# Patient Record
Sex: FEMALE | Race: WHITE | NOT HISPANIC OR LATINO | Employment: UNEMPLOYED | URBAN - METROPOLITAN AREA
[De-identification: names, ages, dates, MRNs, and addresses within clinical notes are randomized per-mention and may not be internally consistent; named-entity substitution may affect disease eponyms.]

---

## 2017-03-28 ENCOUNTER — ALLSCRIPTS OFFICE VISIT (OUTPATIENT)
Dept: OTHER | Facility: OTHER | Age: 42
End: 2017-03-28

## 2017-04-17 ENCOUNTER — HOSPITAL ENCOUNTER (INPATIENT)
Facility: HOSPITAL | Age: 42
LOS: 1 days | Discharge: HOME | DRG: 073 | End: 2017-04-19
Attending: EMERGENCY MEDICINE | Admitting: FAMILY MEDICINE
Payer: COMMERCIAL

## 2017-04-17 DIAGNOSIS — E11.69 DIABETES MELLITUS TYPE 2 IN OBESE (HCC): ICD-10-CM

## 2017-04-17 DIAGNOSIS — B00.7: ICD-10-CM

## 2017-04-17 DIAGNOSIS — B00.52 HERPES SIMPLEX KERATITIS OF LEFT EYE: ICD-10-CM

## 2017-04-17 DIAGNOSIS — F41.9 ANXIETY: ICD-10-CM

## 2017-04-17 DIAGNOSIS — I10 HYPERTENSION: ICD-10-CM

## 2017-04-17 DIAGNOSIS — IMO0001 IDDM (INSULIN DEPENDENT DIABETES MELLITUS): Primary | ICD-10-CM

## 2017-04-17 DIAGNOSIS — F32.A DEPRESSION: ICD-10-CM

## 2017-04-17 DIAGNOSIS — E66.9 DIABETES MELLITUS TYPE 2 IN OBESE (HCC): ICD-10-CM

## 2017-04-17 DIAGNOSIS — K13.79 MOUTH SORES: ICD-10-CM

## 2017-04-17 RX ORDER — LISINOPRIL AND HYDROCHLOROTHIAZIDE 20; 12.5 MG/1; MG/1
1 TABLET ORAL 2 TIMES DAILY
COMMUNITY
End: 2018-06-06 | Stop reason: SDUPTHER

## 2017-04-17 RX ORDER — PAROXETINE HYDROCHLORIDE 40 MG/1
40 TABLET, FILM COATED ORAL EVERY MORNING
Status: ON HOLD | COMMUNITY
End: 2018-01-15 | Stop reason: ALTCHOICE

## 2017-04-17 RX ORDER — DIVALPROEX SODIUM 500 MG/1
500 TABLET, DELAYED RELEASE ORAL 2 TIMES DAILY
Status: ON HOLD | COMMUNITY
End: 2017-04-18 | Stop reason: ALTCHOICE

## 2017-04-17 RX ORDER — INSULIN GLARGINE 100 [IU]/ML
30 INJECTION, SOLUTION SUBCUTANEOUS
COMMUNITY
End: 2018-06-06 | Stop reason: CLARIF

## 2017-04-17 RX ORDER — GABAPENTIN 800 MG/1
800 TABLET ORAL 3 TIMES DAILY
COMMUNITY
End: 2018-06-06 | Stop reason: SDUPTHER

## 2017-04-18 PROBLEM — K13.79 MOUTH SORES: Status: ACTIVE | Noted: 2017-04-18

## 2017-04-18 PROBLEM — F32.A DEPRESSION: Status: ACTIVE | Noted: 2017-04-18

## 2017-04-18 PROBLEM — F41.9 ANXIETY: Status: ACTIVE | Noted: 2017-04-18

## 2017-04-18 PROBLEM — IMO0001 IDDM (INSULIN DEPENDENT DIABETES MELLITUS): Status: ACTIVE | Noted: 2017-04-18

## 2017-04-18 PROBLEM — I10 HYPERTENSION: Status: ACTIVE | Noted: 2017-04-18

## 2017-04-18 PROBLEM — B00.7: Status: ACTIVE | Noted: 2017-04-18

## 2017-04-18 LAB
AMPHETAMINES SERPL QL SCN: NEGATIVE
ANION GAP SERPL CALCULATED.3IONS-SCNC: 8 MMOL/L (ref 4–13)
ANION GAP SERPL CALCULATED.3IONS-SCNC: 9 MMOL/L (ref 4–13)
BARBITURATES UR QL: NEGATIVE
BASOPHILS # BLD AUTO: 0 THOUSANDS/ΜL (ref 0–0.1)
BASOPHILS # BLD AUTO: 0 THOUSANDS/ΜL (ref 0–0.1)
BASOPHILS NFR BLD AUTO: 0 % (ref 0–1)
BASOPHILS NFR BLD AUTO: 1 % (ref 0–1)
BENZODIAZ UR QL: NEGATIVE
BUN SERPL-MCNC: 14 MG/DL (ref 5–25)
BUN SERPL-MCNC: 14 MG/DL (ref 5–25)
CALCIUM SERPL-MCNC: 8.6 MG/DL (ref 8.3–10.1)
CALCIUM SERPL-MCNC: 9.7 MG/DL (ref 8.3–10.1)
CHLORIDE SERPL-SCNC: 102 MMOL/L (ref 100–108)
CHLORIDE SERPL-SCNC: 97 MMOL/L (ref 100–108)
CHOLEST SERPL-MCNC: 172 MG/DL (ref 50–200)
CO2 SERPL-SCNC: 29 MMOL/L (ref 21–32)
CO2 SERPL-SCNC: 30 MMOL/L (ref 21–32)
COCAINE UR QL: NEGATIVE
CREAT SERPL-MCNC: 0.7 MG/DL (ref 0.6–1.3)
CREAT SERPL-MCNC: 0.83 MG/DL (ref 0.6–1.3)
EOSINOPHIL # BLD AUTO: 0 THOUSAND/ΜL (ref 0–0.61)
EOSINOPHIL # BLD AUTO: 0.1 THOUSAND/ΜL (ref 0–0.61)
EOSINOPHIL NFR BLD AUTO: 1 % (ref 0–6)
EOSINOPHIL NFR BLD AUTO: 1 % (ref 0–6)
ERYTHROCYTE [DISTWIDTH] IN BLOOD BY AUTOMATED COUNT: 13.4 % (ref 11.6–15.1)
ERYTHROCYTE [DISTWIDTH] IN BLOOD BY AUTOMATED COUNT: 13.6 % (ref 11.6–15.1)
EST. AVERAGE GLUCOSE BLD GHB EST-MCNC: 166 MG/DL
GFR SERPL CREATININE-BSD FRML MDRD: >60 ML/MIN/1.73SQ M
GFR SERPL CREATININE-BSD FRML MDRD: >60 ML/MIN/1.73SQ M
GLUCOSE SERPL-MCNC: 179 MG/DL (ref 65–140)
GLUCOSE SERPL-MCNC: 196 MG/DL (ref 65–140)
GLUCOSE SERPL-MCNC: 230 MG/DL (ref 65–140)
GLUCOSE SERPL-MCNC: 297 MG/DL (ref 65–140)
GLUCOSE SERPL-MCNC: 302 MG/DL (ref 65–140)
GLUCOSE SERPL-MCNC: 357 MG/DL (ref 65–140)
HBA1C MFR BLD: 7.4 % (ref 4.2–6.3)
HCT VFR BLD AUTO: 37 % (ref 37–47)
HCT VFR BLD AUTO: 42 % (ref 37–47)
HDLC SERPL-MCNC: 33 MG/DL (ref 40–60)
HGB BLD-MCNC: 12.5 G/DL (ref 12–16)
HGB BLD-MCNC: 14.1 G/DL (ref 12–16)
LDLC SERPL CALC-MCNC: 106 MG/DL (ref 0–100)
LYMPHOCYTES # BLD AUTO: 1.2 THOUSANDS/ΜL (ref 0.6–4.47)
LYMPHOCYTES # BLD AUTO: 2.7 THOUSANDS/ΜL (ref 0.6–4.47)
LYMPHOCYTES NFR BLD AUTO: 15 % (ref 14–44)
LYMPHOCYTES NFR BLD AUTO: 34 % (ref 14–44)
MAGNESIUM SERPL-MCNC: 1.7 MG/DL (ref 1.6–2.6)
MCH RBC QN AUTO: 29.3 PG (ref 27–31)
MCH RBC QN AUTO: 29.4 PG (ref 27–31)
MCHC RBC AUTO-ENTMCNC: 33.5 G/DL (ref 31.4–37.4)
MCHC RBC AUTO-ENTMCNC: 33.7 G/DL (ref 31.4–37.4)
MCV RBC AUTO: 87 FL (ref 82–98)
MCV RBC AUTO: 87 FL (ref 82–98)
METHADONE UR QL: NEGATIVE
MONOCYTES # BLD AUTO: 0.2 THOUSAND/ΜL (ref 0.17–1.22)
MONOCYTES # BLD AUTO: 0.5 THOUSAND/ΜL (ref 0.17–1.22)
MONOCYTES NFR BLD AUTO: 3 % (ref 4–12)
MONOCYTES NFR BLD AUTO: 7 % (ref 4–12)
NEUTROPHILS # BLD AUTO: 4.6 THOUSANDS/ΜL (ref 1.85–7.62)
NEUTROPHILS # BLD AUTO: 6.3 THOUSANDS/ΜL (ref 1.85–7.62)
NEUTS SEG NFR BLD AUTO: 58 % (ref 43–75)
NEUTS SEG NFR BLD AUTO: 82 % (ref 43–75)
NRBC BLD AUTO-RTO: 0 /100 WBCS
NRBC BLD AUTO-RTO: 0 /100 WBCS
OPIATES UR QL SCN: POSITIVE
PCP UR QL: NEGATIVE
PHOSPHATE SERPL-MCNC: 1.8 MG/DL (ref 2.7–4.5)
PLATELET # BLD AUTO: 308 THOUSANDS/UL (ref 130–400)
PLATELET # BLD AUTO: 358 THOUSANDS/UL (ref 130–400)
PMV BLD AUTO: 6.8 FL (ref 8.9–12.7)
PMV BLD AUTO: 7 FL (ref 8.9–12.7)
POTASSIUM SERPL-SCNC: 3.6 MMOL/L (ref 3.5–5.3)
POTASSIUM SERPL-SCNC: 3.8 MMOL/L (ref 3.5–5.3)
RBC # BLD AUTO: 4.25 MILLION/UL (ref 4.2–5.4)
RBC # BLD AUTO: 4.81 MILLION/UL (ref 4.2–5.4)
SODIUM SERPL-SCNC: 136 MMOL/L (ref 136–145)
SODIUM SERPL-SCNC: 139 MMOL/L (ref 136–145)
THC UR QL: NEGATIVE
TRIGL SERPL-MCNC: 164 MG/DL
TSH SERPL DL<=0.05 MIU/L-ACNC: 2.16 UIU/ML (ref 0.36–3.74)
WBC # BLD AUTO: 7.8 THOUSAND/UL (ref 4.8–10.8)
WBC # BLD AUTO: 7.9 THOUSAND/UL (ref 4.8–10.8)

## 2017-04-18 PROCEDURE — 80048 BASIC METABOLIC PNL TOTAL CA: CPT | Performed by: FAMILY MEDICINE

## 2017-04-18 PROCEDURE — 87389 HIV-1 AG W/HIV-1&-2 AB AG IA: CPT | Performed by: SPECIALIST

## 2017-04-18 PROCEDURE — 80048 BASIC METABOLIC PNL TOTAL CA: CPT | Performed by: EMERGENCY MEDICINE

## 2017-04-18 PROCEDURE — 86695 HERPES SIMPLEX TYPE 1 TEST: CPT | Performed by: FAMILY MEDICINE

## 2017-04-18 PROCEDURE — 87081 CULTURE SCREEN ONLY: CPT | Performed by: FAMILY MEDICINE

## 2017-04-18 PROCEDURE — 83036 HEMOGLOBIN GLYCOSYLATED A1C: CPT | Performed by: FAMILY MEDICINE

## 2017-04-18 PROCEDURE — 85025 COMPLETE CBC W/AUTO DIFF WBC: CPT | Performed by: FAMILY MEDICINE

## 2017-04-18 PROCEDURE — 86694 HERPES SIMPLEX NES ANTBDY: CPT | Performed by: FAMILY MEDICINE

## 2017-04-18 PROCEDURE — 80061 LIPID PANEL: CPT | Performed by: FAMILY MEDICINE

## 2017-04-18 PROCEDURE — 84100 ASSAY OF PHOSPHORUS: CPT | Performed by: FAMILY MEDICINE

## 2017-04-18 PROCEDURE — 86696 HERPES SIMPLEX TYPE 2 TEST: CPT | Performed by: FAMILY MEDICINE

## 2017-04-18 PROCEDURE — 87070 CULTURE OTHR SPECIMN AEROBIC: CPT | Performed by: EMERGENCY MEDICINE

## 2017-04-18 PROCEDURE — 87252 VIRUS INOCULATION TISSUE: CPT | Performed by: EMERGENCY MEDICINE

## 2017-04-18 PROCEDURE — 99284 EMERGENCY DEPT VISIT MOD MDM: CPT

## 2017-04-18 PROCEDURE — 80307 DRUG TEST PRSMV CHEM ANLYZR: CPT | Performed by: FAMILY MEDICINE

## 2017-04-18 PROCEDURE — 83735 ASSAY OF MAGNESIUM: CPT | Performed by: FAMILY MEDICINE

## 2017-04-18 PROCEDURE — 82948 REAGENT STRIP/BLOOD GLUCOSE: CPT

## 2017-04-18 PROCEDURE — 84443 ASSAY THYROID STIM HORMONE: CPT | Performed by: FAMILY MEDICINE

## 2017-04-18 PROCEDURE — 87205 SMEAR GRAM STAIN: CPT | Performed by: EMERGENCY MEDICINE

## 2017-04-18 PROCEDURE — 85025 COMPLETE CBC W/AUTO DIFF WBC: CPT | Performed by: EMERGENCY MEDICINE

## 2017-04-18 PROCEDURE — 36415 COLL VENOUS BLD VENIPUNCTURE: CPT | Performed by: EMERGENCY MEDICINE

## 2017-04-18 RX ORDER — CLONAZEPAM 0.5 MG/1
0.5 TABLET ORAL DAILY PRN
Status: DISCONTINUED | OUTPATIENT
Start: 2017-04-18 | End: 2017-04-19 | Stop reason: HOSPADM

## 2017-04-18 RX ORDER — CLONAZEPAM 0.5 MG/1
0.5 TABLET ORAL 2 TIMES DAILY
Status: DISCONTINUED | OUTPATIENT
Start: 2017-04-18 | End: 2017-04-19 | Stop reason: HOSPADM

## 2017-04-18 RX ORDER — TETRACAINE HYDROCHLORIDE 5 MG/ML
2 SOLUTION OPHTHALMIC ONCE
Status: COMPLETED | OUTPATIENT
Start: 2017-04-18 | End: 2017-04-18

## 2017-04-18 RX ORDER — TRAMADOL HYDROCHLORIDE 50 MG/1
50 TABLET ORAL EVERY 6 HOURS PRN
Status: DISCONTINUED | OUTPATIENT
Start: 2017-04-18 | End: 2017-04-19 | Stop reason: HOSPADM

## 2017-04-18 RX ORDER — INSULIN GLARGINE 100 [IU]/ML
30 INJECTION, SOLUTION SUBCUTANEOUS
Status: DISCONTINUED | OUTPATIENT
Start: 2017-04-18 | End: 2017-04-19 | Stop reason: HOSPADM

## 2017-04-18 RX ORDER — CLONAZEPAM 0.5 MG/1
0.5 TABLET ORAL 2 TIMES DAILY
COMMUNITY
End: 2018-06-06 | Stop reason: ALTCHOICE

## 2017-04-18 RX ORDER — PREDNISONE 20 MG/1
60 TABLET ORAL DAILY
Status: DISCONTINUED | OUTPATIENT
Start: 2017-04-18 | End: 2017-04-18

## 2017-04-18 RX ORDER — GABAPENTIN 400 MG/1
800 CAPSULE ORAL 3 TIMES DAILY
Status: DISCONTINUED | OUTPATIENT
Start: 2017-04-18 | End: 2017-04-18

## 2017-04-18 RX ORDER — PAROXETINE HYDROCHLORIDE 20 MG/1
40 TABLET, FILM COATED ORAL EVERY MORNING
Status: DISCONTINUED | OUTPATIENT
Start: 2017-04-18 | End: 2017-04-19 | Stop reason: HOSPADM

## 2017-04-18 RX ORDER — GABAPENTIN 300 MG/1
900 CAPSULE ORAL 3 TIMES DAILY
Status: DISCONTINUED | OUTPATIENT
Start: 2017-04-18 | End: 2017-04-19 | Stop reason: HOSPADM

## 2017-04-18 RX ORDER — ACETAMINOPHEN 325 MG/1
650 TABLET ORAL EVERY 6 HOURS PRN
Status: DISCONTINUED | OUTPATIENT
Start: 2017-04-18 | End: 2017-04-19 | Stop reason: HOSPADM

## 2017-04-18 RX ADMIN — TETRACAINE HYDROCHLORIDE 2 DROP: 5 SOLUTION OPHTHALMIC at 00:32

## 2017-04-18 RX ADMIN — TRAMADOL HYDROCHLORIDE 50 MG: 50 TABLET, FILM COATED ORAL at 02:48

## 2017-04-18 RX ADMIN — TETRACAINE HYDROCHLORIDE 2 DROP: 5 SOLUTION OPHTHALMIC at 00:31

## 2017-04-18 RX ADMIN — INSULIN LISPRO 6 UNITS: 100 INJECTION, SOLUTION INTRAVENOUS; SUBCUTANEOUS at 13:40

## 2017-04-18 RX ADMIN — LISINOPRIL: 20 TABLET ORAL at 09:11

## 2017-04-18 RX ADMIN — CLONAZEPAM 0.5 MG: 0.5 TABLET ORAL at 13:39

## 2017-04-18 RX ADMIN — SODIUM CHLORIDE 1000 ML: 0.9 INJECTION, SOLUTION INTRAVENOUS at 00:36

## 2017-04-18 RX ADMIN — TRAMADOL HYDROCHLORIDE 50 MG: 50 TABLET, FILM COATED ORAL at 20:24

## 2017-04-18 RX ADMIN — GABAPENTIN 900 MG: 300 CAPSULE ORAL at 20:24

## 2017-04-18 RX ADMIN — PAROXETINE HYDROCHLORIDE 40 MG: 20 TABLET, FILM COATED ORAL at 09:11

## 2017-04-18 RX ADMIN — SODIUM PHOSPHATE, MONOBASIC, MONOHYDRATE AND SODIUM PHOSPHATE, DIBASIC, ANHYDROUS 30 MMOL: 276; 142 INJECTION, SOLUTION INTRAVENOUS at 09:01

## 2017-04-18 RX ADMIN — ACYCLOVIR SODIUM 1000 MG: 50 INJECTION, SOLUTION INTRAVENOUS at 18:44

## 2017-04-18 RX ADMIN — INSULIN LISPRO 4 UNITS: 100 INJECTION, SOLUTION INTRAVENOUS; SUBCUTANEOUS at 09:12

## 2017-04-18 RX ADMIN — GABAPENTIN 900 MG: 300 CAPSULE ORAL at 16:43

## 2017-04-18 RX ADMIN — GABAPENTIN 900 MG: 300 CAPSULE ORAL at 09:11

## 2017-04-18 RX ADMIN — ACYCLOVIR SODIUM 1000 MG: 50 INJECTION, SOLUTION INTRAVENOUS at 11:09

## 2017-04-18 RX ADMIN — FLUORESCEIN SODIUM 1 STRIP: 1 STRIP OPHTHALMIC at 00:32

## 2017-04-18 RX ADMIN — INSULIN LISPRO 2 UNITS: 100 INJECTION, SOLUTION INTRAVENOUS; SUBCUTANEOUS at 16:43

## 2017-04-18 RX ADMIN — TRAMADOL HYDROCHLORIDE 50 MG: 50 TABLET, FILM COATED ORAL at 09:11

## 2017-04-18 RX ADMIN — INSULIN LISPRO 1 UNITS: 100 INJECTION, SOLUTION INTRAVENOUS; SUBCUTANEOUS at 23:23

## 2017-04-18 RX ADMIN — CLONAZEPAM 0.5 MG: 0.5 TABLET ORAL at 20:24

## 2017-04-18 RX ADMIN — PREDNISONE 60 MG: 20 TABLET ORAL at 02:48

## 2017-04-18 RX ADMIN — ENOXAPARIN SODIUM 40 MG: 40 INJECTION SUBCUTANEOUS at 09:11

## 2017-04-18 RX ADMIN — INSULIN GLARGINE 30 UNITS: 100 INJECTION, SOLUTION SUBCUTANEOUS at 23:23

## 2017-04-18 RX ADMIN — ACYCLOVIR SODIUM 1000 MG: 50 INJECTION, SOLUTION INTRAVENOUS at 02:00

## 2017-04-19 VITALS
HEIGHT: 63 IN | BODY MASS INDEX: 39.87 KG/M2 | SYSTOLIC BLOOD PRESSURE: 133 MMHG | HEART RATE: 60 BPM | TEMPERATURE: 96.4 F | DIASTOLIC BLOOD PRESSURE: 74 MMHG | WEIGHT: 225 LBS | OXYGEN SATURATION: 100 % | RESPIRATION RATE: 18 BRPM

## 2017-04-19 LAB
ANION GAP SERPL CALCULATED.3IONS-SCNC: 4 MMOL/L (ref 4–13)
BASOPHILS # BLD AUTO: 0 THOUSANDS/ΜL (ref 0–0.1)
BASOPHILS NFR BLD AUTO: 0 % (ref 0–1)
BUN SERPL-MCNC: 16 MG/DL (ref 5–25)
CALCIUM SERPL-MCNC: 8.7 MG/DL (ref 8.3–10.1)
CHLORIDE SERPL-SCNC: 104 MMOL/L (ref 100–108)
CO2 SERPL-SCNC: 34 MMOL/L (ref 21–32)
CREAT SERPL-MCNC: 0.71 MG/DL (ref 0.6–1.3)
EOSINOPHIL # BLD AUTO: 0.1 THOUSAND/ΜL (ref 0–0.61)
EOSINOPHIL NFR BLD AUTO: 1 % (ref 0–6)
ERYTHROCYTE [DISTWIDTH] IN BLOOD BY AUTOMATED COUNT: 13.8 % (ref 11.6–15.1)
GFR SERPL CREATININE-BSD FRML MDRD: >60 ML/MIN/1.73SQ M
GLUCOSE SERPL-MCNC: 131 MG/DL (ref 65–140)
GLUCOSE SERPL-MCNC: 139 MG/DL (ref 65–140)
GLUCOSE SERPL-MCNC: 245 MG/DL (ref 65–140)
HCT VFR BLD AUTO: 35.3 % (ref 37–47)
HGB BLD-MCNC: 12 G/DL (ref 12–16)
HIV 1+2 AB+HIV1 P24 AG SERPL QL IA: NORMAL
HSV1 IGG SER IA-ACNC: 44.9 INDEX (ref 0–0.9)
HSV2 IGG SER IA-ACNC: <0.91 INDEX (ref 0–0.9)
LYMPHOCYTES # BLD AUTO: 3.8 THOUSANDS/ΜL (ref 0.6–4.47)
LYMPHOCYTES NFR BLD AUTO: 45 % (ref 14–44)
MAGNESIUM SERPL-MCNC: 1.7 MG/DL (ref 1.6–2.6)
MCH RBC QN AUTO: 29.5 PG (ref 27–31)
MCHC RBC AUTO-ENTMCNC: 34 G/DL (ref 31.4–37.4)
MCV RBC AUTO: 87 FL (ref 82–98)
MONOCYTES # BLD AUTO: 0.5 THOUSAND/ΜL (ref 0.17–1.22)
MONOCYTES NFR BLD AUTO: 6 % (ref 4–12)
MRSA NOSE QL CULT: NORMAL
NEUTROPHILS # BLD AUTO: 4.1 THOUSANDS/ΜL (ref 1.85–7.62)
NEUTS SEG NFR BLD AUTO: 48 % (ref 43–75)
NRBC BLD AUTO-RTO: 0 /100 WBCS
PLATELET # BLD AUTO: 301 THOUSANDS/UL (ref 130–400)
PMV BLD AUTO: 6.8 FL (ref 8.9–12.7)
POTASSIUM SERPL-SCNC: 3.3 MMOL/L (ref 3.5–5.3)
RBC # BLD AUTO: 4.07 MILLION/UL (ref 4.2–5.4)
SODIUM SERPL-SCNC: 142 MMOL/L (ref 136–145)
WBC # BLD AUTO: 8.5 THOUSAND/UL (ref 4.8–10.8)

## 2017-04-19 PROCEDURE — 83735 ASSAY OF MAGNESIUM: CPT | Performed by: FAMILY MEDICINE

## 2017-04-19 PROCEDURE — 80048 BASIC METABOLIC PNL TOTAL CA: CPT | Performed by: FAMILY MEDICINE

## 2017-04-19 PROCEDURE — 85025 COMPLETE CBC W/AUTO DIFF WBC: CPT | Performed by: FAMILY MEDICINE

## 2017-04-19 PROCEDURE — 82948 REAGENT STRIP/BLOOD GLUCOSE: CPT

## 2017-04-19 RX ORDER — DIVALPROEX SODIUM 500 MG/1
500 TABLET, EXTENDED RELEASE ORAL
Status: DISCONTINUED | OUTPATIENT
Start: 2017-04-19 | End: 2017-04-19 | Stop reason: HOSPADM

## 2017-04-19 RX ORDER — CLONAZEPAM 0.5 MG/1
0.5 TABLET ORAL ONCE
Status: COMPLETED | OUTPATIENT
Start: 2017-04-19 | End: 2017-04-19

## 2017-04-19 RX ORDER — LIDOCAINE 40 MG/G
CREAM TOPICAL ONCE
Status: COMPLETED | OUTPATIENT
Start: 2017-04-19 | End: 2017-04-19

## 2017-04-19 RX ORDER — CLONAZEPAM 0.5 MG/1
0.5 TABLET ORAL 2 TIMES DAILY PRN
Qty: 20 TABLET | Refills: 0 | Status: SHIPPED | OUTPATIENT
Start: 2017-04-19 | End: 2017-07-03 | Stop reason: HOSPADM

## 2017-04-19 RX ORDER — ACYCLOVIR 200 MG/1
200 CAPSULE ORAL
Qty: 25 CAPSULE | Refills: 0 | Status: SHIPPED | OUTPATIENT
Start: 2017-04-19 | End: 2017-06-07

## 2017-04-19 RX ORDER — DIVALPROEX SODIUM 500 MG/1
500 TABLET, EXTENDED RELEASE ORAL
Qty: 14 TABLET | Refills: 0 | Status: SHIPPED | OUTPATIENT
Start: 2017-04-19 | End: 2018-10-04

## 2017-04-19 RX ORDER — LIDOCAINE 40 MG/G
1 CREAM TOPICAL DAILY
Qty: 30 G | Refills: 0 | Status: SHIPPED | OUTPATIENT
Start: 2017-04-19 | End: 2017-06-29 | Stop reason: ALTCHOICE

## 2017-04-19 RX ORDER — POTASSIUM CHLORIDE 20 MEQ/1
40 TABLET, EXTENDED RELEASE ORAL ONCE
Status: COMPLETED | OUTPATIENT
Start: 2017-04-19 | End: 2017-04-19

## 2017-04-19 RX ADMIN — LISINOPRIL: 20 TABLET ORAL at 09:10

## 2017-04-19 RX ADMIN — ACYCLOVIR SODIUM 1000 MG: 50 INJECTION, SOLUTION INTRAVENOUS at 11:12

## 2017-04-19 RX ADMIN — ENOXAPARIN SODIUM 40 MG: 40 INJECTION SUBCUTANEOUS at 09:12

## 2017-04-19 RX ADMIN — CLONAZEPAM 0.5 MG: 0.5 TABLET ORAL at 03:50

## 2017-04-19 RX ADMIN — TRAMADOL HYDROCHLORIDE 50 MG: 50 TABLET, FILM COATED ORAL at 09:26

## 2017-04-19 RX ADMIN — PAROXETINE HYDROCHLORIDE 40 MG: 20 TABLET, FILM COATED ORAL at 09:11

## 2017-04-19 RX ADMIN — POTASSIUM CHLORIDE 40 MEQ: 1500 TABLET, EXTENDED RELEASE ORAL at 11:10

## 2017-04-19 RX ADMIN — GABAPENTIN 900 MG: 300 CAPSULE ORAL at 09:11

## 2017-04-19 RX ADMIN — INSULIN LISPRO 3 UNITS: 100 INJECTION, SOLUTION INTRAVENOUS; SUBCUTANEOUS at 12:57

## 2017-04-19 RX ADMIN — CLONAZEPAM 0.5 MG: 0.5 TABLET ORAL at 09:12

## 2017-04-19 RX ADMIN — LIDOCAINE 4% 1 APPLICATION: 4 CREAM TOPICAL at 13:11

## 2017-04-19 RX ADMIN — ACYCLOVIR SODIUM 1000 MG: 50 INJECTION, SOLUTION INTRAVENOUS at 03:50

## 2017-04-20 ENCOUNTER — GENERIC CONVERSION - ENCOUNTER (OUTPATIENT)
Dept: OTHER | Facility: OTHER | Age: 42
End: 2017-04-20

## 2017-04-20 LAB
BACTERIA WND AEROBE CULT: NORMAL
GRAM STN SPEC: NORMAL
HSV1+2 IGM SER IA-ACNC: <0.91 RATIO (ref 0–0.9)

## 2017-04-21 ENCOUNTER — GENERIC CONVERSION - ENCOUNTER (OUTPATIENT)
Dept: OTHER | Facility: OTHER | Age: 42
End: 2017-04-21

## 2017-04-26 LAB — VIRUS SPEC CULT: ABNORMAL

## 2017-05-01 ENCOUNTER — GENERIC CONVERSION - ENCOUNTER (OUTPATIENT)
Dept: OTHER | Facility: OTHER | Age: 42
End: 2017-05-01

## 2017-06-07 ENCOUNTER — APPOINTMENT (EMERGENCY)
Dept: RADIOLOGY | Facility: HOSPITAL | Age: 42
End: 2017-06-07
Payer: COMMERCIAL

## 2017-06-07 ENCOUNTER — HOSPITAL ENCOUNTER (EMERGENCY)
Facility: HOSPITAL | Age: 42
Discharge: HOME | End: 2017-06-07
Attending: EMERGENCY MEDICINE | Admitting: EMERGENCY MEDICINE
Payer: COMMERCIAL

## 2017-06-07 VITALS
HEART RATE: 85 BPM | RESPIRATION RATE: 20 BRPM | TEMPERATURE: 97.1 F | HEIGHT: 63 IN | OXYGEN SATURATION: 94 % | SYSTOLIC BLOOD PRESSURE: 121 MMHG | DIASTOLIC BLOOD PRESSURE: 68 MMHG | WEIGHT: 247 LBS | BODY MASS INDEX: 43.77 KG/M2

## 2017-06-07 DIAGNOSIS — M54.9 MUSCULOSKELETAL BACK PAIN: Primary | ICD-10-CM

## 2017-06-07 LAB
BILIRUB UR QL STRIP: NEGATIVE
CLARITY UR: CLEAR
COLOR UR: YELLOW
GLUCOSE UR STRIP-MCNC: NEGATIVE MG/DL
HGB UR QL STRIP.AUTO: NEGATIVE
KETONES UR STRIP-MCNC: NEGATIVE MG/DL
LEUKOCYTE ESTERASE UR QL STRIP: NEGATIVE
NITRITE UR QL STRIP: NEGATIVE
PH UR STRIP.AUTO: 7 [PH] (ref 5–9)
PROT UR STRIP-MCNC: NEGATIVE MG/DL
SP GR UR STRIP.AUTO: 1.01 (ref 1–1.03)
UROBILINOGEN UR QL STRIP.AUTO: 2 E.U./DL

## 2017-06-07 PROCEDURE — 72100 X-RAY EXAM L-S SPINE 2/3 VWS: CPT

## 2017-06-07 PROCEDURE — 71020 HB CHEST X-RAY 2VW FRONTAL&LATL: CPT

## 2017-06-07 PROCEDURE — 81003 URINALYSIS AUTO W/O SCOPE: CPT | Performed by: EMERGENCY MEDICINE

## 2017-06-07 PROCEDURE — 99283 EMERGENCY DEPT VISIT LOW MDM: CPT

## 2017-06-07 RX ORDER — METHYLPREDNISOLONE 4 MG/1
TABLET ORAL
Qty: 24 TABLET | Refills: 0 | Status: SHIPPED | OUTPATIENT
Start: 2017-06-07 | End: 2017-06-29 | Stop reason: ALTCHOICE

## 2017-06-07 RX ORDER — PREDNISONE 20 MG/1
60 TABLET ORAL ONCE
Status: COMPLETED | OUTPATIENT
Start: 2017-06-07 | End: 2017-06-07

## 2017-06-07 RX ORDER — OXYCODONE HYDROCHLORIDE AND ACETAMINOPHEN 5; 325 MG/1; MG/1
1 TABLET ORAL EVERY 4 HOURS PRN
Qty: 15 TABLET | Refills: 0 | Status: SHIPPED | OUTPATIENT
Start: 2017-06-07 | End: 2017-06-29 | Stop reason: ALTCHOICE

## 2017-06-07 RX ORDER — CYCLOBENZAPRINE HCL 10 MG
10 TABLET ORAL 3 TIMES DAILY PRN
Qty: 15 TABLET | Refills: 0 | Status: SHIPPED | OUTPATIENT
Start: 2017-06-07 | End: 2018-06-06 | Stop reason: SDUPTHER

## 2017-06-07 RX ORDER — OXYCODONE HYDROCHLORIDE AND ACETAMINOPHEN 5; 325 MG/1; MG/1
1 TABLET ORAL ONCE
Status: COMPLETED | OUTPATIENT
Start: 2017-06-07 | End: 2017-06-07

## 2017-06-07 RX ORDER — CYCLOBENZAPRINE HCL 10 MG
10 TABLET ORAL ONCE
Status: COMPLETED | OUTPATIENT
Start: 2017-06-07 | End: 2017-06-07

## 2017-06-07 RX ADMIN — OXYCODONE HYDROCHLORIDE AND ACETAMINOPHEN 1 TABLET: 5; 325 TABLET ORAL at 21:24

## 2017-06-07 RX ADMIN — CYCLOBENZAPRINE HYDROCHLORIDE 10 MG: 10 TABLET, FILM COATED ORAL at 21:25

## 2017-06-07 RX ADMIN — PREDNISONE 60 MG: 20 TABLET ORAL at 22:41

## 2017-06-08 ENCOUNTER — GENERIC CONVERSION - ENCOUNTER (OUTPATIENT)
Dept: OTHER | Facility: OTHER | Age: 42
End: 2017-06-08

## 2017-06-23 ENCOUNTER — ALLSCRIPTS OFFICE VISIT (OUTPATIENT)
Dept: OTHER | Facility: OTHER | Age: 42
End: 2017-06-23

## 2017-06-23 ENCOUNTER — GENERIC CONVERSION - ENCOUNTER (OUTPATIENT)
Dept: OTHER | Facility: OTHER | Age: 42
End: 2017-06-23

## 2017-06-23 DIAGNOSIS — T14.8XXA OTHER INJURY OF UNSPECIFIED BODY REGION: ICD-10-CM

## 2017-06-26 ENCOUNTER — ALLSCRIPTS OFFICE VISIT (OUTPATIENT)
Dept: OTHER | Facility: OTHER | Age: 42
End: 2017-06-26

## 2017-06-29 ENCOUNTER — HOSPITAL ENCOUNTER (INPATIENT)
Facility: HOSPITAL | Age: 42
LOS: 3 days | Discharge: HOME | DRG: 277 | End: 2017-07-03
Attending: EMERGENCY MEDICINE | Admitting: FAMILY MEDICINE
Payer: COMMERCIAL

## 2017-06-29 DIAGNOSIS — N28.9 RENAL INSUFFICIENCY: ICD-10-CM

## 2017-06-29 DIAGNOSIS — R69 DIAGNOSIS UNKNOWN: ICD-10-CM

## 2017-06-29 DIAGNOSIS — E66.01 MORBIDLY OBESE (HCC): ICD-10-CM

## 2017-06-29 DIAGNOSIS — L03.115 CELLULITIS OF RIGHT LOWER EXTREMITY: Primary | ICD-10-CM

## 2017-06-29 DIAGNOSIS — L02.93 CARBUNCLE: ICD-10-CM

## 2017-06-29 LAB
ALBUMIN SERPL BCP-MCNC: 3 G/DL (ref 3.5–5)
ALP SERPL-CCNC: 58 U/L (ref 46–116)
ALT SERPL W P-5'-P-CCNC: 23 U/L (ref 12–78)
ANION GAP SERPL CALCULATED.3IONS-SCNC: 10 MMOL/L (ref 4–13)
AST SERPL W P-5'-P-CCNC: 15 U/L (ref 5–45)
BASOPHILS # BLD AUTO: 0 THOUSANDS/ΜL (ref 0–0.1)
BASOPHILS NFR BLD AUTO: 0 % (ref 0–1)
BILIRUB SERPL-MCNC: 0.3 MG/DL (ref 0.2–1)
BUN SERPL-MCNC: 34 MG/DL (ref 5–25)
CALCIUM SERPL-MCNC: 9.1 MG/DL (ref 8.3–10.1)
CHLORIDE SERPL-SCNC: 95 MMOL/L (ref 100–108)
CO2 SERPL-SCNC: 29 MMOL/L (ref 21–32)
CREAT SERPL-MCNC: 1.65 MG/DL (ref 0.6–1.3)
EOSINOPHIL # BLD AUTO: 0.2 THOUSAND/ΜL (ref 0–0.61)
EOSINOPHIL NFR BLD AUTO: 2 % (ref 0–6)
ERYTHROCYTE [DISTWIDTH] IN BLOOD BY AUTOMATED COUNT: 13.8 % (ref 11.6–15.1)
GFR SERPL CREATININE-BSD FRML MDRD: 34.5 ML/MIN/1.73SQ M
GLUCOSE SERPL-MCNC: 219 MG/DL (ref 65–140)
HCT VFR BLD AUTO: 35.3 % (ref 37–47)
HGB BLD-MCNC: 11.9 G/DL (ref 12–16)
LYMPHOCYTES # BLD AUTO: 2 THOUSANDS/ΜL (ref 0.6–4.47)
LYMPHOCYTES NFR BLD AUTO: 21 % (ref 14–44)
MCH RBC QN AUTO: 28.8 PG (ref 27–31)
MCHC RBC AUTO-ENTMCNC: 33.7 G/DL (ref 31.4–37.4)
MCV RBC AUTO: 85 FL (ref 82–98)
MONOCYTES # BLD AUTO: 0.7 THOUSAND/ΜL (ref 0.17–1.22)
MONOCYTES NFR BLD AUTO: 7 % (ref 4–12)
NEUTROPHILS # BLD AUTO: 6.7 THOUSANDS/ΜL (ref 1.85–7.62)
NEUTS SEG NFR BLD AUTO: 70 % (ref 43–75)
NRBC BLD AUTO-RTO: 0 /100 WBCS
PLATELET # BLD AUTO: 263 THOUSANDS/UL (ref 130–400)
PMV BLD AUTO: 6.3 FL (ref 8.9–12.7)
POTASSIUM SERPL-SCNC: 4.3 MMOL/L (ref 3.5–5.3)
PROT SERPL-MCNC: 8.3 G/DL (ref 6.4–8.2)
RBC # BLD AUTO: 4.14 MILLION/UL (ref 4.2–5.4)
SODIUM SERPL-SCNC: 134 MMOL/L (ref 136–145)
WBC # BLD AUTO: 9.6 THOUSAND/UL (ref 4.8–10.8)

## 2017-06-29 PROCEDURE — 87040 BLOOD CULTURE FOR BACTERIA: CPT | Performed by: EMERGENCY MEDICINE

## 2017-06-29 PROCEDURE — 96361 HYDRATE IV INFUSION ADD-ON: CPT

## 2017-06-29 PROCEDURE — 80053 COMPREHEN METABOLIC PANEL: CPT | Performed by: EMERGENCY MEDICINE

## 2017-06-29 PROCEDURE — 99284 EMERGENCY DEPT VISIT MOD MDM: CPT

## 2017-06-29 PROCEDURE — 85025 COMPLETE CBC W/AUTO DIFF WBC: CPT | Performed by: EMERGENCY MEDICINE

## 2017-06-29 PROCEDURE — 96375 TX/PRO/DX INJ NEW DRUG ADDON: CPT

## 2017-06-29 PROCEDURE — 96365 THER/PROPH/DIAG IV INF INIT: CPT

## 2017-06-29 PROCEDURE — 36415 COLL VENOUS BLD VENIPUNCTURE: CPT | Performed by: EMERGENCY MEDICINE

## 2017-06-29 RX ORDER — DOXYCYCLINE HYCLATE 100 MG/1
100 TABLET, DELAYED RELEASE ORAL EVERY 8 HOURS
COMMUNITY
End: 2017-07-03 | Stop reason: HOSPADM

## 2017-06-29 RX ORDER — KETOROLAC TROMETHAMINE 30 MG/ML
30 INJECTION, SOLUTION INTRAMUSCULAR; INTRAVENOUS ONCE
Status: COMPLETED | OUTPATIENT
Start: 2017-06-29 | End: 2017-06-29

## 2017-06-29 RX ADMIN — KETOROLAC TROMETHAMINE 30 MG: 30 INJECTION, SOLUTION INTRAMUSCULAR at 22:17

## 2017-06-29 RX ADMIN — CEFAZOLIN SODIUM 2000 MG: 2 SOLUTION INTRAVENOUS at 21:47

## 2017-06-29 RX ADMIN — SODIUM CHLORIDE 1000 ML: 0.9 INJECTION, SOLUTION INTRAVENOUS at 21:47

## 2017-06-29 RX ADMIN — VANCOMYCIN HYDROCHLORIDE 1500 MG: 1 INJECTION, POWDER, LYOPHILIZED, FOR SOLUTION INTRAVENOUS at 23:06

## 2017-06-30 PROBLEM — L02.93 CARBUNCLE: Status: ACTIVE | Noted: 2017-06-30

## 2017-06-30 PROBLEM — N28.9 RENAL INSUFFICIENCY: Status: ACTIVE | Noted: 2017-06-30

## 2017-06-30 PROBLEM — E87.1 HYPONATREMIA: Status: ACTIVE | Noted: 2017-06-30

## 2017-06-30 PROBLEM — E66.01 MORBIDLY OBESE (HCC): Status: ACTIVE | Noted: 2017-06-30

## 2017-06-30 PROBLEM — F19.11 HISTORY OF DRUG ABUSE (HCC): Status: ACTIVE | Noted: 2017-06-30

## 2017-06-30 PROBLEM — L03.115 CELLULITIS OF RIGHT LOWER EXTREMITY: Status: ACTIVE | Noted: 2017-06-30

## 2017-06-30 PROBLEM — Z86.39 HISTORY OF HYPOTHYROIDISM: Status: ACTIVE | Noted: 2017-06-30

## 2017-06-30 PROBLEM — D64.9 ANEMIA: Status: ACTIVE | Noted: 2017-06-30

## 2017-06-30 PROBLEM — L02.91 ABSCESS: Status: ACTIVE | Noted: 2017-06-30

## 2017-06-30 LAB
AMPHETAMINES SERPL QL SCN: NEGATIVE
ANION GAP SERPL CALCULATED.3IONS-SCNC: 8 MMOL/L (ref 4–13)
BARBITURATES UR QL: NEGATIVE
BENZODIAZ UR QL: NEGATIVE
BUN SERPL-MCNC: 31 MG/DL (ref 5–25)
CALCIUM SERPL-MCNC: 8.4 MG/DL (ref 8.3–10.1)
CHLORIDE SERPL-SCNC: 100 MMOL/L (ref 100–108)
CO2 SERPL-SCNC: 26 MMOL/L (ref 21–32)
COCAINE UR QL: POSITIVE
CREAT SERPL-MCNC: 1.38 MG/DL (ref 0.6–1.3)
ERYTHROCYTE [DISTWIDTH] IN BLOOD BY AUTOMATED COUNT: 14.2 % (ref 11.6–15.1)
GFR SERPL CREATININE-BSD FRML MDRD: 42.3 ML/MIN/1.73SQ M
GLUCOSE SERPL-MCNC: 127 MG/DL (ref 65–140)
GLUCOSE SERPL-MCNC: 133 MG/DL (ref 65–140)
GLUCOSE SERPL-MCNC: 147 MG/DL (ref 65–140)
GLUCOSE SERPL-MCNC: 153 MG/DL (ref 65–140)
GLUCOSE SERPL-MCNC: 207 MG/DL (ref 65–140)
GLUCOSE SERPL-MCNC: 226 MG/DL (ref 65–140)
HCT VFR BLD AUTO: 29.8 % (ref 37–47)
HGB BLD-MCNC: 10 G/DL (ref 12–16)
MAGNESIUM SERPL-MCNC: 1.8 MG/DL (ref 1.6–2.6)
MCH RBC QN AUTO: 28.6 PG (ref 27–31)
MCHC RBC AUTO-ENTMCNC: 33.5 G/DL (ref 31.4–37.4)
MCV RBC AUTO: 86 FL (ref 82–98)
METHADONE UR QL: NEGATIVE
OPIATES UR QL SCN: POSITIVE
PCP UR QL: NEGATIVE
PHOSPHATE SERPL-MCNC: 3.7 MG/DL (ref 2.7–4.5)
PLATELET # BLD AUTO: 158 THOUSANDS/UL (ref 130–400)
PMV BLD AUTO: 6.2 FL (ref 8.9–12.7)
POTASSIUM SERPL-SCNC: 4.2 MMOL/L (ref 3.5–5.3)
RBC # BLD AUTO: 3.48 MILLION/UL (ref 4.2–5.4)
SODIUM SERPL-SCNC: 134 MMOL/L (ref 136–145)
THC UR QL: NEGATIVE
WBC # BLD AUTO: 7.1 THOUSAND/UL (ref 4.8–10.8)

## 2017-06-30 PROCEDURE — 82948 REAGENT STRIP/BLOOD GLUCOSE: CPT

## 2017-06-30 PROCEDURE — 80307 DRUG TEST PRSMV CHEM ANLYZR: CPT | Performed by: EMERGENCY MEDICINE

## 2017-06-30 PROCEDURE — 85027 COMPLETE CBC AUTOMATED: CPT | Performed by: FAMILY MEDICINE

## 2017-06-30 PROCEDURE — 87186 SC STD MICRODIL/AGAR DIL: CPT | Performed by: FAMILY MEDICINE

## 2017-06-30 PROCEDURE — 87147 CULTURE TYPE IMMUNOLOGIC: CPT | Performed by: FAMILY MEDICINE

## 2017-06-30 PROCEDURE — 80048 BASIC METABOLIC PNL TOTAL CA: CPT | Performed by: FAMILY MEDICINE

## 2017-06-30 PROCEDURE — 83735 ASSAY OF MAGNESIUM: CPT | Performed by: FAMILY MEDICINE

## 2017-06-30 PROCEDURE — 87081 CULTURE SCREEN ONLY: CPT | Performed by: FAMILY MEDICINE

## 2017-06-30 PROCEDURE — 84100 ASSAY OF PHOSPHORUS: CPT | Performed by: FAMILY MEDICINE

## 2017-06-30 PROCEDURE — 87070 CULTURE OTHR SPECIMN AEROBIC: CPT | Performed by: FAMILY MEDICINE

## 2017-06-30 PROCEDURE — 87205 SMEAR GRAM STAIN: CPT | Performed by: FAMILY MEDICINE

## 2017-06-30 PROCEDURE — 87077 CULTURE AEROBIC IDENTIFY: CPT | Performed by: FAMILY MEDICINE

## 2017-06-30 RX ORDER — GABAPENTIN 400 MG/1
800 CAPSULE ORAL 4 TIMES DAILY
Status: DISCONTINUED | OUTPATIENT
Start: 2017-06-30 | End: 2017-07-04 | Stop reason: HOSPADM

## 2017-06-30 RX ORDER — INSULIN GLARGINE 100 [IU]/ML
20 INJECTION, SOLUTION SUBCUTANEOUS
Status: DISCONTINUED | OUTPATIENT
Start: 2017-06-30 | End: 2017-07-02

## 2017-06-30 RX ORDER — PAROXETINE HYDROCHLORIDE 20 MG/1
40 TABLET, FILM COATED ORAL EVERY MORNING
Status: DISCONTINUED | OUTPATIENT
Start: 2017-06-30 | End: 2017-07-04 | Stop reason: HOSPADM

## 2017-06-30 RX ORDER — BUPRENORPHINE AND NALOXONE 8; 2 MG/1; MG/1
1 FILM, SOLUBLE BUCCAL; SUBLINGUAL DAILY
Status: DISCONTINUED | OUTPATIENT
Start: 2017-06-30 | End: 2017-07-04 | Stop reason: HOSPADM

## 2017-06-30 RX ORDER — DIVALPROEX SODIUM 500 MG/1
500 TABLET, EXTENDED RELEASE ORAL
Status: DISCONTINUED | OUTPATIENT
Start: 2017-06-30 | End: 2017-07-04 | Stop reason: HOSPADM

## 2017-06-30 RX ORDER — SODIUM CHLORIDE 9 MG/ML
75 INJECTION, SOLUTION INTRAVENOUS CONTINUOUS
Status: DISCONTINUED | OUTPATIENT
Start: 2017-06-30 | End: 2017-06-30

## 2017-06-30 RX ORDER — INSULIN GLARGINE 100 [IU]/ML
30 INJECTION, SOLUTION SUBCUTANEOUS
Status: DISCONTINUED | OUTPATIENT
Start: 2017-06-30 | End: 2017-06-30

## 2017-06-30 RX ORDER — HEPARIN SODIUM 5000 [USP'U]/ML
5000 INJECTION, SOLUTION INTRAVENOUS; SUBCUTANEOUS EVERY 8 HOURS SCHEDULED
Status: DISCONTINUED | OUTPATIENT
Start: 2017-06-30 | End: 2017-07-04 | Stop reason: HOSPADM

## 2017-06-30 RX ORDER — ACETAMINOPHEN 325 MG/1
650 TABLET ORAL EVERY 6 HOURS PRN
Status: DISCONTINUED | OUTPATIENT
Start: 2017-06-30 | End: 2017-06-30

## 2017-06-30 RX ORDER — ACETAMINOPHEN 325 MG/1
650 TABLET ORAL EVERY 6 HOURS
Status: DISCONTINUED | OUTPATIENT
Start: 2017-06-30 | End: 2017-07-01

## 2017-06-30 RX ORDER — ONDANSETRON 2 MG/ML
4 INJECTION INTRAMUSCULAR; INTRAVENOUS EVERY 6 HOURS PRN
Status: DISCONTINUED | OUTPATIENT
Start: 2017-06-30 | End: 2017-07-04 | Stop reason: HOSPADM

## 2017-06-30 RX ADMIN — GABAPENTIN 800 MG: 400 CAPSULE ORAL at 17:40

## 2017-06-30 RX ADMIN — DIVALPROEX SODIUM 500 MG: 500 TABLET, EXTENDED RELEASE ORAL at 22:43

## 2017-06-30 RX ADMIN — GABAPENTIN 800 MG: 400 CAPSULE ORAL at 11:05

## 2017-06-30 RX ADMIN — INSULIN LISPRO 2 UNITS: 100 INJECTION, SOLUTION INTRAVENOUS; SUBCUTANEOUS at 22:44

## 2017-06-30 RX ADMIN — GABAPENTIN 800 MG: 400 CAPSULE ORAL at 01:35

## 2017-06-30 RX ADMIN — HEPARIN SODIUM 5000 UNITS: 5000 INJECTION, SOLUTION INTRAVENOUS; SUBCUTANEOUS at 22:43

## 2017-06-30 RX ADMIN — SODIUM CHLORIDE 75 ML/HR: 0.9 INJECTION, SOLUTION INTRAVENOUS at 01:36

## 2017-06-30 RX ADMIN — GABAPENTIN 800 MG: 400 CAPSULE ORAL at 22:44

## 2017-06-30 RX ADMIN — ACETAMINOPHEN 650 MG: 325 TABLET, FILM COATED ORAL at 22:45

## 2017-06-30 RX ADMIN — ACETAMINOPHEN 650 MG: 325 TABLET, FILM COATED ORAL at 17:40

## 2017-06-30 RX ADMIN — HEPARIN SODIUM 5000 UNITS: 5000 INJECTION, SOLUTION INTRAVENOUS; SUBCUTANEOUS at 05:42

## 2017-06-30 RX ADMIN — DIVALPROEX SODIUM 500 MG: 500 TABLET, EXTENDED RELEASE ORAL at 01:35

## 2017-06-30 RX ADMIN — PAROXETINE HYDROCHLORIDE 40 MG: 20 TABLET, FILM COATED ORAL at 11:06

## 2017-06-30 RX ADMIN — INSULIN GLARGINE 20 UNITS: 100 INJECTION, SOLUTION SUBCUTANEOUS at 22:44

## 2017-06-30 RX ADMIN — INSULIN GLARGINE 20 UNITS: 100 INJECTION, SOLUTION SUBCUTANEOUS at 01:36

## 2017-06-30 RX ADMIN — LISINOPRIL: 20 TABLET ORAL at 11:05

## 2017-06-30 RX ADMIN — VANCOMYCIN HYDROCHLORIDE 1250 MG: 5 INJECTION, POWDER, LYOPHILIZED, FOR SOLUTION INTRAVENOUS at 11:11

## 2017-06-30 RX ADMIN — BUPRENORPHINE HYDROCHLORIDE, NALOXONE HYDROCHLORIDE 1 FILM: 8; 2 FILM, SOLUBLE BUCCAL; SUBLINGUAL at 11:04

## 2017-06-30 RX ADMIN — HEPARIN SODIUM 5000 UNITS: 5000 INJECTION, SOLUTION INTRAVENOUS; SUBCUTANEOUS at 15:38

## 2017-06-30 RX ADMIN — SODIUM CHLORIDE 1000 ML: 0.9 INJECTION, SOLUTION INTRAVENOUS at 11:11

## 2017-06-30 RX ADMIN — ACETAMINOPHEN 650 MG: 325 TABLET, FILM COATED ORAL at 11:06

## 2017-06-30 RX ADMIN — VANCOMYCIN HYDROCHLORIDE 1250 MG: 5 INJECTION, POWDER, LYOPHILIZED, FOR SOLUTION INTRAVENOUS at 22:47

## 2017-06-30 RX ADMIN — INSULIN LISPRO 2 UNITS: 100 INJECTION, SOLUTION INTRAVENOUS; SUBCUTANEOUS at 13:20

## 2017-07-01 ENCOUNTER — ANESTHESIA EVENT (INPATIENT)
Dept: PERIOP | Facility: HOSPITAL | Age: 42
DRG: 277 | End: 2017-07-01
Payer: COMMERCIAL

## 2017-07-01 ENCOUNTER — ANESTHESIA (INPATIENT)
Dept: PERIOP | Facility: HOSPITAL | Age: 42
DRG: 277 | End: 2017-07-01
Payer: COMMERCIAL

## 2017-07-01 PROBLEM — L02.429: Status: ACTIVE | Noted: 2017-07-01

## 2017-07-01 PROBLEM — L02.439: Status: ACTIVE | Noted: 2017-07-01

## 2017-07-01 PROBLEM — L03.115 CELLULITIS OF RIGHT LOWER EXTREMITY: Status: RESOLVED | Noted: 2017-06-30 | Resolved: 2017-07-01

## 2017-07-01 PROBLEM — L02.91 ABSCESS: Status: RESOLVED | Noted: 2017-06-30 | Resolved: 2017-07-01

## 2017-07-01 LAB
ANION GAP SERPL CALCULATED.3IONS-SCNC: 8 MMOL/L (ref 4–13)
B-HCG SERPL-ACNC: <2 MIU/ML
BASOPHILS # BLD AUTO: 0 THOUSANDS/ΜL (ref 0–0.1)
BASOPHILS NFR BLD AUTO: 1 % (ref 0–1)
BUN SERPL-MCNC: 20 MG/DL (ref 5–25)
CALCIUM SERPL-MCNC: 8.5 MG/DL (ref 8.3–10.1)
CHLORIDE SERPL-SCNC: 103 MMOL/L (ref 100–108)
CO2 SERPL-SCNC: 29 MMOL/L (ref 21–32)
CREAT SERPL-MCNC: 0.91 MG/DL (ref 0.6–1.3)
EOSINOPHIL # BLD AUTO: 0.1 THOUSAND/ΜL (ref 0–0.61)
EOSINOPHIL NFR BLD AUTO: 2 % (ref 0–6)
ERYTHROCYTE [DISTWIDTH] IN BLOOD BY AUTOMATED COUNT: 13.9 % (ref 11.6–15.1)
GFR SERPL CREATININE-BSD FRML MDRD: >60 ML/MIN/1.73SQ M
GLUCOSE SERPL-MCNC: 105 MG/DL (ref 65–140)
GLUCOSE SERPL-MCNC: 118 MG/DL (ref 65–140)
GLUCOSE SERPL-MCNC: 222 MG/DL (ref 65–140)
GLUCOSE SERPL-MCNC: 223 MG/DL (ref 65–140)
HCT VFR BLD AUTO: 29.8 % (ref 37–47)
HGB BLD-MCNC: 10 G/DL (ref 12–16)
LYMPHOCYTES # BLD AUTO: 1.8 THOUSANDS/ΜL (ref 0.6–4.47)
LYMPHOCYTES NFR BLD AUTO: 32 % (ref 14–44)
MAGNESIUM SERPL-MCNC: 1.6 MG/DL (ref 1.6–2.6)
MCH RBC QN AUTO: 28.5 PG (ref 27–31)
MCHC RBC AUTO-ENTMCNC: 33.7 G/DL (ref 31.4–37.4)
MCV RBC AUTO: 85 FL (ref 82–98)
MONOCYTES # BLD AUTO: 0.5 THOUSAND/ΜL (ref 0.17–1.22)
MONOCYTES NFR BLD AUTO: 10 % (ref 4–12)
MRSA NOSE QL CULT: NORMAL
NEUTROPHILS # BLD AUTO: 3.2 THOUSANDS/ΜL (ref 1.85–7.62)
NEUTS SEG NFR BLD AUTO: 56 % (ref 43–75)
NRBC BLD AUTO-RTO: 0 /100 WBCS
PHOSPHATE SERPL-MCNC: 3.4 MG/DL (ref 2.7–4.5)
PLATELET # BLD AUTO: 245 THOUSANDS/UL (ref 130–400)
PMV BLD AUTO: 6.3 FL (ref 8.9–12.7)
POTASSIUM SERPL-SCNC: 4.1 MMOL/L (ref 3.5–5.3)
RBC # BLD AUTO: 3.52 MILLION/UL (ref 4.2–5.4)
SODIUM SERPL-SCNC: 140 MMOL/L (ref 136–145)
VANCOMYCIN TROUGH SERPL-MCNC: 13.7 UG/ML (ref 10–20)
WBC # BLD AUTO: 5.7 THOUSAND/UL (ref 4.8–10.8)

## 2017-07-01 PROCEDURE — 87181 SC STD AGAR DILUTION PER AGT: CPT | Performed by: SURGERY

## 2017-07-01 PROCEDURE — 87075 CULTR BACTERIA EXCEPT BLOOD: CPT | Performed by: SURGERY

## 2017-07-01 PROCEDURE — 80048 BASIC METABOLIC PNL TOTAL CA: CPT | Performed by: FAMILY MEDICINE

## 2017-07-01 PROCEDURE — 87147 CULTURE TYPE IMMUNOLOGIC: CPT | Performed by: SURGERY

## 2017-07-01 PROCEDURE — 84100 ASSAY OF PHOSPHORUS: CPT | Performed by: FAMILY MEDICINE

## 2017-07-01 PROCEDURE — 87205 SMEAR GRAM STAIN: CPT | Performed by: SURGERY

## 2017-07-01 PROCEDURE — 0Y9C0ZZ DRAINAGE OF RIGHT UPPER LEG, OPEN APPROACH: ICD-10-PCS | Performed by: SURGERY

## 2017-07-01 PROCEDURE — 82948 REAGENT STRIP/BLOOD GLUCOSE: CPT

## 2017-07-01 PROCEDURE — 83735 ASSAY OF MAGNESIUM: CPT | Performed by: FAMILY MEDICINE

## 2017-07-01 PROCEDURE — 87070 CULTURE OTHR SPECIMN AEROBIC: CPT | Performed by: SURGERY

## 2017-07-01 PROCEDURE — 85025 COMPLETE CBC W/AUTO DIFF WBC: CPT | Performed by: FAMILY MEDICINE

## 2017-07-01 PROCEDURE — 87186 SC STD MICRODIL/AGAR DIL: CPT | Performed by: SURGERY

## 2017-07-01 PROCEDURE — 84702 CHORIONIC GONADOTROPIN TEST: CPT | Performed by: FAMILY MEDICINE

## 2017-07-01 PROCEDURE — 87077 CULTURE AEROBIC IDENTIFY: CPT | Performed by: SURGERY

## 2017-07-01 PROCEDURE — 87185 SC STD ENZYME DETCJ PER NZM: CPT | Performed by: SURGERY

## 2017-07-01 PROCEDURE — 80202 ASSAY OF VANCOMYCIN: CPT | Performed by: FAMILY MEDICINE

## 2017-07-01 RX ORDER — BUPIVACAINE HYDROCHLORIDE 5 MG/ML
INJECTION, SOLUTION PERINEURAL AS NEEDED
Status: DISCONTINUED | OUTPATIENT
Start: 2017-07-01 | End: 2017-07-01 | Stop reason: HOSPADM

## 2017-07-01 RX ORDER — PROPOFOL 10 MG/ML
INJECTION, EMULSION INTRAVENOUS AS NEEDED
Status: DISCONTINUED | OUTPATIENT
Start: 2017-07-01 | End: 2017-07-01 | Stop reason: SURG

## 2017-07-01 RX ORDER — ACETAMINOPHEN 325 MG/1
650 TABLET ORAL EVERY 6 HOURS PRN
Status: DISCONTINUED | OUTPATIENT
Start: 2017-07-01 | End: 2017-07-04 | Stop reason: HOSPADM

## 2017-07-01 RX ORDER — HYDROMORPHONE HCL 110MG/55ML
0.5 PATIENT CONTROLLED ANALGESIA SYRINGE INTRAVENOUS
Status: DISCONTINUED | OUTPATIENT
Start: 2017-07-01 | End: 2017-07-01

## 2017-07-01 RX ORDER — MORPHINE SULFATE 2 MG/ML
2 INJECTION, SOLUTION INTRAMUSCULAR; INTRAVENOUS EVERY 2 HOUR PRN
Status: DISCONTINUED | OUTPATIENT
Start: 2017-07-01 | End: 2017-07-03

## 2017-07-01 RX ORDER — SODIUM CHLORIDE, SODIUM LACTATE, POTASSIUM CHLORIDE, CALCIUM CHLORIDE 600; 310; 30; 20 MG/100ML; MG/100ML; MG/100ML; MG/100ML
INJECTION, SOLUTION INTRAVENOUS CONTINUOUS PRN
Status: DISCONTINUED | OUTPATIENT
Start: 2017-07-01 | End: 2017-07-01 | Stop reason: SURG

## 2017-07-01 RX ORDER — LIDOCAINE HYDROCHLORIDE AND EPINEPHRINE 10; 10 MG/ML; UG/ML
INJECTION, SOLUTION INFILTRATION; PERINEURAL AS NEEDED
Status: DISCONTINUED | OUTPATIENT
Start: 2017-07-01 | End: 2017-07-01 | Stop reason: HOSPADM

## 2017-07-01 RX ORDER — FENTANYL CITRATE 50 UG/ML
INJECTION, SOLUTION INTRAMUSCULAR; INTRAVENOUS AS NEEDED
Status: DISCONTINUED | OUTPATIENT
Start: 2017-07-01 | End: 2017-07-01 | Stop reason: SURG

## 2017-07-01 RX ORDER — MAGNESIUM HYDROXIDE 1200 MG/15ML
LIQUID ORAL AS NEEDED
Status: DISCONTINUED | OUTPATIENT
Start: 2017-07-01 | End: 2017-07-01 | Stop reason: HOSPADM

## 2017-07-01 RX ORDER — MIDAZOLAM HYDROCHLORIDE 1 MG/ML
INJECTION INTRAMUSCULAR; INTRAVENOUS AS NEEDED
Status: DISCONTINUED | OUTPATIENT
Start: 2017-07-01 | End: 2017-07-01 | Stop reason: SURG

## 2017-07-01 RX ADMIN — GABAPENTIN 800 MG: 400 CAPSULE ORAL at 11:43

## 2017-07-01 RX ADMIN — MORPHINE SULFATE 2 MG: 2 INJECTION, SOLUTION INTRAMUSCULAR; INTRAVENOUS at 14:40

## 2017-07-01 RX ADMIN — INSULIN GLARGINE 20 UNITS: 100 INJECTION, SOLUTION SUBCUTANEOUS at 21:40

## 2017-07-01 RX ADMIN — DIVALPROEX SODIUM 500 MG: 500 TABLET, EXTENDED RELEASE ORAL at 21:40

## 2017-07-01 RX ADMIN — PAROXETINE HYDROCHLORIDE 40 MG: 20 TABLET, FILM COATED ORAL at 11:42

## 2017-07-01 RX ADMIN — PROPOFOL 100 MG: 10 INJECTION, EMULSION INTRAVENOUS at 09:29

## 2017-07-01 RX ADMIN — MORPHINE SULFATE 2 MG: 2 INJECTION, SOLUTION INTRAMUSCULAR; INTRAVENOUS at 12:00

## 2017-07-01 RX ADMIN — MORPHINE SULFATE 2 MG: 2 INJECTION, SOLUTION INTRAMUSCULAR; INTRAVENOUS at 20:42

## 2017-07-01 RX ADMIN — SODIUM CHLORIDE, SODIUM LACTATE, POTASSIUM CHLORIDE, AND CALCIUM CHLORIDE: .6; .31; .03; .02 INJECTION, SOLUTION INTRAVENOUS at 09:15

## 2017-07-01 RX ADMIN — HEPARIN SODIUM 5000 UNITS: 5000 INJECTION, SOLUTION INTRAVENOUS; SUBCUTANEOUS at 21:39

## 2017-07-01 RX ADMIN — LISINOPRIL: 20 TABLET ORAL at 11:45

## 2017-07-01 RX ADMIN — INSULIN LISPRO 2 UNITS: 100 INJECTION, SOLUTION INTRAVENOUS; SUBCUTANEOUS at 21:41

## 2017-07-01 RX ADMIN — INSULIN LISPRO 2 UNITS: 100 INJECTION, SOLUTION INTRAVENOUS; SUBCUTANEOUS at 17:04

## 2017-07-01 RX ADMIN — INSULIN LISPRO 2 UNITS: 100 INJECTION, SOLUTION INTRAVENOUS; SUBCUTANEOUS at 11:49

## 2017-07-01 RX ADMIN — MORPHINE SULFATE 2 MG: 2 INJECTION, SOLUTION INTRAMUSCULAR; INTRAVENOUS at 23:36

## 2017-07-01 RX ADMIN — VANCOMYCIN HYDROCHLORIDE 1250 MG: 5 INJECTION, POWDER, LYOPHILIZED, FOR SOLUTION INTRAVENOUS at 14:06

## 2017-07-01 RX ADMIN — GABAPENTIN 800 MG: 400 CAPSULE ORAL at 17:03

## 2017-07-01 RX ADMIN — GABAPENTIN 800 MG: 400 CAPSULE ORAL at 21:39

## 2017-07-01 RX ADMIN — MIDAZOLAM HYDROCHLORIDE 2 MG: 1 INJECTION, SOLUTION INTRAMUSCULAR; INTRAVENOUS at 09:20

## 2017-07-01 RX ADMIN — PROPOFOL 100 MG: 10 INJECTION, EMULSION INTRAVENOUS at 09:43

## 2017-07-01 RX ADMIN — HYDROMORPHONE HYDROCHLORIDE 0.5 MG: 2 INJECTION, SOLUTION INTRAMUSCULAR; INTRAVENOUS; SUBCUTANEOUS at 10:26

## 2017-07-01 RX ADMIN — PROPOFOL 100 MG: 10 INJECTION, EMULSION INTRAVENOUS at 09:35

## 2017-07-01 RX ADMIN — HEPARIN SODIUM 5000 UNITS: 5000 INJECTION, SOLUTION INTRAVENOUS; SUBCUTANEOUS at 14:07

## 2017-07-01 RX ADMIN — FENTANYL CITRATE 50 MCG: 50 INJECTION, SOLUTION INTRAMUSCULAR; INTRAVENOUS at 09:29

## 2017-07-01 RX ADMIN — PROPOFOL 100 MG: 10 INJECTION, EMULSION INTRAVENOUS at 09:39

## 2017-07-01 RX ADMIN — FENTANYL CITRATE 50 MCG: 50 INJECTION, SOLUTION INTRAMUSCULAR; INTRAVENOUS at 09:42

## 2017-07-01 RX ADMIN — VANCOMYCIN HYDROCHLORIDE 1250 MG: 5 INJECTION, POWDER, LYOPHILIZED, FOR SOLUTION INTRAVENOUS at 23:39

## 2017-07-02 LAB
ANION GAP SERPL CALCULATED.3IONS-SCNC: 5 MMOL/L (ref 4–13)
BACTERIA WND AEROBE CULT: NORMAL
BASOPHILS # BLD AUTO: 0 THOUSANDS/ΜL (ref 0–0.1)
BASOPHILS NFR BLD AUTO: 1 % (ref 0–1)
BUN SERPL-MCNC: 13 MG/DL (ref 5–25)
CALCIUM SERPL-MCNC: 8.5 MG/DL (ref 8.3–10.1)
CHLORIDE SERPL-SCNC: 102 MMOL/L (ref 100–108)
CO2 SERPL-SCNC: 32 MMOL/L (ref 21–32)
CREAT SERPL-MCNC: 0.9 MG/DL (ref 0.6–1.3)
EOSINOPHIL # BLD AUTO: 0.1 THOUSAND/ΜL (ref 0–0.61)
EOSINOPHIL NFR BLD AUTO: 2 % (ref 0–6)
ERYTHROCYTE [DISTWIDTH] IN BLOOD BY AUTOMATED COUNT: 13.4 % (ref 11.6–15.1)
GFR SERPL CREATININE-BSD FRML MDRD: >60 ML/MIN/1.73SQ M
GLUCOSE SERPL-MCNC: 156 MG/DL (ref 65–140)
GLUCOSE SERPL-MCNC: 195 MG/DL (ref 65–140)
GLUCOSE SERPL-MCNC: 201 MG/DL (ref 65–140)
GLUCOSE SERPL-MCNC: 234 MG/DL (ref 65–140)
GLUCOSE SERPL-MCNC: 265 MG/DL (ref 65–140)
GRAM STN SPEC: NORMAL
HCT VFR BLD AUTO: 30.4 % (ref 37–47)
HGB BLD-MCNC: 10.2 G/DL (ref 12–16)
LYMPHOCYTES # BLD AUTO: 2.2 THOUSANDS/ΜL (ref 0.6–4.47)
LYMPHOCYTES NFR BLD AUTO: 46 % (ref 14–44)
MAGNESIUM SERPL-MCNC: 1.6 MG/DL (ref 1.6–2.6)
MCH RBC QN AUTO: 28.6 PG (ref 27–31)
MCHC RBC AUTO-ENTMCNC: 33.6 G/DL (ref 31.4–37.4)
MCV RBC AUTO: 85 FL (ref 82–98)
MONOCYTES # BLD AUTO: 0.4 THOUSAND/ΜL (ref 0.17–1.22)
MONOCYTES NFR BLD AUTO: 8 % (ref 4–12)
NEUTROPHILS # BLD AUTO: 2 THOUSANDS/ΜL (ref 1.85–7.62)
NEUTS SEG NFR BLD AUTO: 43 % (ref 43–75)
NRBC BLD AUTO-RTO: 0 /100 WBCS
PHOSPHATE SERPL-MCNC: 3.5 MG/DL (ref 2.7–4.5)
PLATELET # BLD AUTO: 253 THOUSANDS/UL (ref 130–400)
PMV BLD AUTO: 6.1 FL (ref 8.9–12.7)
POTASSIUM SERPL-SCNC: 3.9 MMOL/L (ref 3.5–5.3)
RBC # BLD AUTO: 3.57 MILLION/UL (ref 4.2–5.4)
SODIUM SERPL-SCNC: 139 MMOL/L (ref 136–145)
WBC # BLD AUTO: 4.7 THOUSAND/UL (ref 4.8–10.8)

## 2017-07-02 PROCEDURE — 80048 BASIC METABOLIC PNL TOTAL CA: CPT | Performed by: FAMILY MEDICINE

## 2017-07-02 PROCEDURE — 84100 ASSAY OF PHOSPHORUS: CPT | Performed by: FAMILY MEDICINE

## 2017-07-02 PROCEDURE — 83735 ASSAY OF MAGNESIUM: CPT | Performed by: FAMILY MEDICINE

## 2017-07-02 PROCEDURE — 82948 REAGENT STRIP/BLOOD GLUCOSE: CPT

## 2017-07-02 PROCEDURE — 85025 COMPLETE CBC W/AUTO DIFF WBC: CPT | Performed by: FAMILY MEDICINE

## 2017-07-02 RX ORDER — AMOXICILLIN AND CLAVULANATE POTASSIUM 875; 125 MG/1; MG/1
1 TABLET, FILM COATED ORAL EVERY 12 HOURS SCHEDULED
Status: DISCONTINUED | OUTPATIENT
Start: 2017-07-02 | End: 2017-07-04 | Stop reason: HOSPADM

## 2017-07-02 RX ORDER — OXYCODONE HYDROCHLORIDE AND ACETAMINOPHEN 5; 325 MG/1; MG/1
1 TABLET ORAL EVERY 6 HOURS PRN
Status: DISCONTINUED | OUTPATIENT
Start: 2017-07-02 | End: 2017-07-04 | Stop reason: HOSPADM

## 2017-07-02 RX ORDER — INSULIN GLARGINE 100 [IU]/ML
30 INJECTION, SOLUTION SUBCUTANEOUS
Status: DISCONTINUED | OUTPATIENT
Start: 2017-07-02 | End: 2017-07-04 | Stop reason: HOSPADM

## 2017-07-02 RX ADMIN — INSULIN LISPRO 2 UNITS: 100 INJECTION, SOLUTION INTRAVENOUS; SUBCUTANEOUS at 12:13

## 2017-07-02 RX ADMIN — MORPHINE SULFATE 2 MG: 2 INJECTION, SOLUTION INTRAMUSCULAR; INTRAVENOUS at 16:30

## 2017-07-02 RX ADMIN — MORPHINE SULFATE 2 MG: 2 INJECTION, SOLUTION INTRAMUSCULAR; INTRAVENOUS at 23:31

## 2017-07-02 RX ADMIN — OXYCODONE HYDROCHLORIDE AND ACETAMINOPHEN 1 TABLET: 5; 325 TABLET ORAL at 12:23

## 2017-07-02 RX ADMIN — AMOXICILLIN AND CLAVULANATE POTASSIUM 1 TABLET: 875; 125 TABLET, FILM COATED ORAL at 21:08

## 2017-07-02 RX ADMIN — GABAPENTIN 800 MG: 400 CAPSULE ORAL at 21:09

## 2017-07-02 RX ADMIN — GABAPENTIN 800 MG: 400 CAPSULE ORAL at 12:23

## 2017-07-02 RX ADMIN — DIVALPROEX SODIUM 500 MG: 500 TABLET, EXTENDED RELEASE ORAL at 21:09

## 2017-07-02 RX ADMIN — MORPHINE SULFATE 2 MG: 2 INJECTION, SOLUTION INTRAMUSCULAR; INTRAVENOUS at 08:51

## 2017-07-02 RX ADMIN — INSULIN LISPRO 3 UNITS: 100 INJECTION, SOLUTION INTRAVENOUS; SUBCUTANEOUS at 17:04

## 2017-07-02 RX ADMIN — HEPARIN SODIUM 5000 UNITS: 5000 INJECTION, SOLUTION INTRAVENOUS; SUBCUTANEOUS at 05:59

## 2017-07-02 RX ADMIN — INSULIN LISPRO 1 UNITS: 100 INJECTION, SOLUTION INTRAVENOUS; SUBCUTANEOUS at 08:44

## 2017-07-02 RX ADMIN — MORPHINE SULFATE 2 MG: 2 INJECTION, SOLUTION INTRAMUSCULAR; INTRAVENOUS at 04:29

## 2017-07-02 RX ADMIN — INSULIN LISPRO 3 UNITS: 100 INJECTION, SOLUTION INTRAVENOUS; SUBCUTANEOUS at 21:21

## 2017-07-02 RX ADMIN — MORPHINE SULFATE 2 MG: 2 INJECTION, SOLUTION INTRAMUSCULAR; INTRAVENOUS at 14:25

## 2017-07-02 RX ADMIN — VANCOMYCIN HYDROCHLORIDE 1250 MG: 5 INJECTION, POWDER, LYOPHILIZED, FOR SOLUTION INTRAVENOUS at 12:12

## 2017-07-02 RX ADMIN — PAROXETINE HYDROCHLORIDE 40 MG: 20 TABLET, FILM COATED ORAL at 08:45

## 2017-07-02 RX ADMIN — GABAPENTIN 800 MG: 400 CAPSULE ORAL at 08:43

## 2017-07-02 RX ADMIN — HEPARIN SODIUM 5000 UNITS: 5000 INJECTION, SOLUTION INTRAVENOUS; SUBCUTANEOUS at 16:12

## 2017-07-02 RX ADMIN — MORPHINE SULFATE 2 MG: 2 INJECTION, SOLUTION INTRAMUSCULAR; INTRAVENOUS at 21:08

## 2017-07-02 RX ADMIN — LISINOPRIL: 20 TABLET ORAL at 08:43

## 2017-07-02 RX ADMIN — MORPHINE SULFATE 2 MG: 2 INJECTION, SOLUTION INTRAMUSCULAR; INTRAVENOUS at 10:40

## 2017-07-02 RX ADMIN — MORPHINE SULFATE 2 MG: 2 INJECTION, SOLUTION INTRAMUSCULAR; INTRAVENOUS at 18:50

## 2017-07-02 RX ADMIN — INSULIN GLARGINE 30 UNITS: 100 INJECTION, SOLUTION SUBCUTANEOUS at 21:20

## 2017-07-02 RX ADMIN — GABAPENTIN 800 MG: 400 CAPSULE ORAL at 17:05

## 2017-07-02 RX ADMIN — HEPARIN SODIUM 5000 UNITS: 5000 INJECTION, SOLUTION INTRAVENOUS; SUBCUTANEOUS at 21:09

## 2017-07-03 VITALS
HEIGHT: 63 IN | HEART RATE: 78 BPM | DIASTOLIC BLOOD PRESSURE: 80 MMHG | BODY MASS INDEX: 41.64 KG/M2 | WEIGHT: 235.01 LBS | OXYGEN SATURATION: 94 % | TEMPERATURE: 97.8 F | SYSTOLIC BLOOD PRESSURE: 130 MMHG | RESPIRATION RATE: 18 BRPM

## 2017-07-03 PROBLEM — L02.93 CARBUNCLE: Status: RESOLVED | Noted: 2017-06-30 | Resolved: 2017-07-03

## 2017-07-03 PROBLEM — L02.439: Status: RESOLVED | Noted: 2017-07-01 | Resolved: 2017-07-03

## 2017-07-03 PROBLEM — E87.1 HYPONATREMIA: Status: RESOLVED | Noted: 2017-06-30 | Resolved: 2017-07-03

## 2017-07-03 PROBLEM — N28.9 RENAL INSUFFICIENCY: Status: RESOLVED | Noted: 2017-06-30 | Resolved: 2017-07-03

## 2017-07-03 PROBLEM — L02.429: Status: RESOLVED | Noted: 2017-07-01 | Resolved: 2017-07-03

## 2017-07-03 LAB
ANION GAP SERPL CALCULATED.3IONS-SCNC: 7 MMOL/L (ref 4–13)
BUN SERPL-MCNC: 12 MG/DL (ref 5–25)
CALCIUM SERPL-MCNC: 9 MG/DL (ref 8.3–10.1)
CHLORIDE SERPL-SCNC: 101 MMOL/L (ref 100–108)
CO2 SERPL-SCNC: 31 MMOL/L (ref 21–32)
CREAT SERPL-MCNC: 0.81 MG/DL (ref 0.6–1.3)
GFR SERPL CREATININE-BSD FRML MDRD: >60 ML/MIN/1.73SQ M
GLUCOSE SERPL-MCNC: 141 MG/DL (ref 65–140)
GLUCOSE SERPL-MCNC: 167 MG/DL (ref 65–140)
GLUCOSE SERPL-MCNC: 197 MG/DL (ref 65–140)
GLUCOSE SERPL-MCNC: 226 MG/DL (ref 65–140)
GLUCOSE SERPL-MCNC: 287 MG/DL (ref 65–140)
MAGNESIUM SERPL-MCNC: 1.7 MG/DL (ref 1.6–2.6)
PHOSPHATE SERPL-MCNC: 4.4 MG/DL (ref 2.7–4.5)
POTASSIUM SERPL-SCNC: 4.3 MMOL/L (ref 3.5–5.3)
SODIUM SERPL-SCNC: 139 MMOL/L (ref 136–145)

## 2017-07-03 PROCEDURE — 84100 ASSAY OF PHOSPHORUS: CPT | Performed by: FAMILY MEDICINE

## 2017-07-03 PROCEDURE — 83735 ASSAY OF MAGNESIUM: CPT | Performed by: FAMILY MEDICINE

## 2017-07-03 PROCEDURE — 80048 BASIC METABOLIC PNL TOTAL CA: CPT | Performed by: FAMILY MEDICINE

## 2017-07-03 PROCEDURE — 82948 REAGENT STRIP/BLOOD GLUCOSE: CPT

## 2017-07-03 RX ORDER — AMOXICILLIN AND CLAVULANATE POTASSIUM 875; 125 MG/1; MG/1
1 TABLET, FILM COATED ORAL EVERY 12 HOURS SCHEDULED
Qty: 18 TABLET | Refills: 0
Start: 2017-07-03 | End: 2017-07-12

## 2017-07-03 RX ADMIN — MORPHINE SULFATE 2 MG: 2 INJECTION, SOLUTION INTRAMUSCULAR; INTRAVENOUS at 04:38

## 2017-07-03 RX ADMIN — INSULIN LISPRO 4 UNITS: 100 INJECTION, SOLUTION INTRAVENOUS; SUBCUTANEOUS at 11:35

## 2017-07-03 RX ADMIN — PAROXETINE HYDROCHLORIDE 40 MG: 20 TABLET, FILM COATED ORAL at 09:39

## 2017-07-03 RX ADMIN — AMOXICILLIN AND CLAVULANATE POTASSIUM 1 TABLET: 875; 125 TABLET, FILM COATED ORAL at 09:39

## 2017-07-03 RX ADMIN — OXYCODONE HYDROCHLORIDE AND ACETAMINOPHEN 1 TABLET: 5; 325 TABLET ORAL at 15:51

## 2017-07-03 RX ADMIN — GABAPENTIN 800 MG: 400 CAPSULE ORAL at 11:35

## 2017-07-03 RX ADMIN — INSULIN LISPRO 2 UNITS: 100 INJECTION, SOLUTION INTRAVENOUS; SUBCUTANEOUS at 17:16

## 2017-07-03 RX ADMIN — GABAPENTIN 800 MG: 400 CAPSULE ORAL at 17:16

## 2017-07-03 RX ADMIN — HEPARIN SODIUM 5000 UNITS: 5000 INJECTION, SOLUTION INTRAVENOUS; SUBCUTANEOUS at 15:06

## 2017-07-03 RX ADMIN — HEPARIN SODIUM 5000 UNITS: 5000 INJECTION, SOLUTION INTRAVENOUS; SUBCUTANEOUS at 06:33

## 2017-07-03 RX ADMIN — GABAPENTIN 800 MG: 400 CAPSULE ORAL at 09:39

## 2017-07-03 RX ADMIN — OXYCODONE HYDROCHLORIDE AND ACETAMINOPHEN 1 TABLET: 5; 325 TABLET ORAL at 09:39

## 2017-07-03 RX ADMIN — LISINOPRIL: 20 TABLET ORAL at 09:39

## 2017-07-04 LAB
BACTERIA SPEC ANAEROBE CULT: NORMAL
BACTERIA WND AEROBE CULT: NORMAL
GRAM STN SPEC: NORMAL
GRAM STN SPEC: NORMAL

## 2017-07-05 LAB
BACTERIA BLD CULT: NORMAL
BACTERIA BLD CULT: NORMAL

## 2017-07-12 ENCOUNTER — GENERIC CONVERSION - ENCOUNTER (OUTPATIENT)
Dept: OTHER | Facility: OTHER | Age: 42
End: 2017-07-12

## 2018-01-09 NOTE — MISCELLANEOUS
Message   Recorded as Task   Date: 12/02/2016 02:43 PM, Created By: Rahel Farmer   Task Name: Med Renewal Request   Assigned To: Kylah Aguilar   Regarding Patient: Reena Weaver, Status: Active   CommentMao Allan - 02 Dec 2016 2:43 PM     TASK CREATED  Caller: Self; Renew Medication; [phone] (Home)  DR DHALIWAL PT,PT [facility]ED [facility] FOR [facility]PT,THEY CANNOT SEE HER FOR 2 MONTHS,ASKS IF YOU WILL REFILL PAXIL 60 MG AND DEPAKOTE 500 MG  [facility] 06 Dec 2016 9:46 AM     TASK EDITED  pt called re med refills ,needs these today,if you have any questions please contact pt per pt she needs these today  her pcp is michelle dhaliwal told me to send to someone on the team who is here  Rahel Farmer - 06 Dec 2016 9:47 AM     TASK EDITED  please call [phone] first then can try [phone]  Kylah Aguilar - 06 Dec 2016 10:06 AM     TASK EDITED  spoke with patient, who said she has an apt with [facility] at the end of January  states she wants to be called back at [phone]  Called patient back regarding her refill request  Patient stated she has an apt with [facility] at the end of January but unsure exactly what day because she doesn't have the info with her  I called [facility] to confirm and they stated they do not have any upcoming apts for the patient  I called patient back and let her know that she needs to actually make an apt, give us the date, time and only then can we refill medications to last her until her apt  Patient became very angry and said that she HAD an apt but that after I called her, she called [facility] to let them to know I was going to be calling them, and they confirmed her home address with her and noted that she had moved to [place] so they told her she needs to go to the [place] office now and cancelled her apt  I I told the patient again what she needs to do   Patient screaming on the other line about insulin medication not being called in (part of which i column't understand), said she would make the apt and hung up        Signatures   Electronically signed by : Yina Engel MD; Dec  6 2016 10:46AM EST                       (Author)

## 2018-01-10 NOTE — MISCELLANEOUS
Provider Comments  Provider Comments:   L/M for pt to call to R/S missed apt   CE      Signatures   Electronically signed by : VANESSA Rainey ; May  1 2017  7:09PM EST                       (Author)

## 2018-01-12 VITALS
SYSTOLIC BLOOD PRESSURE: 130 MMHG | DIASTOLIC BLOOD PRESSURE: 100 MMHG | WEIGHT: 240 LBS | HEIGHT: 63 IN | BODY MASS INDEX: 42.52 KG/M2 | HEART RATE: 61 BPM | RESPIRATION RATE: 18 BRPM | OXYGEN SATURATION: 98 %

## 2018-01-12 VITALS
RESPIRATION RATE: 16 BRPM | DIASTOLIC BLOOD PRESSURE: 58 MMHG | OXYGEN SATURATION: 97 % | BODY MASS INDEX: 42.17 KG/M2 | TEMPERATURE: 97.4 F | HEIGHT: 63 IN | HEART RATE: 96 BPM | WEIGHT: 238 LBS | SYSTOLIC BLOOD PRESSURE: 108 MMHG

## 2018-01-12 NOTE — MISCELLANEOUS
Message  Message Free Text Note Form: Pt called on call phone to ask for lantus and test strips and Paxil  told her last refill sent in nov 1 with note to [facility] in 30 days to keep receiving scripts  Pt says [facility] never received script  i called [facility] and they said they never received a script in nov, so i called in a 30 day supply of lantus and test strips and told pt these are her last refills until she follows up  did not call in paxil as pt off that for months  Plan    1   OneTouch Ultra Blue In Vitro Strip; USE ONE STRIP TO CHECK GLUCOSE   THREE TIMES DAILY    Signatures   Electronically signed by : Whitney Chin DO; Nov 25 2016  8:05PM EST                       (Author)

## 2018-01-12 NOTE — MISCELLANEOUS
Message  RE: TASK about recent visit in ER for back pain  I personally have not seen this patient for 2 years  I have called her a number of times about non-compliance  She came for a visit 3 months ago with Dr Chilo Cary because we stopped refillling her medications  She also has a history of obtaining multiple prescriptions for controlled substances from multiple providers  Please call the patient and notify her that there is no additional treatment or management until she is evaluated by our physicians for interval change as this issue has not been addressed at our practice        Signatures   Electronically signed by : VANESSA Nolasco ; Jun 23 2017  3:06PM EST                       (Author)

## 2018-01-13 NOTE — MISCELLANEOUS
Message  patient called on call phone  call returned immediately  She states she was recently discharged from the hospital with sores on her mouth with a topical cream for pain, and the pharmacy cant fill it today because they have to order it  She says she is in pain and wants something else for it  I spoke to the Inpatient team who let me know she was admitted with oral herpes and there was some concern for drug seeking behavior  I let the patient know that until her topical Rx is filled she can get an OTC abreva for her lesions which can be used  Patient hung up on me at that time        Signatures   Electronically signed by : Suzanna Harrington MD; Apr 21 2017  7:26PM EST                       (Author)

## 2018-01-14 NOTE — MISCELLANEOUS
Message  Message Free Text Note Form: pt called phone because wanted refills  informed her last refills given by dr Mariah Burrell on 11/3, and would receive no more until seen at [facility]        Signatures   Electronically signed by : Mignon Cowden, DO; Nov 25 2016  7:03PM EST                       (Author)

## 2018-01-15 ENCOUNTER — HOSPITAL ENCOUNTER (OUTPATIENT)
Facility: HOSPITAL | Age: 43
Setting detail: OBSERVATION
Discharge: HOME | End: 2018-01-20
Attending: EMERGENCY MEDICINE | Admitting: FAMILY MEDICINE
Payer: COMMERCIAL

## 2018-01-15 DIAGNOSIS — L03.113 CELLULITIS OF RIGHT HAND: Primary | ICD-10-CM

## 2018-01-15 PROBLEM — F31.9 BIPOLAR 1 DISORDER (HCC): Status: ACTIVE | Noted: 2018-01-15

## 2018-01-15 PROBLEM — E11.40 DIABETES MELLITUS WITH NEUROPATHY (HCC): Status: ACTIVE | Noted: 2017-04-18

## 2018-01-15 LAB
ALBUMIN SERPL BCP-MCNC: 3.1 G/DL (ref 3.5–5)
ALP SERPL-CCNC: 63 U/L (ref 46–116)
ALT SERPL W P-5'-P-CCNC: 84 U/L (ref 12–78)
ANION GAP SERPL CALCULATED.3IONS-SCNC: 5 MMOL/L (ref 4–13)
APTT PPP: 29 SECONDS (ref 24–33)
AST SERPL W P-5'-P-CCNC: 50 U/L (ref 5–45)
BASOPHILS # BLD AUTO: 0 THOUSANDS/ΜL (ref 0–0.1)
BASOPHILS NFR BLD AUTO: 0 % (ref 0–1)
BILIRUB SERPL-MCNC: 0.7 MG/DL (ref 0.2–1)
BUN SERPL-MCNC: 9 MG/DL (ref 5–25)
CALCIUM SERPL-MCNC: 8.9 MG/DL (ref 8.3–10.1)
CHLORIDE SERPL-SCNC: 100 MMOL/L (ref 100–108)
CO2 SERPL-SCNC: 31 MMOL/L (ref 21–32)
CREAT SERPL-MCNC: 0.69 MG/DL (ref 0.6–1.3)
EOSINOPHIL # BLD AUTO: 0.1 THOUSAND/ΜL (ref 0–0.61)
EOSINOPHIL NFR BLD AUTO: 1 % (ref 0–6)
ERYTHROCYTE [DISTWIDTH] IN BLOOD BY AUTOMATED COUNT: 14.2 % (ref 11.6–15.1)
GFR SERPL CREATININE-BSD FRML MDRD: 108 ML/MIN/1.73SQ M
GLUCOSE SERPL-MCNC: 236 MG/DL (ref 65–140)
HCT VFR BLD AUTO: 39.2 % (ref 37–47)
HGB BLD-MCNC: 13.6 G/DL (ref 12–16)
INR PPP: 1.08 (ref 0.86–1.16)
LACTATE SERPL-SCNC: 1.3 MMOL/L (ref 0.5–2)
LYMPHOCYTES # BLD AUTO: 2.1 THOUSANDS/ΜL (ref 0.6–4.47)
LYMPHOCYTES NFR BLD AUTO: 22 % (ref 14–44)
MCH RBC QN AUTO: 30.3 PG (ref 27–31)
MCHC RBC AUTO-ENTMCNC: 34.7 G/DL (ref 31.4–37.4)
MCV RBC AUTO: 87 FL (ref 82–98)
MONOCYTES # BLD AUTO: 0.7 THOUSAND/ΜL (ref 0.17–1.22)
MONOCYTES NFR BLD AUTO: 7 % (ref 4–12)
NEUTROPHILS # BLD AUTO: 6.8 THOUSANDS/ΜL (ref 1.85–7.62)
NEUTS SEG NFR BLD AUTO: 70 % (ref 43–75)
NRBC BLD AUTO-RTO: 0 /100 WBCS
PLATELET # BLD AUTO: 323 THOUSANDS/UL (ref 130–400)
PMV BLD AUTO: 6.2 FL (ref 8.9–12.7)
POTASSIUM SERPL-SCNC: 4.7 MMOL/L (ref 3.5–5.3)
PROT SERPL-MCNC: 7.5 G/DL (ref 6.4–8.2)
PROTHROMBIN TIME: 11.3 SECONDS (ref 9.4–11.7)
RBC # BLD AUTO: 4.49 MILLION/UL (ref 4.2–5.4)
SODIUM SERPL-SCNC: 136 MMOL/L (ref 136–145)
WBC # BLD AUTO: 9.7 THOUSAND/UL (ref 4.8–10.8)

## 2018-01-15 PROCEDURE — 83605 ASSAY OF LACTIC ACID: CPT | Performed by: EMERGENCY MEDICINE

## 2018-01-15 PROCEDURE — 80053 COMPREHEN METABOLIC PANEL: CPT | Performed by: EMERGENCY MEDICINE

## 2018-01-15 PROCEDURE — 36415 COLL VENOUS BLD VENIPUNCTURE: CPT | Performed by: EMERGENCY MEDICINE

## 2018-01-15 PROCEDURE — 82948 REAGENT STRIP/BLOOD GLUCOSE: CPT

## 2018-01-15 PROCEDURE — 85025 COMPLETE CBC W/AUTO DIFF WBC: CPT | Performed by: EMERGENCY MEDICINE

## 2018-01-15 PROCEDURE — 87040 BLOOD CULTURE FOR BACTERIA: CPT | Performed by: EMERGENCY MEDICINE

## 2018-01-15 PROCEDURE — 85730 THROMBOPLASTIN TIME PARTIAL: CPT | Performed by: EMERGENCY MEDICINE

## 2018-01-15 PROCEDURE — 99284 EMERGENCY DEPT VISIT MOD MDM: CPT

## 2018-01-15 PROCEDURE — 87081 CULTURE SCREEN ONLY: CPT | Performed by: FAMILY MEDICINE

## 2018-01-15 PROCEDURE — 93005 ELECTROCARDIOGRAM TRACING: CPT

## 2018-01-15 PROCEDURE — 85610 PROTHROMBIN TIME: CPT | Performed by: EMERGENCY MEDICINE

## 2018-01-15 RX ORDER — VANCOMYCIN HYDROCHLORIDE 1 G/200ML
10 INJECTION, SOLUTION INTRAVENOUS EVERY 12 HOURS
Status: DISCONTINUED | OUTPATIENT
Start: 2018-01-16 | End: 2018-01-20 | Stop reason: HOSPADM

## 2018-01-15 RX ORDER — ACETAMINOPHEN 325 MG/1
650 TABLET ORAL EVERY 6 HOURS PRN
Status: DISCONTINUED | OUTPATIENT
Start: 2018-01-15 | End: 2018-01-16

## 2018-01-15 RX ORDER — ESCITALOPRAM OXALATE 10 MG/1
20 TABLET ORAL DAILY
Status: DISCONTINUED | OUTPATIENT
Start: 2018-01-16 | End: 2018-01-20 | Stop reason: HOSPADM

## 2018-01-15 RX ORDER — INSULIN GLARGINE 100 [IU]/ML
30 INJECTION, SOLUTION SUBCUTANEOUS
Status: DISCONTINUED | OUTPATIENT
Start: 2018-01-15 | End: 2018-01-20 | Stop reason: HOSPADM

## 2018-01-15 RX ORDER — GABAPENTIN 400 MG/1
800 CAPSULE ORAL 3 TIMES DAILY
Status: DISCONTINUED | OUTPATIENT
Start: 2018-01-15 | End: 2018-01-20 | Stop reason: HOSPADM

## 2018-01-15 RX ORDER — CLONAZEPAM 0.5 MG/1
0.5 TABLET ORAL 2 TIMES DAILY
Status: DISCONTINUED | OUTPATIENT
Start: 2018-01-15 | End: 2018-01-20 | Stop reason: HOSPADM

## 2018-01-15 RX ORDER — KETOROLAC TROMETHAMINE 30 MG/ML
30 INJECTION, SOLUTION INTRAMUSCULAR; INTRAVENOUS ONCE
Status: COMPLETED | OUTPATIENT
Start: 2018-01-15 | End: 2018-01-15

## 2018-01-15 RX ORDER — DIVALPROEX SODIUM 500 MG/1
500 TABLET, EXTENDED RELEASE ORAL
Status: DISCONTINUED | OUTPATIENT
Start: 2018-01-16 | End: 2018-01-20 | Stop reason: HOSPADM

## 2018-01-15 RX ORDER — ESCITALOPRAM OXALATE 20 MG/1
20 TABLET ORAL DAILY
COMMUNITY
End: 2018-06-06 | Stop reason: SDUPTHER

## 2018-01-15 RX ORDER — KETOROLAC TROMETHAMINE 30 MG/ML
15 INJECTION, SOLUTION INTRAMUSCULAR; INTRAVENOUS EVERY 6 HOURS PRN
Status: DISCONTINUED | OUTPATIENT
Start: 2018-01-15 | End: 2018-01-16

## 2018-01-15 RX ADMIN — SODIUM CHLORIDE 1000 ML: 0.9 INJECTION, SOLUTION INTRAVENOUS at 18:53

## 2018-01-15 RX ADMIN — HYDROMORPHONE HYDROCHLORIDE 0.5 MG: 1 INJECTION, SOLUTION INTRAMUSCULAR; INTRAVENOUS; SUBCUTANEOUS at 23:03

## 2018-01-15 RX ADMIN — KETOROLAC TROMETHAMINE 15 MG: 30 INJECTION, SOLUTION INTRAMUSCULAR at 21:46

## 2018-01-15 RX ADMIN — HYDROMORPHONE HYDROCHLORIDE 1 MG: 1 INJECTION, SOLUTION INTRAMUSCULAR; INTRAVENOUS; SUBCUTANEOUS at 18:54

## 2018-01-15 RX ADMIN — VANCOMYCIN HYDROCHLORIDE 1250 MG: 5 INJECTION, POWDER, LYOPHILIZED, FOR SOLUTION INTRAVENOUS at 19:00

## 2018-01-15 RX ADMIN — KETOROLAC TROMETHAMINE 30 MG: 30 INJECTION, SOLUTION INTRAMUSCULAR at 18:55

## 2018-01-15 RX ADMIN — GABAPENTIN 800 MG: 400 CAPSULE ORAL at 21:46

## 2018-01-15 RX ADMIN — INSULIN GLARGINE 30 UNITS: 100 INJECTION, SOLUTION SUBCUTANEOUS at 21:48

## 2018-01-15 RX ADMIN — CLONAZEPAM 0.5 MG: 0.5 TABLET ORAL at 21:46

## 2018-01-15 NOTE — MISCELLANEOUS
Message   Recorded as Task   Date: 04/19/2017 02:32 PM, Created By: System   Task Name: Vishnu Bryan   Assigned To: Umberto Tate   Regarding Patient: Castillo Sidhu, Status: In Progress   Comment:    System - 19 Apr 2017 2:32 PM     Patient discharged from hospital   Patient Name: Mi Brunson  Patient YOB: 1975  Discharge Date: 4/19/2017  Facility: [facility] - 19 Apr 2017 2:36 PM     TASK EDITED   Daniel Cadena - 20 Apr 2017 10:40 AM     TASK IN PROGRESS   Talita Larsen - 20 Apr 2017 11:52 AM     TASK EDITED    JHONATAN completed  Patient reported that her Clonazepam and Depakote were not called into the pharmacy  She also reported that the acyclovir capsules and the lidocaine ointment were not available at the pharmacy and would not be available until tomorrow  Patient reported that she is having a lot of pain and is requesting Tramadol  Advised the patient that I would need to speak to the doctor that discharged her in regards to these issues  Phone call to Dr Irma Montes De Oca  Per Dr Irma Montes De Oca, the patient was told prior to leaving the hospital that they will not be refilling the Clonazepam  As far as the Depakote, the patient reported that she had enough of the Depakote to get her through to her appointment with [facility]  Per Dr Irma Montes De Oca, patient has drug seeking behavior which is why they will not order the Clonazepam and Tramadol  Phone call to [facility] pharmacy to check and see when thepatient's medication will be in  Per Trisha Enriquez, the acyclovir capsules are ready for the patient to   The Lidocaine cream needs a prior auth as well as the acyclovir ointment for the patient's eyes will need a prior auth  Submitted prior auths online for the Lidocaine cream as well as the Acyclovir ointment  Phone call to patient and made her aware of the above   The patient was not happy that Dr Irma Montes De Oca will not order the CLonazepam or Tramadol and again reported that she is in a lot of pain  Advised patient that I would try to see if the prior auth could be expidited  Juan Fernandez RN        Active Problems    1  Adult ADHD (314 01) (F90 9)   2  Adult BMI 40 0-44 9 kg/sq m (V85 41) (Z68 41)   3  Anxiety (300 00) (F41 9)   4  Asthma (493 90) (J45 909)   5  Baker's cyst, ruptured (727 51) (M66 0)   6  Benign essential hypertension (401 1) (I10)   7  Bipolar disorder (296 80) (F31 9)   8  BMI 38 0-38 9,adult (V85 38) (Z68 38)   9  Cellulitis of thigh (682 6) (L03 119)   10  Cocaine abuse (305 60) (F14 10)   11  Depression (311) (F32 9)   12  Diabetes mellitus type 2, uncontrolled (250 02) (E11 65)   13  Diabetes mellitus with neuropathy (250 60,357 2) (E11 40)   14  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)   15  Fibromyalgia (729 1) (M79 7)   16  Hypothyroidism (244 9) (E03 9)   17  Long-term insulin use (V58 67) (Z79 4)   18  Morbid obesity (278 01) (E66 01)   19  Mouth sores (528 9) (K13 79)   20  Muscle strain (848 9) (T14 8)   21  On home oxygen therapy (V46 2) (Z99 81)   22  Pain, dental (525 9) (K08 89)   23  Peripheral neuropathy (356 9) (G62 9)   24  Right knee pain (719 46) (M25 561)   25  Well adult on routine health check (V70 0) (Z00 00)    Current Meds   1  BD Insulin Syr Ultrafine II 31G X 5/16" 0 5 ML Miscellaneous; use as directed; Therapy: 15Apr2015 to (Last Rx:28Mar2017)  Requested for: 28Mar2017 Ordered   2  Diclofenac Sodium 75 MG Oral Tablet Delayed Release; TAKE 1 TABLET Twice daily   PRN Dental pain; Therapy: 18RAJ5664 to (Evaluate:02Apr2017)  Requested for: 28Mar2017; Last   Rx:28Mar2017 Ordered   3  Divalproex Sodium  MG Oral Tablet Extended Release 24 Hour; TAKE 1 TABLET   AT BEDTIME; Therapy: 75NIJ6023 to (Evaluate:61Phu6172)  Requested for: 24MFD6895; Last   Rx:22Jan2016 Ordered   4  Gabapentin 800 MG Oral Tablet; TAKE 1 TABLET 3 TIMES DAILY;    Therapy: 93HME2987 to (Evaluate:27Apr2017)  Requested for: 51DXG8044; Last   Rx:28Mar2017 Ordered 5  Lantus 100 UNIT/ML Subcutaneous Solution; Inject 20 units at bedtime; Therapy: 12YSY5280 to (Last Rx:28Mar2017)  Requested for: 28Mar2017 Ordered   6  Levothyroxine Sodium 25 MCG Oral Tablet; TAKE 1 TABLET DAILY; Therapy: 65TLG8958 to (Evaluate:02Mar2017)  Requested for: 33Ylz1021; Last   Rx:23Ojc8809 Ordered   7  Lisinopril-Hydrochlorothiazide 20-25 MG Oral Tablet; TAKE 1 TABLET BY MOUTH ONCE   A DAY; Therapy: 12CHF4803 to (Last Rx:48Ixx6586)  Requested for: 26Tlh5155 Ordered   8  OneTouch Ultra Blue In Vitro Strip; TEST 3 TIMES DAILY; Therapy: 54JRE0069 to (Evaluate:77Aij6939) Recorded   9  OneTouch Ultra Blue In Vitro Strip; USE ONE STRIP TO CHECK GLUCOSE THREE   TIMES DAILY; Therapy: 30DRR9520 to (Anitha Ferguson)  Requested for: 28Mar2017; Last   Rx:28Mar2017 Ordered   10  PARoxetine HCl - 30 MG Oral Tablet; TAKE 2 TABLETS DAILY; Last Rx:89Ziy3976    Ordered    Allergies    1   Septra TABS    Signatures   Electronically signed by : VANESSA Taylor ; Apr 24 2017 10:20AM EST                       (Author)

## 2018-01-15 NOTE — MISCELLANEOUS
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)   2  Mouth sores (528 9) (K13 79)    Discussion/Summary  Discussion Summary:   PATIENT DID NOT SHOW FOR HER APPOINTMENT  NURSING SUPERVISOR WAS MADE AWARE  Chief Complaint  Chief Complaint Free Text Note Form: Discharged from [facility] 4/19/17 for Disseminated Herpes Viral disease  Follow up appointment scheduled for 5/1/17 at 9:30 am with Dr Jana Green  JHONATAN completed  Mckenna Goins RN      History of Present Illness  TCM Communication [facility]: The patient is being contacted for follow-up after hospitalization  Hospital records were reviewed  She was hospitalized at [facility]  Diagnosis: disseminated herpesviral disease  She was discharged to home  Medications reviewed and updated today  She scheduled a follow up appointment  Follow-up appointments with other specialists: none  The patient is currently experiencing symptoms  pain in/around mouth from lesions Referrals Needed:  none  Communication performed and completed by Elisa Manzo RN   HPI: PATIENT DID NOT SHOW FOR HER APPOINTMENT  Active Problems    1  Adult ADHD (314 01) (F90 9)   2  Adult BMI 40 0-44 9 kg/sq m (V85 41) (Z68 41)   3  Anxiety (300 00) (F41 9)   4  Asthma (493 90) (J45 909)   5  Baker's cyst, ruptured (727 51) (M66 0)   6  Benign essential hypertension (401 1) (I10)   7  Bipolar disorder (296 80) (F31 9)   8  BMI 38 0-38 9,adult (V85 38) (Z68 38)   9  Cellulitis of thigh (682 6) (L03 119)   10  Cocaine abuse (305 60) (F14 10)   11  Depression (311) (F32 9)   12  Diabetes mellitus type 2, uncontrolled (250 02) (E11 65)   13  Diabetes mellitus with neuropathy (250 60,357 2) (E11 40)   14  Fibromyalgia (729 1) (M79 7)   15  Hypothyroidism (244 9) (E03 9)   16  Long-term insulin use (V58 67) (Z79 4)   17  Morbid obesity (278 01) (E66 01)   18  Muscle strain (848 9) (T14 8)   19  On home oxygen therapy (V46 2) (Z99 81)   20  Pain, dental (525 9) (K08 89)   21   Peripheral neuropathy (356 9) (G62 9)   22  Right knee pain (719 46) (M25 561)   23  Well adult on routine health check (V70 0) (Z00 00)    Family History  Family History    1  Family history of diabetes mellitus (V18 0) (Z83 3)   2  Family history of hypertension (V17 49) (Z82 49)    Social History    · Never a smoker    Current Meds   1  BD Insulin Syr Ultrafine II 31G X 5/16" 0 5 ML Miscellaneous; use as directed; Therapy: 76Dco4248 to (Last Rx:28Mar2017)  Requested for: 28Mar2017 Ordered   2  Diclofenac Sodium 75 MG Oral Tablet Delayed Release; TAKE 1 TABLET Twice daily   PRN Dental pain; Therapy: 73CLD7801 to (Evaluate:02Apr2017)  Requested for: 28Mar2017; Last   Rx:28Mar2017 Ordered   3  Divalproex Sodium  MG Oral Tablet Extended Release 24 Hour; TAKE 1 TABLET   AT BEDTIME; Therapy: 31MED8716 to (Evaluate:71Msx2768)  Requested for: 36VAR8773; Last   Rx:22Jan2016 Ordered   4  Gabapentin 800 MG Oral Tablet; TAKE 1 TABLET 3 TIMES DAILY; Therapy: 25BZL0711 to (Evaluate:27Apr2017)  Requested for: 38QHH6396; Last   Rx:28Mar2017 Ordered   5  Lantus 100 UNIT/ML Subcutaneous Solution; Inject 20 units at bedtime; Therapy: 41IXN9862 to (Last Rx:28Mar2017)  Requested for: 28Mar2017 Ordered   6  Levothyroxine Sodium 25 MCG Oral Tablet; TAKE 1 TABLET DAILY; Therapy: 15GYT8166 to (Evaluate:02Mar2017)  Requested for: 66Gfh2059; Last   Rx:02Dec2016 Ordered   7  Lisinopril-Hydrochlorothiazide 20-25 MG Oral Tablet; TAKE 1 TABLET BY MOUTH ONCE A   DAY; Therapy: 65RXV4482 to (Last Rx:46Xfm1471)  Requested for: 02Dec2016 Ordered   8  OneTouch Ultra Blue In Vitro Strip; TEST 3 TIMES DAILY; Therapy: 05NLU2253 to (Evaluate:80Pgh1513) Recorded   9  OneTouch Ultra Blue In Vitro Strip; USE ONE STRIP TO CHECK GLUCOSE THREE   TIMES DAILY; Therapy: 01GRA1196 to (Diony Soliman)  Requested for: 28Mar2017; Last   Rx:28Mar2017 Ordered   10  PARoxetine HCl - 30 MG Oral Tablet; TAKE 2 TABLETS DAILY;  Last Rx:11Jul2016 Ordered    Allergies    1  Septra TABS    Message   Recorded as Task   Date: 04/19/2017 02:32 PM, Created By: System   Task Name: Hospital JHONATAN   Assigned To: Tyree Tony   Regarding Patient: Iris Elizondo, Status:  In Progress   Comment:    System - 19 Apr 2017 2:32 PM     Patient discharged from hospital   Patient Name: Mony Matthews  Patient YOB: 1975  Discharge Date: 4/19/2017  [facility] - 19 Apr 2017 2:36 PM     TASK EDITED   Huong Cash - 20 Apr 2017 10:40 AM     TASK IN PROGRESS     Signatures   Electronically signed by : Lorra Alpers, M D ; May  1 2017  7:09PM EST                       (Author)    Electronically signed by : DENISE Kumar ; May  3 2017  8:36PM EST                       (Author)

## 2018-01-15 NOTE — ED PROVIDER NOTES
History  Chief Complaint   Patient presents with    Hand Swelling     rt hand swelling,redness x 2 days,had a scratch on her hand she thinks     PT WAS SCRATCHED BY HER CAT 5 DAYS AGO, RT HAND  C/O PAIN, SWELLING  EXAM: RT HAND DIFFUSE SWELLING, ERYHTEMA, TENDERNESS, CELLULITIS  PT UTD FOR TETANUS  NO FEVER, NO LN ENLARGED,         Hand Pain   Location:  RT HAND  Severity:  Moderate  Onset quality:  Gradual  Duration:  4 days  Timing:  Constant  Progression:  Worsening  Chronicity:  New      Prior to Admission Medications   Prescriptions Last Dose Informant Patient Reported? Taking?    PARoxetine (PAXIL) 40 MG tablet   Yes No   Sig: Take 40 mg by mouth every morning   clonazePAM (KlonoPIN) 0 5 mg tablet   Yes No   Sig: Take 0 5 mg by mouth 2 (two) times a day   cyclobenzaprine (FLEXERIL) 10 mg tablet   No No   Sig: Take 1 tablet by mouth 3 (three) times a day as needed for muscle spasms for up to 15 doses   divalproex sodium (DEPAKOTE ER) 500 mg 24 hr tablet   No No   Sig: Take 1 tablet by mouth daily at bedtime for 14 days   gabapentin (NEURONTIN) 800 mg tablet   Yes No   Sig: Take 800 mg by mouth 4 (four) times a day   insulin glargine (LANTUS) 100 units/mL subcutaneous injection   Yes No   Sig: Inject 30 Units under the skin daily at bedtime   lisinopril-hydrochlorothiazide (PRINZIDE,ZESTORETIC) 20-12 5 MG per tablet   Yes No   Sig: Take 1 tablet by mouth daily      Facility-Administered Medications: None       Past Medical History:   Diagnosis Date    Depression     Diabetes mellitus ([facility] [address] )     Hypertension     Neuropathy        Past Surgical History:   Procedure Laterality Date    INCISION AND DRAINAGE OF WOUND Right 7/1/2017    Procedure: INCISION AND DRAINAGE (I&D) EXTREMITY THIGH;  Surgeon: Ross Flores MD;  Location: [facility];  Service: General       Family History   Problem Relation Age of Onset    Hypertension Mother     Diabetes Father     Hypertension Father      I have reviewed and agree with the history as documented  Social History   Substance Use Topics    Smoking status: Never Smoker    Smokeless tobacco: Never Used    Alcohol use No        Review of Systems   Musculoskeletal: Positive for arthralgias  Skin:        CELLULITIS   All other systems reviewed and are negative  Physical Exam  ED Triage Vitals [01/15/18 1759]   Temperature Pulse Respirations Blood Pressure SpO2   97 5 °F (36 4 °C) 80 16 168/94 97 %      Temp Source Heart Rate Source Patient Position - Orthostatic VS BP Location FiO2 (%)   Tympanic Monitor Sitting Left arm --      Pain Score       9           Orthostatic Vital Signs  Vitals:    01/15/18 1759   BP: 168/94   Pulse: 80   Patient Position - Orthostatic VS: Sitting       Physical Exam   Constitutional: She appears well-developed and well-nourished  No distress  HENT:   Head: Normocephalic and atraumatic  Eyes: Conjunctivae are normal  Pupils are equal, round, and reactive to light  Neck: Normal range of motion  Cardiovascular: Normal rate and regular rhythm  Pulmonary/Chest: Effort normal and breath sounds normal    Musculoskeletal: She exhibits edema and tenderness  Right hand: She exhibits tenderness and swelling  Lymphadenopathy:     She has no cervical adenopathy  Skin: She is not diaphoretic  There is erythema  Nursing note and vitals reviewed        ED Medications  Medications   sodium chloride 0 9 % bolus 1,000 mL (not administered)   HYDROmorphone (DILAUDID) injection 1 mg (not administered)   ketorolac (TORADOL) injection 30 mg (not administered)   piperacillin-tazobactam (ZOSYN) IVPB 3 375 g (not administered)   vancomycin (VANCOCIN) 1,250 mg in sodium chloride 0 9 % 250 mL IVPB (not administered)       Diagnostic Studies  Results Reviewed     Procedure Component Value Units Date/Time    Blood culture [[id number]]     Lab Status:  No result Specimen:  Blood from Line, Venous     Blood culture [[id number]]     Lab Status:  No result Specimen:  Blood from Line, Venous     CBC and differential [[id number]]     Lab Status:  No result Specimen:  Blood     Comprehensive metabolic panel [[id number]]     Lab Status:  No result Specimen:  Blood     Protime-INR [[id number]]     Lab Status:  No result Specimen:  Blood     APTT [[id number]]     Lab Status:  No result Specimen:  Blood     Lactic acid, plasma [[id number]]     Lab Status:  No result Specimen:  Blood                  No orders to display              Procedures  Procedures       Phone Contacts  ED Phone Contact    ED Course  ED Course                                MDM  Number of Diagnoses or Management Options  Cellulitis of right hand:   Diagnosis management comments: D/W DR Jose Montalvo, CELLULITIS, NOT CAT SCRATCH    CritCare Time    Disposition  Final diagnoses:   Cellulitis of right hand     Time reflects when diagnosis was documented in both MDM as applicable and the Disposition within this note     Time User Action Codes Description Comment    1/15/2018  6:44 PM Milad Hawkins Add [L03 113] Cellulitis of right hand       ED Disposition     ED Disposition Condition Comment    Admit  Case was discussed with FP and the patient's admission status was agreed to be Admission Status: inpatient status to the service of Dr Jose Montalvo   Follow-up Information    None       Patient's Medications   Discharge Prescriptions    No medications on file     No discharge procedures on file      ED Provider  Electronically Signed by           Willard Cook MD  01/15/18 4424       Willard Cook MD  01/15/18 9019       Willard Cook MD  01/15/18 7358

## 2018-01-16 LAB
ANION GAP SERPL CALCULATED.3IONS-SCNC: 4 MMOL/L (ref 4–13)
BUN SERPL-MCNC: 15 MG/DL (ref 5–25)
CALCIUM SERPL-MCNC: 8.7 MG/DL (ref 8.3–10.1)
CHLORIDE SERPL-SCNC: 104 MMOL/L (ref 100–108)
CHOLEST SERPL-MCNC: 138 MG/DL (ref 50–200)
CO2 SERPL-SCNC: 29 MMOL/L (ref 21–32)
CREAT SERPL-MCNC: 0.66 MG/DL (ref 0.6–1.3)
ERYTHROCYTE [DISTWIDTH] IN BLOOD BY AUTOMATED COUNT: 14.6 % (ref 11.6–15.1)
EST. AVERAGE GLUCOSE BLD GHB EST-MCNC: 169 MG/DL
GFR SERPL CREATININE-BSD FRML MDRD: 110 ML/MIN/1.73SQ M
GLUCOSE SERPL-MCNC: 183 MG/DL (ref 65–140)
GLUCOSE SERPL-MCNC: 187 MG/DL (ref 65–140)
GLUCOSE SERPL-MCNC: 205 MG/DL (ref 65–140)
GLUCOSE SERPL-MCNC: 215 MG/DL (ref 65–140)
GLUCOSE SERPL-MCNC: 274 MG/DL (ref 65–140)
GLUCOSE SERPL-MCNC: 297 MG/DL (ref 65–140)
HBA1C MFR BLD: 7.5 % (ref 4.2–6.3)
HCT VFR BLD AUTO: 36.8 % (ref 37–47)
HDLC SERPL-MCNC: 34 MG/DL (ref 40–60)
HGB BLD-MCNC: 12.7 G/DL (ref 12–16)
LDLC SERPL CALC-MCNC: 84 MG/DL (ref 0–100)
MAGNESIUM SERPL-MCNC: 1.9 MG/DL (ref 1.6–2.6)
MCH RBC QN AUTO: 30.3 PG (ref 27–31)
MCHC RBC AUTO-ENTMCNC: 34.5 G/DL (ref 31.4–37.4)
MCV RBC AUTO: 88 FL (ref 82–98)
PHOSPHATE SERPL-MCNC: 3.8 MG/DL (ref 2.7–4.5)
PLATELET # BLD AUTO: 248 THOUSANDS/UL (ref 130–400)
PMV BLD AUTO: 6.5 FL (ref 8.9–12.7)
POTASSIUM SERPL-SCNC: 4.3 MMOL/L (ref 3.5–5.3)
RBC # BLD AUTO: 4.19 MILLION/UL (ref 4.2–5.4)
SODIUM SERPL-SCNC: 137 MMOL/L (ref 136–145)
TRIGL SERPL-MCNC: 102 MG/DL
WBC # BLD AUTO: 8.4 THOUSAND/UL (ref 4.8–10.8)

## 2018-01-16 PROCEDURE — 80061 LIPID PANEL: CPT | Performed by: STUDENT IN AN ORGANIZED HEALTH CARE EDUCATION/TRAINING PROGRAM

## 2018-01-16 PROCEDURE — 83735 ASSAY OF MAGNESIUM: CPT | Performed by: STUDENT IN AN ORGANIZED HEALTH CARE EDUCATION/TRAINING PROGRAM

## 2018-01-16 PROCEDURE — 85027 COMPLETE CBC AUTOMATED: CPT | Performed by: STUDENT IN AN ORGANIZED HEALTH CARE EDUCATION/TRAINING PROGRAM

## 2018-01-16 PROCEDURE — 80048 BASIC METABOLIC PNL TOTAL CA: CPT | Performed by: STUDENT IN AN ORGANIZED HEALTH CARE EDUCATION/TRAINING PROGRAM

## 2018-01-16 PROCEDURE — 84100 ASSAY OF PHOSPHORUS: CPT | Performed by: STUDENT IN AN ORGANIZED HEALTH CARE EDUCATION/TRAINING PROGRAM

## 2018-01-16 PROCEDURE — 82948 REAGENT STRIP/BLOOD GLUCOSE: CPT

## 2018-01-16 PROCEDURE — 83036 HEMOGLOBIN GLYCOSYLATED A1C: CPT | Performed by: STUDENT IN AN ORGANIZED HEALTH CARE EDUCATION/TRAINING PROGRAM

## 2018-01-16 RX ORDER — LANOLIN ALCOHOL/MO/W.PET/CERES
6 CREAM (GRAM) TOPICAL
Status: DISCONTINUED | OUTPATIENT
Start: 2018-01-16 | End: 2018-01-20 | Stop reason: HOSPADM

## 2018-01-16 RX ORDER — ONDANSETRON 2 MG/ML
4 INJECTION INTRAMUSCULAR; INTRAVENOUS EVERY 6 HOURS PRN
Status: DISCONTINUED | OUTPATIENT
Start: 2018-01-16 | End: 2018-01-20 | Stop reason: HOSPADM

## 2018-01-16 RX ORDER — ACETAMINOPHEN 325 MG/1
650 TABLET ORAL EVERY 6 HOURS PRN
Status: DISCONTINUED | OUTPATIENT
Start: 2018-01-16 | End: 2018-01-20 | Stop reason: HOSPADM

## 2018-01-16 RX ORDER — KETOROLAC TROMETHAMINE 30 MG/ML
30 INJECTION, SOLUTION INTRAMUSCULAR; INTRAVENOUS EVERY 6 HOURS PRN
Status: DISCONTINUED | OUTPATIENT
Start: 2018-01-16 | End: 2018-01-17

## 2018-01-16 RX ADMIN — MELATONIN TAB 3 MG 6 MG: 3 TAB at 02:47

## 2018-01-16 RX ADMIN — KETOROLAC TROMETHAMINE 30 MG: 30 INJECTION, SOLUTION INTRAMUSCULAR at 16:16

## 2018-01-16 RX ADMIN — CLONAZEPAM 0.5 MG: 0.5 TABLET ORAL at 10:38

## 2018-01-16 RX ADMIN — INSULIN LISPRO 2 UNITS: 100 INJECTION, SOLUTION INTRAVENOUS; SUBCUTANEOUS at 17:21

## 2018-01-16 RX ADMIN — ONDANSETRON 4 MG: 2 INJECTION INTRAMUSCULAR; INTRAVENOUS at 10:33

## 2018-01-16 RX ADMIN — VANCOMYCIN HYDROCHLORIDE 1000 MG: 1 INJECTION, SOLUTION INTRAVENOUS at 18:14

## 2018-01-16 RX ADMIN — MELATONIN TAB 3 MG 6 MG: 3 TAB at 21:21

## 2018-01-16 RX ADMIN — KETOROLAC TROMETHAMINE 30 MG: 30 INJECTION, SOLUTION INTRAMUSCULAR at 22:16

## 2018-01-16 RX ADMIN — CLONAZEPAM 0.5 MG: 0.5 TABLET ORAL at 17:20

## 2018-01-16 RX ADMIN — VANCOMYCIN HYDROCHLORIDE 1000 MG: 1 INJECTION, SOLUTION INTRAVENOUS at 06:16

## 2018-01-16 RX ADMIN — KETOROLAC TROMETHAMINE 30 MG: 30 INJECTION, SOLUTION INTRAMUSCULAR at 02:47

## 2018-01-16 RX ADMIN — INSULIN GLARGINE 30 UNITS: 100 INJECTION, SOLUTION SUBCUTANEOUS at 22:04

## 2018-01-16 RX ADMIN — ACETAMINOPHEN 650 MG: 325 TABLET, FILM COATED ORAL at 12:41

## 2018-01-16 RX ADMIN — KETOROLAC TROMETHAMINE 30 MG: 30 INJECTION, SOLUTION INTRAMUSCULAR at 09:05

## 2018-01-16 RX ADMIN — GABAPENTIN 800 MG: 400 CAPSULE ORAL at 10:36

## 2018-01-16 RX ADMIN — LISINOPRIL: 20 TABLET ORAL at 10:37

## 2018-01-16 RX ADMIN — ENOXAPARIN SODIUM 40 MG: 40 INJECTION SUBCUTANEOUS at 10:38

## 2018-01-16 RX ADMIN — DIVALPROEX SODIUM 500 MG: 500 TABLET, EXTENDED RELEASE ORAL at 21:21

## 2018-01-16 RX ADMIN — GABAPENTIN 800 MG: 400 CAPSULE ORAL at 21:21

## 2018-01-16 RX ADMIN — INSULIN LISPRO 2 UNITS: 100 INJECTION, SOLUTION INTRAVENOUS; SUBCUTANEOUS at 07:57

## 2018-01-16 RX ADMIN — INSULIN LISPRO 4 UNITS: 100 INJECTION, SOLUTION INTRAVENOUS; SUBCUTANEOUS at 12:05

## 2018-01-16 RX ADMIN — ESCITALOPRAM OXALATE 20 MG: 10 TABLET ORAL at 10:35

## 2018-01-16 RX ADMIN — GABAPENTIN 800 MG: 400 CAPSULE ORAL at 17:20

## 2018-01-16 RX ADMIN — ACETAMINOPHEN 650 MG: 325 TABLET, FILM COATED ORAL at 19:43

## 2018-01-16 NOTE — PROGRESS NOTES
[address] [facility] [address] [facility] Progress Note - Nicholas Barron 41 y o  female MRN: [id number]    Unit/Bed#: 3 [place] [address] Encounter: [id number]      Assessment/Plan:  Right hand cellulitis  -afebrile, no leukocytosis on admission  -Blood cultures x 2 pending  -pain control: toradol 15 mg q6h prn  -Day #2 vancomycin 1g q12h  -vanco trough pending  DM type 2 with neuropathy  -hgb A1c pending  -ISS with accuchecls  -Lantus 30 units qhs and gabapentin 800 mg TID  Obesity  -BMI 43 4  -lipid panel pending  Hypertension: stable continue with Prinizide 20-12-5 mg qd  Depression: stable, continue with lexapro 20mg qd  Bipolar Disorder: stable, continue with Depakote 500 mg qd  Global: diabetic diet, SCDs and lovenox    Subjective:   Patient seen and examined at bedside  Reports improvement in hand  Objective:     Vitals: Blood pressure 129/66, pulse 61, temperature 97 6 °F (36 4 °C), temperature source Tympanic, resp  rate 20, height 5' 3" (1 6 m), weight 111 kg (245 lb 9 5 oz), last menstrual period 01/03/2018, SpO2 97 %, not currently breastfeeding  ,Body mass index is 43 5 kg/m²    Wt Readings from Last 3 Encounters:   01/15/18 111 kg (245 lb 9 5 oz)   06/30/17 107 kg (235 lb 0 2 oz)   06/26/17 108 kg (238 lb)       Intake/Output Summary (Last 24 hours) at 01/16/18 0711  Last data filed at 01/16/18 0616   Gross per 24 hour   Intake              500 ml   Output                0 ml   Net              500 ml       Physical Exam:   General: Pt is AAO x 3, not in any acute distress  Cardio: RRR, S1 and S2 +, no murmurs/rubs/gallops/clicks  Resp: CTA b/l, no wheezes/rales/rhonchi  Abdomen: soft, NT/ND, BS+  Extremities: right dorsal surface of hand moderate erythema, warmth and pain, no wound      Recent Results (from the past 24 hour(s))   Protime-INR    Collection Time: 01/15/18  6:49 PM   Result Value Ref Range    Protime 11 3 9 4 - 11 7 seconds    INR 1 08 0 86 - 1 16   APTT    Collection Time: 01/15/18  6:49 PM   Result Value Ref Range    PTT 29 24 - 33 seconds   CBC and differential    Collection Time: 01/15/18  6:50 PM   Result Value Ref Range    WBC 9 70 4 80 - 10 80 Thousand/uL    RBC 4 49 4 20 - 5 40 Million/uL    Hemoglobin 13 6 12 0 - 16 0 g/dL    Hematocrit 39 2 37 0 - 47 0 %    MCV 87 82 - 98 fL    MCH 30 3 27 0 - 31 0 pg    MCHC 34 7 31 4 - 37 4 g/dL    RDW 14 2 11 6 - 15 1 %    MPV 6 2 (L) 8 9 - 12 7 fL    Platelets 459 191 - 729 Thousands/uL    nRBC 0 /100 WBCs    Neutrophils Relative 70 43 - 75 %    Lymphocytes Relative 22 14 - 44 %    Monocytes Relative 7 4 - 12 %    Eosinophils Relative 1 0 - 6 %    Basophils Relative 0 0 - 1 %    Neutrophils Absolute 6 80 1 85 - 7 62 Thousands/µL    Lymphocytes Absolute 2 10 0 60 - 4 47 Thousands/µL    Monocytes Absolute 0 70 0 17 - 1 22 Thousand/µL    Eosinophils Absolute 0 10 0 00 - 0 61 Thousand/µL    Basophils Absolute 0 00 0 00 - 0 10 Thousands/µL   Comprehensive metabolic panel    Collection Time: 01/15/18  6:50 PM   Result Value Ref Range    Sodium 136 136 - 145 mmol/L    Potassium 4 7 3 5 - 5 3 mmol/L    Chloride 100 100 - 108 mmol/L    CO2 31 21 - 32 mmol/L    Anion Gap 5 4 - 13 mmol/L    BUN 9 5 - 25 mg/dL    Creatinine 0 69 0 60 - 1 30 mg/dL    Glucose 236 (H) 65 - 140 mg/dL    Calcium 8 9 8 3 - 10 1 mg/dL    AST 50 (H) 5 - 45 U/L    ALT 84 (H) 12 - 78 U/L    Alkaline Phosphatase 63 46 - 116 U/L    Total Protein 7 5 6 4 - 8 2 g/dL    Albumin 3 1 (L) 3 5 - 5 0 g/dL    Total Bilirubin 0 70 0 20 - 1 00 mg/dL    eGFR 108 ml/min/1 73sq m   Lactic acid, plasma    Collection Time: 01/15/18  6:50 PM   Result Value Ref Range    LACTIC ACID 1 3 0 5 - 2 0 mmol/L   CBC (With Platelets)    Collection Time: 01/16/18  6:24 AM   Result Value Ref Range    WBC 8 40 4 80 - 10 80 Thousand/uL    RBC 4 19 (L) 4 20 - 5 40 Million/uL    Hemoglobin 12 7 12 0 - 16 0 g/dL    Hematocrit 36 8 (L) 37 0 - 47 0 %    MCV 88 82 - 98 fL    MCH 30 3 27 0 - 31 0 pg    MCHC 34 5 31 4 - 37 4 g/dL    RDW 14 6 11 6 - 15 1 %    Platelets 030 476 - 165 Thousands/uL    MPV 6 5 (L) 8 9 - 12 7 fL       Current Facility-Administered Medications   Medication Dose Route Frequency Provider Last Rate Last Dose    acetaminophen (TYLENOL) tablet 650 mg  650 mg Oral Q6H PRN Sowmya Smith MD        clonazePAM (KlonoPIN) tablet 0 5 mg  0 5 mg Oral BID Sowmya Smith MD   0 5 mg at 01/15/18 2146    divalproex sodium (DEPAKOTE ER) 24 hr tablet 500 mg  500 mg Oral HS Sowmya Smith MD        enoxaparin (LOVENOX) subcutaneous injection 40 mg  40 mg Subcutaneous Daily Sowmya Smith MD        escitalopram (LEXAPRO) tablet 20 mg  20 mg Oral Daily Sowmya Smith MD        gabapentin (NEURONTIN) capsule 800 mg  800 mg Oral TID Sowmya Smith MD   800 mg at 01/15/18 2146    insulin glargine (LANTUS) subcutaneous injection 30 Units  30 Units Subcutaneous HS Sowmya Smith MD   30 Units at 01/15/18 2148    insulin lispro (HumaLOG) 100 units/mL subcutaneous injection 2-12 Units  2-12 Units Subcutaneous TID AC Sowmya Smith MD        ketorolac (TORADOL) injection 30 mg  30 mg Intravenous Q6H PRN Sowmya Smith MD   30 mg at 01/16/18 0247    lisinopril-hydrochlorothiazide (PRINZIDE 20/12  5) combo dose   Oral Daily Sowmya Smith MD        melatonin tablet 6 mg  6 mg Oral HS Sowmya Smith MD   6 mg at 01/16/18 0247    vancomycin (VANCOCIN) IVPB (premix) 1,000 mg  10 mg/kg Intravenous Q12H Sowmya Smith  mL/hr at 01/16/18 0616 1,000 mg at 01/16/18 0616       Invasive Devices     Peripheral Intravenous Line            Peripheral IV 07/01/17 Right Arm 198 days    Peripheral IV 01/15/18 Left Forearm less than 1 day                Barbara Boyd MD

## 2018-01-16 NOTE — H&P
H&P Exam - Devi Barron 41 y o  female MRN: [id number]  Unit/Bed#: ED 15 Encounter: [id number]      41year old female with a PMH of Depression, Hypertension, and Diabetes Mellitus w  Neuropathy presents with right hand swelling  Patient will be admitted to the medical/surgical floor and will be placed on inpatient status under the service of Dr Amrit Melgar  Patient is expected to stay more than 2 midnights  Assessment/Plan:    Right Hand Swelling:  · Likely 2/2 Cellulitis vs  Abscess   · Afebrile on admission / WBC on admission: 9 7 / LA on admission: 1 3  · f/u Blood Cx x2  · Pain Control: Toradol 15mg IV q6h prn  · Start Vancomycin 1g IV q12h    Diabetes Mellitus w  Neuropathy:  · Blood Glucose on admission: 236  · Obtain HbA1c  · Start ISS Algorithm 4 and monitor with Accucheck  · Continue home medication: Lantus 30U at bedtime and Gabapentin 800mg PO tid    Obesity:  · BMI: 43 4  · Obtain Lipid Panel    Hypertension:  · BP on admission: 168/94  · Stable; continue with home medication: Prinizide 20-12 5mg PO qd    Depression:  · Stable; continue with home medication: Lexapro 20mg PO qd    Bipolar Disorder:  · Stable; continue with home medication: Depakote 500mg PO q24h    Global:  · Diabetic Diet / Replete Electrolytes as Needed / Lovenox + SCD        Plan Discussed and Agreed upon with Senior Resident and Attending Physician on-call    History of Present Illness     Carlito Sidhu is a 41year old female with a PMH of Depression, Hypertension, and Diabetes Mellitus w  Neuropathy who presents to the ED with a chief complaint of right hand swelling  As per patient, she noticed her right hand to start swelling sometime last week either on 12/10/18 or 12/11/18  Patient states that she was volunteering her time at a cat shelter  While doing so, it appears that she received a cat scratch from her own cat on the top surface of her right hand  She states that her cat's vaccinations are up-to-date   Patient states that she cleaned the wound with soap and water  Further notes using over-the-counter bacitracin ointment for the last 3-4 days  Patient states that the affected area has progressively worsened  Her right hand has become more tender, red, warm to touch, and painful  Associated symptoms include periods of sweating, chills, and fever blister on her nose  Pain was initially noted to be 6/10 in severity, but now is rated 9/10 in severity  She denies any fever, headache, visual changes, shortness of breath, chest pain, abdominal pain, nausea, vomiting, diarrhea, weakness, tingling, and swelling of her other extremities  ER Course: Patient arrived to the ED  Given 1L NaCl 0 9% Bolus, Dilaudid 1mg 1x dose, Toradol 30mg IV 1x dose, and Vancomycin 1250mg  Patient stabilized and moved to the medical floor  Review of Systems   Constitutional: Positive for diaphoresis  Negative for chills and fever  HENT: Negative for congestion, ear discharge, ear pain, facial swelling, mouth sores, rhinorrhea, sinus pain, sinus pressure, sore throat and trouble swallowing  Eyes: Negative for photophobia and visual disturbance  Respiratory: Negative for cough, chest tightness and shortness of breath  Cardiovascular: Negative for chest pain, palpitations and leg swelling  Gastrointestinal: Negative for abdominal distention, abdominal pain, constipation, diarrhea, nausea and vomiting  Endocrine: Negative for polydipsia, polyphagia and polyuria  Genitourinary: Negative for decreased urine volume, difficulty urinating, dyspareunia, dysuria, enuresis, flank pain, frequency, hematuria and urgency  Musculoskeletal: Positive for joint swelling  Negative for arthralgias, back pain, gait problem and myalgias  Skin: Positive for wound  Neurological: Negative for dizziness, tremors, syncope, facial asymmetry, weakness, numbness and headaches  Psychiatric/Behavioral: Negative for behavioral problems         Historical Information Past Medical History:   Diagnosis Date    Depression     Diabetes mellitus ([address] )     Hypertension     Neuropathy      Past Surgical History:   Procedure Laterality Date    INCISION AND DRAINAGE OF WOUND Right 7/1/2017    Procedure: INCISION AND DRAINAGE (I&D) EXTREMITY THIGH;  Surgeon: Mark Navarrete MD;  Location: [facility];  Service: General     Social History   History   Alcohol Use No     History   Drug Use No     Comment: lasr use per patient April 2017     History   Smoking Status    Never Smoker   Smokeless Tobacco    Never Used     Family History:   Family History   Problem Relation Age of Onset    Hypertension Mother     Diabetes Father     Hypertension Father        Meds/Allergies   PTA meds:   Prior to Admission Medications   Prescriptions Last Dose Informant Patient Reported? Taking?    PARoxetine (PAXIL) 40 MG tablet   Yes No   Sig: Take 40 mg by mouth every morning   clonazePAM (KlonoPIN) 0 5 mg tablet   Yes No   Sig: Take 0 5 mg by mouth 2 (two) times a day   cyclobenzaprine (FLEXERIL) 10 mg tablet   No No   Sig: Take 1 tablet by mouth 3 (three) times a day as needed for muscle spasms for up to 15 doses   divalproex sodium (DEPAKOTE ER) 500 mg 24 hr tablet   No No   Sig: Take 1 tablet by mouth daily at bedtime for 14 days   gabapentin (NEURONTIN) 800 mg tablet   Yes No   Sig: Take 800 mg by mouth 4 (four) times a day   insulin glargine (LANTUS) 100 units/mL subcutaneous injection   Yes No   Sig: Inject 30 Units under the skin daily at bedtime   lisinopril-hydrochlorothiazide (PRINZIDE,ZESTORETIC) 20-12 5 MG per tablet   Yes No   Sig: Take 1 tablet by mouth daily      Facility-Administered Medications: None     Allergies   Allergen Reactions    Septra [Sulfamethoxazole-Trimethoprim] Hives    Tramadol Rash       Objective   First Vitals:   Blood Pressure: 168/94 (01/15/18 1759)  Pulse: 80 (01/15/18 1759)  Temperature: 97 5 °F (36 4 °C) (01/15/18 1759)  Temp Source: Tympanic (01/15/18 1759)  Respirations: 16 (01/15/18 1759)  Height: 5' 3" (160 cm) (01/15/18 1759)  Weight - Scale: 113 kg (250 lb) (01/15/18 1759)  SpO2: 97 % (01/15/18 1759)    Current Vitals:   Blood Pressure: 168/94 (01/15/18 1759)  Pulse: 80 (01/15/18 1759)  Temperature: 97 5 °F (36 4 °C) (01/15/18 1759)  Temp Source: Tympanic (01/15/18 1759)  Respirations: 16 (01/15/18 1759)  Height: 5' 3" (160 cm) (01/15/18 1759)  Weight - Scale: 113 kg (250 lb) (01/15/18 1759)  SpO2: 97 % (01/15/18 1759)    No intake or output data in the 24 hours ending 01/15/18 1915    Invasive Devices     Peripheral Intravenous Line            Peripheral IV 07/01/17 Right Arm 198 days    Peripheral IV 01/15/18 Left Forearm less than 1 day                Physical Exam   Constitutional: She is oriented to person, place, and time  She appears well-developed and well-nourished  HENT:   Head: Normocephalic and atraumatic  Eyes: Conjunctivae and EOM are normal  Pupils are equal, round, and reactive to light  Neck: Normal range of motion  Neck supple  No JVD present  No tracheal deviation present  No thyromegaly present  Cardiovascular: Normal rate, regular rhythm, normal heart sounds and intact distal pulses  Exam reveals no gallop and no friction rub  No murmur heard  Pulmonary/Chest: Effort normal and breath sounds normal  No respiratory distress  She has no wheezes  She has no rales  She exhibits no tenderness  Abdominal: Soft  Bowel sounds are normal  She exhibits no distension and no mass  There is no tenderness  There is no rebound and no guarding  Musculoskeletal: Normal range of motion  She exhibits no edema, tenderness or deformity  Right Hand ([place] Surface): Erythematous, Tender to Palpation, Increased Warmth, Painful to Touch, No Streaks, No Lymphadenopathy, No Puncture Wound    Lymphadenopathy:     She has no cervical adenopathy  Neurological: She is alert and oriented to person, place, and time  Skin: Skin is warm   There is erythema  Psychiatric: She has a normal mood and affect  Lab Results:  Results for orders placed or performed during the hospital encounter of 01/15/18   CBC and differential   Result Value Ref Range    WBC 9 70 4 80 - 10 80 Thousand/uL    RBC 4 49 4 20 - 5 40 Million/uL    Hemoglobin 13 6 12 0 - 16 0 g/dL    Hematocrit 39 2 37 0 - 47 0 %    MCV 87 82 - 98 fL    MCH 30 3 27 0 - 31 0 pg    MCHC 34 7 31 4 - 37 4 g/dL    RDW 14 2 11 6 - 15 1 %    MPV 6 2 (L) 8 9 - 12 7 fL    Platelets 501 260 - 489 Thousands/uL    nRBC 0 /100 WBCs    Neutrophils Relative 70 43 - 75 %    Lymphocytes Relative 22 14 - 44 %    Monocytes Relative 7 4 - 12 %    Eosinophils Relative 1 0 - 6 %    Basophils Relative 0 0 - 1 %    Neutrophils Absolute 6 80 1 85 - 7 62 Thousands/µL    Lymphocytes Absolute 2 10 0 60 - 4 47 Thousands/µL    Monocytes Absolute 0 70 0 17 - 1 22 Thousand/µL    Eosinophils Absolute 0 10 0 00 - 0 61 Thousand/µL    Basophils Absolute 0 00 0 00 - 0 10 Thousands/µL   Comprehensive metabolic panel   Result Value Ref Range    Sodium 136 136 - 145 mmol/L    Potassium 4 7 3 5 - 5 3 mmol/L    Chloride 100 100 - 108 mmol/L    CO2 31 21 - 32 mmol/L    Anion Gap 5 4 - 13 mmol/L    BUN 9 5 - 25 mg/dL    Creatinine 0 69 0 60 - 1 30 mg/dL    Glucose 236 (H) 65 - 140 mg/dL    Calcium 8 9 8 3 - 10 1 mg/dL    AST 50 (H) 5 - 45 U/L    ALT 84 (H) 12 - 78 U/L    Alkaline Phosphatase 63 46 - 116 U/L    Total Protein 7 5 6 4 - 8 2 g/dL    Albumin 3 1 (L) 3 5 - 5 0 g/dL    Total Bilirubin 0 70 0 20 - 1 00 mg/dL    eGFR 108 ml/min/1 73sq m   Protime-INR   Result Value Ref Range    Protime 11 3 9 4 - 11 7 seconds    INR 1 08 0 86 - 1 16   APTT   Result Value Ref Range    PTT 29 24 - 33 seconds   Lactic acid, plasma   Result Value Ref Range    LACTIC ACID 1 3 0 5 - 2 0 mmol/L       Imaging: None  EKG, Pathology, and Other Studies: None    Code Status: Full      Counseling / Coordination of Care:    Total floor / unit time spent today 30 keegan Khan MD

## 2018-01-16 NOTE — PLAN OF CARE
INFECTION - ADULT     Absence or prevention of progression during hospitalization Progressing        Nutrition/Hydration-ADULT     Nutrient/Hydration intake appropriate for improving, restoring or maintaining nutritional needs Progressing        PAIN - ADULT     Verbalizes/displays adequate comfort level or baseline comfort level Progressing

## 2018-01-16 NOTE — MISCELLANEOUS
Message  Message Free Text Note Form: pt called  states her medications have ran out  states she called [facility] 4-5 days ago and had her pharmacy also call us but we did not refill her meds  i called pharmacy and was told that they faxed for refill 2 days ago  as per pharmacy pt has not refill her meds since march 2016  i looked at her all scripts chart and as per chart she has not been in the office since oct 2015  her last A1c was in 4/2015  i called back patient and advised her that she hasn't been in the office for over 8 months and her last check was over a year ago  pt denies  states she was in [facility] very recently and has been seen by us multiple times this year  she states she needs her meds now and has no refills left  I told patient that I will refill her meds ONLY for 1 month and no more  She needs to make appt with [facility] within 1 month for further medication refill  I refilled medications as it was told to me by the patient  Lantus 20u at bedtime  gabapentin 600mg tid  Lisinopril - HCTZ 20-25mg daily  0 5ml syringes for her diabetes  paxil 30mg 2 tabs daily    i called and spoke with pharmacy and refilled as above for 1 month with no refill    i called dr Willi Ward and discused the case and he agreed  Signatures   Electronically signed by :  VANESSA Cabrera ; Jul 9 2016  3:58PM EST                       (Author)

## 2018-01-16 NOTE — CASE MANAGEMENT
Continued Stay Review    Date: 1/16/18    Vital Signs: /66   Pulse 61   Temp 97 6 °F (36 4 °C) (Tympanic)   Resp 20   Ht 5' 3" (1 6 m)   Wt 111 kg (245 lb 9 5 oz)   LMP 01/03/2018   SpO2 97%   BMI 43 50 kg/m²       IV ZOFRAN X1  VANCO TROUGH BEFORE 4TH DOSE  IV TORADOL X2   IV DILAUDID   Medications:   Scheduled Meds:   clonazePAM 0 5 mg Oral BID   divalproex sodium 500 mg Oral HS   enoxaparin 40 mg Subcutaneous Daily   escitalopram 20 mg Oral Daily   gabapentin 800 mg Oral TID   insulin glargine 30 Units Subcutaneous HS   insulin lispro 2-12 Units Subcutaneous TID AC   lisinopril-hydrochlorothiazide (PRINZIDE 20/12  5) combo dose  Oral Daily   melatonin 6 mg Oral HS   vancomycin 10 mg/kg Intravenous Q12H     Continuous Infusions:    PRN Meds:   acetaminophen    ketorolac    ondansetron    Abnormal Labs/Diagnostic Results: GLUCOSE 215 HDL 34 HGBA1C 7 5    Age/Sex: 41 y o  female     ATTENDING  Extremities: right dorsal surface of hand moderate erythema, warmth and pain       Assessment/Plan:  Right hand cellulitis  -afebrile, no leukocytosis on admission  -Blood cultures x 2 pending  -pain control: toradol 15 mg q6h prn  -Day #2 vancomycin 1g q12h  -vanco trough pending  DM type 2 with neuropathy  -hgb A1c pending  -ISS with accuchecls  -Lantus 30 units qhs and gabapentin 800 mg TID  Obesity  -BMI 43 4  -lipid panel pending  Hypertension: stable continue with Prinizide 20-12-5 mg qd  Depression: stable, continue with lexapro 20mg qd  Bipolar Disorder: stable, continue with Depakote 500 mg qd  Global: diabetic diet, SCDs and lovenox

## 2018-01-16 NOTE — CASE MANAGEMENT
Initial Clinical Review    Admission: Date/Time/Statement: 1/15/18 @ 1845     Orders Placed This Encounter   Procedures    Inpatient Admission (expected length of stay for this patient is greater than two midnights)     Standing Status:   Standing     Number of Occurrences:   1     Order Specific Question:   Admitting Physician     Answer:   Lev Clayton     Order Specific Question:   Level of Care     Answer:   Med Surg [16]     Order Specific Question:   Estimated length of stay     Answer:   Not Applicable         ED: Date/Time/Mode of Arrival:   ED Arrival Information     Expected Arrival Acuity Means of Arrival Escorted By Service Admission Type    - 1/15/2018 17:56 Urgent Walk-In Self General Medicine Urgent    Arrival Complaint    swollen hand          Chief Complaint:   Chief Complaint   Patient presents with    Hand Swelling     rt hand swelling,redness x 2 days,had a scratch on her hand she thinks       History of Illness:  41year old female with a PMH of Depression, Hypertension, and Diabetes Mellitus w  Neuropathy who presents to the ED with a chief complaint of right hand swelling  As per patient, she noticed her right hand to start swelling sometime last week   Patient states that she was volunteering her time at a cat shelter  While doing so, it appears that she received a cat scratch from her own cat on the top surface of her right hand  She states that her cat's vaccinations are up-to-date  Patient states that she cleaned the wound with soap and water  Further notes using over-the-counter bacitracin ointment for the last 3-4 days  Patient states that the affected area has progressively worsened  Her right hand has become more tender, red, warm to touch, and painful  Associated symptoms include periods of sweating, chills, and fever blister on her nose  Pain was initially noted to be 6/10 in severity, but now is rated 9/10 in severity    She denies any fever, headache, visual changes, shortness of breath, chest pain, abdominal pain, nausea, vomiting, diarrhea, weakness, tingling, and swelling of her other extremities  Right Hand ([place]): Erythematous, Tender to Palpation, Increased Warmth, Painful to Touch  ED Vital Signs:   ED Triage Vitals [01/15/18 1759]   Temperature Pulse Respirations Blood Pressure SpO2   97 5 °F (36 4 °C) 80 16 168/94 97 %      Temp Source Heart Rate Source Patient Position - Orthostatic VS BP Location FiO2 (%)   Tympanic Monitor Sitting Left arm --      Pain Score       9        Wt Readings from Last 1 Encounters:   01/15/18 111 kg (245 lb 9 5 oz)       Vital Signs (abnormal): Abnormal Labs/Diagnostic Test Results:   Glucose 236 (H) 65 - 140      AST 50 (H) 5 - 45 U/L      ALT 84 (H) 12 - 78 U/L       ED Treatment:   Medication Administration from 01/15/2018 1756 to 01/15/2018 1949       Date/Time Order Dose Route Action Action by Comments     01/15/2018 1853 sodium chloride 0 9 % bolus 1,000 mL 1,000 mL Intravenous New Bag Ivan Sanchez RN      01/15/2018 1854 HYDROmorphone (DILAUDID) injection 1 mg 1 mg Intravenous Given Ivan Sanchez RN      01/15/2018 1855 ketorolac (TORADOL) injection 30 mg 30 mg Intravenous Given Ivan Sanchez RN      01/15/2018 1900 vancomycin (VANCOCIN) 1,250 mg in sodium chloride 0 9 % 250 mL IVPB 1,250 mg Intravenous New Bag Ivan Sanchez RN           Past Medical/Surgical History:    Active Ambulatory Problems     Diagnosis Date Noted    Disseminated herpesviral disease ([facility] ) 04/18/2017    Mouth sores 04/18/2017    Hypertension 04/18/2017    Diabetes mellitus with neuropathy ([facility] ) 04/18/2017    Anxiety 04/18/2017    Depression 04/18/2017    History of drug abuse 06/30/2017    Morbidly obese ([facility] ) 06/30/2017    History of hypothyroidism 06/30/2017    Anemia 06/30/2017     Resolved Ambulatory Problems     Diagnosis Date Noted    Abscess 06/30/2017    Carbuncle 06/30/2017    Cellulitis of right lower extremity 06/30/2017    Renal insufficiency 06/30/2017    Hyponatremia 06/30/2017    Carbuncle and furuncle of lower extremity 07/01/2017     Past Medical History:   Diagnosis Date    Depression     [facility] mellitus ([facility] )     Hypertension     Neuropathy        Admitting Diagnosis: Hand swelling [M79 89]  Cellulitis of right hand [L03 113]    Age/Sex: 41 y o  female    Assessment/Plan:   41year old female with a PMH of Depression, Hypertension, and Diabetes Mellitus w  Neuropathy presents with right hand swelling  Patient will be admitted to the medical/surgical floor and will be placed on inpatient status under the service of Dr Ktae Esposito  Patient is expected to stay more than 2 midnights      Assessment/Plan:  Right Hand Swelling:  · Likely 2/2 Cellulitis vs  Abscess   · Afebrile on admission / WBC on admission: 9 7 / LA on admission: 1 3  · f/u Blood Cx x2  · Pain Control: Toradol 15mg IV q6h prn  · Start Vancomycin 1g IV q12h  Diabetes Mellitus w  Neuropathy:  · Blood Glucose on admission: 236  · Obtain HbA1c  · Start ISS Algorithm 4 and monitor with Accucheck  · Continue home medication: Lantus 30U at bedtime and Gabapentin 800mg PO tid  Obesity:  · BMI: 43 4  · Obtain Lipid Panel  Hypertension:  · BP on admission: 168/94  · Stable; continue with home medication: Prinizide 20-12 5mg PO qd  Depression:  · Stable; continue with home medication: Lexapro 20mg PO qd     Bipolar Disorder:  · Stable; continue with home medication: Depakote 500mg PO q24h     Global:  · Diabetic Diet / Replete Electrolytes as Needed / Lovenox + SCD        Admission Orders:  GMF    Scheduled Meds:   clonazePAM 0 5 mg Oral BID   divalproex sodium 500 mg Oral HS   enoxaparin 40 mg Subcutaneous Daily   escitalopram 20 mg Oral Daily   gabapentin 800 mg Oral TID   insulin glargine 30 Units Subcutaneous HS   insulin lispro 2-12 Units Subcutaneous TID AC   lisinopril-hydrochlorothiazide (PRINZIDE 20/12  5) combo dose  Oral Daily melatonin 6 mg Oral HS   vancomycin 10 mg/kg Intravenous Q12H     Continuous Infusions:    PRN Meds:   acetaminophen    ketorolac

## 2018-01-16 NOTE — MALNUTRITION/BMI
This medical record reflects one or more clinical indicators suggestive of malnutrition and/or morbid obesity  Please indicate the one diagnosis below which you feel best reflects the clinical picture  BMI Findings: Body mass index is 43 5 kg/m²  See Nutrition note dated 01/16/2018 for additional details  Completed nutrition assessment is viewable in the nutrition documentation

## 2018-01-16 NOTE — NUTRITION
01/16/18 1019   Assessment   Timepoint Initial  (BMI>40)   Labs   List Completed Labs (, , 215; meds: gabapentin, dilaudid, lantus, humalog, lisinopril-HCTZ)   Feeding Route   PO Independent   Adequacy of Intake   Nutrition Modality PO  (CCD1)   Estimated Calorie Intake 0-25%   Estimated Protein Intake  0-25%   Estimated Fluid Intake 0-25%   Estimated calorie intake compared to estimated need Pt reported she had breakfast this morning, then threw up d/t severe hand pain r/t cellulitis  Assessed as not meeting needs at this time, will monitor  Nutrition Prognosis   Nutrition Concerns (vomiting)   Comorbid Concerns (morbid obesity, R hand cellulitis)   Nutrition Considerations (Education inappropriate at present, will provide prn)   PES Statement   Problem Clinical   Weight (3) Overweight/obesity NC-3 3   Overweight/ obesity specific to Obese, Class III (5)   Related to Energy intake > Energy output over time   As evidenced by: BMI   Additional PES needed? Yes   PES Statement 2   Problem Intake   Related to Inadequate Intake;Vomiting  (pain)   As evidenced by Intake < estimated needs;Per patient interview   Oral or Nutritional Support Intake (2) Inadequate oral intake NI-2 1   Patient Nutrition Goals   Goal adequate hydration;tolerate PO diet;meet PO needs   Goal Status initiated   Timeframe to complete goal by next f/u   Recommendations/Interventions   Summary BMI form filed  Pt admitted for R hand cellulitis which pt reports is causing severe pain and vomiting  Unable to tolerate breakfast this morning as a result  Will assess diet ed needs as appropriate      Interventions Diet: continued as ordered   Nutrition Recommendations Continue diet as ordered   Nutrition Complexity Risk   Nutrition complexity level Moderate risk   Nutrition review: 01/19/18  (po intakes, diet ed needs?)   Follow up date 01/23/18

## 2018-01-16 NOTE — MISCELLANEOUS
Provider Comments  Provider Comments:   CALLED PT REGARDING MISSED APPT, LM TO R/S /AT        Signatures   Electronically signed by : Ilsa Espinoza MD; Dec 16 2016  2:50PM EST                       (Acknowledgement)

## 2018-01-16 NOTE — CASE MANAGEMENT
Continued Stay Review    Date: 1/16/18    Vital Signs: /73   Pulse 62   Temp (!) 96 4 °F (35 8 °C) (Tympanic)   Resp 20   Ht 5' 3" (1 6 m)   Wt 111 kg (245 lb 9 5 oz)   LMP 01/03/2018   SpO2 92%   BMI 43 50 kg/m²     Medications:   Scheduled Meds:   clonazePAM 0 5 mg Oral BID   divalproex sodium 500 mg Oral HS   enoxaparin 40 mg Subcutaneous Daily   escitalopram 20 mg Oral Daily   gabapentin 800 mg Oral TID   insulin glargine 30 Units Subcutaneous HS   insulin lispro 2-12 Units Subcutaneous TID AC   lisinopril-hydrochlorothiazide (PRINZIDE 20/12  5) combo dose  Oral Daily   melatonin 6 mg Oral HS   vancomycin 10 mg/kg Intravenous Q12H     Continuous Infusions:    PRN Meds:   acetaminophen    ketorolac    ondansetron    Abnormal Labs/Diagnostic Results: GLUCOSE 205     Age/Sex: 41 y o  female     Assessment/Plan:   Extremities: right dorsal surface of hand moderate erythema, warmth and pain   Pt will continue with IV antibiotics for the hand cellulitis  Pt is morbidly obese  Lillie written for pain control    Cellulitis marked with pen to check on resolution juan  Assessment/Plan:  Right hand cellulitis  -afebrile, no leukocytosis on admission  -Blood cultures x 2 pending  -pain control: toradol 15 mg q6h prn  -Day #2 vancomycin 1g q12h  -vanco trough pending  DM type 2 with neuropathy  -hgb A1c pending  -ISS with accuchecls  -Lantus 30 units qhs and gabapentin 800 mg TID  Obesity  -BMI 43 4  -lipid panel pending  Hypertension: stable continue with Prinizide 20-12-5 mg qd  Depression: stable, continue with lexapro 20mg qd  Bipolar Disorder: stable, continue with Depakote 500 mg qd  Global: diabetic diet, SCDs and lovenox     Discharge Plan:

## 2018-01-17 LAB
ANION GAP SERPL CALCULATED.3IONS-SCNC: 6 MMOL/L (ref 4–13)
BUN SERPL-MCNC: 12 MG/DL (ref 5–25)
CALCIUM SERPL-MCNC: 7.3 MG/DL (ref 8.3–10.1)
CHLORIDE SERPL-SCNC: 107 MMOL/L (ref 100–108)
CO2 SERPL-SCNC: 24 MMOL/L (ref 21–32)
CREAT SERPL-MCNC: 0.48 MG/DL (ref 0.6–1.3)
ERYTHROCYTE [DISTWIDTH] IN BLOOD BY AUTOMATED COUNT: 14.2 % (ref 11.6–15.1)
GFR SERPL CREATININE-BSD FRML MDRD: 122 ML/MIN/1.73SQ M
GLUCOSE SERPL-MCNC: 126 MG/DL (ref 65–140)
GLUCOSE SERPL-MCNC: 148 MG/DL (ref 65–140)
GLUCOSE SERPL-MCNC: 181 MG/DL (ref 65–140)
GLUCOSE SERPL-MCNC: 236 MG/DL (ref 65–140)
GLUCOSE SERPL-MCNC: 246 MG/DL (ref 65–140)
HCT VFR BLD AUTO: 32 % (ref 37–47)
HGB BLD-MCNC: 10.8 G/DL (ref 12–16)
MAGNESIUM SERPL-MCNC: 1.6 MG/DL (ref 1.6–2.6)
MCH RBC QN AUTO: 29.5 PG (ref 27–31)
MCHC RBC AUTO-ENTMCNC: 33.7 G/DL (ref 31.4–37.4)
MCV RBC AUTO: 88 FL (ref 82–98)
MRSA NOSE QL CULT: NORMAL
PHOSPHATE SERPL-MCNC: 3.2 MG/DL (ref 2.7–4.5)
PLATELET # BLD AUTO: 244 THOUSANDS/UL (ref 130–400)
PMV BLD AUTO: 6.4 FL (ref 8.9–12.7)
POTASSIUM SERPL-SCNC: 3.6 MMOL/L (ref 3.5–5.3)
RBC # BLD AUTO: 3.65 MILLION/UL (ref 4.2–5.4)
SODIUM SERPL-SCNC: 137 MMOL/L (ref 136–145)
VANCOMYCIN TROUGH SERPL-MCNC: 14.4 UG/ML (ref 10–20)
WBC # BLD AUTO: 9.5 THOUSAND/UL (ref 4.8–10.8)

## 2018-01-17 PROCEDURE — 80202 ASSAY OF VANCOMYCIN: CPT | Performed by: STUDENT IN AN ORGANIZED HEALTH CARE EDUCATION/TRAINING PROGRAM

## 2018-01-17 PROCEDURE — 90732 PPSV23 VACC 2 YRS+ SUBQ/IM: CPT | Performed by: FAMILY MEDICINE

## 2018-01-17 PROCEDURE — 80048 BASIC METABOLIC PNL TOTAL CA: CPT | Performed by: STUDENT IN AN ORGANIZED HEALTH CARE EDUCATION/TRAINING PROGRAM

## 2018-01-17 PROCEDURE — 83735 ASSAY OF MAGNESIUM: CPT | Performed by: STUDENT IN AN ORGANIZED HEALTH CARE EDUCATION/TRAINING PROGRAM

## 2018-01-17 PROCEDURE — 84100 ASSAY OF PHOSPHORUS: CPT | Performed by: STUDENT IN AN ORGANIZED HEALTH CARE EDUCATION/TRAINING PROGRAM

## 2018-01-17 PROCEDURE — 85027 COMPLETE CBC AUTOMATED: CPT | Performed by: STUDENT IN AN ORGANIZED HEALTH CARE EDUCATION/TRAINING PROGRAM

## 2018-01-17 PROCEDURE — 82948 REAGENT STRIP/BLOOD GLUCOSE: CPT

## 2018-01-17 RX ORDER — IBUPROFEN 400 MG/1
800 TABLET ORAL EVERY 6 HOURS PRN
Status: DISCONTINUED | OUTPATIENT
Start: 2018-01-17 | End: 2018-01-17

## 2018-01-17 RX ORDER — KETOROLAC TROMETHAMINE 30 MG/ML
30 INJECTION, SOLUTION INTRAMUSCULAR; INTRAVENOUS EVERY 6 HOURS PRN
Status: DISPENSED | OUTPATIENT
Start: 2018-01-17 | End: 2018-01-19

## 2018-01-17 RX ORDER — CYCLOBENZAPRINE HCL 10 MG
10 TABLET ORAL 3 TIMES DAILY PRN
Status: DISCONTINUED | OUTPATIENT
Start: 2018-01-17 | End: 2018-01-20 | Stop reason: HOSPADM

## 2018-01-17 RX ORDER — PANTOPRAZOLE SODIUM 20 MG/1
20 TABLET, DELAYED RELEASE ORAL
Status: DISCONTINUED | OUTPATIENT
Start: 2018-01-18 | End: 2018-01-20 | Stop reason: HOSPADM

## 2018-01-17 RX ADMIN — KETOROLAC TROMETHAMINE 30 MG: 30 INJECTION, SOLUTION INTRAMUSCULAR at 21:54

## 2018-01-17 RX ADMIN — CYCLOBENZAPRINE HYDROCHLORIDE 10 MG: 10 TABLET, FILM COATED ORAL at 01:06

## 2018-01-17 RX ADMIN — GABAPENTIN 800 MG: 400 CAPSULE ORAL at 08:45

## 2018-01-17 RX ADMIN — ENOXAPARIN SODIUM 40 MG: 40 INJECTION SUBCUTANEOUS at 08:45

## 2018-01-17 RX ADMIN — GABAPENTIN 800 MG: 400 CAPSULE ORAL at 21:54

## 2018-01-17 RX ADMIN — CLONAZEPAM 0.5 MG: 0.5 TABLET ORAL at 08:46

## 2018-01-17 RX ADMIN — CLONAZEPAM 0.5 MG: 0.5 TABLET ORAL at 18:30

## 2018-01-17 RX ADMIN — VANCOMYCIN HYDROCHLORIDE 1000 MG: 1 INJECTION, SOLUTION INTRAVENOUS at 08:48

## 2018-01-17 RX ADMIN — INSULIN GLARGINE 30 UNITS: 100 INJECTION, SOLUTION SUBCUTANEOUS at 21:55

## 2018-01-17 RX ADMIN — CYCLOBENZAPRINE HYDROCHLORIDE 10 MG: 10 TABLET, FILM COATED ORAL at 08:55

## 2018-01-17 RX ADMIN — INSULIN LISPRO 4 UNITS: 100 INJECTION, SOLUTION INTRAVENOUS; SUBCUTANEOUS at 11:32

## 2018-01-17 RX ADMIN — CYCLOBENZAPRINE HYDROCHLORIDE 10 MG: 10 TABLET, FILM COATED ORAL at 18:30

## 2018-01-17 RX ADMIN — LISINOPRIL: 20 TABLET ORAL at 08:46

## 2018-01-17 RX ADMIN — VANCOMYCIN HYDROCHLORIDE 1000 MG: 1 INJECTION, SOLUTION INTRAVENOUS at 19:29

## 2018-01-17 RX ADMIN — GABAPENTIN 800 MG: 400 CAPSULE ORAL at 15:48

## 2018-01-17 RX ADMIN — DIVALPROEX SODIUM 500 MG: 500 TABLET, EXTENDED RELEASE ORAL at 21:54

## 2018-01-17 RX ADMIN — MELATONIN TAB 3 MG 6 MG: 3 TAB at 21:54

## 2018-01-17 RX ADMIN — IBUPROFEN 800 MG: 400 TABLET, FILM COATED ORAL at 10:01

## 2018-01-17 RX ADMIN — KETOROLAC TROMETHAMINE 30 MG: 30 INJECTION, SOLUTION INTRAMUSCULAR at 15:48

## 2018-01-17 RX ADMIN — ESCITALOPRAM OXALATE 20 MG: 10 TABLET ORAL at 08:47

## 2018-01-17 RX ADMIN — KETOROLAC TROMETHAMINE 30 MG: 30 INJECTION, SOLUTION INTRAMUSCULAR at 04:17

## 2018-01-17 RX ADMIN — ACETAMINOPHEN 650 MG: 325 TABLET, FILM COATED ORAL at 03:22

## 2018-01-17 RX ADMIN — PNEUMOCOCCAL VACCINE POLYVALENT 0.5 ML
25; 25; 25; 25; 25; 25; 25; 25; 25; 25; 25; 25; 25; 25; 25; 25; 25; 25; 25; 25; 25; 25; 25 INJECTION, SOLUTION INTRAMUSCULAR; SUBCUTANEOUS at 08:55

## 2018-01-17 RX ADMIN — INSULIN LISPRO 2 UNITS: 100 INJECTION, SOLUTION INTRAVENOUS; SUBCUTANEOUS at 16:57

## 2018-01-17 RX ADMIN — LIDOCAINE HYDROCHLORIDE 1 APPLICATION: 20 JELLY TOPICAL at 11:30

## 2018-01-17 NOTE — PROGRESS NOTES
[address] [facility] [address] [facility] Progress Note - Devi Barron 41 y o  female MRN: [id number]    Unit/Bed#: 3 [place] [address]01 Encounter: [id number]      Assessment/Plan:  Right hand cellulitis  -afebrile, no leukocytosis on admission  -Blood cultures x 2 pending  -pain control: ibuprofen 800 mg q6h PRN, caution with narcotics, previous cocaine and narcotic abuse  -Day #3 vancomycin 1g q12h  -vanco trough - 14 4 wnl  Uncontrolled DM type 2 with neuropathy  -hgb A1c 7 5  -ISS with accuchecls  -Lantus 30 units qhs and gabapentin 800 mg TID  Obesity  -BMI 43 4  -lipid panel cholesterol 138, triglycerides 102, HDL 34, LDL 84  Hypertension: stable continue with Prinizide 20-12-5 mg qd  Depression: stable, continue with lexapro 20mg qd  Bipolar Disorder: stable, continue with Depakote 500 mg qd  Global: diabetic diet, SCDs and lovenox    Subjective:   Patient seen and examined at the bedside  Patient very emotional, and demanding Dilaudid overnight  Patient told she would not receive narcotic medications  Objective:     Vitals: Blood pressure 162/82, pulse 78, temperature 98 1 °F (36 7 °C), temperature source Oral, resp  rate 19, height 5' 3" (1 6 m), weight 111 kg (245 lb 9 5 oz), last menstrual period 01/03/2018, SpO2 99 %, not currently breastfeeding  ,Body mass index is 43 5 kg/m²    Wt Readings from Last 3 Encounters:   01/15/18 111 kg (245 lb 9 5 oz)   06/30/17 107 kg (235 lb 0 2 oz)   06/26/17 108 kg (238 lb)     No intake or output data in the 24 hours ending 01/17/18 0706    Physical Exam:   General: Pt is AAO x 3, not in any acute distress  Cardio: RRR, S1 and S2 +, no murmurs/rubs/gallops/clicks  Resp: CTA b/l, no wheezes/rales/rhonchi  Abdomen: soft, NT/ND, BS+  Extremities: right dorsal surface of hand minimal erythema, warmth and pain, improved from yesterday, no wound    Recent Results (from the past 24 hour(s))   Fingerstick Glucose (POCT)    Collection Time: 01/16/18  7:31 AM   Result Value Ref Range    POC Glucose 187 (H) 65 - 140 mg/dl   Fingerstick Glucose (POCT)    Collection Time: 01/16/18 11:22 AM   Result Value Ref Range    POC Glucose 205 (H) 65 - 140 mg/dl   Fingerstick Glucose (POCT)    Collection Time: 01/16/18  4:25 PM   Result Value Ref Range    POC Glucose 183 (H) 65 - 140 mg/dl   Fingerstick Glucose (POCT)    Collection Time: 01/16/18  9:54 PM   Result Value Ref Range    POC Glucose 297 (H) 65 - 140 mg/dl   Basic metabolic panel    Collection Time: 01/17/18  5:51 AM   Result Value Ref Range    Sodium 137 136 - 145 mmol/L    Potassium 3 6 3 5 - 5 3 mmol/L    Chloride 107 100 - 108 mmol/L    CO2 24 21 - 32 mmol/L    Anion Gap 6 4 - 13 mmol/L    BUN 12 5 - 25 mg/dL    Creatinine 0 48 (L) 0 60 - 1 30 mg/dL    Glucose 148 (H) 65 - 140 mg/dL    Calcium 7 3 (L) 8 3 - 10 1 mg/dL    eGFR 122 ml/min/1 73sq m   Magnesium    Collection Time: 01/17/18  5:51 AM   Result Value Ref Range    Magnesium 1 6 1 6 - 2 6 mg/dL   Phosphorus    Collection Time: 01/17/18  5:51 AM   Result Value Ref Range    Phosphorus 3 2 2 7 - 4 5 mg/dL   CBC and Platelet    Collection Time: 01/17/18  5:51 AM   Result Value Ref Range    WBC 9 50 4 80 - 10 80 Thousand/uL    RBC 3 65 (L) 4 20 - 5 40 Million/uL    Hemoglobin 10 8 (L) 12 0 - 16 0 g/dL    Hematocrit 32 0 (L) 37 0 - 47 0 %    MCV 88 82 - 98 fL    MCH 29 5 27 0 - 31 0 pg    MCHC 33 7 31 4 - 37 4 g/dL    RDW 14 2 11 6 - 15 1 %    Platelets 622 801 - 780 Thousands/uL    MPV 6 4 (L) 8 9 - 12 7 fL       Current Facility-Administered Medications   Medication Dose Route Frequency Provider Last Rate Last Dose    acetaminophen (TYLENOL) tablet 650 mg  650 mg Oral Q6H PRN Nellei Torres MD   650 mg at 01/17/18 0322    clonazePAM (KlonoPIN) tablet 0 5 mg  0 5 mg Oral BID Aden Zepeda MD   0 5 mg at 01/16/18 1720    cyclobenzaprine (FLEXERIL) tablet 10 mg  10 mg Oral TID PRN Aden Zepeda MD   10 mg at 01/17/18 0106    divalproex sodium (DEPAKOTE ER) 24 hr tablet 500 mg  500 mg Oral HS Darrell Marrian Severe, MD   500 mg at 01/16/18 2121    enoxaparin (LOVENOX) subcutaneous injection 40 mg  40 mg Subcutaneous Daily Teresa Ellis MD   40 mg at 01/16/18 1038    escitalopram (LEXAPRO) tablet 20 mg  20 mg Oral Daily Teresa Ellis MD   20 mg at 01/16/18 1035    gabapentin (NEURONTIN) capsule 800 mg  800 mg Oral TID Teresa Ellis MD   800 mg at 01/16/18 2121    insulin glargine (LANTUS) subcutaneous injection 30 Units  30 Units Subcutaneous HS Teresa Ellis MD   30 Units at 01/16/18 2204    insulin lispro (HumaLOG) 100 units/mL subcutaneous injection 2-12 Units  2-12 Units Subcutaneous TID [facility] Teresa Ellis MD   2 Units at 01/16/18 1721    ketorolac (TORADOL) injection 30 mg  30 mg Intravenous Q6H PRN Teresa Ellis MD   30 mg at 01/17/18 0417    lisinopril-hydrochlorothiazide (PRINZIDE 20/12  5) combo dose   Oral Daily Teresa Ellis MD        melatonin tablet 6 mg  6 mg Oral HS Teresa Ellis MD   6 mg at 01/16/18 2121    ondansetron (ZOFRAN) injection 4 mg  4 mg Intravenous Q6H PRN Kathia Garcia DO   4 mg at 01/16/18 1033    pneumococcal 23-valent polysaccharide vaccine (PNEUMOVAX-23) injection 0 5 mL  0 5 mL Subcutaneous Prior to discharge Peter Treadwell DO        vancomycin (VANCOCIN) IVPB (premix) 1,000 mg  10 mg/kg Intravenous Q12H Teresa Ellis  mL/hr at 01/16/18 1814 1,000 mg at 01/16/18 1814       Invasive Devices     Peripheral Intravenous Line            Peripheral IV 01/15/18 Left Forearm 1 day                Todd Chauhan MD

## 2018-01-17 NOTE — MISCELLANEOUS
Provider Comments  Provider Comments:   CALLED PT FOR NOT SHOW, L/M TO RESCHEDULED          Signatures   Electronically signed by : VANESSA Daniel ; Jul 27 2017 10:29AM EST                       (Author)

## 2018-01-17 NOTE — MISCELLANEOUS
Message  Message Free Text Note Form: Seen in ED on 6/8/17 for back pain  Imaging negative  Back pain is staying the same with a little improvement  Not tried anything  Discussion/Summary  Discussion Summary:   Counseled patient to start scheduled tylenol and motrin alternating  Make visit with [facility] on monday        Signatures   Electronically signed by : VANESSA Hoang ; Jun 17 2017  4:12PM EST                       (Author)

## 2018-01-17 NOTE — PROGRESS NOTES
Assessment    1  Back muscle spasm (724 8) (M62 830)   2  Adult BMI 40 0-44 9 kg/sq m (V85 41) (Z68 41)   3  Acute right-sided thoracic back pain (724 1) (M54 6)    Plan  Acute right-sided thoracic back pain, Muscle strain    · *1 - [facility]  *  Status: Active  Requested for: 23Jun2017  Care Summary provided  : Yes  Back muscle spasm, Muscle strain    · Cyclobenzaprine HCl - 10 MG Oral Tablet; TAKE 1 TABLET 3 TIMES DAILY AS  NEEDED    Discussion/Summary    # Right-sided thoracic back pain/Muscle Spasm/malingering: Patient has normal ROM and back pain is parasternal without inciting trauma, no numbness or tingling, radiculopathy, likely 2/2 to musculoskeletal  Patient has history of narcotic seeking behavior  - ordered requisition for Physical therapy  - Rx given for flexeril    RTO as needed    Discussed with Dr Janine Dixon  Chief Complaint    1  Back Pain  back pain feels like when she had pneumonia      History of Present Illness  HPI: 38year old female complains of worsening back pain since her visit to ER on June 7th  She reports the back pain as right parasternal thoracic throbbing 9/10 back pain  It worsens with walking and getting out of bed  No pain when laying down  She as been taking Motrin and Tylenol without relief  At ER she had right sided thoracic pain during that morning  At the ED did CXR which was negative and patient was discharged on Percocet and Flexeril  Reported some pain control with the Percocet  Denies inciting injury  Denies fevers chills  Denies SOB  Review of Systems    Constitutional: as noted in HPI  Cardiovascular: no complaints of slow or fast heart rate, no chest pain, no palpitations, no leg claudication or lower extremity edema and as noted in HPI  Respiratory: no complaints of shortness of breath, no wheezing, no dyspnea on exertion, no orthopnea or PND  Musculoskeletal: as noted in HPI     Neurological: no complaints of headache, no confusion, no numbness or tingling, no dizziness or fainting  ROS reviewed  Active Problems    1  Adult ADHD (314 01) (F90 9)   2  Adult BMI 40 0-44 9 kg/sq m (V85 41) (Z68 41)   3  Anxiety (300 00) (F41 9)   4  Asthma (493 90) (J45 909)   5  Baker's cyst, ruptured (727 51) (M66 0)   6  Benign essential hypertension (401 1) (I10)   7  Bipolar disorder (296 80) (F31 9)   8  BMI 38 0-38 9,adult (V85 38) (Z68 38)   9  Cellulitis of thigh (682 6) (L03 119)   10  Cocaine abuse (305 60) (F14 10)   11  Depression (311) (F32 9)   12  Diabetes mellitus type 2, uncontrolled (250 02) (E11 65)   13  Diabetes mellitus with neuropathy (250 60,357 2) (E11 40)   14  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)   15  Fibromyalgia (729 1) (M79 7)   16  Hypothyroidism (244 9) (E03 9)   17  Long-term insulin use (V58 67) (Z79 4)   18  Morbid obesity (278 01) (E66 01)   19  Mouth sores (528 9) (K13 79)   20  Muscle strain (848 9) (T14 8)   21  On home oxygen therapy (V46 2) (Z99 81)   22  Pain, dental (525 9) (K08 89)   23  Peripheral neuropathy (356 9) (G62 9)   24  Right knee pain (719 46) (M25 561)   25  Well adult on routine health check (V70 0) (Z00 00)    Past Medical History  Active Problems And Past Medical History Reviewed: The active problems and past medical history were reviewed and updated today  Family History  Family History    1  Family history of diabetes mellitus (V18 0) (Z83 3)   2  Family history of hypertension (V17 49) (Z82 49)  Family History Reviewed: The family history was reviewed and updated today  Social History    · Never a smoker  The social history was reviewed and updated today  The social history was reviewed and is unchanged  Surgical History  Surgical History Reviewed: The surgical history was reviewed and updated today  Current Meds   1  Acyclovir 200 MG Oral Capsule; TAKE CAPSULE  Take 1 capsule by mouth 5 times a   day for 5 days;    Therapy: (Recorded:20Apr2017) to Recorded   2  Acyclovir 5 % External Ointment; APPLY INCH  Apply to the eyes 6 times daily for 7 days; Therapy: (Recorded:20Apr2017) to Recorded   3  BD Insulin Syr Ultrafine II 31G X 5/16" 0 5 ML Miscellaneous; use as directed; Therapy: 15Apr2015 to (Last Rx:28Mar2017)  Requested for: 28Mar2017 Ordered   4  BD Insulin Syringe MicroFine 28G X 1/2" 1 ML Miscellaneous; USE ONE SYRINGE   THREE TIMES DAILY; Therapy: 20Apr2017 to (Evaluate:06Sep2017)  Requested for: [id number]; Last   Rx:30May2017 Ordered   5  Divalproex Sodium  MG Oral Tablet Extended Release 24 Hour; TAKE 1 TABLET   AT BEDTIME; Therapy: [id number] to (Evaluate:07Jun2017)  Requested for: 09OKU3531; Last   Rx:23May2017 Ordered   6  Gabapentin 800 MG Oral Tablet; TAKE 1 TABLET 4 TIMES DAILY; Therapy: (Recorded:20Apr2017) to Recorded   7  Gabapentin 800 MG Oral Tablet; TAKE ONE TABLET BY MOUTH THREE TIMES DAILY; Therapy: [id number] to (Evaluate:07Jun2017)  Requested for: [id number]; Last   Rx:23May2017 Ordered   8  Lantus 100 UNIT/ML Subcutaneous Solution; INJECT 30 UNIT Bedtime; Therapy: (Recorded:20Apr2017) to Recorded   9  Lidocaine 4 % External Cream; USE TOPICALLY AS DIRECTED; Therapy: (Recorded:20Apr2017) to Recorded   10  Lisinopril-Hydrochlorothiazide 20-12 5 MG Oral Tablet; TAKE 1 TABLET DAILY     Requested for: [id number]; Last Rx:23May2017 Ordered   11  OneTouch Ultra Blue In Vitro Strip; TEST 3 TIMES DAILY; Therapy: [id number] to (Evaluate:16May2017) Recorded   12  OneTouch Ultra Blue In Vitro Strip; USE ONE STRIP TO CHECK GLUCOSE THREE    TIMES DAILY; Therapy: [id number] to ([facility])  Requested for: 24Apr2017; Last    Rx:24Apr2017 Ordered   13  PARoxetine HCl - 40 MG Oral Tablet; TAKE 1 TABLET DAILY; Therapy: (Recorded:20Apr2017) to Recorded    The medication list was reviewed and updated today  Allergies    1   Septra TABS    Vitals   Recorded: [id number] 04:29PM   Temperature 97 3 F   Heart Rate 97   Respiration 18   Systolic 159   Diastolic 88   Height 5 ft 3 in   Weight 235 lb    BMI Calculated 41 63   BSA Calculated 2 07   O2 Saturation 98     Physical Exam    Constitutional   General appearance: No acute distress, well appearing and well nourished  Pulmonary   Respiratory effort: No increased work of breathing or signs of respiratory distress  Auscultation of lungs: Clear to auscultation  Cardiovascular   Auscultation of heart: Normal rate and rhythm, normal S1 and S2, without murmurs  Musculoskeletal   Gait and station: Normal     Digits and nails: Normal without clubbing or cyanosis  Inspection/palpation of joints, bones, and muscles: Abnormal   pain on straight leg raise bilaterally, point tenderness on right paraspinal thoracic area; some  Attending Note  Attending Note [facility]: Attending Note: I did not interview and examine the patient, I supervised the Resident and I agree with the Resident management plan as it was presented to me  Level of Participation: I was present in clinic, but did not examine the patient  I agree with the Resident's note  Signatures   Electronically signed by : Brett Jack MD; Jun 25 2017  2:13AM EST                       (Author)    Electronically signed by :  VANESSA Jasmine ; Jun 25 2017  5:29PM EST                       (Co-author)

## 2018-01-17 NOTE — SOCIAL WORK
DASH discussion completed  Discussed goals of making sure pt's needs are met upon discharge, pt's preferences are taken into account, pt understands her health condition, medications and symptoms to watch for after returning home and pt is aware of any follow up appointments recommended by hospital physician  HILDA WENT INTO PT ROOM TO D/W HER DCP NEEDS  PT FOUND TO BE CRYING  PER PT, SHE IS HAVING SEVERE PAIN IN HER HAND AND THERE IS NOTHING THEY CAN DO ABOUT IT   PT HAS RECENTLY HAD IBUPROFEN BUT SEEMS LIKE IT IS NOT EFFECTIVE  PCP AWARE AND WILL ADD TOPICAL PAIN SUPPORT  HILDA UPDATED PT WITH MED ORDER  DCP: PT STATED THAT SHE LIVES WITH HER  AND IS GENERALLY INDEPENDENT WITH HER NEEDS  PT HAS NO HHC AND DME IN THE HOME, SHE DOES NOT DRIVE AT THIS TIME    PT USES [facility] IN [place] [id number]

## 2018-01-18 LAB
ANION GAP SERPL CALCULATED.3IONS-SCNC: 5 MMOL/L (ref 4–13)
BUN SERPL-MCNC: 15 MG/DL (ref 5–25)
CALCIUM SERPL-MCNC: 8.6 MG/DL (ref 8.3–10.1)
CHLORIDE SERPL-SCNC: 100 MMOL/L (ref 100–108)
CO2 SERPL-SCNC: 31 MMOL/L (ref 21–32)
CREAT SERPL-MCNC: 0.77 MG/DL (ref 0.6–1.3)
ERYTHROCYTE [DISTWIDTH] IN BLOOD BY AUTOMATED COUNT: 13.9 % (ref 11.6–15.1)
GFR SERPL CREATININE-BSD FRML MDRD: 96 ML/MIN/1.73SQ M
GLUCOSE SERPL-MCNC: 107 MG/DL (ref 65–140)
GLUCOSE SERPL-MCNC: 203 MG/DL (ref 65–140)
GLUCOSE SERPL-MCNC: 227 MG/DL (ref 65–140)
GLUCOSE SERPL-MCNC: 244 MG/DL (ref 65–140)
GLUCOSE SERPL-MCNC: 258 MG/DL (ref 65–140)
HCT VFR BLD AUTO: 33.9 % (ref 37–47)
HGB BLD-MCNC: 11.6 G/DL (ref 12–16)
MAGNESIUM SERPL-MCNC: 1.5 MG/DL (ref 1.6–2.6)
MCH RBC QN AUTO: 29.6 PG (ref 27–31)
MCHC RBC AUTO-ENTMCNC: 34.2 G/DL (ref 31.4–37.4)
MCV RBC AUTO: 87 FL (ref 82–98)
PHOSPHATE SERPL-MCNC: 3.9 MG/DL (ref 2.7–4.5)
PLATELET # BLD AUTO: 343 THOUSANDS/UL (ref 130–400)
PMV BLD AUTO: 6.3 FL (ref 8.9–12.7)
POTASSIUM SERPL-SCNC: 3.8 MMOL/L (ref 3.5–5.3)
RBC # BLD AUTO: 3.91 MILLION/UL (ref 4.2–5.4)
SODIUM SERPL-SCNC: 136 MMOL/L (ref 136–145)
WBC # BLD AUTO: 11.2 THOUSAND/UL (ref 4.8–10.8)

## 2018-01-18 PROCEDURE — 80048 BASIC METABOLIC PNL TOTAL CA: CPT | Performed by: STUDENT IN AN ORGANIZED HEALTH CARE EDUCATION/TRAINING PROGRAM

## 2018-01-18 PROCEDURE — 82948 REAGENT STRIP/BLOOD GLUCOSE: CPT

## 2018-01-18 PROCEDURE — 85027 COMPLETE CBC AUTOMATED: CPT | Performed by: STUDENT IN AN ORGANIZED HEALTH CARE EDUCATION/TRAINING PROGRAM

## 2018-01-18 PROCEDURE — 84100 ASSAY OF PHOSPHORUS: CPT | Performed by: STUDENT IN AN ORGANIZED HEALTH CARE EDUCATION/TRAINING PROGRAM

## 2018-01-18 PROCEDURE — 83735 ASSAY OF MAGNESIUM: CPT | Performed by: STUDENT IN AN ORGANIZED HEALTH CARE EDUCATION/TRAINING PROGRAM

## 2018-01-18 RX ADMIN — INSULIN LISPRO 4 UNITS: 100 INJECTION, SOLUTION INTRAVENOUS; SUBCUTANEOUS at 13:55

## 2018-01-18 RX ADMIN — CLONAZEPAM 0.5 MG: 0.5 TABLET ORAL at 10:04

## 2018-01-18 RX ADMIN — Medication 400 MG: at 10:55

## 2018-01-18 RX ADMIN — DIVALPROEX SODIUM 500 MG: 500 TABLET, EXTENDED RELEASE ORAL at 21:16

## 2018-01-18 RX ADMIN — GABAPENTIN 800 MG: 400 CAPSULE ORAL at 21:17

## 2018-01-18 RX ADMIN — ACETAMINOPHEN 650 MG: 325 TABLET, FILM COATED ORAL at 03:24

## 2018-01-18 RX ADMIN — KETOROLAC TROMETHAMINE 30 MG: 30 INJECTION, SOLUTION INTRAMUSCULAR at 16:25

## 2018-01-18 RX ADMIN — ENOXAPARIN SODIUM 40 MG: 40 INJECTION SUBCUTANEOUS at 10:05

## 2018-01-18 RX ADMIN — CYCLOBENZAPRINE HYDROCHLORIDE 10 MG: 10 TABLET, FILM COATED ORAL at 15:10

## 2018-01-18 RX ADMIN — GABAPENTIN 800 MG: 400 CAPSULE ORAL at 15:10

## 2018-01-18 RX ADMIN — INSULIN LISPRO 4 UNITS: 100 INJECTION, SOLUTION INTRAVENOUS; SUBCUTANEOUS at 18:24

## 2018-01-18 RX ADMIN — VANCOMYCIN HYDROCHLORIDE 1000 MG: 1 INJECTION, SOLUTION INTRAVENOUS at 10:14

## 2018-01-18 RX ADMIN — KETOROLAC TROMETHAMINE 30 MG: 30 INJECTION, SOLUTION INTRAMUSCULAR at 21:14

## 2018-01-18 RX ADMIN — LISINOPRIL: 20 TABLET ORAL at 10:04

## 2018-01-18 RX ADMIN — PANTOPRAZOLE SODIUM 20 MG: 20 TABLET, DELAYED RELEASE ORAL at 05:22

## 2018-01-18 RX ADMIN — KETOROLAC TROMETHAMINE 30 MG: 30 INJECTION, SOLUTION INTRAMUSCULAR at 04:09

## 2018-01-18 RX ADMIN — MELATONIN TAB 3 MG 6 MG: 3 TAB at 21:16

## 2018-01-18 RX ADMIN — KETOROLAC TROMETHAMINE 30 MG: 30 INJECTION, SOLUTION INTRAMUSCULAR at 10:10

## 2018-01-18 RX ADMIN — GABAPENTIN 800 MG: 400 CAPSULE ORAL at 10:03

## 2018-01-18 RX ADMIN — ACETAMINOPHEN 650 MG: 325 TABLET, FILM COATED ORAL at 15:10

## 2018-01-18 RX ADMIN — INSULIN GLARGINE 30 UNITS: 100 INJECTION, SOLUTION SUBCUTANEOUS at 21:17

## 2018-01-18 RX ADMIN — VANCOMYCIN HYDROCHLORIDE 1000 MG: 1 INJECTION, SOLUTION INTRAVENOUS at 18:27

## 2018-01-18 RX ADMIN — ESCITALOPRAM OXALATE 20 MG: 10 TABLET ORAL at 10:04

## 2018-01-18 RX ADMIN — CLONAZEPAM 0.5 MG: 0.5 TABLET ORAL at 18:24

## 2018-01-18 RX ADMIN — LIDOCAINE HYDROCHLORIDE 1 APPLICATION: 20 JELLY TOPICAL at 10:51

## 2018-01-18 RX ADMIN — CYCLOBENZAPRINE HYDROCHLORIDE 10 MG: 10 TABLET, FILM COATED ORAL at 03:24

## 2018-01-18 NOTE — PLAN OF CARE
DISCHARGE PLANNING - CARE MANAGEMENT     Discharge to post-acute care or home with appropriate resources Progressing        INFECTION - ADULT     Absence or prevention of progression during hospitalization Progressing        Nutrition/Hydration-ADULT     Nutrient/Hydration intake appropriate for improving, restoring or maintaining nutritional needs Progressing        PAIN - ADULT     Verbalizes/displays adequate comfort level or baseline comfort level Progressing

## 2018-01-18 NOTE — PROGRESS NOTES
[address] [facility] [address] [facility] Progress Note - Dot Tevin Barron 41 y o  female MRN: [id number]    Unit/Bed#: 3 [place] [address]- Encounter: [id number]      Assessment/Plan:  Right hand cellulitis  -afebrile, no leukocytosis on admission  -Blood cultures x 2 pending  -pain control:  Toradol  30mg q6h PRN, caution with narcotics, previous cocaine and narcotic abuse  -Day #4 vancomycin 1g q12h  -vanco trough - 14 4 wnl  Uncontrolled DM type 2 with neuropathy  -hgb A1c 7 5  -ISS with accuchecls  -Lantus 30 units qhs and gabapentin 800 mg TID  Obesity  -BMI 43 4  -lipid panel cholesterol 138, triglycerides 102, HDL 34, LDL 84  Hypertension: stable continue with Prinizide 20-12-5 mg qd  Depression: stable, continue with lexapro 20mg qd  Bipolar Disorder: stable, continue with Depakote 500 mg qd  Global: diabetic diet, SCDs and lovenox    Subjective:   Patient seen and examined at the bedside  Reports pain improved  Patient still requesting narcotics  It was explained to the patient her dose of Toradol was equivalent to a percocet  Objective:     Vitals: Blood pressure 158/86, pulse 83, temperature 99 °F (37 2 °C), temperature source Oral, resp  rate 20, height 5' 3" (1 6 m), weight 111 kg (245 lb 9 5 oz), last menstrual period 01/03/2018, SpO2 100 %, not currently breastfeeding  ,Body mass index is 43 5 kg/m²    Wt Readings from Last 3 Encounters:   01/15/18 111 kg (245 lb 9 5 oz)   06/30/17 107 kg (235 lb 0 2 oz)   06/26/17 108 kg (238 lb)       Intake/Output Summary (Last 24 hours) at 01/18/18 0646  Last data filed at 01/17/18 1929   Gross per 24 hour   Intake              440 ml   Output                0 ml   Net              440 ml       Physical Exam:   General: Pt is AAO x 3, not in any acute distress  Cardio: RRR, S1 and S2 +, no murmurs/rubs/gallops/clicks  Resp: CTA b/l, no wheezes/rales/rhonchi  Abdomen: soft, NT/ND, BS+  Extremities: right dorsal surface of hand erythema, warmth, and pain, significant improvement from yesterday, no wound or drainage         Recent Results (from the past 24 hour(s))   Fingerstick Glucose (POCT)    Collection Time: 01/17/18  7:18 AM   Result Value Ref Range    POC Glucose 126 65 - 140 mg/dl   Fingerstick Glucose (POCT)    Collection Time: 01/17/18 11:13 AM   Result Value Ref Range    POC Glucose 236 (H) 65 - 140 mg/dl   Fingerstick Glucose (POCT)    Collection Time: 01/17/18  4:35 PM   Result Value Ref Range    POC Glucose 181 (H) 65 - 140 mg/dl   Fingerstick Glucose (POCT)    Collection Time: 01/17/18  9:18 PM   Result Value Ref Range    POC Glucose 246 (H) 65 - 140 mg/dl       Current Facility-Administered Medications   Medication Dose Route Frequency Provider Last Rate Last Dose    acetaminophen (TYLENOL) tablet 650 mg  650 mg Oral Q6H PRN Naz Kauffman MD   650 mg at 01/18/18 0324    clonazePAM (KlonoPIN) tablet 0 5 mg  0 5 mg Oral BID Jovanna Contreras MD   0 5 mg at 01/17/18 1830    cyclobenzaprine (FLEXERIL) tablet 10 mg  10 mg Oral TID PRN Jovanna Contreras MD   10 mg at 01/18/18 0324    divalproex sodium (DEPAKOTE ER) 24 hr tablet 500 mg  500 mg Oral HS Jovanna Contreras MD   500 mg at 01/17/18 2154    enoxaparin (LOVENOX) subcutaneous injection 40 mg  40 mg Subcutaneous Daily Jovanna Contreras MD   40 mg at 01/17/18 0845    escitalopram (LEXAPRO) tablet 20 mg  20 mg Oral Daily Jovanna Contreras MD   20 mg at 01/17/18 0847    gabapentin (NEURONTIN) capsule 800 mg  800 mg Oral TID Jovanna Contreras MD   800 mg at 01/17/18 2154    insulin glargine (LANTUS) subcutaneous injection 30 Units  30 Units Subcutaneous HS Jovanna Contreras MD   30 Units at 01/17/18 2155    insulin lispro (HumaLOG) 100 units/mL subcutaneous injection 2-12 Units  2-12 Units Subcutaneous TID [facility] Jovanna Contreras MD   2 Units at 01/17/18 1657    ketorolac (TORADOL) injection 30 mg  30 mg Intravenous Q6H PRN Kathia Garcia DO   30 mg at 01/18/18 0409    lidocaine (XYLOCAINE) 2 % topical gel 1 application  1 application Topical Daily PRN Jesus Allen MD   1 application at 02/80/66 1130    lisinopril-hydrochlorothiazide (PRINZIDE 20/12  5) combo dose   Oral Daily Sylvia Dumont MD        melatonin tablet 6 mg  6 mg Oral HS Sylvia Dumont MD   6 mg at 01/17/18 2154    ondansetron (ZOFRAN) injection 4 mg  4 mg Intravenous Q6H PRN Kathia Radha, DO   4 mg at 01/16/18 1033    pantoprazole (PROTONIX) EC tablet 20 mg  20 mg Oral Early Morning Kathia Radha, DO   20 mg at 01/18/18 0522    vancomycin (VANCOCIN) IVPB (premix) 1,000 mg  10 mg/kg Intravenous Q12H Sylvia Dumont MD   Stopped at 01/17/18 2029       Invasive Devices     Peripheral Intravenous Line            Peripheral IV 01/17/18 Right Forearm less than 1 day                Jesus Allen MD

## 2018-01-19 ENCOUNTER — GENERIC CONVERSION - ENCOUNTER (OUTPATIENT)
Dept: OTHER | Facility: OTHER | Age: 43
End: 2018-01-19

## 2018-01-19 LAB
ANION GAP SERPL CALCULATED.3IONS-SCNC: 5 MMOL/L (ref 4–13)
BUN SERPL-MCNC: 18 MG/DL (ref 5–25)
CALCIUM SERPL-MCNC: 8.5 MG/DL (ref 8.3–10.1)
CHLORIDE SERPL-SCNC: 101 MMOL/L (ref 100–108)
CO2 SERPL-SCNC: 32 MMOL/L (ref 21–32)
CREAT SERPL-MCNC: 0.66 MG/DL (ref 0.6–1.3)
ERYTHROCYTE [DISTWIDTH] IN BLOOD BY AUTOMATED COUNT: 14 % (ref 11.6–15.1)
GFR SERPL CREATININE-BSD FRML MDRD: 110 ML/MIN/1.73SQ M
GLUCOSE P FAST SERPL-MCNC: 110 MG/DL (ref 65–99)
GLUCOSE SERPL-MCNC: 110 MG/DL (ref 65–140)
GLUCOSE SERPL-MCNC: 111 MG/DL (ref 65–140)
GLUCOSE SERPL-MCNC: 210 MG/DL (ref 65–140)
GLUCOSE SERPL-MCNC: 245 MG/DL (ref 65–140)
GLUCOSE SERPL-MCNC: 319 MG/DL (ref 65–140)
HCT VFR BLD AUTO: 33.3 % (ref 37–47)
HGB BLD-MCNC: 11.2 G/DL (ref 12–16)
MAGNESIUM SERPL-MCNC: 1.8 MG/DL (ref 1.6–2.6)
MCH RBC QN AUTO: 29.7 PG (ref 27–31)
MCHC RBC AUTO-ENTMCNC: 33.8 G/DL (ref 31.4–37.4)
MCV RBC AUTO: 88 FL (ref 82–98)
PHOSPHATE SERPL-MCNC: 3.9 MG/DL (ref 2.7–4.5)
PLATELET # BLD AUTO: 308 THOUSANDS/UL (ref 130–400)
PMV BLD AUTO: 6.3 FL (ref 8.9–12.7)
POTASSIUM SERPL-SCNC: 3.6 MMOL/L (ref 3.5–5.3)
RBC # BLD AUTO: 3.78 MILLION/UL (ref 4.2–5.4)
SODIUM SERPL-SCNC: 138 MMOL/L (ref 136–145)
WBC # BLD AUTO: 10.9 THOUSAND/UL (ref 4.8–10.8)

## 2018-01-19 PROCEDURE — 83735 ASSAY OF MAGNESIUM: CPT | Performed by: STUDENT IN AN ORGANIZED HEALTH CARE EDUCATION/TRAINING PROGRAM

## 2018-01-19 PROCEDURE — 80048 BASIC METABOLIC PNL TOTAL CA: CPT | Performed by: STUDENT IN AN ORGANIZED HEALTH CARE EDUCATION/TRAINING PROGRAM

## 2018-01-19 PROCEDURE — 82948 REAGENT STRIP/BLOOD GLUCOSE: CPT

## 2018-01-19 PROCEDURE — 84100 ASSAY OF PHOSPHORUS: CPT | Performed by: STUDENT IN AN ORGANIZED HEALTH CARE EDUCATION/TRAINING PROGRAM

## 2018-01-19 PROCEDURE — 85027 COMPLETE CBC AUTOMATED: CPT | Performed by: STUDENT IN AN ORGANIZED HEALTH CARE EDUCATION/TRAINING PROGRAM

## 2018-01-19 RX ORDER — BUPIVACAINE HYDROCHLORIDE AND EPINEPHRINE 2.5; 5 MG/ML; UG/ML
25 INJECTION, SOLUTION EPIDURAL; INFILTRATION; INTRACAUDAL; PERINEURAL ONCE
Status: COMPLETED | OUTPATIENT
Start: 2018-01-19 | End: 2018-01-19

## 2018-01-19 RX ORDER — HYDROMORPHONE HCL 110MG/55ML
1 PATIENT CONTROLLED ANALGESIA SYRINGE INTRAVENOUS ONCE
Status: COMPLETED | OUTPATIENT
Start: 2018-01-19 | End: 2018-01-19

## 2018-01-19 RX ORDER — LORAZEPAM 2 MG/ML
1 INJECTION INTRAMUSCULAR ONCE
Status: COMPLETED | OUTPATIENT
Start: 2018-01-19 | End: 2018-01-19

## 2018-01-19 RX ORDER — KETOROLAC TROMETHAMINE 30 MG/ML
30 INJECTION, SOLUTION INTRAMUSCULAR; INTRAVENOUS EVERY 6 HOURS PRN
Status: DISCONTINUED | OUTPATIENT
Start: 2018-01-19 | End: 2018-01-20 | Stop reason: HOSPADM

## 2018-01-19 RX ADMIN — INSULIN LISPRO 4 UNITS: 100 INJECTION, SOLUTION INTRAVENOUS; SUBCUTANEOUS at 16:35

## 2018-01-19 RX ADMIN — MELATONIN TAB 3 MG 6 MG: 3 TAB at 21:43

## 2018-01-19 RX ADMIN — KETOROLAC TROMETHAMINE 30 MG: 30 INJECTION, SOLUTION INTRAMUSCULAR at 03:17

## 2018-01-19 RX ADMIN — GABAPENTIN 800 MG: 400 CAPSULE ORAL at 15:48

## 2018-01-19 RX ADMIN — INSULIN GLARGINE 30 UNITS: 100 INJECTION, SOLUTION SUBCUTANEOUS at 21:45

## 2018-01-19 RX ADMIN — PANTOPRAZOLE SODIUM 20 MG: 20 TABLET, DELAYED RELEASE ORAL at 06:18

## 2018-01-19 RX ADMIN — BUPIVACAINE HYDROCHLORIDE AND EPINEPHRINE 25 ML: 2.5; 5 INJECTION, SOLUTION EPIDURAL; INFILTRATION; INTRACAUDAL; PERINEURAL at 14:00

## 2018-01-19 RX ADMIN — GABAPENTIN 800 MG: 400 CAPSULE ORAL at 08:19

## 2018-01-19 RX ADMIN — CYCLOBENZAPRINE HYDROCHLORIDE 10 MG: 10 TABLET, FILM COATED ORAL at 00:47

## 2018-01-19 RX ADMIN — ESCITALOPRAM OXALATE 20 MG: 10 TABLET ORAL at 08:19

## 2018-01-19 RX ADMIN — DIVALPROEX SODIUM 500 MG: 500 TABLET, EXTENDED RELEASE ORAL at 21:43

## 2018-01-19 RX ADMIN — GABAPENTIN 800 MG: 400 CAPSULE ORAL at 21:42

## 2018-01-19 RX ADMIN — KETOROLAC TROMETHAMINE 30 MG: 30 INJECTION, SOLUTION INTRAMUSCULAR at 09:11

## 2018-01-19 RX ADMIN — LISINOPRIL: 20 TABLET ORAL at 08:19

## 2018-01-19 RX ADMIN — INSULIN LISPRO 4 UNITS: 100 INJECTION, SOLUTION INTRAVENOUS; SUBCUTANEOUS at 12:04

## 2018-01-19 RX ADMIN — ACETAMINOPHEN 650 MG: 325 TABLET, FILM COATED ORAL at 19:50

## 2018-01-19 RX ADMIN — VANCOMYCIN HYDROCHLORIDE 1000 MG: 1 INJECTION, SOLUTION INTRAVENOUS at 19:50

## 2018-01-19 RX ADMIN — ENOXAPARIN SODIUM 40 MG: 40 INJECTION SUBCUTANEOUS at 08:19

## 2018-01-19 RX ADMIN — HYDROMORPHONE HYDROCHLORIDE 1 MG: 2 INJECTION, SOLUTION INTRAMUSCULAR; INTRAVENOUS; SUBCUTANEOUS at 14:05

## 2018-01-19 RX ADMIN — ACETAMINOPHEN 650 MG: 325 TABLET, FILM COATED ORAL at 00:46

## 2018-01-19 RX ADMIN — CLONAZEPAM 0.5 MG: 0.5 TABLET ORAL at 18:18

## 2018-01-19 RX ADMIN — ACETAMINOPHEN 650 MG: 325 TABLET, FILM COATED ORAL at 08:19

## 2018-01-19 RX ADMIN — KETOROLAC TROMETHAMINE 30 MG: 30 INJECTION, SOLUTION INTRAMUSCULAR at 18:18

## 2018-01-19 RX ADMIN — LORAZEPAM 1 MG: 2 INJECTION INTRAMUSCULAR; INTRAVENOUS at 13:51

## 2018-01-19 RX ADMIN — VANCOMYCIN HYDROCHLORIDE 1000 MG: 1 INJECTION, SOLUTION INTRAVENOUS at 06:18

## 2018-01-19 RX ADMIN — CLONAZEPAM 0.5 MG: 0.5 TABLET ORAL at 08:20

## 2018-01-19 RX ADMIN — HYDROMORPHONE HYDROCHLORIDE 1 MG: 2 INJECTION, SOLUTION INTRAMUSCULAR; INTRAVENOUS; SUBCUTANEOUS at 15:48

## 2018-01-19 NOTE — CONSULTS
Consultation - General Surgery   Rocío Barron 41 y o  female MRN: [id number]  Unit/Bed#: [address] [place] [address] Encounter: [id number]    Assessment/Plan     Assessment:  1) Right Hand Cellulitis with abscess and superficial skin necrosis - erythema, significant swelling, significant palpable fluctuance on the dorsal surface of the right wrist, necrotic center, weeping purulent fluid    Plan:  1) Right Hand Cellulitis -   - plan for bedside I & D   - ordered one time dose of 1 mg dilaudid  - one time dose 1mg of lorazepam  - verbal consent obtained  - continued abx per FP   - continued PRN analgesia per FP  - tighter glycemic control     History of Present Illness   HPI:  Brayan Grandchild is a 41 y o  female past medical history of neuropathy, diabetes mellitus, cat bite 1 week ago presenting to the acute care surgery team with right hand pain  Patient reports that approximately 1 week ago she was bit on the back of her hand by her pet cat  Patient wants the [facility] team as an outpatient and was told that her lab work was normal hand exam was benign  Patient at that time denied any erythema, swelling, fluctuance, fever, chills  Patient now yesterday solid dressing change of her dorsal surface of right hand  Patient now reports increased erythema, swelling, necrotic center, weeping, fluctuance right where the CT head bit her last week  Patient reports that the pain has been worsening over the last 24 hours  Patient reports mild fever  Patient does have history of peripheral neuropathy with mild numbness in her distal fingertips; however, she denies any new changes to her neurovascular status  Patient denies any numbness, tingling, lack of motor movement, lack of sensation compared to her baseline  Patient reports that she does have limited active range of motion in her fingers secondary to the swelling and pain  Patient does not have any saw such like digits or any palpable tenderness over the tendons   Patient does have history of IV drug use but currently denies any IV drug use  Inpatient consult to Acute Care Surgery  Consult performed by: Gordy Riley ordered by: Adonis Blackmon          Review of Systems   Constitutional: Positive for fever  Negative for appetite change, chills and fatigue  Respiratory: Negative for cough, chest tightness, shortness of breath and wheezing  Cardiovascular: Negative for chest pain, palpitations and leg swelling  Gastrointestinal: Negative for abdominal distention, abdominal pain, anal bleeding, diarrhea, nausea and vomiting  Genitourinary: Negative for dysuria  Skin: Positive for color change and wound  Neurological: Negative for seizures, weakness and numbness         Historical Information   Past Medical History:   Diagnosis Date    Depression     Diabetes mellitus ([address] )     Hypertension     Neuropathy      Past Surgical History:   Procedure Laterality Date    INCISION AND DRAINAGE OF WOUND Right 7/1/2017    Procedure: INCISION AND DRAINAGE (I&D) EXTREMITY THIGH;  Surgeon: Megha Wiley MD;  Location: [facility];  Service: General     Social History   History   Alcohol Use No     History   Drug Use No     Comment: lasr use per patient April 2017     History   Smoking Status    Never Smoker   Smokeless Tobacco    Never Used     Family History: non-contributory    Meds/Allergies   all current active meds have been reviewed and current meds:   Current Facility-Administered Medications   Medication Dose Route Frequency    acetaminophen (TYLENOL) tablet 650 mg  650 mg Oral Q6H PRN    bupivacaine-epinephrine (PF) (MARCAINE/EPINEPHRINE PF) 0 25 %-1:[id number] injection 25 mL  25 mL Infiltration Once    clonazePAM (KlonoPIN) tablet 0 5 mg  0 5 mg Oral BID    cyclobenzaprine (FLEXERIL) tablet 10 mg  10 mg Oral TID PRN    divalproex sodium (DEPAKOTE ER) 24 hr tablet 500 mg  500 mg Oral HS    enoxaparin (LOVENOX) subcutaneous injection 40 mg  40 mg Subcutaneous Daily    escitalopram (LEXAPRO) tablet 20 mg  20 mg Oral Daily    gabapentin (NEURONTIN) capsule 800 mg  800 mg Oral TID    HYDROmorphone (DILAUDID) injection 1 mg  1 mg Intravenous Once    insulin glargine (LANTUS) subcutaneous injection 30 Units  30 Units Subcutaneous HS    insulin lispro (HumaLOG) 100 units/mL subcutaneous injection 2-12 Units  2-12 Units Subcutaneous TID AC    ketorolac (TORADOL) injection 30 mg  30 mg Intravenous Q6H PRN    lidocaine (XYLOCAINE) 2 % topical gel 1 application  1 application Topical Daily PRN    lisinopril-hydrochlorothiazide (PRINZIDE 20/12  5) combo dose   Oral Daily    LORazepam (ATIVAN) 2 mg/mL injection 1 mg  1 mg Intravenous Once    melatonin tablet 6 mg  6 mg Oral HS    ondansetron (ZOFRAN) injection 4 mg  4 mg Intravenous Q6H PRN    pantoprazole (PROTONIX) EC tablet 20 mg  20 mg Oral Early Morning    vancomycin (VANCOCIN) IVPB (premix) 1,000 mg  10 mg/kg Intravenous Q12H     Allergies   Allergen Reactions    Septra [Sulfamethoxazole-Trimethoprim] Hives    Tramadol Rash       Objective   First Vitals:   Blood Pressure: 168/94 (01/15/18 1759)  Pulse: 80 (01/15/18 1759)  Temperature: 97 5 °F (36 4 °C) (01/15/18 1759)  Temp Source: Tympanic (01/15/18 1759)  Respirations: 16 (01/15/18 1759)  Height: 5' 3" (160 cm) (01/15/18 1759)  Weight - Scale: 113 kg (250 lb) (01/15/18 1759)  SpO2: 97 % (01/15/18 1759)    Current Vitals:   Blood Pressure: 164/77 (01/19/18 0705)  Pulse: 70 (01/19/18 0705)  Temperature: 98 8 °F (37 1 °C) (01/19/18 0705)  Temp Source: Oral (01/19/18 0705)  Respirations: 18 (01/19/18 0705)  Height: 5' 3" (160 cm) (01/15/18 1959)  Weight - Scale: 111 kg (245 lb 9 5 oz) (01/15/18 1959)  SpO2: 99 % (01/19/18 0705)      Intake/Output Summary (Last 24 hours) at 01/19/18 1350  Last data filed at 01/19/18 0705   Gross per 24 hour   Intake             1120 ml   Output                0 ml   Net             1120 ml       Invasive Devices     Peripheral Intravenous Line            Peripheral IV 01/18/18 Right Forearm 1 day                Physical Exam   Constitutional: She is oriented to person, place, and time  Vital signs are normal  She appears well-developed and well-nourished  She is cooperative  She does not have a sickly appearance  She does not appear ill  No distress  She is not intubated  HENT:   Head: Normocephalic and atraumatic  Right Ear: Hearing and external ear normal    Left Ear: Hearing and external ear normal    Nose: Nose normal    Cardiovascular: Normal rate, regular rhythm, S1 normal, S2 normal and normal heart sounds  Exam reveals no gallop, no S3, no S4, no distant heart sounds and no friction rub  No murmur heard  Pulses:       Radial pulses are 2+ on the right side, and 2+ on the left side  Cap refill less than 2 seconds    Pulmonary/Chest: Effort normal  No accessory muscle usage  No apnea, no tachypnea and no bradypnea  She is not intubated  No respiratory distress  Neurological: She is alert and oriented to person, place, and time  No sensory deficit  GCS eye subscore is 4  GCS verbal subscore is 5  GCS motor subscore is 6  AROM limited   PROM full/complete in all planes of finger movement  - PROM and AROM of wrist limited to pain and swelling     Skin: There is erythema  Lab Results:   I have personally reviewed pertinent lab results    , CBC:   Lab Results   Component Value Date    WBC 10 90 (H) 01/19/2018    HGB 11 2 (L) 01/19/2018    HCT 33 3 (L) 01/19/2018    MCV 88 01/19/2018     01/19/2018    MCH 29 7 01/19/2018    MCHC 33 8 01/19/2018    RDW 14 0 01/19/2018    MPV 6 3 (L) 01/19/2018   , CMP:   Lab Results   Component Value Date     01/19/2018    K 3 6 01/19/2018     01/19/2018    CO2 32 01/19/2018    ANIONGAP 5 01/19/2018    BUN 18 01/19/2018    CREATININE 0 66 01/19/2018    GLUCOSE 110 01/19/2018    CALCIUM 8 5 01/19/2018    EGFR 110 01/19/2018     Imaging: I have personally reviewed pertinent reports  EKG, Pathology, and Other Studies: I have personally reviewed pertinent reports  Counseling / Coordination of Care  Total floor / unit time spent today 45 minutes  Greater than 50% of total time was spent with the patient and / or family counseling and / or coordination of care  A description of the counseling / coordination of care: evaluating patient, discussing with attending, developing plan, planning for bedside ID, coordinating with FP team, reviewing chart

## 2018-01-19 NOTE — PROCEDURES
Incision and drain  Date/Time: 1/19/2018 2:04 PM  Performed by: Stephanie Pastrana by: Crystal Walton     Patient location:  Bedside  Other Assisting Provider: Yes (comment) Rigoberto Hernandez RN)    Consent:     Consent obtained:  Verbal    Consent given by:  Patient    Risks discussed:  Bleeding, incomplete drainage, infection and pain    Alternatives discussed:  No treatment, delayed treatment and alternative treatment  Universal protocol:     Procedure explained and questions answered to patient or proxy's satisfaction: yes      Relevant documents present and verified: yes      Test results available and properly labeled: yes      Imaging studies available: yes      Required blood products, implants, devices, and special equipment available: yes      Site/side marked: yes      Immediately prior to procedure a time out was called: yes      Patient identity confirmed:  Verbally with patient and arm band  Location:     Type:  Abscess and fluid collection    Size:  6cm X 6cm    Location:  Upper extremity    Upper extremity location:  R hand  Pre-procedure details:     Skin preparation:  Antiseptic wash (alcohol swab)  Sedation:     Sedation type: Anxiolysis (PRN dilaudid)  Anesthesia (see MAR for exact dosages):      Anesthesia method:  Topical application and local infiltration    Topical anesthetic:  Lidocaine gel    Local anesthetic:  Bupivacaine 0 25% WITH epi  Procedure details:     Complexity:  Intermediate    Needle aspiration: no      Incision types:  Single straight    Scalpel blade:  11    Approach:  Open    Incision depth:  Skin and subcutaneous    Wound management:  Probed and deloculated, extensive cleaning, debrided and irrigated with saline    Irrigation with saline:  10cc    Drainage:  Purulent    Drainage amount:  Copious    Wound treatment:  Wound left open    Packing materials:  1/4 in iodoform gauze    Amount 1/4" iodoform:  Approximately 15cm  Post-procedure details:     Patient tolerance of procedure:   Tolerated with difficulty

## 2018-01-19 NOTE — PROGRESS NOTES
[facility] Progress Note - Jory Barron 41 y o  female MRN: [id number]    Unit/Bed#: [address] [place] [address] Encounter: [id number]      Assessment/Plan:  Right hand cellulitis  -afebrile, no leukocytosis on admission  -Blood cultures x 2 pending  -pain control:  Toradol  30mg q6h PRN, caution with narcotics, previous cocaine and narcotic abuse  -Day #5  vancomycin 1g q12h  -vanco trough - 14 4 wnl  Uncontrolled DM type 2 with neuropathy  -hgb A1c 7 5  -ISS with accuchecls  -Lantus 30 units qhs and gabapentin 800 mg TID  Obesity  -BMI 43 4  -lipid panel cholesterol 138, triglycerides 102, HDL 34, LDL 84  Hypertension: stable continue with Prinizide 20-12-5 mg qd  Depression: stable, continue with lexapro 20mg qd  Bipolar Disorder: stable, continue with Depakote 500 mg qd  Global: diabetic diet, SCDs and lovenox         Subjective:   Patient seen examined at bedside  Hand opened this morning and drained foul-smelling pus  No fevers or chills  Objective:     Vitals: Blood pressure 165/78, pulse 79, temperature 98 9 °F (37 2 °C), temperature source Tympanic, resp  rate 18, height 5' 3" (1 6 m), weight 111 kg (245 lb 9 5 oz), last menstrual period 01/03/2018, SpO2 100 %, not currently breastfeeding  ,Body mass index is 43 5 kg/m²    Wt Readings from Last 3 Encounters:   01/15/18 111 kg (245 lb 9 5 oz)   06/30/17 107 kg (235 lb 0 2 oz)   06/26/17 108 kg (238 lb)       Intake/Output Summary (Last 24 hours) at 01/19/18 9516  Last data filed at 01/18/18 2000   Gross per 24 hour   Intake              440 ml   Output                0 ml   Net              440 ml       Physical Exam:  General: Pt is AAO x 3, not in any acute distress  Cardio: RRR, S1 and S2 +, no murmurs/rubs/gallops/clicks  Resp: CTA b/l, no wheezes/rales/rhonchi  Abdomen: soft, NT/ND, BS+  Extremities: right dorsal surface of hand erythema, warmth, and pain, 5cm x 5m wound over dorsal surface of hand with serous drainage          Recent Results (from the past 24 hour(s))   Fingerstick Glucose (POCT)    Collection Time: 01/18/18  7:53 AM   Result Value Ref Range    POC Glucose 107 65 - 140 mg/dl   Basic metabolic panel    Collection Time: 01/18/18 10:53 AM   Result Value Ref Range    Sodium 136 136 - 145 mmol/L    Potassium 3 8 3 5 - 5 3 mmol/L    Chloride 100 100 - 108 mmol/L    CO2 31 21 - 32 mmol/L    Anion Gap 5 4 - 13 mmol/L    BUN 15 5 - 25 mg/dL    Creatinine 0 77 0 60 - 1 30 mg/dL    Glucose 244 (H) 65 - 140 mg/dL    Calcium 8 6 8 3 - 10 1 mg/dL    eGFR 96 ml/min/1 73sq m   Magnesium    Collection Time: 01/18/18 10:53 AM   Result Value Ref Range    Magnesium 1 5 (L) 1 6 - 2 6 mg/dL   Phosphorus    Collection Time: 01/18/18 10:53 AM   Result Value Ref Range    Phosphorus 3 9 2 7 - 4 5 mg/dL   CBC and Platelet    Collection Time: 01/18/18 10:53 AM   Result Value Ref Range    WBC 11 20 (H) 4 80 - 10 80 Thousand/uL    RBC 3 91 (L) 4 20 - 5 40 Million/uL    Hemoglobin 11 6 (L) 12 0 - 16 0 g/dL    Hematocrit 33 9 (L) 37 0 - 47 0 %    MCV 87 82 - 98 fL    MCH 29 6 27 0 - 31 0 pg    MCHC 34 2 31 4 - 37 4 g/dL    RDW 13 9 11 6 - 15 1 %    Platelets 901 175 - 160 Thousands/uL    MPV 6 3 (L) 8 9 - 12 7 fL   Fingerstick Glucose (POCT)    Collection Time: 01/18/18 11:18 AM   Result Value Ref Range    POC Glucose 227 (H) 65 - 140 mg/dl   Fingerstick Glucose (POCT)    Collection Time: 01/18/18  4:33 PM   Result Value Ref Range    POC Glucose 203 (H) 65 - 140 mg/dl   Fingerstick Glucose (POCT)    Collection Time: 01/18/18  8:53 PM   Result Value Ref Range    POC Glucose 258 (H) 65 - 140 mg/dl       Current Facility-Administered Medications   Medication Dose Route Frequency Provider Last Rate Last Dose    acetaminophen (TYLENOL) tablet 650 mg  650 mg Oral Q6H PRN Bill Baker MD   650 mg at 01/19/18 0046    clonazePAM (KlonoPIN) tablet 0 5 mg  0 5 mg Oral BID King Parish MD   0 5 mg at 01/18/18 1824    cyclobenzaprine (FLEXERIL) tablet 10 mg  10 mg Oral TID PRN Jaimee Hankins MD   10 mg at 01/19/18 0047    divalproex sodium (DEPAKOTE ER) 24 hr tablet 500 mg  500 mg Oral HS Jaimee Hankins MD   500 mg at 01/18/18 2116    enoxaparin (LOVENOX) subcutaneous injection 40 mg  40 mg Subcutaneous Daily Jaimee Hankins MD   40 mg at 01/18/18 1005    escitalopram (LEXAPRO) tablet 20 mg  20 mg Oral Daily Jaimee Hankins MD   20 mg at 01/18/18 1004    gabapentin (NEURONTIN) capsule 800 mg  800 mg Oral TID Jaimee Hankins MD   800 mg at 01/18/18 2117    insulin glargine (LANTUS) subcutaneous injection 30 Units  30 Units Subcutaneous HS Jaimee Hankins MD   30 Units at 01/18/18 2117    insulin lispro (HumaLOG) 100 units/mL subcutaneous injection 2-12 Units  2-12 Units Subcutaneous TID [facility] Jaimee Hankins MD   4 Units at 01/18/18 1824    ketorolac (TORADOL) injection 30 mg  30 mg Intravenous Q6H PRN Jaimee Hankins MD   30 mg at 01/19/18 0317    lidocaine (XYLOCAINE) 2 % topical gel 1 application  1 application Topical Daily PRN Angel Hayward MD   1 application at 80/50/82 1051    lisinopril-hydrochlorothiazide (PRINZIDE 20/12  5) combo dose   Oral Daily Jaimee Hankins MD        melatonin tablet 6 mg  6 mg Oral HS Jaimee Hankins MD   6 mg at 01/18/18 2116    ondansetron (ZOFRAN) injection 4 mg  4 mg Intravenous Q6H PRN Kathia Radha, DO   4 mg at 01/16/18 1033    pantoprazole (PROTONIX) EC tablet 20 mg  20 mg Oral Early Morning Kathia Radha, DO   20 mg at 01/19/18 0618    vancomycin (VANCOCIN) IVPB (premix) 1,000 mg  10 mg/kg Intravenous Q12H Jaimee Hankins  mL/hr at 01/19/18 0618 1,000 mg at 01/19/18 0618       Invasive Devices     Peripheral Intravenous Line            Peripheral IV 01/18/18 Right Forearm less than 1 day                  Angel Hayward MD

## 2018-01-19 NOTE — CASE MANAGEMENT
Continued Stay Review    Date: 1/18/18    Vital Signs: /70 (BP Location: Right arm)   Pulse 72   Temp 98 7 °F (37 1 °C) (Tympanic)   Resp 18   Ht 5' 3" (1 6 m)   Wt 111 kg (245 lb 9 5 oz)   LMP 01/03/2018   SpO2 97%   BMI 43 50 kg/m²     Medications:   Scheduled Meds:   clonazePAM 0 5 mg Oral BID   divalproex sodium 500 mg Oral HS   enoxaparin 40 mg Subcutaneous Daily   escitalopram 20 mg Oral Daily   gabapentin 800 mg Oral TID   insulin glargine 30 Units Subcutaneous HS   insulin lispro 2-12 Units Subcutaneous TID AC   lisinopril-hydrochlorothiazide (PRINZIDE 20/12  5) combo dose  Oral Daily   melatonin 6 mg Oral HS   pantoprazole 20 mg Oral Early Morning   vancomycin 10 mg/kg Intravenous Q12H     Continuous Infusions:    PRN Meds:   acetaminophen    cyclobenzaprine    Ketorolac; USED X6/24HRS    lidocaine    ondansetron    Abnormal Labs/Diagnostic Results:   GLUC 244 MG 1 5 WBC 11 20 HGB 11 6     Age/Sex: 41 y o  female     Assessment/Plan:   Extremities: right dorsal surface of hand erythema, warmth, and pain, no wound or drainage  Right hand cellulitis  -afebrile, no leukocytosis on admission  -Blood cultures x 2 pending  -pain control:  Toradol  30mg q6h PRN, caution with narcotics, previous cocaine and narcotic abuse  -Day #4 vancomycin 1g q12h  -vanco trough - 14 4 wnl  Uncontrolled DM type 2 with neuropathy  -hgb A1c 7 5  -ISS with accuchecls  -Lantus 30 units qhs and gabapentin 800 mg TID  Obesity  -BMI 43 4  -lipid panel cholesterol 138, triglycerides 102, HDL 34, LDL 84  Hypertension: stable continue with Prinizide 20-12-5 mg qd  Depression: stable, continue with lexapro 20mg qd  Bipolar Disorder: stable, continue with Depakote 500 mg qd  Global: diabetic diet, SCDs and lovenox    Discharge Plan: HOME  Thank you,  [address] [facility] in the [facility] by Senthil Franco for 2017  Network Utilization Review Department  Phone: [phone];  Fax [phone]  ATTENTION: The Network Utilization Review Department is now centralized for our 7 Facilities  Make a note that we have a new phone and fax numbers for our Department  Please call with any questions or concerns to [phone] and carefully follow the prompts so that you are directed to the right person  All voicemails are confidential  Fax any determinations, approvals, denials, and requests for initial or continue stay review clinical to [phone]  Due to HIGH CALL volume, it would be easier if you could please send faxed requests to expedite your requests and in part, help us provide discharge notifications faster

## 2018-01-20 VITALS
SYSTOLIC BLOOD PRESSURE: 144 MMHG | OXYGEN SATURATION: 96 % | DIASTOLIC BLOOD PRESSURE: 84 MMHG | TEMPERATURE: 98.7 F | RESPIRATION RATE: 18 BRPM | HEIGHT: 63 IN | BODY MASS INDEX: 43.52 KG/M2 | HEART RATE: 71 BPM | WEIGHT: 245.59 LBS

## 2018-01-20 LAB
BACTERIA BLD CULT: NORMAL
BACTERIA BLD CULT: NORMAL
GLUCOSE SERPL-MCNC: 194 MG/DL (ref 65–140)
GLUCOSE SERPL-MCNC: 275 MG/DL (ref 65–140)

## 2018-01-20 PROCEDURE — 82948 REAGENT STRIP/BLOOD GLUCOSE: CPT

## 2018-01-20 RX ORDER — OXYCODONE HYDROCHLORIDE AND ACETAMINOPHEN 5; 325 MG/1; MG/1
1 TABLET ORAL ONCE
Status: COMPLETED | OUTPATIENT
Start: 2018-01-20 | End: 2018-01-20

## 2018-01-20 RX ORDER — OXYCODONE HYDROCHLORIDE AND ACETAMINOPHEN 5; 325 MG/1; MG/1
1 TABLET ORAL EVERY 6 HOURS PRN
Qty: 20 TABLET | Refills: 0
Start: 2018-01-20 | End: 2018-06-06 | Stop reason: ALTCHOICE

## 2018-01-20 RX ORDER — CLINDAMYCIN HYDROCHLORIDE 300 MG/1
300 CAPSULE ORAL 4 TIMES DAILY
Qty: 20 CAPSULE | Refills: 0
Start: 2018-01-20 | End: 2018-01-25

## 2018-01-20 RX ADMIN — PANTOPRAZOLE SODIUM 20 MG: 20 TABLET, DELAYED RELEASE ORAL at 06:22

## 2018-01-20 RX ADMIN — KETOROLAC TROMETHAMINE 30 MG: 30 INJECTION, SOLUTION INTRAMUSCULAR at 01:10

## 2018-01-20 RX ADMIN — CLONAZEPAM 0.5 MG: 0.5 TABLET ORAL at 09:20

## 2018-01-20 RX ADMIN — GABAPENTIN 800 MG: 400 CAPSULE ORAL at 09:20

## 2018-01-20 RX ADMIN — CYCLOBENZAPRINE HYDROCHLORIDE 10 MG: 10 TABLET, FILM COATED ORAL at 09:30

## 2018-01-20 RX ADMIN — ENOXAPARIN SODIUM 40 MG: 40 INJECTION SUBCUTANEOUS at 09:21

## 2018-01-20 RX ADMIN — OXYCODONE HYDROCHLORIDE AND ACETAMINOPHEN 1 TABLET: 5; 325 TABLET ORAL at 09:56

## 2018-01-20 RX ADMIN — INSULIN LISPRO 2 UNITS: 100 INJECTION, SOLUTION INTRAVENOUS; SUBCUTANEOUS at 09:17

## 2018-01-20 RX ADMIN — VANCOMYCIN HYDROCHLORIDE 1000 MG: 1 INJECTION, SOLUTION INTRAVENOUS at 06:22

## 2018-01-20 RX ADMIN — KETOROLAC TROMETHAMINE 30 MG: 30 INJECTION, SOLUTION INTRAMUSCULAR at 07:38

## 2018-01-20 RX ADMIN — LISINOPRIL: 20 TABLET ORAL at 09:20

## 2018-01-20 RX ADMIN — ESCITALOPRAM OXALATE 20 MG: 10 TABLET ORAL at 09:20

## 2018-01-20 NOTE — PLAN OF CARE
DISCHARGE PLANNING - CARE MANAGEMENT     Discharge to post-acute care or home with appropriate resources Not Progressing        INFECTION - ADULT     Absence or prevention of progression during hospitalization Not Progressing        Nutrition/Hydration-ADULT     Nutrient/Hydration intake appropriate for improving, restoring or maintaining nutritional needs Not Progressing        PAIN - ADULT     Verbalizes/displays adequate comfort level or baseline comfort level Not Progressing

## 2018-01-20 NOTE — PROGRESS NOTES
[address] [facility] [address] [facility] Practice Progress Note - Jory Barron 41 y o  female MRN: [id number]    Unit/Bed#: [address] [place] [address]01 Encounter: [id number]      Assessment/Plan:    Right hand cellulitis  -Pt was afebrile overnight    - WBC 10 90 today, down trending from 11 20 since yesterday  -Blood cultures negative after 4 days  -pain control:  Toradol  30mg q6h PRN, provide caution with narcotics, previous cocaine and narcotic abuse  - Continue vancomycin 1g q12h, will switch to Clindamycin 300 QID PO when pt is ready for discharge    -vanco trough - 14 4 wnl  - Pt seen by Dr Jay Manzo from surgery  Wound was incised and drained  Wound was packed and redressed today  Pt was taught how to properly package and dress the wound  Uncontrolled DM type 2 with neuropathy  -hgb A1c 7 5  -ISS Alg 4 + accuchecls qachs   -Lantus 30 units qhs and gabapentin 800 mg TID    Obesity  -BMI 43 4  -lipid panel cholesterol 138, triglycerides 102, HDL 34, LDL 84  - Weight loss counseling provided     Hypertension   - stable   - Continue with Prinizide 20-12-5 mg qd    Depression  - stable   - Continue with lexapro 20mg qd    Bipolar Disorder  - stable  - Continue with Depakote 500 mg qd    Global   - Diabetic diet, replete electrolytes as necessary, SCDs and Lovenox for DVT prophylaxis       Subjective:   Pt seen and examined at bedside  Pt continues to experience pain in her right wrist  Pt denies chest pain, shortness of breath, abdominal pain, urinary changes or diarrhea  Objective:     Vitals: Blood pressure 144/84, pulse 71, temperature 98 7 °F (37 1 °C), temperature source Oral, resp  rate 18, height 5' 3" (1 6 m), weight 111 kg (245 lb 9 5 oz), last menstrual period 01/03/2018, SpO2 96 %, not currently breastfeeding  ,Body mass index is 43 5 kg/m²    Wt Readings from Last 3 Encounters:   01/15/18 111 kg (245 lb 9 5 oz)   06/30/17 107 kg (235 lb 0 2 oz)   06/26/17 108 kg (238 lb)     No intake or output data in the 24 hours ending 01/20/18 1201    Physical Exam: General appearance: alert and oriented, in no acute distress  Lungs: clear to auscultation bilaterally  Heart: regular rate and rhythm, S1, S2 normal, no murmur, click, rub or gallop  Abdomen: soft, non-tender; bowel sounds normal; no masses,  no organomegaly  Skin: 5 cm x 5cm wound over dorsal surface of hand s/p I&D, erythema and warmth present over whole dorsal wrist     Recent Results (from the past 24 hour(s))   Fingerstick Glucose (POCT)    Collection Time: 01/19/18  4:20 PM   Result Value Ref Range    POC Glucose 245 (H) 65 - 140 mg/dl   Fingerstick Glucose (POCT)    Collection Time: 01/19/18  9:18 PM   Result Value Ref Range    POC Glucose 319 (H) 65 - 140 mg/dl   Fingerstick Glucose (POCT)    Collection Time: 01/20/18  7:47 AM   Result Value Ref Range    POC Glucose 194 (H) 65 - 140 mg/dl       Current Facility-Administered Medications   Medication Dose Route Frequency Provider Last Rate Last Dose    acetaminophen (TYLENOL) tablet 650 mg  650 mg Oral Q6H PRN Marci Flores MD   650 mg at 01/19/18 1950    clonazePAM (KlonoPIN) tablet 0 5 mg  0 5 mg Oral BID Mathew Manning MD   0 5 mg at 01/20/18 0920    cyclobenzaprine (FLEXERIL) tablet 10 mg  10 mg Oral TID PRN Mathew Manning MD   10 mg at 01/20/18 0930    divalproex sodium (DEPAKOTE ER) 24 hr tablet 500 mg  500 mg Oral HS Mathew Manning MD   500 mg at 01/19/18 2143    enoxaparin (LOVENOX) subcutaneous injection 40 mg  40 mg Subcutaneous Daily Mathew Manning MD   40 mg at 01/20/18 0921    escitalopram (LEXAPRO) tablet 20 mg  20 mg Oral Daily Mathew Manning MD   20 mg at 01/20/18 0920    gabapentin (NEURONTIN) capsule 800 mg  800 mg Oral TID Mathew Manning MD   800 mg at 01/20/18 0920    insulin glargine (LANTUS) subcutaneous injection 30 Units  30 Units Subcutaneous HS Mathew Manning MD   30 Units at 01/19/18 2145    insulin lispro (HumaLOG) 100 units/mL subcutaneous injection 2-12 Units  2-12 Units Subcutaneous TID [facility] James Nascimento MD   2 Units at 01/20/18 2657    ketorolac (TORADOL) injection 30 mg  30 mg Intravenous Q6H PRN Alverta Heimlich, MD   30 mg at 01/20/18 0738    lidocaine (XYLOCAINE) 2 % topical gel 1 application  1 application Topical Daily PRN Alverta Heimlich, MD   1 application at 16/70/32 1051    lisinopril-hydrochlorothiazide (PRINZIDE 20/12  5) combo dose   Oral Daily James Nascimento MD        melatonin tablet 6 mg  6 mg Oral HS James Nascimento MD   6 mg at 01/19/18 2143    ondansetron (ZOFRAN) injection 4 mg  4 mg Intravenous Q6H PRN Kathia Radha, DO   4 mg at 01/16/18 1033    pantoprazole (PROTONIX) EC tablet 20 mg  20 mg Oral Early Morning Kathia Radha, DO   20 mg at 01/20/18 9832    vancomycin (VANCOCIN) IVPB (premix) 1,000 mg  10 mg/kg Intravenous Q12H James Nascimento  mL/hr at 01/20/18 0622 1,000 mg at 01/20/18 0622       Invasive Devices     Peripheral Intravenous Line            Peripheral IV 01/18/18 Right Forearm 2 days                Lab, Imaging and other studies: I have personally reviewed pertinent reports      VTE Pharmacologic Prophylaxis: Enoxaparin (Lovenox)  VTE Mechanical Prophylaxis: sequential compression device    Yue Arroyo DO

## 2018-01-20 NOTE — PROGRESS NOTES
Progress Note - General Surgery   Valentin Barron 41 y o  female MRN: [id number]  Unit/Bed#: [address] [place] [address] Encounter: [id number]    Assessment:  Postop day 1 status post incision and drainage of abscess right wrist   Afebrile, pain under control, 1st dressing change to be performed today    Plan:  After 1st dressing change by nurse today, patient may be discharged if she feels comfortable performing the changes  Subjective/Objective   Chief Complaint:  Do think I can go home?     Subjective:  Comfortable in bed    Objective:     Blood pressure 142/68, pulse 91, temperature (!) 97 1 °F (36 2 °C), temperature source Tympanic, resp  rate 18, height 5' 3" (1 6 m), weight 111 kg (245 lb 9 5 oz), last menstrual period 01/03/2018, SpO2 95 %, not currently breastfeeding  ,Body mass index is 43 5 kg/m²  No intake or output data in the 24 hours ending 01/20/18 0937    Invasive Devices     Peripheral Intravenous Line            Peripheral IV 01/18/18 Right Forearm 1 day                Physical Exam:  Dressing in place    To be changed today by the nursing staff and patient will observe

## 2018-01-20 NOTE — DISCHARGE INSTRUCTIONS
Please change dressing and packing of your wrist every day, as taught by nurse today  Please follow up with [facility] in 2 weeks  Please call [facility] Surgical Office to set up an appointment with Dr torres within 1 week  Start clindamycin 600 mg 4 times a day the next 5 days  Take Percocet 1 tablet every 6 hours for pain as needed

## 2018-01-20 NOTE — NURSING NOTE
Pt given discharge instructions and follow up appointment information  Pt verbalized understanding of discharge instructions and follow up appointments  Pt educated on dressing changes with packing and verbalized assistance available at home  Pt provided with supplies and opportunity for questions  IV access removed intact pt tolerated well  Dressing changed before d/c to home  Pt given x1 RX for percocet  Antibiotics called into pt pharmacy    Pt exited [address] [facility] with all belongings and family

## 2018-01-20 NOTE — DISCHARGE SUMMARY
[facility] 26 Discharge Summary - Medical Jory Barron 41 y o  female MRN: [id number]    [place] [facility] [address] / Bed: 2 [facility] 306/3 [place] Encounter: [id number]    BRIEF OVERVIEW  Admitting Provider: Kati Soliman DO  Discharge Provider: Ida Strickland MD    Discharge To: Home    Outpatient Follow-Up: Follow up in 1 week with Dr Jay Manzo at [facility] office  Follow up with steven in 2 weeks  Things to address at first follow up visit: cellulitis and care of wound on Right hand   Labs and results pending at discharge: none     Admission Date: 1/15/2018     Discharge Date: 1/20/2018    Primary Discharge Diagnosis  Principal Problem:    Cellulitis of right hand  Active Problems:    Hypertension    Diabetes mellitus with neuropathy ([facility] )    Depression    Morbidly obese ([facility] )    Bipolar 1 disorder ([facility] )  Resolved Problems:    * No resolved hospital problems  Car Subramanian Irvin STAY    Procedures Performed/Pertinent Test results    I & D of cellulitis or Right wrist by Dr Jay Manzo     Results for orders placed or performed during the hospital encounter of 01/15/18   Blood culture   Result Value Ref Range    Blood Culture No Growth After 4 Days  Blood culture   Result Value Ref Range    Blood Culture No Growth After 4 Days      MRSA culture   Result Value Ref Range    MRSA Culture Only       No Methicillin Resistant Staphlyococcus aureus (MRSA) isolated   CBC and differential   Result Value Ref Range    WBC 9 70 4 80 - 10 80 Thousand/uL    RBC 4 49 4 20 - 5 40 Million/uL    Hemoglobin 13 6 12 0 - 16 0 g/dL    Hematocrit 39 2 37 0 - 47 0 %    MCV 87 82 - 98 fL    MCH 30 3 27 0 - 31 0 pg    MCHC 34 7 31 4 - 37 4 g/dL    RDW 14 2 11 6 - 15 1 %    MPV 6 2 (L) 8 9 - 12 7 fL    Platelets 165 610 - 990 Thousands/uL    nRBC 0 /100 WBCs    Neutrophils Relative 70 43 - 75 %    Lymphocytes Relative 22 14 - 44 % Monocytes Relative 7 4 - 12 %    Eosinophils Relative 1 0 - 6 %    Basophils Relative 0 0 - 1 %    Neutrophils Absolute 6 80 1 85 - 7 62 Thousands/µL    Lymphocytes Absolute 2 10 0 60 - 4 47 Thousands/µL    Monocytes Absolute 0 70 0 17 - 1 22 Thousand/µL    Eosinophils Absolute 0 10 0 00 - 0 61 Thousand/µL    Basophils Absolute 0 00 0 00 - 0 10 Thousands/µL   Comprehensive metabolic panel   Result Value Ref Range    Sodium 136 136 - 145 mmol/L    Potassium 4 7 3 5 - 5 3 mmol/L    Chloride 100 100 - 108 mmol/L    CO2 31 21 - 32 mmol/L    Anion Gap 5 4 - 13 mmol/L    BUN 9 5 - 25 mg/dL    Creatinine 0 69 0 60 - 1 30 mg/dL    Glucose 236 (H) 65 - 140 mg/dL    Calcium 8 9 8 3 - 10 1 mg/dL    AST 50 (H) 5 - 45 U/L    ALT 84 (H) 12 - 78 U/L    Alkaline Phosphatase 63 46 - 116 U/L    Total Protein 7 5 6 4 - 8 2 g/dL    Albumin 3 1 (L) 3 5 - 5 0 g/dL    Total Bilirubin 0 70 0 20 - 1 00 mg/dL    eGFR 108 ml/min/1 73sq m   Protime-INR   Result Value Ref Range    Protime 11 3 9 4 - 11 7 seconds    INR 1 08 0 86 - 1 16   APTT   Result Value Ref Range    PTT 29 24 - 33 seconds   Lactic acid, plasma   Result Value Ref Range    LACTIC ACID 1 3 0 5 - 2 0 mmol/L   Basic metabolic panel   Result Value Ref Range    Sodium 137 136 - 145 mmol/L    Potassium 4 3 3 5 - 5 3 mmol/L    Chloride 104 100 - 108 mmol/L    CO2 29 21 - 32 mmol/L    Anion Gap 4 4 - 13 mmol/L    BUN 15 5 - 25 mg/dL    Creatinine 0 66 0 60 - 1 30 mg/dL    Glucose 215 (H) 65 - 140 mg/dL    Calcium 8 7 8 3 - 10 1 mg/dL    eGFR 110 ml/min/1 73sq m   Magnesium   Result Value Ref Range    Magnesium 1 9 1 6 - 2 6 mg/dL   Phosphorus   Result Value Ref Range    Phosphorus 3 8 2 7 - 4 5 mg/dL   CBC (With Platelets)   Result Value Ref Range    WBC 8 40 4 80 - 10 80 Thousand/uL    RBC 4 19 (L) 4 20 - 5 40 Million/uL    Hemoglobin 12 7 12 0 - 16 0 g/dL    Hematocrit 36 8 (L) 37 0 - 47 0 %    MCV 88 82 - 98 fL    MCH 30 3 27 0 - 31 0 pg    MCHC 34 5 31 4 - 37 4 g/dL    RDW 14 6 11 6 - 15 1 %    Platelets 232 035 - 280 Thousands/uL    MPV 6 5 (L) 8 9 - 12 7 fL   Hemoglobin A1c   Result Value Ref Range    Hemoglobin A1C 7 5 (H) 4 2 - 6 3 %     mg/dl   Lipid Panel with Direct LDL reflex   Result Value Ref Range    Cholesterol 138 50 - 200 mg/dL    Triglycerides 102 <=150 mg/dL    HDL, Direct 34 (L) 40 - 60 mg/dL    LDL Calculated 84 0 - 100 mg/dL   Vancomycin, trough Please draw before 4th dose   Result Value Ref Range    Vancomycin Tr 14 4 10 0 - 20 0 ug/mL   Basic metabolic panel   Result Value Ref Range    Sodium 137 136 - 145 mmol/L    Potassium 3 6 3 5 - 5 3 mmol/L    Chloride 107 100 - 108 mmol/L    CO2 24 21 - 32 mmol/L    Anion Gap 6 4 - 13 mmol/L    BUN 12 5 - 25 mg/dL    Creatinine 0 48 (L) 0 60 - 1 30 mg/dL    Glucose 148 (H) 65 - 140 mg/dL    Calcium 7 3 (L) 8 3 - 10 1 mg/dL    eGFR 122 ml/min/1 73sq m   Magnesium   Result Value Ref Range    Magnesium 1 6 1 6 - 2 6 mg/dL   Phosphorus   Result Value Ref Range    Phosphorus 3 2 2 7 - 4 5 mg/dL   CBC and Platelet   Result Value Ref Range    WBC 9 50 4 80 - 10 80 Thousand/uL    RBC 3 65 (L) 4 20 - 5 40 Million/uL    Hemoglobin 10 8 (L) 12 0 - 16 0 g/dL    Hematocrit 32 0 (L) 37 0 - 47 0 %    MCV 88 82 - 98 fL    MCH 29 5 27 0 - 31 0 pg    MCHC 33 7 31 4 - 37 4 g/dL    RDW 14 2 11 6 - 15 1 %    Platelets 177 619 - 383 Thousands/uL    MPV 6 4 (L) 8 9 - 12 7 fL   Basic metabolic panel   Result Value Ref Range    Sodium 136 136 - 145 mmol/L    Potassium 3 8 3 5 - 5 3 mmol/L    Chloride 100 100 - 108 mmol/L    CO2 31 21 - 32 mmol/L    Anion Gap 5 4 - 13 mmol/L    BUN 15 5 - 25 mg/dL    Creatinine 0 77 0 60 - 1 30 mg/dL    Glucose 244 (H) 65 - 140 mg/dL    Calcium 8 6 8 3 - 10 1 mg/dL    eGFR 96 ml/min/1 73sq m   Magnesium   Result Value Ref Range    Magnesium 1 5 (L) 1 6 - 2 6 mg/dL   Phosphorus   Result Value Ref Range    Phosphorus 3 9 2 7 - 4 5 mg/dL   CBC and Platelet   Result Value Ref Range    WBC 11 20 (H) 4 80 - 10 80 Thousand/uL    RBC 3 91 (L) 4 20 - 5 40 Million/uL    Hemoglobin 11 6 (L) 12 0 - 16 0 g/dL    Hematocrit 33 9 (L) 37 0 - 47 0 %    MCV 87 82 - 98 fL    MCH 29 6 27 0 - 31 0 pg    MCHC 34 2 31 4 - 37 4 g/dL    RDW 13 9 11 6 - 15 1 %    Platelets 919 371 - 666 Thousands/uL    MPV 6 3 (L) 8 9 - 12 7 fL   Basic metabolic panel   Result Value Ref Range    Sodium 138 136 - 145 mmol/L    Potassium 3 6 3 5 - 5 3 mmol/L    Chloride 101 100 - 108 mmol/L    CO2 32 21 - 32 mmol/L    Anion Gap 5 4 - 13 mmol/L    BUN 18 5 - 25 mg/dL    Creatinine 0 66 0 60 - 1 30 mg/dL    Glucose 110 65 - 140 mg/dL    Glucose, Fasting 110 (H) 65 - 99 mg/dL    Calcium 8 5 8 3 - 10 1 mg/dL    eGFR 110 ml/min/1 73sq m   Magnesium   Result Value Ref Range    Magnesium 1 8 1 6 - 2 6 mg/dL   Phosphorus   Result Value Ref Range    Phosphorus 3 9 2 7 - 4 5 mg/dL   CBC and Platelet   Result Value Ref Range    WBC 10 90 (H) 4 80 - 10 80 Thousand/uL    RBC 3 78 (L) 4 20 - 5 40 Million/uL    Hemoglobin 11 2 (L) 12 0 - 16 0 g/dL    Hematocrit 33 3 (L) 37 0 - 47 0 %    MCV 88 82 - 98 fL    MCH 29 7 27 0 - 31 0 pg    MCHC 33 8 31 4 - 37 4 g/dL    RDW 14 0 11 6 - 15 1 %    Platelets 092 978 - 257 Thousands/uL    MPV 6 3 (L) 8 9 - 12 7 fL   Fingerstick Glucose (POCT)   Result Value Ref Range    POC Glucose 187 (H) 65 - 140 mg/dl   Fingerstick Glucose (POCT)   Result Value Ref Range    POC Glucose 274 (H) 65 - 140 mg/dl   Fingerstick Glucose (POCT)   Result Value Ref Range    POC Glucose 205 (H) 65 - 140 mg/dl   Fingerstick Glucose (POCT)   Result Value Ref Range    POC Glucose 183 (H) 65 - 140 mg/dl   Fingerstick Glucose (POCT)   Result Value Ref Range    POC Glucose 297 (H) 65 - 140 mg/dl   Fingerstick Glucose (POCT)   Result Value Ref Range    POC Glucose 126 65 - 140 mg/dl   Fingerstick Glucose (POCT)   Result Value Ref Range    POC Glucose 236 (H) 65 - 140 mg/dl   Fingerstick Glucose (POCT)   Result Value Ref Range    POC Glucose 181 (H) 65 - 140 mg/dl Fingerstick Glucose (POCT)   Result Value Ref Range    POC Glucose 246 (H) 65 - 140 mg/dl   Fingerstick Glucose (POCT)   Result Value Ref Range    POC Glucose 107 65 - 140 mg/dl   Fingerstick Glucose (POCT)   Result Value Ref Range    POC Glucose 227 (H) 65 - 140 mg/dl   Fingerstick Glucose (POCT)   Result Value Ref Range    POC Glucose 203 (H) 65 - 140 mg/dl   Fingerstick Glucose (POCT)   Result Value Ref Range    POC Glucose 258 (H) 65 - 140 mg/dl   Fingerstick Glucose (POCT)   Result Value Ref Range    POC Glucose 111 65 - 140 mg/dl   Fingerstick Glucose (POCT)   Result Value Ref Range    POC Glucose 210 (H) 65 - 140 mg/dl   Fingerstick Glucose (POCT)   Result Value Ref Range    POC Glucose 245 (H) 65 - 140 mg/dl   Fingerstick Glucose (POCT)   Result Value Ref Range    POC Glucose 319 (H) 65 - 140 mg/dl   Fingerstick Glucose (POCT)   Result Value Ref Range    POC Glucose 194 (H) 65 - 140 mg/dl   Fingerstick Glucose (POCT)   Result Value Ref Range    POC Glucose 275 (H) 65 - 140 mg/dl       HPI  As per H&P, Denita Espinoza is a 41year old female with a PMH of Depression, Hypertension, and Diabetes Mellitus w  Neuropathy who presents to the ED with a chief complaint of right hand swelling  As per patient, she noticed her right hand to start swelling sometime last week either on 12/10/18 or 12/11/18  Patient states that she was volunteering her time at a cat shelter  While doing so, it appears that she received a cat scratch from her own cat on the top surface of her right hand  She states that her cat's vaccinations are up-to-date  Patient states that she cleaned the wound with soap and water  Further notes using over-the-counter bacitracin ointment for the last 3-4 days  Patient states that the affected area has progressively worsened  Her right hand has become more tender, red, warm to touch, and painful  Associated symptoms include periods of sweating, chills, and fever blister on her nose    Pain was initially noted to be 6/10 in severity, but now is rated 9/10 in severity  She denies any fever, headache, visual changes, shortness of breath, chest pain, abdominal pain, nausea, vomiting, diarrhea, weakness, tingling, and swelling of her other extremities  Hospital Course    In ED,  Given 1L NaCl 0 9% Bolus, Dilaudid 1mg 1x dose, Toradol 30mg IV 1x dose, and Vancomycin 1250mg  Patient stabilized and moved to the medical floor      Right hand cellulitis  - Pt had one episode of temp 100 9, but otherwise during hospitalization    - WBC 10 90 on day of discharge, down trending from 11 20 the day prior  - Blood cultures negative after 4 days  - Pain controlled with Toradol  30mg q6h PRN  - Proceeded with caution with narcotics, previous cocaine and narcotic abuse  - Pt given vancomycin 1g q12h for 5 days, switched to Clindamycin 600 QID PO upon discharge    - vanco [id number] 4 wnl  - Pt seen by Dr Nato Arias from surgery  Wound was incised and drained  Wound was packed and redressed today  Pt was taught how to properly package and dress the wound          Uncontrolled DM type 2 with neuropathy  -hgb A1c 7 5  -Placed on ISS Alg 4 + accuchecls qachs   -Given Lantus 30 units qhs and gabapentin 800 mg TID     Obesity  -BMI 43 4  - lipid panel:  cholesterol 138, triglycerides 102, HDL 34, LDL 84  - Weight loss counseling provided      Hypertension   -  Final /84 upon discharge   - Pt was given Prinizide 20-12-5 mg qd     Depression  - stable during hospitalization   - Continued on home medication Lexapro 20mg qd     Bipolar Disorder  - stable during hospitalization   - Continued on Depakote 500 mg qd      Global   - Pt was on diabetic diet while hospitalized, electrolytes repleted necessary, SCDs and Lovenox given for DVT prophylaxis     Pt will follow up in 2 weeks with [facility]  Pt will follow up in 1 week with Dr Nato Arias in Surgery clinic    Pt taught to do proper packing and dressing changes by nurse  Pt instructed to change her dressing and packing daily  Will continue Clindamycin 600mg QID for 5 days  Percocet 1 tab Q6H PRN for 5 days, a total of 20 tablets given upon discharge       Physical Exam at Discharge  General appearance: alert and oriented, in no acute distress  Lungs: clear to auscultation bilaterally  Heart: regular rate and rhythm, S1, S2 normal, no murmur, click, rub or gallop  Abdomen: soft, non-tender; bowel sounds normal; no masses,  no organomegaly  Skin: 5 cm x 5 cm wound over dorsal surface of hand s/p I&D; warmth and erythema present over dorsal side of wrist     Medications   clindamycin 300 MG capsule   Commonly known as: CLEOCIN   Take 1 capsule by mouth 4 (four) times a day for 5 days   Refills: 0                     clonazePAM 0 5 mg tablet   Commonly known as: KlonoPIN   Take 0 5 mg by mouth 2 (two) times a day   Refills: 0                    cyclobenzaprine 10 mg tablet   Commonly known as: FLEXERIL   Take 1 tablet by mouth 3 (three) times a day as needed for muscle spasms for up to 15 doses   Refills: 0                    divalproex sodium 500 mg 24 hr tablet   Commonly known as: DEPAKOTE ER   Take 1 tablet by mouth daily at bedtime for 14 days   Refills: 0                    escitalopram 20 mg tablet   Commonly known as: LEXAPRO   Take 20 mg by mouth daily   Refills: 0                    gabapentin 800 mg tablet   Commonly known as: NEURONTIN   Take 800 mg by mouth 3 (three) times a day   Refills: 0                    insulin glargine 100 units/mL subcutaneous injection   Commonly known as: LANTUS   Inject 30 Units under the skin daily at bedtime   Refills: 0                    lisinopril-hydrochlorothiazide 20-12 5 MG per tablet   Commonly known as: PRINZIDE,ZESTORETIC   Take 1 tablet by mouth 2 (two) times a day   Refills: 0                    oxyCODONE-acetaminophen 5-325 mg per tablet   Commonly known as: PERCOCET   Take 1 tablet by mouth every 6 (six) hours as needed for moderate pain for up to 20 doses Max Daily Amount: 4 tablets   Refills: 0               Allergies  Allergies   Allergen Reactions    Septra [Sulfamethoxazole-Trimethoprim] Hives    Tramadol Rash     Tolerates IV Dilaudid       Diet restrictions: none  Activity restrictions: as tolerated   Code Status: Level 1 - Full Code  Advance Directive and Living Will: <no information>  Power of :    POLST:      Discharge Condition: stable      Discharge  Statement   I spent 40 minutes discharging the patient  This time was spent on the day of discharge  I had direct contact with the patient on the day of discharge  Additional documentation is required if more than 30 minutes were spent on discharge

## 2018-01-22 ENCOUNTER — GENERIC CONVERSION - ENCOUNTER (OUTPATIENT)
Dept: OTHER | Facility: OTHER | Age: 43
End: 2018-01-22

## 2018-01-22 VITALS
BODY MASS INDEX: 41.64 KG/M2 | DIASTOLIC BLOOD PRESSURE: 88 MMHG | WEIGHT: 235 LBS | OXYGEN SATURATION: 98 % | HEIGHT: 63 IN | HEART RATE: 97 BPM | RESPIRATION RATE: 18 BRPM | TEMPERATURE: 97.3 F | SYSTOLIC BLOOD PRESSURE: 130 MMHG

## 2018-01-24 NOTE — PROCEDURES
Procedures by Gordon Back PA-C at 1/19/2018  2:04 PM      Author:  Gordon Back PA-C Service:  Surgery-General Author Type:  Physician Assistant    Filed:  1/19/2018  3:41 PM Date of Service:  1/19/2018  2:04 PM Status:  Attested    :  Gordon Back PA-C (Physician Assistant)  Cosigner:  Kaden Peacock MD at 1/20/2018  9:17 AM      Procedure Orders:       1  Incision and Drainage [[id number]] ordered by Gordon Back PA-C at 01/19/18 1404                 Post-procedure Diagnoses:       1   Cellulitis of right hand [Z88 [id number]]              Attestation signed by Kaden Peacock MD at 1/20/2018  9:17 AM (Updated)        I was present and supervised the resident for this procedure    Total time spent with the patient on 19 January 2018 is 45 minutes                      Incision and drain  Date/Time: 1/19/2018 2:04 PM  Performed by: Rose Sainz by: Sacha Mendosa     Patient location:  Bedside  Other Assisting Provider:  Yes (comment) Cara Kendall RN)    Consent:     Consent obtained:  Verbal    Consent given by:  Patient    Risks discussed:  Bleeding, incomplete drainage, infection and pain    Alternatives discussed:  No treatment, delayed treatment and alternative treatment  Universal protocol:     Procedure explained and questions answered to patient or proxy's satisfaction: yes      Relevant documents present and verified: yes      Test results available and properly labeled: yes       Imaging studies available: yes      Required blood products, implants, devices, and special equipment available: yes      Site/side marked: yes      Immediately prior to procedure a time out was called: yes      Patient identity confirmed:  Verbally with patient and arm band  Location:     Type:  Abscess and fluid collection    Size:  6cm X 6cm    Location:  Upper extremity    Upper extremity location:  R hand  Pre-procedure details:     Skin preparation:  Antiseptic wash (alcohol swab)  Sedation:     Sedation type: Anxiolysis (PRN dilaudid)  Anesthesia (see MAR for exact dosages): Anesthesia method:  Topical application and local infiltration    Topical anesthetic:  Lidocaine gel    Local anesthetic:  Bupivacaine 0 25% WITH epi  Procedure details:     Complexity:  Intermediate    Needle aspiration: no      Incision types:  Single straight    Scalpel blade:  11    Approach:  Open    Incision depth:  Skin and subcutaneous    Wound management:  Probed and deloculated, extensive cleaning, debrided and irrigated with saline    Irrigation with saline:  10cc    Drainage:  Purulent    Drainage amount:  Copious    Wound treatment:  Wound left open    Packing materials:  1/4 in iodoform gauze    Amount 1/4 iodoform:  Approximately 15cm  Post-procedure details:     Patient tolerance of procedure:   Tolerated with difficulty                       Received for:Provider  [facility]   Jan 20 2018  9:18AM [facility] Standard Time

## 2018-01-24 NOTE — MISCELLANEOUS
History of Present Illness  TCM Communication [facility]: The patient is being contacted for follow-up after hospitalization  Hospital records were reviewed  She was hospitalized at and [facility]  The date of admission: 1/15/2018, date of discharge: 1/20/2018  Diagnosis: Cellulitis of right hand, Hypertension, Diabetes mellitus with neuropathy, Depression, Morbidly obese, Bipolar 1 disorder,  She was discharged to home  Medications reviewed and updated today  She did not schedule a follow up appointment  Follow-up appointments with other specialists: Dr Chino Torres general surgery  Symptoms: fever and vomiting  Counseling was provided to the patient  left message for patient to call office for an appoint ment  Communication performed and completed by Susan Burton LPN      Active Problems    1  Acute right-sided thoracic back pain (724 1) (M54 6)   2  Adult ADHD (314 01) (F90 9)   3  Adult BMI 40 0-44 9 kg/sq m (V85 41) (Z68 41)   4  Anxiety (300 00) (F41 9)   5  Asthma (493 90) (J45 909)   6  Back muscle spasm (724 8) (M62 830)   7  Baker's cyst, ruptured (727 51) (M66 0)   8  Benign essential hypertension (401 1) (I10)   9  Bipolar disorder (296 80) (F31 9)   10  BMI 38 0-38 9,adult (V85 38) (Z68 38)   11  Cellulitis of thigh (682 6) (L03 119)   12  Cocaine abuse (305 60) (F14 10)   13  Depression (311) (F32 9)   14  Diabetes mellitus type 2, uncontrolled (250 02) (E11 65)   15  Diabetes mellitus with neuropathy (250 60,357 2) (E11 40)   16  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)   17  Erythema migrans (Lyme disease) (088 81) (A69 20)   18  Fibromyalgia (729 1) (M79 7)   19  Hypothyroidism (244 9) (E03 9)   20  Long-term insulin use (V58 67) (Z79 4)   21  Morbid obesity (278 01) (E66 01)   22  Mouth sores (528 9) (K13 79)   23  Muscle strain (848 9) (T14 8XXA)   24  On home oxygen therapy (V46 2) (Z99 81)   25  Pain, dental (525 9) (K08 89)   26   Peripheral neuropathy (356 9) (G62 9)   27  Right knee pain (719 46) (M25 561)   28  Well adult on routine health check (V70 0) (Z00 00)    Family History  Mother    1  Denied: Family history of mental disorder   2  Denied: Family history of substance abuse  Father    3  Denied: Family history of mental disorder   4  Denied: Family history of substance abuse  Sibling    5  Denied: Family history of mental disorder   6  Denied: Family history of substance abuse  Family History    7  Family history of diabetes mellitus (V18 0) (Z83 3)   8  Family history of hypertension (V17 49) (Z82 49)    Social History    · Denied: History of Alcohol use   · Always uses seat belt   · Denied: History of Drug use   · Never a smoker    Current Meds   1  BD Insulin Syr Ultrafine II 31G X 5/16" 0 5 ML Miscellaneous; use as directed; Therapy: 15Apr2015 to (Last Rx:06Uwe0068)  Requested for: 34FVE3323 Ordered   2  BD Insulin Syringe MicroFine 28G X 1/2" 1 ML Miscellaneous; USE ONE SYRINGE   THREE TIMES DAILY; Therapy: 03QGZ9157 to (Evaluate:24Mar2018)  Requested for: 46VDM8690; Last   Rx:08Eub8637 Ordered   3  ClonazePAM 0 5 MG Oral Tablet; TAKE 1 TABLET TWICE DAILY; Therapy: (50-92-49-29) to Recorded   4  Cyclobenzaprine HCl - 10 MG Oral Tablet; TAKE 1 TABLET 3 TIMES DAILY AS NEEDED; Therapy: 77TFC4083 to (YIUUKNVO:36BRA5960)  Requested for: 90JHN0555; Last   XL:75ZEA4015 Ordered   5  Divalproex Sodium  MG Oral Tablet Extended Release 24 Hour; TAKE 1 TABLET   AT BEDTIME; Therapy: 45OVJ1422 to (Evaluate:07Jun2017)  Requested for: 80PRA8976; Last   Rx:01Fcn1863 Ordered   6  Escitalopram Oxalate 20 MG Oral Tablet; TAKE 1 TABLET DAILY; Therapy: (50-92-49-29) to Recorded   7  Gabapentin 800 MG Oral Tablet; TAKE ONE TABLET BY MOUTH THREE TIMES DAILY; Therapy: 22UUX7778 to (Evaluate:36Znj9830)  Requested for: 74ZGJ6957; Last   Rx:45Vcq3099; Status: ACTIVE - Transmit to [facility] Verification Ordered   8   Lantus 100 UNIT/ML Subcutaneous Solution; INJECT 30 UNIT Bedtime; Therapy: (Recorded:75Hih1960) to Recorded   9  Lisinopril-Hydrochlorothiazide 20-12 5 MG Oral Tablet; take one tablet by mouth every day    Requested for: 70Dpc0418; Last Rx:07Hdy3665; Status: ACTIVE - Transmit to [facility] Verification Ordered   10  OneTouch Ultra Blue In Vitro Strip; TEST 3 TIMES DAILY; Therapy: 31JZR1463 to (Evaluate:78Wkj0739)  Requested for: 07NWO8522; Last    Rx:09Nov2017; Status: ACTIVE - Transmit to [facility] Verification Ordered   11  OneTouch Ultra Blue In Vitro Strip; USE ONE STRIP TO CHECK GLUCOSE THREE    TIMES DAILY; Therapy: 34NED5591 to (Mike Salvage)  Requested for: 24Apr2017; Last    Rx:79Dpm9519 Ordered    Allergies    1  Septra TABS    Message   Recorded as Task   Date: 01/20/2018 02:13 PM, Created By: System   Task Name: [facility]   Assigned To: Rahel Ramirez   Regarding Patient: Ron House, Status:  In Progress   Comment:    System - 20 Jan 2018 2:13 PM     Patient discharged from hospital   Patient Name: Natasha Vera  Patient YOB: 1975  Discharge Date: 1/20/2018  Facility: [facility] - 22 Jan 2018 9:12 AM     TASK EDITED   Semaj Hickey - 22 Jan 2018 10:52 AM     TASK IN PROGRESS     Signatures   Electronically signed by : Amira Weaver DO; Jan 23 2018  7:17PM EST                       (Author)    Electronically signed by : Amira Weaver DO; Jan 23 2018  7:17PM EST                       (Author)    Electronically signed by : VANESSA Stone ; Jan 23 2018  7:21PM EST

## 2018-01-30 LAB
ATRIAL RATE: 69 BPM
P AXIS: 32 DEGREES
PR INTERVAL: 138 MS
QRS AXIS: -1 DEGREES
QRSD INTERVAL: 86 MS
QT INTERVAL: 416 MS
QTC INTERVAL: 445 MS
T WAVE AXIS: 6 DEGREES
VENTRICULAR RATE: 69 BPM

## 2018-05-21 RX ORDER — IBUPROFEN 200 MG
TABLET ORAL
Refills: 0 | OUTPATIENT
Start: 2018-05-21

## 2018-05-21 NOTE — TELEPHONE ENCOUNTER
I have been inappropriately named as PCP for this patient  I have never seen, nor will I ever see this patient as their PCP   Please forward to [place], and pt has not been seen for years, I do not think we should refill anything

## 2018-05-23 NOTE — TELEPHONE ENCOUNTER
[facility] in January by Dr Thien Cortes note never crossed over into Epic also I called [facility] patient already had refills on her insulin syringes they just didn't have order gauge in stock last week but they received them in and patient has already picked them up  Problem resolved now will close note

## 2018-06-06 ENCOUNTER — OFFICE VISIT (OUTPATIENT)
Dept: FAMILY MEDICINE CLINIC | Facility: CLINIC | Age: 43
End: 2018-06-06
Payer: COMMERCIAL

## 2018-06-06 VITALS
HEART RATE: 87 BPM | DIASTOLIC BLOOD PRESSURE: 68 MMHG | SYSTOLIC BLOOD PRESSURE: 110 MMHG | HEIGHT: 63 IN | WEIGHT: 235 LBS | BODY MASS INDEX: 41.64 KG/M2 | RESPIRATION RATE: 18 BRPM | OXYGEN SATURATION: 98 % | TEMPERATURE: 96.3 F

## 2018-06-06 DIAGNOSIS — I10 ESSENTIAL HYPERTENSION: ICD-10-CM

## 2018-06-06 DIAGNOSIS — Z79.4 TYPE 2 DIABETES MELLITUS WITH DIABETIC NEUROPATHY, WITH LONG-TERM CURRENT USE OF INSULIN (HCC): Primary | ICD-10-CM

## 2018-06-06 DIAGNOSIS — M94.0 COSTOCHONDRITIS: ICD-10-CM

## 2018-06-06 DIAGNOSIS — Z87.898 HISTORY OF SEIZURE: ICD-10-CM

## 2018-06-06 DIAGNOSIS — E11.40 TYPE 2 DIABETES MELLITUS WITH DIABETIC NEUROPATHY, WITH LONG-TERM CURRENT USE OF INSULIN (HCC): Primary | ICD-10-CM

## 2018-06-06 DIAGNOSIS — F32.A DEPRESSION, UNSPECIFIED DEPRESSION TYPE: ICD-10-CM

## 2018-06-06 PROCEDURE — 3725F SCREEN DEPRESSION PERFORMED: CPT | Performed by: FAMILY MEDICINE

## 2018-06-06 PROCEDURE — 3008F BODY MASS INDEX DOCD: CPT | Performed by: FAMILY MEDICINE

## 2018-06-06 PROCEDURE — 99213 OFFICE O/P EST LOW 20 MIN: CPT | Performed by: FAMILY MEDICINE

## 2018-06-06 PROCEDURE — 3078F DIAST BP <80 MM HG: CPT | Performed by: FAMILY MEDICINE

## 2018-06-06 PROCEDURE — 3074F SYST BP LT 130 MM HG: CPT | Performed by: FAMILY MEDICINE

## 2018-06-06 RX ORDER — SYRINGE AND NEEDLE,INSULIN,1ML
SYRINGE, EMPTY DISPOSABLE MISCELLANEOUS
COMMUNITY
Start: 2017-12-15 | End: 2018-06-06 | Stop reason: SDUPTHER

## 2018-06-06 RX ORDER — GABAPENTIN 800 MG/1
800 TABLET ORAL 3 TIMES DAILY
Refills: 0 | Status: CANCELLED | OUTPATIENT
Start: 2018-06-06

## 2018-06-06 RX ORDER — CYCLOBENZAPRINE HCL 10 MG
10 TABLET ORAL 3 TIMES DAILY PRN
Qty: 15 TABLET | Refills: 0 | Status: SHIPPED | OUTPATIENT
Start: 2018-06-06 | End: 2018-06-06 | Stop reason: SDUPTHER

## 2018-06-06 RX ORDER — PAROXETINE HYDROCHLORIDE 40 MG/1
1 TABLET, FILM COATED ORAL DAILY
COMMUNITY
End: 2018-06-06 | Stop reason: ALTCHOICE

## 2018-06-06 RX ORDER — DIVALPROEX SODIUM 500 MG/1
500 TABLET, EXTENDED RELEASE ORAL 2 TIMES DAILY
Qty: 60 TABLET | Refills: 3 | Status: ON HOLD | OUTPATIENT
Start: 2018-06-06 | End: 2020-02-12 | Stop reason: SDUPTHER

## 2018-06-06 RX ORDER — DIVALPROEX SODIUM 500 MG/1
500 TABLET, EXTENDED RELEASE ORAL 2 TIMES DAILY
COMMUNITY
End: 2018-06-06 | Stop reason: SDUPTHER

## 2018-06-06 RX ORDER — CYCLOBENZAPRINE HCL 10 MG
10 TABLET ORAL 3 TIMES DAILY PRN
Qty: 15 TABLET | Refills: 0 | Status: SHIPPED | OUTPATIENT
Start: 2018-06-06 | End: 2018-07-16 | Stop reason: SDUPTHER

## 2018-06-06 RX ORDER — GABAPENTIN 800 MG/1
800 TABLET ORAL 3 TIMES DAILY
Qty: 90 TABLET | Refills: 3 | Status: SHIPPED | OUTPATIENT
Start: 2018-06-06 | End: 2018-06-29 | Stop reason: SDUPTHER

## 2018-06-06 RX ORDER — LISINOPRIL AND HYDROCHLOROTHIAZIDE 20; 12.5 MG/1; MG/1
1 TABLET ORAL 2 TIMES DAILY
Qty: 60 TABLET | Refills: 3 | Status: SHIPPED | OUTPATIENT
Start: 2018-06-06 | End: 2018-11-26 | Stop reason: SDUPTHER

## 2018-06-06 RX ORDER — ESCITALOPRAM OXALATE 20 MG/1
20 TABLET ORAL DAILY
Qty: 30 TABLET | Refills: 3 | Status: SHIPPED | OUTPATIENT
Start: 2018-06-06 | End: 2018-11-26 | Stop reason: SDUPTHER

## 2018-06-06 RX ORDER — SYRINGE AND NEEDLE,INSULIN,1ML
SYRINGE, EMPTY DISPOSABLE MISCELLANEOUS
Qty: 100 EACH | Refills: 3 | Status: SHIPPED | OUTPATIENT
Start: 2018-06-06

## 2018-06-06 NOTE — TELEPHONE ENCOUNTER
[facility]ES    PT NEEDS GABAPENTIN 800MG  ONE SYRINGE THREE TIME BURROUGHS  28gX1/2 1 ML(OLD SYSTEM)          QUICK CHECK PHARMACY  [place], [place]    PT HAS AN APT ON 6/12/2018  PT CAME TODAY BELIEVING WAS TODAY,  NEEDS SCRIPT ASAP  OUT OF MED  NEEDS NEEDLES     [phone] CELL

## 2018-06-06 NOTE — PROGRESS NOTES
Assessment/Plan:  41year old female with PMH T2DM, HTN, h/o seizure and depression comes in for med and diabetic supplies refill  Patient also due for HbA1c given requisition  D/W Dr Carey Salmon as needed     Diagnoses and all orders for this visit:    Type 2 diabetes mellitus with diabetic neuropathy, with long-term current use of insulin ([facility] [address] )  Comments:  HbA1c 7 5 in 1/2018; pt reports glucose ranging 110s-150s  Continue with current regimen, basaglar 30 u qd and ISS with humalog; repeat A1c;  life modifi  Orders:  -     gabapentin (NEURONTIN) 800 mg tablet; Take 1 tablet (800 mg total) by mouth 3 (three) times a day  -     insulin glargine (BASAGLAR KWIKPEN) 100 units/mL injection pen; Inject 30 Units under the skin daily  -     INS SYRINGE/NEEDLE 1CC/28G (B-D INS SYR MICROFINE 1CC/28G) 28G X 1/2" 1 ML MISC; by Does not apply route 3 (three) times a day with meals  -     glucose blood (ONE TOUCH ULTRA TEST) test strip; Test 3 times a day before meals  -     insulin lispro (HumaLOG) 100 units/mL injection; According to insulin sliding scale  -     HEMOGLOBIN A1C W/ EAG ESTIMATION; Future  -     HEMOGLOBIN A1C W/ EAG ESTIMATION    Essential hypertension  Comments:  /68, blood pressure controlled will continue with current reigmen; lisinopril-hctz  Orders:  -     lisinopril-hydrochlorothiazide (PRINZIDE,ZESTORETIC) 20-12 5 MG per tablet; Take 1 tablet by mouth 2 (two) times a day    Costochondritis  Comments:  recent 1 5 week rib pain is reproducible; recommended OTC NSAIDS  Last EKG in 5/2018 had NSR  Orders:  -     Discontinue: cyclobenzaprine (FLEXERIL) 10 mg tablet; Take 1 tablet (10 mg total) by mouth 3 (three) times a day as needed for muscle spasms for up to 15 doses  -     cyclobenzaprine (FLEXERIL) 10 mg tablet;  Take 1 tablet (10 mg total) by mouth 3 (three) times a day as needed for muscle spasms for up to 15 doses    History of seizure  Comments:  same depakote regimen for many years and been seizure free for 9 years; Rx renewed  Orders:  -     divalproex sodium (DEPAKOTE ER) 500 mg 24 hr tablet; Take 1 tablet (500 mg total) by mouth 2 (two) times a day    Depression, unspecified depression type  -     escitalopram (LEXAPRO) 20 mg tablet; Take 1 tablet (20 mg total) by mouth daily    Other orders  -     Discontinue: INS SYRINGE/NEEDLE 1CC/28G (B-D INS SYR MICROFINE 1CC/28G) 28G X 1/2" 1 ML MISC; by Does not apply route  -     Discontinue: Glucose Blood (ONETOUCH ULTRA BLUE VI); by In Vitro route 3 (three) times a day  -     Discontinue: PARoxetine (PAXIL) 40 MG tablet; Take 1 tablet by mouth daily  -     Discontinue: insulin glargine (BASAGLAR KWIKPEN) 100 units/mL injection pen; Inject 30 Units under the skin daily  -     Discontinue: insulin lispro (HumaLOG) 100 units/mL injection; Inject under the skin 3 (three) times a day before meals  -     Discontinue: divalproex sodium (DEPAKOTE ER) 500 mg 24 hr tablet; Take 500 mg by mouth 2 (two) times a day        Subjective:      Patient ID: Malachi Forman is a 41 y o  female  HPI  41year old obese female with PMH T2DM, HTN, h/o seizure and depression comes in to follow up for refills of mediations  Patient reports her finger stick glucose ranging 110-150s mostly  Patient last hemoglobin A1c was 7 5 (1/16/18)  Patient denies polyphagia, polydipsia or polyuria  In regards to seizure, has been on same depakote regimen for many years and been seizure free for 9 years  Patient does complain for bilateral pain under breast  Over 1 5 weeks which worsens with deep breath and lying in certain positions  Denies CP, SOB  Denies fevers, chills, nausea or vomiting      The following portions of the patient's history were reviewed and updated as appropriate: allergies, current medications, past family history, past medical history, past social history, past surgical history and problem list     Review of Systems   Constitutional: Negative for chills and fever    HENT: Negative for rhinorrhea and sore throat  Respiratory: Negative for shortness of breath and wheezing  Cardiovascular: Positive for chest pain  Negative for palpitations  Endocrine: Negative for polydipsia, polyphagia and polyuria  Genitourinary: Negative for dysuria  Psychiatric/Behavioral: Negative for sleep disturbance and suicidal ideas  The patient is not nervous/anxious  Objective:      /68 (BP Location: Left arm, Patient Position: Sitting)   Pulse 87   Temp (!) 96 3 °F (35 7 °C) (Tympanic)   Resp 18   Ht 5' 3" (1 6 m)   Wt 107 kg (235 lb)   LMP  (Within Weeks)   SpO2 98%   BMI 41 63 kg/m²          Physical Exam   Constitutional: She appears well-developed and well-nourished  obese   HENT:   Head: Normocephalic and atraumatic  Eyes: Conjunctivae are normal  No scleral icterus  Neck: Normal range of motion  Neck supple  Cardiovascular: Normal rate and regular rhythm  Pulmonary/Chest: Effort normal and breath sounds normal    Musculoskeletal: She exhibits no edema or tenderness  Lymphadenopathy:     She has no cervical adenopathy  Skin: Skin is warm and dry  Psychiatric: She has a normal mood and affect

## 2018-06-07 NOTE — TELEPHONE ENCOUNTER
Dr Camryn Johnston saw her yesterday and gabapentin was prescribed  F/u with Dr Camryn Johnston for any questions on med

## 2018-06-29 DIAGNOSIS — M94.0 COSTOCHONDRITIS: ICD-10-CM

## 2018-06-29 DIAGNOSIS — E11.40 TYPE 2 DIABETES MELLITUS WITH DIABETIC NEUROPATHY, WITH LONG-TERM CURRENT USE OF INSULIN (HCC): ICD-10-CM

## 2018-06-29 DIAGNOSIS — Z79.4 TYPE 2 DIABETES MELLITUS WITH DIABETIC NEUROPATHY, WITH LONG-TERM CURRENT USE OF INSULIN (HCC): ICD-10-CM

## 2018-06-29 PROBLEM — M54.6 ACUTE RIGHT-SIDED THORACIC BACK PAIN: Status: ACTIVE | Noted: 2017-06-25

## 2018-06-29 PROBLEM — M62.830 BACK MUSCLE SPASM: Status: ACTIVE | Noted: 2017-06-23

## 2018-06-29 RX ORDER — CYCLOBENZAPRINE HCL 10 MG
TABLET ORAL
Qty: 15 TABLET | Refills: 0 | Status: SHIPPED | OUTPATIENT
Start: 2018-06-29 | End: 2018-07-17 | Stop reason: SDUPTHER

## 2018-06-29 RX ORDER — GABAPENTIN 800 MG/1
800 TABLET ORAL 3 TIMES DAILY
Qty: 90 TABLET | Refills: 0 | Status: SHIPPED | OUTPATIENT
Start: 2018-06-29 | End: 2018-09-25 | Stop reason: SDUPTHER

## 2018-06-29 NOTE — TELEPHONE ENCOUNTER
DR Dionte Gomes - PT ALSO NEEDS REFILL FOR GABAPENTINE  SEND TO [facility] IN [facility]  SHE ALSO NEEDS FLEXERIL  SHE WANTS THESE TODAY

## 2018-07-02 NOTE — TELEPHONE ENCOUNTER
Script sent to [facility] , [address] [facility] states due to Gabapentin becoming a controlled substance and the fact that the patient has not been filling her meds at their pharmacy she is not comfortable filing this script , discussed with patient states she dose not use [facility] because of this and would like script called to [facility] , Pharmacy changed in her chart to [facility] and called into [facility] patient informed   MM, CMA  (Dr Edward Damico aware)

## 2018-07-06 ENCOUNTER — HOSPITAL ENCOUNTER (EMERGENCY)
Facility: HOSPITAL | Age: 43
Discharge: HOME | End: 2018-07-06
Attending: EMERGENCY MEDICINE | Admitting: EMERGENCY MEDICINE
Payer: COMMERCIAL

## 2018-07-06 VITALS
RESPIRATION RATE: 20 BRPM | HEART RATE: 95 BPM | OXYGEN SATURATION: 98 % | DIASTOLIC BLOOD PRESSURE: 81 MMHG | SYSTOLIC BLOOD PRESSURE: 138 MMHG | TEMPERATURE: 98.4 F

## 2018-07-06 DIAGNOSIS — L02.91 ABSCESS: Primary | ICD-10-CM

## 2018-07-06 PROCEDURE — 87070 CULTURE OTHR SPECIMN AEROBIC: CPT | Performed by: EMERGENCY MEDICINE

## 2018-07-06 PROCEDURE — 87147 CULTURE TYPE IMMUNOLOGIC: CPT | Performed by: EMERGENCY MEDICINE

## 2018-07-06 PROCEDURE — 87186 SC STD MICRODIL/AGAR DIL: CPT | Performed by: EMERGENCY MEDICINE

## 2018-07-06 PROCEDURE — 99283 EMERGENCY DEPT VISIT LOW MDM: CPT

## 2018-07-06 PROCEDURE — 87205 SMEAR GRAM STAIN: CPT | Performed by: EMERGENCY MEDICINE

## 2018-07-06 RX ORDER — CLINDAMYCIN HYDROCHLORIDE 150 MG/1
450 CAPSULE ORAL ONCE
Status: COMPLETED | OUTPATIENT
Start: 2018-07-06 | End: 2018-07-06

## 2018-07-06 RX ORDER — NAPROXEN 500 MG/1
500 TABLET ORAL 2 TIMES DAILY WITH MEALS
Qty: 14 TABLET | Refills: 0 | Status: SHIPPED | OUTPATIENT
Start: 2018-07-06 | End: 2018-10-04

## 2018-07-06 RX ORDER — NAPROXEN 500 MG/1
500 TABLET ORAL ONCE
Status: COMPLETED | OUTPATIENT
Start: 2018-07-06 | End: 2018-07-06

## 2018-07-06 RX ORDER — LIDOCAINE HYDROCHLORIDE AND EPINEPHRINE 10; 10 MG/ML; UG/ML
5 INJECTION, SOLUTION INFILTRATION; PERINEURAL ONCE
Status: COMPLETED | OUTPATIENT
Start: 2018-07-06 | End: 2018-07-06

## 2018-07-06 RX ORDER — CLINDAMYCIN HYDROCHLORIDE 150 MG/1
450 CAPSULE ORAL 3 TIMES DAILY
Qty: 63 CAPSULE | Refills: 0 | Status: SHIPPED | OUTPATIENT
Start: 2018-07-06 | End: 2018-07-13

## 2018-07-06 RX ADMIN — NAPROXEN 500 MG: 500 TABLET ORAL at 13:56

## 2018-07-06 RX ADMIN — LIDOCAINE HYDROCHLORIDE,EPINEPHRINE BITARTRATE 5 ML: 10; .01 INJECTION, SOLUTION INFILTRATION; PERINEURAL at 13:27

## 2018-07-06 RX ADMIN — CLINDAMYCIN HYDROCHLORIDE 450 MG: 150 CAPSULE ORAL at 13:26

## 2018-07-06 NOTE — DISCHARGE INSTRUCTIONS
Abscess   WHAT YOU NEED TO KNOW:   A warm compress may help your abscess drain  Your healthcare provider may make a cut in the abscess so it can drain  You may need surgery to remove an abscess that is on your hands or buttocks  DISCHARGE INSTRUCTIONS:   Return to the emergency department if:   · The area around your abscess becomes very painful, warm, or has red streaks  · You have a fever and chills  · Your heart is beating faster than usual      · You feel faint or confused  Contact your healthcare provider if:   · Your abscess gets bigger or does not get better  · Your abscess returns  · You have questions or concerns about your condition or care  Medicines: You may  need any of the following:  · Antibiotics  help treat a bacterial infection  · Acetaminophen  decreases pain and fever  It is available without a doctor's order  Ask how much to take and how often to take it  Follow directions  Acetaminophen can cause liver damage if not taken correctly  · NSAIDs , such as ibuprofen, help decrease swelling, pain, and fever  This medicine is available with or without a doctor's order  NSAIDs can cause stomach bleeding or kidney problems in certain people  If you take blood thinner medicine, always ask your healthcare provider if NSAIDs are safe for you  Always read the medicine label and follow directions  · Take your medicine as directed  Contact your healthcare provider if you think your medicine is not helping or if you have side effects  Tell him or her if you are allergic to any medicine  Keep a list of the medicines, vitamins, and herbs you take  Include the amounts, and when and why you take them  Bring the list or the pill bottles to follow-up visits  Carry your medicine list with you in case of an emergency  Self-care:   · Apply a warm compress to your abscess  This will help it open and drain  Wet a washcloth in warm, but not hot, water  Apply the compress for 10 minutes  Repeat this 4 times each day  Do not  press on an abscess or try to open it with a needle  You may push the bacteria deeper or into your blood  · Do not share your clothes, towels, or sheets with anyone  This can spread the infection to others  · Wash your hands often  This can help prevent the spread of germs  Use soap and water or an alcohol-based hand rub  Care for your wound after it is drained:   · Care for your wound as directed  If your healthcare provider says it is okay, carefully remove the bandage and gauze packing  You may need to soak the gauze to get it out of your wound  Clean your wound and the area around it as directed  Dry the area and put on new, clean bandages  Change your bandages when they get wet or dirty  · Ask your healthcare provider how to change the gauze in your wound  Keep track of how many pieces of gauze are placed inside the wound  Do not put too much packing in the wound  Do not pack the gauze too tightly in your wound  Follow up with your healthcare provider in 1 to 3 days: You may need to have your packing removed or your bandage changed  Write down your questions so you remember to ask them during your visits  © 2017 [address]  Information is for End User's use only and may not be sold, redistributed or otherwise used for commercial purposes  All illustrations and images included in CareNotes® are the copyrighted property of [employer]  or Senthil Franco  The above information is an  only  It is not intended as medical advice for individual conditions or treatments  Talk to your doctor, nurse or pharmacist before following any medical regimen to see if it is safe and effective for you
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
## 2018-07-09 DIAGNOSIS — Z79.4 TYPE 2 DIABETES MELLITUS WITH DIABETIC NEUROPATHY, WITH LONG-TERM CURRENT USE OF INSULIN (HCC): ICD-10-CM

## 2018-07-09 DIAGNOSIS — M94.0 COSTOCHONDRITIS: ICD-10-CM

## 2018-07-09 DIAGNOSIS — E11.40 TYPE 2 DIABETES MELLITUS WITH DIABETIC NEUROPATHY, WITH LONG-TERM CURRENT USE OF INSULIN (HCC): ICD-10-CM

## 2018-07-10 LAB
BACTERIA WND AEROBE CULT: ABNORMAL
BACTERIA WND AEROBE CULT: ABNORMAL
GRAM STN SPEC: ABNORMAL

## 2018-07-17 DIAGNOSIS — M94.0 COSTOCHONDRITIS: ICD-10-CM

## 2018-07-17 DIAGNOSIS — E11.40 TYPE 2 DIABETES MELLITUS WITH DIABETIC NEUROPATHY, WITH LONG-TERM CURRENT USE OF INSULIN (HCC): ICD-10-CM

## 2018-07-17 DIAGNOSIS — Z79.4 TYPE 2 DIABETES MELLITUS WITH DIABETIC NEUROPATHY, WITH LONG-TERM CURRENT USE OF INSULIN (HCC): ICD-10-CM

## 2018-07-20 DIAGNOSIS — Z79.4 TYPE 2 DIABETES MELLITUS WITH DIABETIC NEUROPATHY, WITH LONG-TERM CURRENT USE OF INSULIN (HCC): ICD-10-CM

## 2018-07-20 DIAGNOSIS — E11.40 TYPE 2 DIABETES MELLITUS WITH DIABETIC NEUROPATHY, WITH LONG-TERM CURRENT USE OF INSULIN (HCC): ICD-10-CM

## 2018-07-20 RX ORDER — CYCLOBENZAPRINE HCL 10 MG
10 TABLET ORAL 3 TIMES DAILY PRN
Qty: 30 TABLET | Refills: 0 | Status: SHIPPED | OUTPATIENT
Start: 2018-07-20 | End: 2018-08-31 | Stop reason: SDUPTHER

## 2018-07-20 RX ORDER — CYCLOBENZAPRINE HCL 10 MG
10 TABLET ORAL 3 TIMES DAILY PRN
Qty: 15 TABLET | Refills: 0 | Status: SHIPPED | OUTPATIENT
Start: 2018-07-20 | End: 2018-08-31 | Stop reason: SDUPTHER

## 2018-07-20 RX ORDER — INSULIN GLARGINE 100 [IU]/ML
INJECTION, SOLUTION SUBCUTANEOUS
Qty: 15 ML | Refills: 0 | Status: CANCELLED | OUTPATIENT
Start: 2018-07-20

## 2018-08-27 DIAGNOSIS — Z79.4 TYPE 2 DIABETES MELLITUS WITH DIABETIC NEUROPATHY, WITH LONG-TERM CURRENT USE OF INSULIN (HCC): ICD-10-CM

## 2018-08-27 DIAGNOSIS — E11.40 TYPE 2 DIABETES MELLITUS WITH DIABETIC NEUROPATHY, WITH LONG-TERM CURRENT USE OF INSULIN (HCC): ICD-10-CM

## 2018-08-28 DIAGNOSIS — Z79.4 TYPE 2 DIABETES MELLITUS WITH DIABETIC NEUROPATHY, WITH LONG-TERM CURRENT USE OF INSULIN (HCC): ICD-10-CM

## 2018-08-28 DIAGNOSIS — E11.40 TYPE 2 DIABETES MELLITUS WITH DIABETIC NEUROPATHY, WITH LONG-TERM CURRENT USE OF INSULIN (HCC): ICD-10-CM

## 2018-08-28 RX ORDER — INSULIN GLARGINE 100 [IU]/ML
INJECTION, SOLUTION SUBCUTANEOUS
Qty: 15 ML | Refills: 0 | Status: CANCELLED | OUTPATIENT
Start: 2018-08-28

## 2018-08-29 DIAGNOSIS — Z79.4 TYPE 2 DIABETES MELLITUS WITH DIABETIC NEUROPATHY, WITH LONG-TERM CURRENT USE OF INSULIN (HCC): ICD-10-CM

## 2018-08-29 DIAGNOSIS — E11.40 TYPE 2 DIABETES MELLITUS WITH DIABETIC NEUROPATHY, WITH LONG-TERM CURRENT USE OF INSULIN (HCC): ICD-10-CM

## 2018-08-31 DIAGNOSIS — Z79.4 TYPE 2 DIABETES MELLITUS WITH DIABETIC NEUROPATHY, WITH LONG-TERM CURRENT USE OF INSULIN (HCC): ICD-10-CM

## 2018-08-31 DIAGNOSIS — M94.0 COSTOCHONDRITIS: ICD-10-CM

## 2018-08-31 DIAGNOSIS — E11.40 TYPE 2 DIABETES MELLITUS WITH DIABETIC NEUROPATHY, WITH LONG-TERM CURRENT USE OF INSULIN (HCC): ICD-10-CM

## 2018-08-31 RX ORDER — CYCLOBENZAPRINE HCL 10 MG
10 TABLET ORAL 3 TIMES DAILY PRN
Qty: 30 TABLET | Refills: 0 | Status: SHIPPED | OUTPATIENT
Start: 2018-08-31 | End: 2018-09-25 | Stop reason: SDUPTHER

## 2018-09-18 DIAGNOSIS — M94.0 COSTOCHONDRITIS: ICD-10-CM

## 2018-09-18 RX ORDER — CYCLOBENZAPRINE HCL 10 MG
TABLET ORAL
Qty: 30 TABLET | Refills: 0 | OUTPATIENT
Start: 2018-09-18

## 2018-09-24 ENCOUNTER — HOSPITAL ENCOUNTER (EMERGENCY)
Facility: HOSPITAL | Age: 43
Discharge: HOME | End: 2018-09-24
Attending: EMERGENCY MEDICINE | Admitting: EMERGENCY MEDICINE
Payer: COMMERCIAL

## 2018-09-24 VITALS
BODY MASS INDEX: 40.74 KG/M2 | OXYGEN SATURATION: 100 % | WEIGHT: 230 LBS | TEMPERATURE: 97 F | RESPIRATION RATE: 20 BRPM | HEART RATE: 83 BPM | SYSTOLIC BLOOD PRESSURE: 144 MMHG | DIASTOLIC BLOOD PRESSURE: 74 MMHG

## 2018-09-24 DIAGNOSIS — L03.90 CELLULITIS: Primary | ICD-10-CM

## 2018-09-24 PROCEDURE — 99281 EMR DPT VST MAYX REQ PHY/QHP: CPT

## 2018-09-24 PROCEDURE — 96372 THER/PROPH/DIAG INJ SC/IM: CPT

## 2018-09-24 RX ORDER — CLINDAMYCIN HYDROCHLORIDE 300 MG/1
300 CAPSULE ORAL EVERY 6 HOURS SCHEDULED
Qty: 28 CAPSULE | Refills: 0 | Status: SHIPPED | OUTPATIENT
Start: 2018-09-24 | End: 2018-10-01

## 2018-09-24 RX ORDER — KETOROLAC TROMETHAMINE 30 MG/ML
60 INJECTION, SOLUTION INTRAMUSCULAR; INTRAVENOUS ONCE
Status: COMPLETED | OUTPATIENT
Start: 2018-09-24 | End: 2018-09-24

## 2018-09-24 RX ORDER — MUPIROCIN CALCIUM 20 MG/G
CREAM TOPICAL 3 TIMES DAILY
Qty: 15 G | Refills: 0 | Status: SHIPPED | OUTPATIENT
Start: 2018-09-24 | End: 2018-10-04

## 2018-09-24 RX ADMIN — KETOROLAC TROMETHAMINE 60 MG: 30 INJECTION, SOLUTION INTRAMUSCULAR at 16:36

## 2018-09-24 NOTE — DISCHARGE INSTRUCTIONS
Cellulitis, Ambulatory Care   GENERAL INFORMATION:   Cellulitis  is a skin infection caused by bacteria  Common symptoms include the following:   · Fever    · A red, warm, swollen area on your skin    · Pain when the area is touched    · Bumps or blisters (abscess) that may drain pus    · Bumpy, raised skin that feels like an orange peel  Seek immediate care for the following symptoms:   · An increase in pain, redness, warmth, and size    · Red streaks coming from the infected area    · A thin, gray-brown discharge coming from your infected skin area    · A crackling under your skin when you touch it    · Purple dots or bumps on your skin, or bleeding under your skin    · New swelling and pain in your legs    · Sudden trouble breathing or chest pain  Treatment for cellulitis  may include medicines to treat the bacterial infection or decrease pain  The infection may need to be cleaned out  Damaged, dead, or infected tissue may need to be cut away to help your wound heal   Manage your symptoms:   · Elevate your wound above the level of your heart  as often as you can  This will help decrease swelling and pain  Prop your wound on pillows or blankets to keep it elevated comfortably  · Clean your wound as directed  You may need to wash the wound with soap and water  Look for signs of infection  · Wear pressure stockings as directed  The stockings are tight and put pressure on your legs  This improves blood flow and decreases swelling  Prevent cellulitis:   · Wash your hands often  Use soap and water  Wash your hands after you use the bathroom, change a child's diapers, or sneeze  Wash your hands before you prepare or eat food  Use lotion to prevent dry, cracked skin  · Do not share personal items, such as towels, clothing, and razors  · Clean exercise equipment  with germ-killing  before and after you use it    Follow up with your healthcare provider as directed:  Write down your questions so you remember to ask them during your visits  CARE AGREEMENT:   You have the right to help plan your care  Learn about your health condition and how it may be treated  Discuss treatment options with your caregivers to decide what care you want to receive  You always have the right to refuse treatment  The above information is an  only  It is not intended as medical advice for individual conditions or treatments  Talk to your doctor, nurse or pharmacist before following any medical regimen to see if it is safe and effective for you  © 2014 [address] is for End User's use only and may not be sold, redistributed or otherwise used for commercial purposes  All illustrations and images included in CareNotes® are the copyrighted property of [employer]  or Senthil Franco

## 2018-09-24 NOTE — TELEPHONE ENCOUNTER
DR Krishan Justin - PT IS CALLING AGAIN FOR HER REFILL  SHE IS NOT VERY HAPPY  SHE WANTS THIS TO BE FILLED TODAY  SHE SAID SHE DOESN'T UNDERSTAND WHY SHE HAS TO CALL ALL THE TIME FOR THIS  WHY CAN'T SHE HAVE A MONTH'S SUPPLY  SHE WOULD LIKE A CALL WHEN THIS IS DONE

## 2018-09-24 NOTE — ED PROVIDER NOTES
History  Chief Complaint   Patient presents with    Insect Bite     insect bite to nose on saturday  pt's nose red and swollen  A 51-year-old female with history of diabetes presents with insect bite x3 days  The bug bite is located on her right exterior nose  She does not know what bit her  She states that it is very hard and red and warm  It occasionally will leak pus when she scratches it  There is no facial swelling, eye involvement, fever, chills, runny nose, sore throat  Prior to Admission Medications   Prescriptions Last Dose Informant Patient Reported? Taking?    INS SYRINGE/NEEDLE 1CC/28G (B-D INS SYR MICROFINE 1CC/28G) 28G X 1/2" 1 ML MISC   No No   Sig: by Does not apply route 3 (three) times a day with meals   ONE TOUCH ULTRA TEST test strip   No No   Sig: Test 3 times a day BEFORE MEALS   cyclobenzaprine (FLEXERIL) 10 mg tablet   No No   Sig: Take 1 tablet (10 mg total) by mouth 3 (three) times a day as needed for muscle spasms for up to 15 doses   divalproex sodium (DEPAKOTE ER) 500 mg 24 hr tablet  Self No No   Sig: Take 1 tablet by mouth daily at bedtime for 14 days   divalproex sodium (DEPAKOTE ER) 500 mg 24 hr tablet   No No   Sig: Take 1 tablet (500 mg total) by mouth 2 (two) times a day   escitalopram (LEXAPRO) 20 mg tablet   No No   Sig: Take 1 tablet (20 mg total) by mouth daily   gabapentin (NEURONTIN) 800 mg tablet   No No   Sig: Take 1 tablet (800 mg total) by mouth 3 (three) times a day   glucose blood (ONE TOUCH ULTRA TEST) test strip   No No   Sig: Test 3 times a day before meals   insulin glargine (BASAGLAR KWIKPEN) 100 units/mL injection pen   No No   Sig: Inject 30 Units under the skin daily   insulin glargine (BASAGLAR KWIKPEN) 100 units/mL injection pen   No No   Sig: Inject 30 Units under the skin daily   insulin lispro (HumaLOG) 100 units/mL injection   No No   Sig: According to insulin sliding scale   lisinopril-hydrochlorothiazide (PRINZIDE,ZESTORETIC) 20-12 5 MG per tablet   No No   Sig: Take 1 tablet by mouth 2 (two) times a day   naproxen (NAPROSYN) 500 mg tablet   No No   Sig: Take 1 tablet (500 mg total) by mouth 2 (two) times a day with meals for 7 days      Facility-Administered Medications: None       Past Medical History:   Diagnosis Date    Depression     Diabetes mellitus ([address] )     Hypertension     Neuropathy        Past Surgical History:   Procedure Laterality Date    INCISION AND DRAINAGE OF WOUND Right 7/1/2017    Procedure: INCISION AND DRAINAGE (I&D) EXTREMITY THIGH;  Surgeon: Will Arndt MD;  Location: [facility];  Service: General       Family History   Problem Relation Age of Onset    Hypertension Mother     Diabetes Father     Hypertension Father     Diabetes Family     Hypertension Family      I have reviewed and agree with the history as documented  Social History   Substance Use Topics    Smoking status: Never Smoker    Smokeless tobacco: Never Used    Alcohol use No        Review of Systems   Constitutional: Negative for chills and fever  HENT: Negative for sneezing and sore throat  Eyes: Negative for photophobia, pain, redness and visual disturbance  Respiratory: Negative for cough and shortness of breath  Cardiovascular: Negative for chest pain, palpitations and leg swelling  Gastrointestinal: Negative for abdominal pain, constipation, diarrhea, nausea and vomiting  Musculoskeletal: Negative for arthralgias, back pain and gait problem  Skin: Positive for wound  Negative for color change, pallor and rash  Neurological: Negative for dizziness, weakness, light-headedness, numbness and headaches  Psychiatric/Behavioral: Negative for agitation  All other systems reviewed and are negative  Physical Exam  Physical Exam   Constitutional: She is oriented to person, place, and time  She appears well-developed and well-nourished  No distress  HENT:   Head: Normocephalic and atraumatic     Nose: Nose lacerations present  No sinus tenderness  Eyes: EOM are normal    Neck: Normal range of motion  Cardiovascular: Normal rate, regular rhythm, normal heart sounds and intact distal pulses  Exam reveals no gallop and no friction rub  No murmur heard  Pulmonary/Chest: Effort normal and breath sounds normal  No respiratory distress  She has no wheezes  She has no rales  Sp02 is 100% indicating adequate oxygenation on room air   Neurological: She is alert and oriented to person, place, and time  Skin: Skin is warm and dry  Capillary refill takes less than 2 seconds  No rash noted  She is not diaphoretic  There is erythema  No pallor  Psychiatric: She has a normal mood and affect  Her behavior is normal  Judgment and thought content normal    Nursing note and vitals reviewed  Vital Signs  ED Triage Vitals [09/24/18 1518]   Temperature Pulse Respirations Blood Pressure SpO2   (!) 97 °F (36 1 °C) 83 20 144/74 100 %      Temp Source Heart Rate Source Patient Position - Orthostatic VS BP Location FiO2 (%)   Temporal Monitor Sitting Right arm --      Pain Score       9           Vitals:    09/24/18 1518   BP: 144/74   Pulse: 83   Patient Position - Orthostatic VS: Sitting       Visual Acuity      ED Medications  Medications   ketorolac (TORADOL) injection 60 mg (60 mg Intramuscular Given 9/24/18 1636)       Diagnostic Studies  Results Reviewed     None                 No orders to display              Procedures  Procedures       Phone Contacts  ED Phone Contact    ED Course                               MDM  Number of Diagnoses or Management Options  Cellulitis:   Diagnosis management comments: Likely cellulitis with small abscess, since very indurated at this time will not drain   Patient afebrile  Recommend follow up in 2 days with primary care physician or ED to see if lump will be able to drain  Recommend topical and oral abx as well as warm compresses, can use ibuprofen as needed for pain  Gave patient proper education regarding diagnosis  Answered all questions  Return to ED for any worsening of symptoms otherwise follow up with primary care physician for re-evaluation  Discussed plan with patient who verbalized understanding and agreed to plan  Amount and/or Complexity of Data Reviewed  Discuss the patient with other providers: yes (Discussed case with Dr Bety Santana who also saw patient)      CritCare Time    Disposition  Final diagnoses:   Cellulitis     Time reflects when diagnosis was documented in both MDM as applicable and the Disposition within this note     Time User Action Codes Description Comment    9/24/2018  4:26 PM Jazmine Thompson Add [N16 50] Cellulitis       ED Disposition     ED Disposition Condition Comment    Discharge  [address] discharge to home/self care      Condition at discharge: Good        Follow-up Information     Follow up With Specialties Details Why [address] Emergency Department Emergency Medicine Go in 2 days For wound re-check [address] [facility] ED, [facility], [facility]    primary care physician  Schedule an appointment as soon as possible for a visit in 2 days For wound re-check, As needed            Discharge Medication List as of 9/24/2018  4:29 PM      START taking these medications    Details   clindamycin (CLEOCIN) 300 MG capsule Take 1 capsule (300 mg total) by mouth every 6 (six) hours for 7 days, Starting Mon 9/24/2018, Until Mon 10/1/2018, Print      mupirocin (BACTROBAN) 2 % cream Apply topically 3 (three) times a day, Starting Mon 9/24/2018, Print         CONTINUE these medications which have NOT CHANGED    Details   cyclobenzaprine (FLEXERIL) 10 mg tablet Take 1 tablet (10 mg total) by mouth 3 (three) times a day as needed for muscle spasms for up to 15 doses, Starting Fri 8/31/2018, Normal      divalproex sodium (DEPAKOTE ER) 500 mg 24 hr tablet Take 1 tablet (500 mg total) by mouth 2 (two) times a day, Starting Wed 6/6/2018, Normal      escitalopram (LEXAPRO) 20 mg tablet Take 1 tablet (20 mg total) by mouth daily, Starting Wed 6/6/2018, Normal      gabapentin (NEURONTIN) 800 mg tablet Take 1 tablet (800 mg total) by mouth 3 (three) times a day, Starting Fri 6/29/2018, Normal      !! glucose blood (ONE TOUCH ULTRA TEST) test strip Test 3 times a day before meals, Normal      INS SYRINGE/NEEDLE 1CC/28G (B-D INS SYR MICROFINE 1CC/28G) 28G X 1/2" 1 ML MISC by Does not apply route 3 (three) times a day with meals, Starting Wed 6/6/2018, Normal      !! insulin glargine (BASAGLAR KWIKPEN) 100 units/mL injection pen Inject 30 Units under the skin daily, Starting Mon 9/24/2018, Normal      !! insulin glargine (BASAGLAR KWIKPEN) 100 units/mL injection pen Inject 30 Units under the skin daily, Starting Fri 8/31/2018, Normal      insulin lispro (HumaLOG) 100 units/mL injection According to insulin sliding scale, Normal      lisinopril-hydrochlorothiazide (PRINZIDE,ZESTORETIC) 20-12 5 MG per tablet Take 1 tablet by mouth 2 (two) times a day, Starting Wed 6/6/2018, Normal      naproxen (NAPROSYN) 500 mg tablet Take 1 tablet (500 mg total) by mouth 2 (two) times a day with meals for 7 days, Starting Fri 7/6/2018, Until Fri 7/13/2018, Print      !! ONE TOUCH ULTRA TEST test strip Test 3 times a day BEFORE MEALS, Normal       !! - Potential duplicate medications found  Please discuss with provider  No discharge procedures on file      ED Provider  Electronically Signed by           Sherryle Macadam, PA-C  09/24/18 5329

## 2018-09-25 ENCOUNTER — TELEPHONE (OUTPATIENT)
Dept: FAMILY MEDICINE CLINIC | Facility: CLINIC | Age: 43
End: 2018-09-25

## 2018-09-25 DIAGNOSIS — Z79.4 TYPE 2 DIABETES MELLITUS WITH DIABETIC NEUROPATHY, WITH LONG-TERM CURRENT USE OF INSULIN (HCC): ICD-10-CM

## 2018-09-25 DIAGNOSIS — M94.0 COSTOCHONDRITIS: ICD-10-CM

## 2018-09-25 DIAGNOSIS — E11.40 TYPE 2 DIABETES MELLITUS WITH DIABETIC NEUROPATHY, WITH LONG-TERM CURRENT USE OF INSULIN (HCC): ICD-10-CM

## 2018-09-25 RX ORDER — CYCLOBENZAPRINE HCL 10 MG
10 TABLET ORAL 3 TIMES DAILY PRN
Qty: 30 TABLET | Refills: 0 | Status: SHIPPED | OUTPATIENT
Start: 2018-09-25 | End: 2018-10-18 | Stop reason: SDUPTHER

## 2018-09-25 RX ORDER — GABAPENTIN 800 MG/1
800 TABLET ORAL 3 TIMES DAILY
Refills: 0 | Status: CANCELLED | OUTPATIENT
Start: 2018-09-25

## 2018-09-25 RX ORDER — CYCLOBENZAPRINE HCL 10 MG
10 TABLET ORAL 3 TIMES DAILY PRN
Status: CANCELLED | OUTPATIENT
Start: 2018-09-25

## 2018-09-25 RX ORDER — GABAPENTIN 800 MG/1
800 TABLET ORAL 3 TIMES DAILY
Qty: 90 TABLET | Refills: 2 | Status: SHIPPED | OUTPATIENT
Start: 2018-09-25 | End: 2018-11-26 | Stop reason: SDUPTHER

## 2018-09-25 NOTE — TELEPHONE ENCOUNTER
Pt said she has been calling for flexeril refills and said that no one has gotten back to her  Would like to know if she can have monthly supplies/refills of meds so that she doesn't have to call so often  She said "she even ran out of insulin in the past waiting for us" She was getting upset/tearful  I told her that you or I will call her back today

## 2018-09-25 NOTE — TELEPHONE ENCOUNTER
Pt called about flexeril  She said that you told her that she was going to be getting 60 tabs, so she just wanted me to let you know that it only went through for 30 tablets and that she is going to pick it up because she needs to take it but that she will most likely need a refill in 10 days and doesn't want you to think she got 60  [facility] does show quantity #30

## 2018-09-25 NOTE — TELEPHONE ENCOUNTER
DR Deedee Robledo -  SEE PREVIOUS MESSAGES  PT IS CALLING AGAIN FOR THIS  SHE WANTS THIS TO BE DONE RIGHT AWAY

## 2018-10-04 ENCOUNTER — APPOINTMENT (EMERGENCY)
Dept: RADIOLOGY | Facility: HOSPITAL | Age: 43
End: 2018-10-04
Payer: COMMERCIAL

## 2018-10-04 ENCOUNTER — HOSPITAL ENCOUNTER (EMERGENCY)
Facility: HOSPITAL | Age: 43
Discharge: HOME | End: 2018-10-04
Attending: EMERGENCY MEDICINE
Payer: COMMERCIAL

## 2018-10-04 VITALS
HEIGHT: 63 IN | RESPIRATION RATE: 20 BRPM | DIASTOLIC BLOOD PRESSURE: 76 MMHG | WEIGHT: 236 LBS | OXYGEN SATURATION: 96 % | BODY MASS INDEX: 41.82 KG/M2 | SYSTOLIC BLOOD PRESSURE: 162 MMHG | TEMPERATURE: 99 F | HEART RATE: 108 BPM

## 2018-10-04 DIAGNOSIS — R73.9 HYPERGLYCEMIA: Primary | ICD-10-CM

## 2018-10-04 LAB
ALBUMIN SERPL BCP-MCNC: 3.2 G/DL (ref 3.5–5)
ALP SERPL-CCNC: 57 U/L (ref 46–116)
ALT SERPL W P-5'-P-CCNC: 46 U/L (ref 12–78)
ANION GAP SERPL CALCULATED.3IONS-SCNC: 10 MMOL/L (ref 4–13)
APTT PPP: 26 SECONDS (ref 24–33)
AST SERPL W P-5'-P-CCNC: 51 U/L (ref 5–45)
BACTERIA UR QL AUTO: ABNORMAL /HPF
BASOPHILS # BLD AUTO: 0.02 THOUSANDS/ΜL (ref 0–0.1)
BASOPHILS NFR BLD AUTO: 0 % (ref 0–1)
BILIRUB SERPL-MCNC: 0.4 MG/DL (ref 0.2–1)
BILIRUB UR QL STRIP: NEGATIVE
BUN SERPL-MCNC: 19 MG/DL (ref 5–25)
CALCIUM SERPL-MCNC: 8.6 MG/DL (ref 8.3–10.1)
CHLORIDE SERPL-SCNC: 95 MMOL/L (ref 100–108)
CLARITY UR: ABNORMAL
CO2 SERPL-SCNC: 28 MMOL/L (ref 21–32)
COLOR UR: YELLOW
CREAT SERPL-MCNC: 1.08 MG/DL (ref 0.6–1.3)
EOSINOPHIL # BLD AUTO: 0.13 THOUSAND/ΜL (ref 0–0.61)
EOSINOPHIL NFR BLD AUTO: 2 % (ref 0–6)
ERYTHROCYTE [DISTWIDTH] IN BLOOD BY AUTOMATED COUNT: 13.9 % (ref 11.6–15.1)
EXT PREG TEST URINE: NEGATIVE
GFR SERPL CREATININE-BSD FRML MDRD: 64 ML/MIN/1.73SQ M
GLUCOSE SERPL-MCNC: 242 MG/DL (ref 65–140)
GLUCOSE SERPL-MCNC: 324 MG/DL (ref 65–140)
GLUCOSE SERPL-MCNC: 332 MG/DL (ref 65–140)
GLUCOSE UR STRIP-MCNC: ABNORMAL MG/DL
HCT VFR BLD AUTO: 35.3 % (ref 34.8–46.1)
HGB BLD-MCNC: 11.9 G/DL (ref 11.5–15.4)
HGB UR QL STRIP.AUTO: ABNORMAL
IMM GRANULOCYTES # BLD AUTO: 0.03 THOUSAND/UL (ref 0–0.2)
IMM GRANULOCYTES NFR BLD AUTO: 0 % (ref 0–2)
INR PPP: 0.98 (ref 0.86–1.16)
KETONES UR STRIP-MCNC: NEGATIVE MG/DL
LEUKOCYTE ESTERASE UR QL STRIP: ABNORMAL
LYMPHOCYTES # BLD AUTO: 2.02 THOUSANDS/ΜL (ref 0.6–4.47)
LYMPHOCYTES NFR BLD AUTO: 28 % (ref 14–44)
MCH RBC QN AUTO: 30.4 PG (ref 26.8–34.3)
MCHC RBC AUTO-ENTMCNC: 33.7 G/DL (ref 31.4–37.4)
MCV RBC AUTO: 90 FL (ref 82–98)
MONOCYTES # BLD AUTO: 0.65 THOUSAND/ΜL (ref 0.17–1.22)
MONOCYTES NFR BLD AUTO: 9 % (ref 4–12)
NEUTROPHILS # BLD AUTO: 4.4 THOUSANDS/ΜL (ref 1.85–7.62)
NEUTS SEG NFR BLD AUTO: 61 % (ref 43–75)
NITRITE UR QL STRIP: NEGATIVE
NON-SQ EPI CELLS URNS QL MICRO: ABNORMAL /HPF
NRBC BLD AUTO-RTO: 0 /100 WBCS
PH UR STRIP.AUTO: 5.5 [PH] (ref 5–9)
PLATELET # BLD AUTO: 239 THOUSANDS/UL (ref 149–390)
PMV BLD AUTO: 9.1 FL (ref 8.9–12.7)
POTASSIUM SERPL-SCNC: 5 MMOL/L (ref 3.5–5.3)
PROT SERPL-MCNC: 7.8 G/DL (ref 6.4–8.2)
PROT UR STRIP-MCNC: NEGATIVE MG/DL
PROTHROMBIN TIME: 10.3 SECONDS (ref 9.4–11.7)
RBC # BLD AUTO: 3.92 MILLION/UL (ref 3.81–5.12)
RBC #/AREA URNS AUTO: ABNORMAL /HPF
SODIUM SERPL-SCNC: 133 MMOL/L (ref 136–145)
SP GR UR STRIP.AUTO: 1.02 (ref 1–1.03)
TROPONIN I SERPL-MCNC: <0.02 NG/ML
UROBILINOGEN UR QL STRIP.AUTO: 0.2 E.U./DL
WBC # BLD AUTO: 7.25 THOUSAND/UL (ref 4.31–10.16)
WBC #/AREA URNS AUTO: ABNORMAL /HPF

## 2018-10-04 PROCEDURE — 80053 COMPREHEN METABOLIC PANEL: CPT | Performed by: EMERGENCY MEDICINE

## 2018-10-04 PROCEDURE — 84484 ASSAY OF TROPONIN QUANT: CPT | Performed by: EMERGENCY MEDICINE

## 2018-10-04 PROCEDURE — 96360 HYDRATION IV INFUSION INIT: CPT

## 2018-10-04 PROCEDURE — 82948 REAGENT STRIP/BLOOD GLUCOSE: CPT

## 2018-10-04 PROCEDURE — 81025 URINE PREGNANCY TEST: CPT | Performed by: EMERGENCY MEDICINE

## 2018-10-04 PROCEDURE — 85730 THROMBOPLASTIN TIME PARTIAL: CPT | Performed by: EMERGENCY MEDICINE

## 2018-10-04 PROCEDURE — 36415 COLL VENOUS BLD VENIPUNCTURE: CPT | Performed by: EMERGENCY MEDICINE

## 2018-10-04 PROCEDURE — 81001 URINALYSIS AUTO W/SCOPE: CPT | Performed by: EMERGENCY MEDICINE

## 2018-10-04 PROCEDURE — 85610 PROTHROMBIN TIME: CPT | Performed by: EMERGENCY MEDICINE

## 2018-10-04 PROCEDURE — 71045 X-RAY EXAM CHEST 1 VIEW: CPT

## 2018-10-04 PROCEDURE — 93005 ELECTROCARDIOGRAM TRACING: CPT

## 2018-10-04 PROCEDURE — 85025 COMPLETE CBC W/AUTO DIFF WBC: CPT | Performed by: EMERGENCY MEDICINE

## 2018-10-04 PROCEDURE — 99285 EMERGENCY DEPT VISIT HI MDM: CPT

## 2018-10-04 RX ADMIN — SODIUM CHLORIDE 1000 ML: 0.9 INJECTION, SOLUTION INTRAVENOUS at 08:51

## 2018-10-04 NOTE — ED NOTES
Patient states she is feeling much better - symptoms resolved and would like to be discharged  Dr Hardeep Arias made aware       Lauren Duarte, CHICA  10/04/18 0377

## 2018-10-04 NOTE — ED PROVIDER NOTES
History  Chief Complaint   Patient presents with    Diplopia     patient states she was on the bus this morning when she began with lightheadedness, double vision, nausea, and sweating  Patient was well when she awoke this morning however she was late to catch a bus so she left the house without eating or taking her normal morning meds  She only had some flavored water prior to leaving  While she was on the bus she noticed lightheadedness diaphoresis nausea and double vision  Patient denies any chest or abdominal pain  She has no urinary changes or dysuria and does not think she is pregnant  Fingerstick on arrival was elevated  Patient states she took her long-acting insulin last night and only had cover with 1 more unit            Prior to Admission Medications   Prescriptions Last Dose Informant Patient Reported? Taking?    INS SYRINGE/NEEDLE 1CC/28G (B-D INS SYR MICROFINE 1CC/28G) 28G X 1/2" 1 ML MISC   No No   Sig: by Does not apply route 3 (three) times a day with meals   ONE TOUCH ULTRA TEST test strip   No No   Sig: Test 3 times a day BEFORE MEALS   cyclobenzaprine (FLEXERIL) 10 mg tablet   No Yes   Sig: Take 1 tablet (10 mg total) by mouth 3 (three) times a day as needed for muscle spasms for up to 30 doses   divalproex sodium (DEPAKOTE ER) 500 mg 24 hr tablet 10/3/2018 at Unknown time  No Yes   Sig: Take 1 tablet (500 mg total) by mouth 2 (two) times a day   escitalopram (LEXAPRO) 20 mg tablet 10/3/2018 at Unknown time  No Yes   Sig: Take 1 tablet (20 mg total) by mouth daily   gabapentin (NEURONTIN) 800 mg tablet 10/3/2018 at Unknown time  No Yes   Sig: Take 1 tablet (800 mg total) by mouth 3 (three) times a day   glucose blood (ONE TOUCH ULTRA TEST) test strip   No No   Sig: Test 3 times a day before meals   insulin glargine (BASAGLAR KWIKPEN) 100 units/mL injection pen 10/3/2018 at Unknown time  No Yes   Sig: Inject 30 Units under the skin daily   insulin lispro (HumaLOG) 100 units/mL injection 10/3/2018 at Unknown time  No Yes   Sig: According to insulin sliding scale   lisinopril-hydrochlorothiazide (PRINZIDE,ZESTORETIC) 20-12 5 MG per tablet 10/3/2018 at Unknown time  No Yes   Sig: Take 1 tablet by mouth 2 (two) times a day      Facility-Administered Medications: None       Past Medical History:   Diagnosis Date    Depression     Diabetes mellitus ([address] )     Hypertension     Neuropathy        Past Surgical History:   Procedure Laterality Date    INCISION AND DRAINAGE OF WOUND Right 7/1/2017    Procedure: INCISION AND DRAINAGE (I&D) EXTREMITY THIGH;  Surgeon: Ariadne Howard MD;  Location: [facility];  Service: General       Family History   Problem Relation Age of Onset    Hypertension Mother     Diabetes Father     Hypertension Father     Diabetes Family     Hypertension Family      I have reviewed and agree with the history as documented  Social History   Substance Use Topics    Smoking status: Never Smoker    Smokeless tobacco: Never Used    Alcohol use No        Review of Systems   Constitutional: Positive for diaphoresis  Negative for fever  HENT: Negative for congestion and sore throat  Respiratory: Positive for cough  Negative for shortness of breath  Cardiovascular: Negative for chest pain  Gastrointestinal: Positive for nausea  Negative for abdominal pain and vomiting  Genitourinary: Negative for difficulty urinating and dysuria  Musculoskeletal: Negative for back pain  Skin: Negative for rash  Neurological: Positive for weakness and light-headedness  Negative for syncope  All other systems reviewed and are negative  Physical Exam  Physical Exam   Constitutional: She is oriented to person, place, and time  She appears well-developed and well-nourished  HENT:   Head: Normocephalic and atraumatic  Mouth/Throat: Oropharynx is clear and moist    Eyes: Conjunctivae are normal    Neck: Normal range of motion  Neck supple     Cardiovascular: Normal rate, regular rhythm and normal heart sounds  Pulmonary/Chest: Effort normal and breath sounds normal    Abdominal: Soft  Bowel sounds are normal  There is no tenderness  Musculoskeletal: Normal range of motion  She exhibits no edema  Neurological: She is oriented to person, place, and time  Skin: Skin is warm and dry  Psychiatric: She has a normal mood and affect  Her behavior is normal    Nursing note and vitals reviewed        Vital Signs  ED Triage Vitals [10/04/18 0820]   Temperature Pulse Respirations Blood Pressure SpO2   99 °F (37 2 °C) (!) 124 20 (!) 170/102 96 %      Temp Source Heart Rate Source Patient Position - Orthostatic VS BP Location FiO2 (%)   Oral Monitor Lying Left arm --      Pain Score       No Pain           Vitals:    10/04/18 0820   BP: (!) 170/102   Pulse: (!) 124   Patient Position - Orthostatic VS: Lying       Visual Acuity      ED Medications  Medications   sodium chloride 0 9 % bolus 1,000 mL (0 mL Intravenous Stopped 10/4/18 0944)       Diagnostic Studies  Results Reviewed     Procedure Component Value Units Date/Time    Fingerstick Glucose (POCT) [[id number]]  (Abnormal) Collected:  10/04/18 0939    Lab Status:  Final result Updated:  10/04/18 0940     POC Glucose 242 (H) mg/dl     Fingerstick Glucose (POCT) [[id number]]  (Abnormal) Collected:  10/04/18 0819    Lab Status:  Final result Updated:  10/04/18 0940     POC Glucose 332 (H) mg/dl     Urine Microscopic [[id number]]  (Abnormal) Collected:  10/04/18 0850    Lab Status:  Final result Specimen:  Urine from Urine, Clean Catch Updated:  10/04/18 0925     RBC, UA 2-4 (A) /hpf      WBC, UA 10-20 (A) /hpf      Epithelial Cells Moderate (A) /hpf      Bacteria, UA Moderate (A) /hpf     Comprehensive metabolic panel [[id number]]  (Abnormal) Collected:  10/04/18 0840    Lab Status:  Final result Specimen:  Blood from Arm, Right Updated:  10/04/18 0911     Sodium 133 (L) mmol/L      Potassium 5 0 mmol/L      Chloride 95 (L) mmol/L CO2 28 mmol/L      ANION GAP 10 mmol/L      BUN 19 mg/dL      Creatinine 1 08 mg/dL      Glucose 324 (H) mg/dL      Calcium 8 6 mg/dL      AST 51 (H) U/L      ALT 46 U/L      Alkaline Phosphatase 57 U/L      Total Protein 7 8 g/dL      Albumin 3 2 (L) g/dL      Total Bilirubin 0 40 mg/dL      eGFR 64 ml/min/1 73sq m     Narrative:         National Kidney Disease Education Program recommendations are as follows:  GFR calculation is accurate only with a steady state creatinine  Chronic Kidney disease less than 60 ml/min/1 73 sq  meters  Kidney failure less than 15 ml/min/1 73 sq  meters      APTT [[id number]]  (Normal) Collected:  10/04/18 0845    Lab Status:  Final result Specimen:  Blood from Arm, Left Updated:  10/04/18 0909     PTT 26 seconds     Protime-INR [[id number]]  (Normal) Collected:  10/04/18 0845    Lab Status:  Final result Specimen:  Blood from Arm, Left Updated:  10/04/18 0909     Protime 10 3 seconds      INR 0 98    Troponin I [[id number]]  (Normal) Collected:  10/04/18 0840    Lab Status:  Final result Specimen:  Blood from Arm, Right Updated:  10/04/18 0909     Troponin I <0 02 ng/mL     UA w Reflex to Microscopic [[id number]]  (Abnormal) Collected:  10/04/18 0850    Lab Status:  Final result Specimen:  Urine from Urine, Clean Catch Updated:  10/04/18 0902     Color, UA Yellow     Clarity, UA Turbid     Specific Gravity, UA 1 025     pH, UA 5 5     Leukocytes, UA Trace (A)     Nitrite, UA Negative     Protein, UA Negative mg/dl      Glucose,  (1/2%) (A) mg/dl      Ketones, UA Negative mg/dl      Urobilinogen, UA 0 2 E U /dl      Bilirubin, UA Negative     Blood, UA Trace-Intact (A)    POCT pregnancy, urine [[id number]]  (Normal) Resulted:  10/04/18 0858    Lab Status:  Final result Updated:  10/04/18 0858     EXT PREG TEST UR (Ref: Negative) negative    CBC and differential [[id number]] Collected:  10/04/18 0840    Lab Status:  Final result Specimen:  Blood from Arm, Right Updated:  10/04/18 0846     WBC 7 25 Thousand/uL      RBC 3 92 Million/uL      Hemoglobin 11 9 g/dL      Hematocrit 35 3 %      MCV 90 fL      MCH 30 4 pg      MCHC 33 7 g/dL      RDW 13 9 %      MPV 9 1 fL      Platelets 237 Thousands/uL      nRBC 0 /100 WBCs      Neutrophils Relative 61 %      Immat GRANS % 0 %      Lymphocytes Relative 28 %      Monocytes Relative 9 %      Eosinophils Relative 2 %      Basophils Relative 0 %      Neutrophils Absolute 4 40 Thousands/µL      Immature Grans Absolute 0 03 Thousand/uL      Lymphocytes Absolute 2 02 Thousands/µL      Monocytes Absolute 0 65 Thousand/µL      Eosinophils Absolute 0 13 Thousand/µL      Basophils Absolute 0 02 Thousands/µL                  XR chest 1 view portable   Final Result by Judi Nesbitt MD (10/04 1720)      No acute cardiopulmonary disease  Workstation performed: [id number]                    Procedures  ECG 12 Lead Documentation  Date/Time: 10/4/2018 8:23 AM  Performed by: Jessi Corona  Authorized by: Jessi Corona     Indications / Diagnosis:  Hyperglycemia  ECG reviewed by me, the ED Provider: yes    Patient location:  ED  Interpretation:     Interpretation: abnormal    Rate:     ECG rate:  121    ECG rate assessment: tachycardic    Rhythm:     Rhythm: sinus tachycardia    Ectopy:     Ectopy: none    QRS:     QRS axis:  Normal    QRS intervals:  Normal  Conduction:     Conduction: normal    ST segments:     ST segments:  Normal  T waves:     T waves: normal    Other findings:     Other findings: poor R wave progression             Phone Contacts  ED Phone Contact    ED Course                               MDM  Number of Diagnoses or Management Options  Diagnosis management comments: Patient states she used to weigh over 500 pounds and with exercise and diet she cut her weight in half    She states for sugars usually well controlled although she did not check it this morning    CritCare Time    Disposition  Final diagnoses:   Hyperglycemia     Time reflects when diagnosis was documented in both MDM as applicable and the Disposition within this note     Time User Action Codes Description Comment    10/4/2018  9:47 AM Juve CONNORS Add [R73 9] Hyperglycemia       ED Disposition     ED Disposition Condition Comment    Discharge  [address] discharge to home/self care  Condition at discharge: Stable        Follow-up Information     Follow up With Specialties Details Why [facility] Schedule an appointment as soon as possible for a visit in 1 day  [address] [place] [address] 90  [phone]            Patient's Medications   Discharge Prescriptions    No medications on file     No discharge procedures on file      ED Provider  Electronically Signed by           Carolyn Sánchez MD  10/04/18 0144

## 2018-10-04 NOTE — DISCHARGE INSTRUCTIONS
Diabetic Hyperglycemia   WHAT YOU NEED TO KNOW:   Diabetic hyperglycemia is a blood glucose (sugar) level that is higher than your healthcare provider recommends  You may have increased thirst and urinate more often than usual  Over time, uncontrolled diabetes can damage your nerves, blood vessels, tissues, and organs  That is why it is important to manage diabetic hyperglycemia  Without treatment, diabetic hyperglycemia can lead to diabetic ketoacidosis (DKA) or hyperglycemic hyperosmolar state (HHS)  These are serious conditions that can become life-threatening  DISCHARGE INSTRUCTIONS:   Return to the emergency department if:   · You have shortness of breath  · Your breath smells fruity  · You have nausea and vomiting  · You have symptoms of dehydration, such as dark yellow urine, dry mouth and lips, and dry skin  Contact your healthcare provider if:   · You continue to have higher blood sugar levels than your healthcare provider recommends  · Your blood sugar level is over 240 mg/dl and  you have ketones in your urine  · You have questions or concerns about your condition or care  Medicines:   · Medicines  such as insulin and hypoglycemic medicine decrease blood sugar levels  · Take your medicine as directed  Contact your healthcare provider if you think your medicine is not helping or if you have side effects  Tell him or her if you are allergic to any medicine  Keep a list of the medicines, vitamins, and herbs you take  Include the amounts, and when and why you take them  Bring the list or the pill bottles to follow-up visits  Carry your medicine list with you in case of an emergency  Follow up with your healthcare provider or specialist as directed: Your healthcare provider may refer you to a dietitian or diabetes specialist  Write down your questions so you remember to ask them during your visits     Manage diabetic hyperglycemia:   · If you take diabetes medicine or insulin, take it as directed  Missed or wrong doses can cause your blood sugar to go up  · Tell your healthcare provider if you continue to have trouble managing your blood sugar  He may change the type, amount, or timing of your diabetes medicine or insulin  If you do not take diabetes medicine or insulin, you may need to start  · Work with your healthcare provider to develop a sick day plan  Illness can cause your blood sugar to rise  A sick day plan helps you control your blood sugar level when you are sick  Prevent diabetic hyperglycemia:   · Check your blood sugar levels regularly  Ask your healthcare provider how often to check your blood sugar and what your levels should be  · Follow your meal plan  Your blood sugar can go up if you eat a large meal or you eat more carbohydrates than recommended  Work with a dietitian to develop a meal plan that is right for you  · Exercise regularly  to help lower your blood sugar when it is high  It can also keep your blood sugar levels steady over time  Exercise for at least 30 minutes, 5 days a week  Include muscle strengthening activities 2 days each week  Do not sit for longer than 90 minutes at a time  Work with your healthcare provider to create an exercise plan  Children should get at least 60 minutes of physical activity each day  · Check your ketones before exercise  if your blood sugar level is above 240 mg/dl  Do not exercise if you have ketones in your urine,  because your blood sugar level may rise even more  Ask your healthcare provider how to lower your blood sugar when you have ketones  © 2017 [address] [employer] Information is for End User's use only and may not be sold, redistributed or otherwise used for commercial purposes  All illustrations and images included in CareNotes® are the copyrighted property of [employer]  or [employer]il [employer]  The above information is an  only   It is not intended as medical advice for individual conditions or treatments  Talk to your doctor, nurse or pharmacist before following any medical regimen to see if it is safe and effective for you

## 2018-10-05 LAB
ATRIAL RATE: 121 BPM
P AXIS: 57 DEGREES
PR INTERVAL: 140 MS
QRS AXIS: -5 DEGREES
QRSD INTERVAL: 72 MS
QT INTERVAL: 312 MS
QTC INTERVAL: 443 MS
T WAVE AXIS: 39 DEGREES
VENTRICULAR RATE: 121 BPM

## 2018-10-05 PROCEDURE — 93010 ELECTROCARDIOGRAM REPORT: CPT | Performed by: INTERNAL MEDICINE

## 2018-10-18 DIAGNOSIS — M62.838 MUSCLE SPASM OF BOTH LOWER LEGS: Primary | ICD-10-CM

## 2018-10-18 DIAGNOSIS — M94.0 COSTOCHONDRITIS: ICD-10-CM

## 2018-10-18 RX ORDER — CYCLOBENZAPRINE HCL 10 MG
10 TABLET ORAL 3 TIMES DAILY PRN
Qty: 30 TABLET | Refills: 0 | Status: SHIPPED | OUTPATIENT
Start: 2018-10-18 | End: 2019-03-16

## 2018-10-18 NOTE — TELEPHONE ENCOUNTER
Pt called regarding refill request that she needs urgently today  Discussed with pt that I do not see any refill request for flexeril prior to today, and for future I recommend she call us at least a week prior to running out  I also noticed that she has not been assessed recently at least in past couple months for flexeril use, and ideally we want to reassess pts on long term muscle relaxants  She states she has leg spasms, and she takes the flexeril for this, not costochondritis  I told her I will give her a refill for max 2 weeks, she needs to schedule an apt within this time frame to come and discuss her leg spasm prior to further refills  Task sent to  to reach out to her  Med refilled

## 2018-10-18 NOTE — TELEPHONE ENCOUNTER
Dr Ankit Palma or any in this team    Pt needs flexeril 10 mg    Pt she call 10-4 last week  No message and call today before no message    Pharmacy [facility] pharmacy

## 2018-10-24 ENCOUNTER — TELEPHONE (OUTPATIENT)
Dept: FAMILY MEDICINE CLINIC | Facility: CLINIC | Age: 43
End: 2018-10-24

## 2018-10-24 NOTE — TELEPHONE ENCOUNTER
Called pharmacy to clarify script , also called patient and informed script will be available for  to please check with the pharmacy this afternoon    COOPER KIM

## 2018-10-24 NOTE — TELEPHONE ENCOUNTER
Received transmission from [facility] in regards to rx for gabapentin  ORQUIDEA is now required for state reportable drugs, Gabapentin is now reportable    Please resend rx for Gabapentin with ORQUIDEA number to [facility]

## 2018-11-26 ENCOUNTER — OFFICE VISIT (OUTPATIENT)
Dept: FAMILY MEDICINE CLINIC | Facility: CLINIC | Age: 43
End: 2018-11-26
Payer: COMMERCIAL

## 2018-11-26 VITALS
WEIGHT: 252 LBS | HEART RATE: 92 BPM | DIASTOLIC BLOOD PRESSURE: 82 MMHG | OXYGEN SATURATION: 96 % | TEMPERATURE: 97.9 F | BODY MASS INDEX: 44.64 KG/M2 | RESPIRATION RATE: 20 BRPM | SYSTOLIC BLOOD PRESSURE: 146 MMHG

## 2018-11-26 DIAGNOSIS — I10 ESSENTIAL HYPERTENSION: ICD-10-CM

## 2018-11-26 DIAGNOSIS — E11.40 TYPE 2 DIABETES MELLITUS WITH DIABETIC NEUROPATHY, WITH LONG-TERM CURRENT USE OF INSULIN (HCC): ICD-10-CM

## 2018-11-26 DIAGNOSIS — F41.9 ANXIETY: ICD-10-CM

## 2018-11-26 DIAGNOSIS — R11.2 INTRACTABLE VOMITING WITH NAUSEA, UNSPECIFIED VOMITING TYPE: ICD-10-CM

## 2018-11-26 DIAGNOSIS — G47.09 OTHER INSOMNIA: ICD-10-CM

## 2018-11-26 DIAGNOSIS — Z79.4 TYPE 2 DIABETES MELLITUS WITH DIABETIC NEUROPATHY, WITH LONG-TERM CURRENT USE OF INSULIN (HCC): ICD-10-CM

## 2018-11-26 DIAGNOSIS — F32.A DEPRESSION, UNSPECIFIED DEPRESSION TYPE: ICD-10-CM

## 2018-11-26 DIAGNOSIS — F90.9 ADULT ADHD: Primary | ICD-10-CM

## 2018-11-26 PROCEDURE — 99213 OFFICE O/P EST LOW 20 MIN: CPT | Performed by: FAMILY MEDICINE

## 2018-11-26 RX ORDER — LISINOPRIL AND HYDROCHLOROTHIAZIDE 20; 12.5 MG/1; MG/1
1 TABLET ORAL DAILY
Qty: 60 TABLET | Refills: 2 | Status: SHIPPED | OUTPATIENT
Start: 2018-11-26 | End: 2019-06-12 | Stop reason: SDUPTHER

## 2018-11-26 RX ORDER — GABAPENTIN 800 MG/1
800 TABLET ORAL 3 TIMES DAILY
Qty: 90 TABLET | Refills: 3 | Status: SHIPPED | OUTPATIENT
Start: 2018-11-26 | End: 2020-02-12 | Stop reason: HOSPADM

## 2018-11-26 RX ORDER — LANCETS
EACH MISCELLANEOUS
COMMUNITY
Start: 2018-10-21 | End: 2020-10-09 | Stop reason: SDUPTHER

## 2018-11-26 RX ORDER — TRAZODONE HYDROCHLORIDE 50 MG/1
50 TABLET ORAL
Qty: 60 TABLET | Refills: 0 | Status: SHIPPED | OUTPATIENT
Start: 2018-11-26 | End: 2020-02-12 | Stop reason: HOSPADM

## 2018-11-26 RX ORDER — METOCLOPRAMIDE 5 MG/1
5 TABLET ORAL 4 TIMES DAILY
Qty: 24 TABLET | Refills: 0 | Status: SHIPPED | OUTPATIENT
Start: 2018-11-26 | End: 2019-01-19

## 2018-11-26 RX ORDER — ESCITALOPRAM OXALATE 20 MG/1
20 TABLET ORAL DAILY
Qty: 30 TABLET | Refills: 3 | Status: SHIPPED | OUTPATIENT
Start: 2018-11-26 | End: 2019-01-19

## 2018-11-26 NOTE — PROGRESS NOTES
Assessment/Plan:     Diagnoses and all orders for this visit:    Adult ADHD  -     Ambulatory referral to Psychology; Future    Depression, unspecified depression type  -     escitalopram (LEXAPRO) 20 mg tablet; Take 1 tablet (20 mg total) by mouth daily  -     Ambulatory referral to Psychology; Future    Type 2 diabetes mellitus with diabetic neuropathy, with long-term current use of insulin ([facility])  Comments:  HbA1c 7 5 in 1/2018; pt reports glucose ranging 110s-150s  Continue with current regimen, basaglar 30 u qd and ISS with humalog; repeat A1c;  life modifi  Orders:  -     gabapentin (NEURONTIN) 800 mg tablet; Take 1 tablet (800 mg total) by mouth 3 (three) times a day  -     insulin glargine (BASAGLAR KWIKPEN) 100 units/mL injection pen; Inject 30 Units under the skin daily  -     insulin lispro (HUMALOG KWIKPEN) 100 units/mL injection pen; Sliding scale TID    Essential hypertension  Comments:  /68, blood pressure controlled will continue with current reigmen; lisinopril-hctz  Orders:  -     lisinopril-hydrochlorothiazide (PRINZIDE,ZESTORETIC) 20-12 5 MG per tablet; Take 1 tablet by mouth daily    Anxiety  -     Ambulatory referral to Psychology; Future    Intractable vomiting with nausea, unspecified vomiting type  -     metoclopramide (REGLAN) 5 mg tablet; Take 1 tablet (5 mg total) by mouth 4 (four) times a day    Other insomnia  -     traZODone (DESYREL) 50 mg tablet; Take 1 tablet (50 mg total) by mouth daily at bedtime    Other orders  -     Lancets (ONETOUCH ULTRASOFT) lancets; Subjective:      Patient ID: Norma Mcgregor is a 41 y o  female  Patient is a 82-year-old female with past medical history of diabetes mellitus type 2 with neuropathy, hypothyroidism, hypertension, asthma, anxiety and depression here for medication refills  Patient's last A1c of 7 5 was 01/16/2018  Currently on Lantus 30 units q h s   Patient has a history of depression and anxiety, states she also has ADHD and would like to be started on medication for that  Currently taking Lexapro  Patient takes gabapentin for her neuropathy and states she has also been taking in Flexeril for periodic leg cramps  Patient requested a refill of the Flexeril stating that she has insomnia and she uses it to help her sleep  Patient got teary-eyed when I told her that Flexeril was not appropriate medication for insomnia and would need to try her on something more appropriate for insomnia  Reports she has tried melatonin which did not work  Also discussed with patient that she would need to follow up with a psychologist which she currently does not for management of her anxiety and depression as well as further evaluation for adult ADHD  Patient's BP during this visit was elevated at 146/82, consistent with past visits, states that she has not taken her medication today  Patient reports some bloating sensation, occasional nausea and vomiting not related to eating which she states is from chronic GI issues related to her diabetes as she gets it from time to time likely gastroparesis  Denies any fever, abdominal pain, dyspepsia, dysphagia, or diarrhea  The following portions of the patient's history were reviewed and updated as appropriate: allergies, current medications, past family history, past medical history, past social history, past surgical history and problem list     Review of Systems   Constitutional: Negative for chills, fever and unexpected weight change  Respiratory: Negative  Cardiovascular: Negative  Gastrointestinal: Positive for nausea and vomiting  Negative for abdominal pain and diarrhea  Genitourinary: Negative  Skin: Negative for rash  Neurological: Negative for light-headedness and headaches           Objective:      /82 (BP Location: Right arm, Patient Position: Sitting, Cuff Size: Extra-Large)   Pulse 92   Temp 97 9 °F (36 6 °C) (Tympanic)   Resp 20   Wt 114 kg (252 lb)   LMP 10/28/2018 (Exact Date)   SpO2 96%   Breastfeeding? No   BMI 44 64 kg/m²          Physical Exam   Constitutional: She is oriented to person, place, and time  She appears well-developed and well-nourished  No distress  HENT:   Head: Normocephalic and atraumatic  Nose: Nose normal    Mouth/Throat: Oropharynx is clear and moist  No oropharyngeal exudate  Eyes: Pupils are equal, round, and reactive to light  Conjunctivae and EOM are normal    Neck: Normal range of motion  Neck supple  Cardiovascular: Normal rate, regular rhythm and normal heart sounds  Pulmonary/Chest: Effort normal and breath sounds normal  No respiratory distress  She has no wheezes  She has no rales  She exhibits no tenderness  Abdominal: Soft  Bowel sounds are normal  She exhibits no distension and no mass  There is no tenderness  There is no rebound and no guarding  Musculoskeletal: Normal range of motion  She exhibits no edema, tenderness or deformity  Lymphadenopathy:     She has no cervical adenopathy  Neurological: She is alert and oriented to person, place, and time  Skin: Skin is warm and dry  No rash noted  No erythema  No pallor  Psychiatric: She has a normal mood and affect  Nursing note and vitals reviewed

## 2018-11-30 ENCOUNTER — TELEPHONE (OUTPATIENT)
Dept: FAMILY MEDICINE CLINIC | Facility: CLINIC | Age: 43
End: 2018-11-30

## 2018-11-30 NOTE — TELEPHONE ENCOUNTER
[address] [facility] [place] [facility]avi PUT PT ON TRAZADONE  SHE SAYS IT'S NOT WORKING  SHE WOULD LIKE SOMETHING ELSE

## 2019-01-11 ENCOUNTER — TELEPHONE (OUTPATIENT)
Dept: FAMILY MEDICINE CLINIC | Facility: CLINIC | Age: 44
End: 2019-01-11

## 2019-01-11 NOTE — TELEPHONE ENCOUNTER
jem from [facility] called,insurance will cover ademelog,instead of novalog

## 2019-01-15 NOTE — TELEPHONE ENCOUNTER
Received prior auth rejection from [employer] , formulary is humalog 100units/ml vial , they will not cover the kwikpen

## 2019-01-19 ENCOUNTER — HOSPITAL ENCOUNTER (EMERGENCY)
Facility: HOSPITAL | Age: 44
Discharge: HOME | End: 2019-01-19
Attending: EMERGENCY MEDICINE
Payer: COMMERCIAL

## 2019-01-19 ENCOUNTER — APPOINTMENT (EMERGENCY)
Dept: RADIOLOGY | Facility: HOSPITAL | Age: 44
End: 2019-01-19
Attending: EMERGENCY MEDICINE
Payer: COMMERCIAL

## 2019-01-19 VITALS
WEIGHT: 263 LBS | RESPIRATION RATE: 16 BRPM | SYSTOLIC BLOOD PRESSURE: 150 MMHG | BODY MASS INDEX: 46.59 KG/M2 | DIASTOLIC BLOOD PRESSURE: 70 MMHG | HEART RATE: 78 BPM | OXYGEN SATURATION: 97 % | TEMPERATURE: 97.2 F

## 2019-01-19 DIAGNOSIS — E11.40 TYPE 2 DIABETES MELLITUS WITH DIABETIC NEUROPATHY, WITH LONG-TERM CURRENT USE OF INSULIN (HCC): Primary | ICD-10-CM

## 2019-01-19 DIAGNOSIS — Z79.4 TYPE 2 DIABETES MELLITUS WITH DIABETIC NEUROPATHY, WITH LONG-TERM CURRENT USE OF INSULIN (HCC): Primary | ICD-10-CM

## 2019-01-19 DIAGNOSIS — K59.00 CONSTIPATION: ICD-10-CM

## 2019-01-19 DIAGNOSIS — K04.7 DENTAL ABSCESS: ICD-10-CM

## 2019-01-19 DIAGNOSIS — R73.9 HYPERGLYCEMIA: Primary | ICD-10-CM

## 2019-01-19 LAB
ACETONE SERPL-MCNC: NEGATIVE MG/DL
ALBUMIN SERPL BCP-MCNC: 3.1 G/DL (ref 3.5–5)
ALP SERPL-CCNC: 81 U/L (ref 46–116)
ALT SERPL W P-5'-P-CCNC: 36 U/L (ref 12–78)
ANION GAP SERPL CALCULATED.3IONS-SCNC: 5 MMOL/L (ref 4–13)
AST SERPL W P-5'-P-CCNC: 18 U/L (ref 5–45)
BACTERIA UR QL AUTO: ABNORMAL /HPF
BASOPHILS # BLD AUTO: 0.02 THOUSANDS/ΜL (ref 0–0.1)
BASOPHILS NFR BLD AUTO: 0 % (ref 0–1)
BILIRUB SERPL-MCNC: 0.4 MG/DL (ref 0.2–1)
BILIRUB UR QL STRIP: NEGATIVE
BUN SERPL-MCNC: 12 MG/DL (ref 5–25)
CALCIUM SERPL-MCNC: 8.9 MG/DL (ref 8.3–10.1)
CHLORIDE SERPL-SCNC: 90 MMOL/L (ref 100–108)
CLARITY UR: CLEAR
CO2 SERPL-SCNC: 34 MMOL/L (ref 21–32)
COLOR UR: YELLOW
CREAT SERPL-MCNC: 0.93 MG/DL (ref 0.6–1.3)
EOSINOPHIL # BLD AUTO: 0.29 THOUSAND/ΜL (ref 0–0.61)
EOSINOPHIL NFR BLD AUTO: 4 % (ref 0–6)
ERYTHROCYTE [DISTWIDTH] IN BLOOD BY AUTOMATED COUNT: 12.7 % (ref 11.6–15.1)
GFR SERPL CREATININE-BSD FRML MDRD: 76 ML/MIN/1.73SQ M
GLUCOSE SERPL-MCNC: 282 MG/DL (ref 65–140)
GLUCOSE SERPL-MCNC: 395 MG/DL (ref 65–140)
GLUCOSE SERPL-MCNC: 433 MG/DL (ref 65–140)
GLUCOSE UR STRIP-MCNC: ABNORMAL MG/DL
HCT VFR BLD AUTO: 39.1 % (ref 34.8–46.1)
HGB BLD-MCNC: 13.3 G/DL (ref 11.5–15.4)
HGB UR QL STRIP.AUTO: NEGATIVE
IMM GRANULOCYTES # BLD AUTO: 0.03 THOUSAND/UL (ref 0–0.2)
IMM GRANULOCYTES NFR BLD AUTO: 0 % (ref 0–2)
KETONES UR STRIP-MCNC: NEGATIVE MG/DL
LEUKOCYTE ESTERASE UR QL STRIP: ABNORMAL
LYMPHOCYTES # BLD AUTO: 2.75 THOUSANDS/ΜL (ref 0.6–4.47)
LYMPHOCYTES NFR BLD AUTO: 34 % (ref 14–44)
MCH RBC QN AUTO: 29 PG (ref 26.8–34.3)
MCHC RBC AUTO-ENTMCNC: 34 G/DL (ref 31.4–37.4)
MCV RBC AUTO: 85 FL (ref 82–98)
MONOCYTES # BLD AUTO: 0.55 THOUSAND/ΜL (ref 0.17–1.22)
MONOCYTES NFR BLD AUTO: 7 % (ref 4–12)
NEUTROPHILS # BLD AUTO: 4.53 THOUSANDS/ΜL (ref 1.85–7.62)
NEUTS SEG NFR BLD AUTO: 55 % (ref 43–75)
NITRITE UR QL STRIP: NEGATIVE
NON-SQ EPI CELLS URNS QL MICRO: ABNORMAL /HPF
NRBC BLD AUTO-RTO: 0 /100 WBCS
PH UR STRIP.AUTO: 7.5 [PH] (ref 5–9)
PLATELET # BLD AUTO: 260 THOUSANDS/UL (ref 149–390)
PMV BLD AUTO: 9.1 FL (ref 8.9–12.7)
POTASSIUM SERPL-SCNC: 4.5 MMOL/L (ref 3.5–5.3)
PROT SERPL-MCNC: 7.4 G/DL (ref 6.4–8.2)
PROT UR STRIP-MCNC: NEGATIVE MG/DL
RBC # BLD AUTO: 4.58 MILLION/UL (ref 3.81–5.12)
RBC #/AREA URNS AUTO: ABNORMAL /HPF
SODIUM SERPL-SCNC: 129 MMOL/L (ref 136–145)
SP GR UR STRIP.AUTO: 1.01 (ref 1–1.03)
UROBILINOGEN UR QL STRIP.AUTO: 2 E.U./DL
WBC # BLD AUTO: 8.17 THOUSAND/UL (ref 4.31–10.16)
WBC #/AREA URNS AUTO: ABNORMAL /HPF

## 2019-01-19 PROCEDURE — 96361 HYDRATE IV INFUSION ADD-ON: CPT

## 2019-01-19 PROCEDURE — 36415 COLL VENOUS BLD VENIPUNCTURE: CPT | Performed by: EMERGENCY MEDICINE

## 2019-01-19 PROCEDURE — 85025 COMPLETE CBC W/AUTO DIFF WBC: CPT | Performed by: EMERGENCY MEDICINE

## 2019-01-19 PROCEDURE — 99284 EMERGENCY DEPT VISIT MOD MDM: CPT

## 2019-01-19 PROCEDURE — 82948 REAGENT STRIP/BLOOD GLUCOSE: CPT

## 2019-01-19 PROCEDURE — 80053 COMPREHEN METABOLIC PANEL: CPT | Performed by: EMERGENCY MEDICINE

## 2019-01-19 PROCEDURE — 82009 KETONE BODYS QUAL: CPT | Performed by: EMERGENCY MEDICINE

## 2019-01-19 PROCEDURE — 74022 RADEX COMPL AQT ABD SERIES: CPT

## 2019-01-19 PROCEDURE — 96375 TX/PRO/DX INJ NEW DRUG ADDON: CPT

## 2019-01-19 PROCEDURE — 96365 THER/PROPH/DIAG IV INF INIT: CPT

## 2019-01-19 PROCEDURE — 81001 URINALYSIS AUTO W/SCOPE: CPT | Performed by: EMERGENCY MEDICINE

## 2019-01-19 RX ORDER — MAGNESIUM CARB/ALUMINUM HYDROX 105-160MG
296 TABLET,CHEWABLE ORAL ONCE
Status: COMPLETED | OUTPATIENT
Start: 2019-01-19 | End: 2019-01-19

## 2019-01-19 RX ORDER — METHADONE HYDROCHLORIDE 10 MG/ML
60 CONCENTRATE ORAL DAILY
COMMUNITY

## 2019-01-19 RX ORDER — CLINDAMYCIN HYDROCHLORIDE 150 MG/1
300 CAPSULE ORAL EVERY 6 HOURS SCHEDULED
Qty: 28 CAPSULE | Refills: 0 | Status: SHIPPED | OUTPATIENT
Start: 2019-01-19 | End: 2019-01-26

## 2019-01-19 RX ORDER — CLINDAMYCIN PHOSPHATE 600 MG/50ML
600 INJECTION INTRAVENOUS ONCE
Status: COMPLETED | OUTPATIENT
Start: 2019-01-19 | End: 2019-01-19

## 2019-01-19 RX ORDER — KETOROLAC TROMETHAMINE 30 MG/ML
30 INJECTION, SOLUTION INTRAMUSCULAR; INTRAVENOUS ONCE
Status: COMPLETED | OUTPATIENT
Start: 2019-01-19 | End: 2019-01-19

## 2019-01-19 RX ORDER — ONDANSETRON 2 MG/ML
4 INJECTION INTRAMUSCULAR; INTRAVENOUS ONCE
Status: COMPLETED | OUTPATIENT
Start: 2019-01-19 | End: 2019-01-19

## 2019-01-19 RX ADMIN — MAGNESIUM CITRATE 296 ML: 1.75 LIQUID ORAL at 20:57

## 2019-01-19 RX ADMIN — SODIUM CHLORIDE 1000 ML: 0.9 INJECTION, SOLUTION INTRAVENOUS at 19:06

## 2019-01-19 RX ADMIN — ONDANSETRON 4 MG: 2 INJECTION INTRAMUSCULAR; INTRAVENOUS at 19:12

## 2019-01-19 RX ADMIN — INSULIN HUMAN 10 UNITS: 100 INJECTION, SOLUTION PARENTERAL at 20:33

## 2019-01-19 RX ADMIN — KETOROLAC TROMETHAMINE 30 MG: 30 INJECTION, SOLUTION INTRAMUSCULAR at 19:12

## 2019-01-19 RX ADMIN — SODIUM CHLORIDE 1000 ML: 0.9 INJECTION, SOLUTION INTRAVENOUS at 20:24

## 2019-01-19 RX ADMIN — CLINDAMYCIN PHOSPHATE 600 MG: 600 INJECTION, SOLUTION INTRAVENOUS at 19:51

## 2019-01-19 NOTE — ED PROVIDER NOTES
History  Chief Complaint   Patient presents with    Vomiting     is  diabetic and has been vomiting since yesterday  states her sugar is out of control  ( 476)  blurred visiong due to her sugar/  states she has a broken upper right side tooth that is bothering her     Patient is a 72-year-old female that presents with complaint of her blood sugars running high for the last 2 days  Patient states she has also been nauseous it has been vomiting and unable to keep her p  O  Medications down  Patient is complaining of bilateral upper jaw pain in the area where she has 2 broken teeth  She believes she might have an infection in that area and that is why her sugars are running high  Patient has no trouble breathing or swallowing, there is no diplopia, there is no headache  Patient denies any chest pain or shortness of breath  Patient denies any abdominal pain but she states she has been constipated for at least 3 days  Patient denies any fevers or chills, no sick contacts, no cough or cold or flu-like symptoms  Prior to Admission Medications   Prescriptions Last Dose Informant Patient Reported? Taking?    INS SYRINGE/NEEDLE 1CC/28G (B-D INS SYR MICROFINE 1CC/28G) 28G X 1/2" 1 ML MISC   No No   Sig: by Does not apply route 3 (three) times a day with meals   Lancets (ONETOUCH ULTRASOFT) lancets   Yes No   ONE TOUCH ULTRA TEST test strip   No No   Sig: Test 3 times a day BEFORE MEALS   cyclobenzaprine (FLEXERIL) 10 mg tablet   No No   Sig: Take 1 tablet (10 mg total) by mouth 3 (three) times a day as needed for muscle spasms for up to 30 doses   divalproex sodium (DEPAKOTE ER) 500 mg 24 hr tablet   No No   Sig: Take 1 tablet (500 mg total) by mouth 2 (two) times a day   gabapentin (NEURONTIN) 800 mg tablet   No No   Sig: Take 1 tablet (800 mg total) by mouth 3 (three) times a day   glucose blood (ONE TOUCH ULTRA TEST) test strip   No No   Sig: Test 3 times a day before meals   insulin glargine (BASAGLAR KWIKPEN) 100 units/mL injection pen   No No   Sig: Inject 30 Units under the skin daily   lisinopril-hydrochlorothiazide (PRINZIDE,ZESTORETIC) 20-12 5 MG per tablet   No No   Sig: Take 1 tablet by mouth daily   methadone (DOLOPHINE) 10 mg/mL oral concentrated solution 1/19/2019 at Unknown time  Yes Yes   Sig: Take 100 mg by mouth every 6 (six) hours as needed for moderate pain   traZODone (DESYREL) 50 mg tablet   No No   Sig: Take 1 tablet (50 mg total) by mouth daily at bedtime      Facility-Administered Medications: None       Past Medical History:   Diagnosis Date    Depression     Diabetes mellitus ([facility] [address] )     Hypertension     Neuropathy        Past Surgical History:   Procedure Laterality Date    INCISION AND DRAINAGE OF WOUND Right 7/1/2017    Procedure: INCISION AND DRAINAGE (I&D) EXTREMITY THIGH;  Surgeon: Gibran Haq MD;  Location: [facility];  Service: General       Family History   Problem Relation Age of Onset    Hypertension Mother     Diabetes Father     Hypertension Father     Diabetes Family     Hypertension Family      I have reviewed and agree with the history as documented  Social History   Substance Use Topics    Smoking status: Never Smoker    Smokeless tobacco: Never Used    Alcohol use No        Review of Systems   Constitutional: Positive for fatigue  Negative for chills and fever  HENT: Positive for dental problem  Negative for drooling, facial swelling, sore throat and trouble swallowing  Eyes: Negative for photophobia and visual disturbance  Respiratory: Negative for chest tightness and shortness of breath  Cardiovascular: Negative for chest pain and leg swelling  Gastrointestinal: Positive for nausea and vomiting  Negative for abdominal pain  Genitourinary: Negative for difficulty urinating, dysuria and flank pain  Musculoskeletal: Negative for back pain and neck pain  Skin: Negative  Neurological: Negative for weakness and numbness  Psychiatric/Behavioral: Negative  Physical Exam  Physical Exam   Constitutional: She is oriented to person, place, and time  She appears well-developed  HENT:   Head: Normocephalic and atraumatic  Mouth/Throat: Uvula is midline, oropharynx is clear and moist and mucous membranes are normal  Abnormal dentition  Dental caries present  No uvula swelling  Neck: Normal range of motion  Neck supple  Cardiovascular: Normal rate and regular rhythm  Pulmonary/Chest: Effort normal and breath sounds normal    Abdominal: Soft  Bowel sounds are normal  She exhibits no distension  There is no tenderness  Musculoskeletal: Normal range of motion  She exhibits no edema  Neurological: She is alert and oriented to person, place, and time  No cranial nerve deficit  Skin: Skin is warm and dry  Nursing note and vitals reviewed        Vital Signs  ED Triage Vitals   Temperature Pulse Respirations Blood Pressure SpO2   01/19/19 1841 01/19/19 1841 01/19/19 1841 01/19/19 1841 01/19/19 1841   (!) 97 2 °F (36 2 °C) 84 18 139/83 97 %      Temp src Heart Rate Source Patient Position - Orthostatic VS BP Location FiO2 (%)   -- 01/19/19 2106 01/19/19 2106 01/19/19 2106 --    Monitor Sitting Right arm       Pain Score       01/19/19 1842       8           Vitals:    01/19/19 1841 01/19/19 2106   BP: 139/83 150/70   Pulse: 84 78   Patient Position - Orthostatic VS:  Sitting       Visual Acuity      ED Medications  Medications   sodium chloride 0 9 % bolus 1,000 mL (0 mL Intravenous Stopped 1/19/19 2106)   ketorolac (TORADOL) injection 30 mg (30 mg Intravenous Given 1/19/19 1912)   ondansetron (ZOFRAN) injection 4 mg (4 mg Intravenous Given 1/19/19 1912)   clindamycin (CLEOCIN) IVPB (premix) 600 mg (0 mg Intravenous Stopped 1/19/19 2021)   sodium chloride 0 9 % bolus 1,000 mL (0 mL Intravenous Stopped 1/19/19 2106)   insulin regular (HumuLIN R,NovoLIN R) injection 10 Units (10 Units Intravenous Given 1/19/19 2033) magnesium citrate (CITROMA) oral solution 296 mL (296 mL Oral Given 1/19/19 2057)       Diagnostic Studies  Results Reviewed     Procedure Component Value Units Date/Time    Fingerstick Glucose (POCT) [[id number]]  (Abnormal) Collected:  01/19/19 2051    Lab Status:  Final result Updated:  01/19/19 2052     POC Glucose 282 (H) mg/dl     Urine Microscopic [[id number]]  (Abnormal) Collected:  01/19/19 1935    Lab Status:  Final result Specimen:  Urine from Urine, Clean Catch Updated:  01/19/19 2017     RBC, UA None Seen /hpf      WBC, UA 0-1 (A) /hpf      Epithelial Cells Occasional /hpf      Bacteria, UA Occasional /hpf     UA w Reflex to Microscopic [[id number]]  (Abnormal) Collected:  01/19/19 1935    Lab Status:  Final result Specimen:  Urine from Urine, Clean Catch Updated:  01/19/19 1945     Color, UA Yellow     Clarity, UA Clear     Specific Gravity, UA 1 010     pH, UA 7 5     Leukocytes, UA Elevated glucose may cause decreased leukocyte values   See urine microscopic for [facility] result/ (A)     Nitrite, UA Negative     Protein, UA Negative mg/dl      Glucose, UA >=1000 (1%) (A) mg/dl      Ketones, UA Negative mg/dl      Urobilinogen, UA 2 0 (A) E U /dl      Bilirubin, UA Negative     Blood, UA Negative    Comprehensive metabolic panel [[id number]]  (Abnormal) Collected:  01/19/19 1903    Lab Status:  Final result Specimen:  Blood from Arm, Left Updated:  01/19/19 1936     Sodium 129 (L) mmol/L      Potassium 4 5 mmol/L      Chloride 90 (L) mmol/L      CO2 34 (H) mmol/L      ANION GAP 5 mmol/L      BUN 12 mg/dL      Creatinine 0 93 mg/dL      Glucose 395 (H) mg/dL      Calcium 8 9 mg/dL      AST 18 U/L      ALT 36 U/L      Alkaline Phosphatase 81 U/L      Total Protein 7 4 g/dL      Albumin 3 1 (L) g/dL      Total Bilirubin 0 40 mg/dL      eGFR 76 ml/min/1 73sq m     Narrative:         National Kidney Disease Education Program recommendations are as follows:  GFR calculation is accurate only with a steady state creatinine  Chronic Kidney disease less than 60 ml/min/1 73 sq  meters  Kidney failure less than 15 ml/min/1 73 sq  meters  Acetone [[id number]]  (Normal) Collected:  01/19/19 1903    Lab Status:  Final result Specimen:  Blood from Arm, Left Updated:  01/19/19 1927     Acetone, Bld Negative    CBC and differential [[id number]] Collected:  01/19/19 1903    Lab Status:  Final result Specimen:  Blood from Arm, Left Updated:  01/19/19 1908     WBC 8 17 Thousand/uL      RBC 4 58 Million/uL      Hemoglobin 13 3 g/dL      Hematocrit 39 1 %      MCV 85 fL      MCH 29 0 pg      MCHC 34 0 g/dL      RDW 12 7 %      MPV 9 1 fL      Platelets 063 Thousands/uL      nRBC 0 /100 WBCs      Neutrophils Relative 55 %      Immat GRANS % 0 %      Lymphocytes Relative 34 %      Monocytes Relative 7 %      Eosinophils Relative 4 %      Basophils Relative 0 %      Neutrophils Absolute 4 53 Thousands/µL      Immature Grans Absolute 0 03 Thousand/uL      Lymphocytes Absolute 2 75 Thousands/µL      Monocytes Absolute 0 55 Thousand/µL      Eosinophils Absolute 0 29 Thousand/µL      Basophils Absolute 0 02 Thousands/µL     Fingerstick Glucose (POCT) [[id number]]  (Abnormal) Collected:  01/19/19 1852    Lab Status:  Final result Updated:  01/19/19 1903     POC Glucose 433 (H) mg/dl                  XR abdomen obstruction series    (Results Pending)              Procedures  Procedures       Phone Contacts  ED Phone Contact    ED Course                               MDM  Number of Diagnoses or Management Options  Constipation:   Dental abscess:   Hyperglycemia:   Diagnosis management comments: Patient's blood sugar came down with IV fluids and some IV insulin  There is no evidence of a gap or DKA  The patient was given antibiotics for presumed dental abscess and a prescription to continue at home  Patient needs to make an appointment with her dentist as soon as possible    Patient x-ray just showed moderate amount of stool in the colon with no evidence obstruction  Patient was given a bottle of Mag citrate  Patient is going to follow up with primary care doctor if she continues have hyperglycemia for possible adjustment of her medications were follow up in the emergency room if any worsening symptoms  Patient states understanding is agreement the assessment plan  Amount and/or Complexity of Data Reviewed  Clinical lab tests: ordered and reviewed  Tests in the radiology section of CPT®: ordered and reviewed      CritCare Time    Disposition  Final diagnoses:   Hyperglycemia   Dental abscess   Constipation     Time reflects when diagnosis was documented in both MDM as applicable and the Disposition within this note     Time User Action Codes Description Comment    1/19/2019  8:50 PM Lincoln Main Add [R73 9] Hyperglycemia     1/19/2019  8:50 PM Eloy Main Add [K04 7] Dental abscess     1/19/2019  8:50 PM Lincoln Main Add [K59 00] Constipation       ED Disposition     ED Disposition Condition Comment    Discharge  [address] discharge to home/self care      Condition at discharge: Stable        Follow-up Information     Follow up With Specialties Details Why Contact Info    Keaton Mccartney MD Family Medicine Schedule an appointment as soon as possible for a visit in 3 days  [address]   [phone]            Discharge Medication List as of 1/19/2019  8:52 PM      START taking these medications    Details   clindamycin (CLEOCIN) 150 mg capsule Take 2 capsules (300 mg total) by mouth every 6 (six) hours for 7 days, Starting Sat 1/19/2019, Until Sat 1/26/2019, Print         CONTINUE these medications which have NOT CHANGED    Details   methadone (DOLOPHINE) 10 mg/mL oral concentrated solution Take 100 mg by mouth every 6 (six) hours as needed for moderate pain, Historical Med      cyclobenzaprine (FLEXERIL) 10 mg tablet Take 1 tablet (10 mg total) by mouth 3 (three) times a day as needed for muscle spasms for up to 30 doses, Starting u 10/18/2018, Normal      divalproex sodium (DEPAKOTE ER) 500 mg 24 hr tablet Take 1 tablet (500 mg total) by mouth 2 (two) times a day, Starting Wed 6/6/2018, Normal      gabapentin (NEURONTIN) 800 mg tablet Take 1 tablet (800 mg total) by mouth 3 (three) times a day, Starting Mon 11/26/2018, Normal      !! glucose blood (ONE TOUCH ULTRA TEST) test strip Test 3 times a day before meals, Normal      INS SYRINGE/NEEDLE 1CC/28G (B-D INS SYR MICROFINE 1CC/28G) 28G X 1/2" 1 ML MISC by Does not apply route 3 (three) times a day with meals, Starting Wed 6/6/2018, Normal      insulin glargine (BASAGLAR KWIKPEN) 100 units/mL injection pen Inject 30 Units under the skin daily, Starting Mon 11/26/2018, Normal      Lancets (ONETOUCH ULTRASOFT) lancets Historical Med      lisinopril-hydrochlorothiazide (PRINZIDE,ZESTORETIC) 20-12 5 MG per tablet Take 1 tablet by mouth daily, Starting Mon 11/26/2018, Normal      !! ONE TOUCH ULTRA TEST test strip Test 3 times a day BEFORE MEALS, Normal      traZODone (DESYREL) 50 mg tablet Take 1 tablet (50 mg total) by mouth daily at bedtime, Starting Mon 11/26/2018, Normal      insulin lispro (HUMALOG KWIKPEN) 100 units/mL injection pen Sliding scale TID, Normal       !! - Potential duplicate medications found  Please discuss with provider  No discharge procedures on file      ED Provider  Electronically Signed by           Agnes Padilla MD  01/19/19 7620

## 2019-01-20 NOTE — ED NOTES
Repeat Accucheck as recorded, pt feeling better, ride is on way, does not want to wait until fluid is finished       Starr Andrews RN  01/19/19 7816

## 2019-01-20 NOTE — DISCHARGE INSTRUCTIONS
Constipation   WHAT YOU NEED TO KNOW:   Constipation is when you have hard, dry bowel movements, or you go longer than usual between bowel movements  DISCHARGE INSTRUCTIONS:   Seek care immediately if:   · You have blood in your bowel movements  · You have a fever and abdominal pain with the constipation  Contact your healthcare provider if:   · Your constipation gets worse  · You start to vomit  · You have questions or concerns about your condition or care  Medicines:   · Medicine or a fiber supplement  may help make your bowel movement softer  A laxative may help relax and loosen your intestines to help you have a bowel movement  You may also be given medicine to increase fluid in your intestines  The fluid may help move bowel movements through your intestines  · Take your medicine as directed  Contact your healthcare provider if you think your medicine is not helping or if you have side effects  Tell him of her if you are allergic to any medicine  Keep a list of the medicines, vitamins, and herbs you take  Include the amounts, and when and why you take them  Bring the list or the pill bottles to follow-up visits  Carry your medicine list with you in case of an emergency  Manage your constipation:   · Drink liquids as directed  You may need to drink extra liquids to help soften and move your bowels  Ask how much liquid to drink each day and which liquids are best for you  · Eat high-fiber foods  This may help decrease constipation by adding bulk to your bowel movements  High-fiber foods include fruit, vegetables, whole-grain breads and cereals, and beans  Your healthcare provider or dietitian can help you create a high-fiber meal plan  · Exercise regularly  Regular physical activity can help stimulate your intestines  Ask which exercises are best for you  · Schedule a time each day to have a bowel movement  This may help train your body to have regular bowel movements   [facility] forward while you are on the toilet to help move the bowel movement out  Sit on the toilet for at least 10 minutes, even if you do not have a bowel movement  Follow up with your healthcare provider as directed:  Write down your questions so you remember to ask them during your visits  © 2017 [address] [employer] Information is for End User's use only and may not be sold, redistributed or otherwise used for commercial purposes  All illustrations and images included in CareNotes® are the copyrighted property of [employer]  or [employer]  The above information is an  only  It is not intended as medical advice for individual conditions or treatments  Talk to your doctor, nurse or pharmacist before following any medical regimen to see if it is safe and effective for you  Diabetic Hyperglycemia   WHAT YOU NEED TO KNOW:   Diabetic hyperglycemia is a blood glucose (sugar) level that is higher than your healthcare provider recommends  You may have increased thirst and urinate more often than usual  Over time, uncontrolled diabetes can damage your nerves, blood vessels, tissues, and organs  That is why it is important to manage diabetic hyperglycemia  Without treatment, diabetic hyperglycemia can lead to diabetic ketoacidosis (DKA) or hyperglycemic hyperosmolar state (HHS)  These are serious conditions that can become life-threatening  DISCHARGE INSTRUCTIONS:   Return to the emergency department if:   · You have shortness of breath  · Your breath smells fruity  · You have nausea and vomiting  · You have symptoms of dehydration, such as dark yellow urine, dry mouth and lips, and dry skin  Contact your healthcare provider if:   · You continue to have higher blood sugar levels than your healthcare provider recommends  · Your blood sugar level is over 240 mg/dl and  you have ketones in your urine       · You have questions or concerns about your condition or care   Medicines:   · Medicines  such as insulin and hypoglycemic medicine decrease blood sugar levels  · Take your medicine as directed  Contact your healthcare provider if you think your medicine is not helping or if you have side effects  Tell him or her if you are allergic to any medicine  Keep a list of the medicines, vitamins, and herbs you take  Include the amounts, and when and why you take them  Bring the list or the pill bottles to follow-up visits  Carry your medicine list with you in case of an emergency  Follow up with your healthcare provider or specialist as directed: Your healthcare provider may refer you to a dietitian or diabetes specialist  Write down your questions so you remember to ask them during your visits  Manage diabetic hyperglycemia:   · If you take diabetes medicine or insulin, take it as directed  Missed or wrong doses can cause your blood sugar to go up  · Tell your healthcare provider if you continue to have trouble managing your blood sugar  He may change the type, amount, or timing of your diabetes medicine or insulin  If you do not take diabetes medicine or insulin, you may need to start  · Work with your healthcare provider to develop a sick day plan  Illness can cause your blood sugar to rise  A sick day plan helps you control your blood sugar level when you are sick  Prevent diabetic hyperglycemia:   · Check your blood sugar levels regularly  Ask your healthcare provider how often to check your blood sugar and what your levels should be  · Follow your meal plan  Your blood sugar can go up if you eat a large meal or you eat more carbohydrates than recommended  Work with a dietitian to develop a meal plan that is right for you  · Exercise regularly  to help lower your blood sugar when it is high  It can also keep your blood sugar levels steady over time  Exercise for at least 30 minutes, 5 days a week   Include muscle strengthening activities 2 days each week  Do not sit for longer than 90 minutes at a time  Work with your healthcare provider to create an exercise plan  Children should get at least 60 minutes of physical activity each day  · Check your ketones before exercise  if your blood sugar level is above 240 mg/dl  Do not exercise if you have ketones in your urine,  because your blood sugar level may rise even more  Ask your healthcare provider how to lower your blood sugar when you have ketones  © 2017 [address] [employer]  Information is for End User's use only and may not be sold, redistributed or otherwise used for commercial purposes  All illustrations and images included in CareNotes® are the copyrighted property of [employer]  or [employer]  The above information is an  only  It is not intended as medical advice for individual conditions or treatments  Talk to your doctor, nurse or pharmacist before following any medical regimen to see if it is safe and effective for you  Dental Abscess   WHAT YOU NEED TO KNOW:   A dental abscess is a collection of pus in or around a tooth  A dental abscess is caused by bacteria  The bacteria usually enter the tooth when the enamel (outer part of the tooth) is damaged by tooth decay  Bacteria may also enter the tooth through a break or chip in the tooth, or a cut in the gum  Food particles that are stuck between the teeth for a long time may also lead to an abscess  DISCHARGE INSTRUCTIONS:   Return to the emergency department if:   · You have severe pain  · You have trouble breathing because of pain or swelling  Contact your healthcare provider if:   · Your symptoms get worse, even after treatment  · Your mouth is bleeding  · You cannot eat or drink because of pain or swelling  · Your abscess returns  · You have an injury that causes a crack in your tooth  · You have questions or concerns about your condition or care  Medicines:   You may  need any of the following:  · Antibiotics  help treat a bacterial infection  · NSAIDs , such as ibuprofen, help decrease swelling, pain, and fever  This medicine is available with or without a doctor's order  NSAIDs can cause stomach bleeding or kidney problems in certain people  If you take blood thinner medicine, always ask your healthcare provider if NSAIDs are safe for you  Always read the medicine label and follow directions  · Acetaminophen  decreases pain and fever  It is available without a doctor's order  Ask how much to take and how often to take it  Follow directions  Read the labels of all other medicines you are using to see if they also contain acetaminophen, or ask your doctor or pharmacist  Acetaminophen can cause liver damage if not taken correctly  Do not use more than 4 grams (4,000 milligrams) total of acetaminophen in one day  · Prescription pain medicine  may be given  Ask your healthcare provider how to take this medicine safely  Some prescription pain medicines contain acetaminophen  Do not take other medicines that contain acetaminophen without talking to your healthcare provider  Too much acetaminophen may cause liver damage  Prescription pain medicine may cause constipation  Ask your healthcare provider how to prevent or treat constipation  · Take your medicine as directed  Contact your healthcare provider if you think your medicine is not helping or if you have side effects  Tell him of her if you are allergic to any medicine  Keep a list of the medicines, vitamins, and herbs you take  Include the amounts, and when and why you take them  Bring the list or the pill bottles to follow-up visits  Carry your medicine list with you in case of an emergency  Self-care:   · Rinse your mouth every 2 hours with salt water  This will help keep the area clean  · Gently brush your teeth twice a day with a soft tooth brush  This will help keep the area clean       · Eat soft foods as directed  Soft foods may cause less pain  Examples include applesauce, yogurt, and cooked pasta  Ask your healthcare provider how long to follow this instruction  · Apply a warm compress to your tooth or gum  Use a cotton ball or gauze soaked in warm water  Remove the compress in 10 minutes or when it becomes cool  Repeat 3 times a day  Prevent another abscess:   · Brush your teeth at least 2 times a day with fluoride toothpaste  · Use dental floss to clean between your teeth at least once a day  · Rinse your mouth with water or mouthwash after meals and snacks  · Chew sugarless gum after meals and snacks  · Limit foods that are sticky and high in sugar such as raisons  Also limit drinks high in sugar, such as soda  · See your dentist every 6 months for dental cleanings and oral exams  Follow up with your healthcare provider in 24 hours: Your healthcare provider will need to check your teeth and gums  Write down your questions so you remember to ask them during your visits  © 2017 [address] [facility] Information is for End User's use only and may not be sold, redistributed or otherwise used for commercial purposes  All illustrations and images included in CareNotes® are the copyrighted property of [employer]  or [employer]  The above information is an  only  It is not intended as medical advice for individual conditions or treatments  Talk to your doctor, nurse or pharmacist before following any medical regimen to see if it is safe and effective for you

## 2019-01-20 NOTE — ED NOTES
Pt reports pain relieved,  IVF's infusing as ordered, pt resting comfortably, call bell with in reach       Mya Wilks RN  01/19/19 1954

## 2019-02-07 ENCOUNTER — TELEPHONE (OUTPATIENT)
Dept: FAMILY MEDICINE CLINIC | Facility: CLINIC | Age: 44
End: 2019-02-07

## 2019-03-16 ENCOUNTER — HOSPITAL ENCOUNTER (EMERGENCY)
Facility: HOSPITAL | Age: 44
Discharge: HOME | End: 2019-03-16
Attending: EMERGENCY MEDICINE | Admitting: EMERGENCY MEDICINE
Payer: COMMERCIAL

## 2019-03-16 ENCOUNTER — APPOINTMENT (EMERGENCY)
Dept: RADIOLOGY | Facility: HOSPITAL | Age: 44
End: 2019-03-16
Payer: COMMERCIAL

## 2019-03-16 VITALS
DIASTOLIC BLOOD PRESSURE: 94 MMHG | TEMPERATURE: 98.8 F | HEART RATE: 90 BPM | SYSTOLIC BLOOD PRESSURE: 180 MMHG | OXYGEN SATURATION: 94 % | BODY MASS INDEX: 45.7 KG/M2 | WEIGHT: 258 LBS

## 2019-03-16 DIAGNOSIS — R73.9 HYPERGLYCEMIA: ICD-10-CM

## 2019-03-16 DIAGNOSIS — K59.00 CONSTIPATION: Primary | ICD-10-CM

## 2019-03-16 DIAGNOSIS — K08.89 PAIN, DENTAL: ICD-10-CM

## 2019-03-16 LAB
ACETONE SERPL-MCNC: NEGATIVE MG/DL
ALBUMIN SERPL BCP-MCNC: 3.1 G/DL (ref 3.5–5)
ALP SERPL-CCNC: 74 U/L (ref 46–116)
ALT SERPL W P-5'-P-CCNC: 53 U/L (ref 12–78)
ANION GAP SERPL CALCULATED.3IONS-SCNC: 6 MMOL/L (ref 4–13)
AST SERPL W P-5'-P-CCNC: 75 U/L (ref 5–45)
BACTERIA UR QL AUTO: ABNORMAL /HPF
BASE EX.OXY STD BLDV CALC-SCNC: 88.2 % (ref 60–80)
BASE EXCESS BLDV CALC-SCNC: 3.3 MMOL/L
BASOPHILS # BLD AUTO: 0.03 THOUSANDS/ΜL (ref 0–0.1)
BASOPHILS NFR BLD AUTO: 0 % (ref 0–1)
BILIRUB DIRECT SERPL-MCNC: 0 MG/DL (ref 0–0.2)
BILIRUB SERPL-MCNC: 0.7 MG/DL (ref 0.2–1)
BILIRUB UR QL STRIP: NEGATIVE
BUN SERPL-MCNC: 9 MG/DL (ref 5–25)
CALCIUM SERPL-MCNC: 8.8 MG/DL (ref 8.3–10.1)
CHLORIDE SERPL-SCNC: 91 MMOL/L (ref 100–108)
CLARITY UR: CLEAR
CO2 SERPL-SCNC: 31 MMOL/L (ref 21–32)
COLOR UR: ABNORMAL
CREAT SERPL-MCNC: 0.96 MG/DL (ref 0.6–1.3)
EOSINOPHIL # BLD AUTO: 0.13 THOUSAND/ΜL (ref 0–0.61)
EOSINOPHIL NFR BLD AUTO: 1 % (ref 0–6)
ERYTHROCYTE [DISTWIDTH] IN BLOOD BY AUTOMATED COUNT: 13.1 % (ref 11.6–15.1)
EXT PREG TEST URINE: NEGATIVE
GFR SERPL CREATININE-BSD FRML MDRD: 73 ML/MIN/1.73SQ M
GLUCOSE SERPL-MCNC: 421 MG/DL (ref 65–140)
GLUCOSE SERPL-MCNC: 517 MG/DL (ref 65–140)
GLUCOSE SERPL-MCNC: >500 MG/DL (ref 65–140)
GLUCOSE UR STRIP-MCNC: ABNORMAL MG/DL
HCO3 BLDV-SCNC: 29.7 MMOL/L (ref 24–30)
HCT VFR BLD AUTO: 40.6 % (ref 34.8–46.1)
HGB BLD-MCNC: 14 G/DL (ref 11.5–15.4)
HGB UR QL STRIP.AUTO: NEGATIVE
IMM GRANULOCYTES # BLD AUTO: 0.03 THOUSAND/UL (ref 0–0.2)
IMM GRANULOCYTES NFR BLD AUTO: 0 % (ref 0–2)
KETONES UR STRIP-MCNC: NEGATIVE MG/DL
LEUKOCYTE ESTERASE UR QL STRIP: ABNORMAL
LIPASE SERPL-CCNC: 40 U/L (ref 73–393)
LYMPHOCYTES # BLD AUTO: 2.58 THOUSANDS/ΜL (ref 0.6–4.47)
LYMPHOCYTES NFR BLD AUTO: 29 % (ref 14–44)
MAGNESIUM SERPL-MCNC: 1.8 MG/DL (ref 1.6–2.6)
MCH RBC QN AUTO: 30 PG (ref 26.8–34.3)
MCHC RBC AUTO-ENTMCNC: 34.5 G/DL (ref 31.4–37.4)
MCV RBC AUTO: 87 FL (ref 82–98)
MONOCYTES # BLD AUTO: 0.57 THOUSAND/ΜL (ref 0.17–1.22)
MONOCYTES NFR BLD AUTO: 6 % (ref 4–12)
NEUTROPHILS # BLD AUTO: 5.63 THOUSANDS/ΜL (ref 1.85–7.62)
NEUTS SEG NFR BLD AUTO: 64 % (ref 43–75)
NITRITE UR QL STRIP: NEGATIVE
NON-SQ EPI CELLS URNS QL MICRO: ABNORMAL /HPF
NRBC BLD AUTO-RTO: 0 /100 WBCS
O2 CT BLDV-SCNC: 18.5 ML/DL
PCO2 BLDV: 52 MM HG (ref 42–50)
PH BLDV: 7.38 [PH] (ref 7.3–7.4)
PH UR STRIP.AUTO: 6 [PH]
PLATELET # BLD AUTO: 274 THOUSANDS/UL (ref 149–390)
PMV BLD AUTO: 9.6 FL (ref 8.9–12.7)
PO2 BLDV: 63.2 MM HG (ref 35–45)
POTASSIUM SERPL-SCNC: 5.7 MMOL/L (ref 3.5–5.3)
PROT SERPL-MCNC: 7.8 G/DL (ref 6.4–8.2)
PROT UR STRIP-MCNC: NEGATIVE MG/DL
RBC # BLD AUTO: 4.66 MILLION/UL (ref 3.81–5.12)
RBC #/AREA URNS AUTO: ABNORMAL /HPF
SODIUM SERPL-SCNC: 128 MMOL/L (ref 136–145)
SP GR UR STRIP.AUTO: <=1.005 (ref 1–1.03)
UROBILINOGEN UR QL STRIP.AUTO: 0.2 E.U./DL
WBC # BLD AUTO: 8.97 THOUSAND/UL (ref 4.31–10.16)
WBC #/AREA URNS AUTO: ABNORMAL /HPF

## 2019-03-16 PROCEDURE — 82805 BLOOD GASES W/O2 SATURATION: CPT | Performed by: EMERGENCY MEDICINE

## 2019-03-16 PROCEDURE — 80076 HEPATIC FUNCTION PANEL: CPT | Performed by: EMERGENCY MEDICINE

## 2019-03-16 PROCEDURE — 96361 HYDRATE IV INFUSION ADD-ON: CPT

## 2019-03-16 PROCEDURE — 36415 COLL VENOUS BLD VENIPUNCTURE: CPT | Performed by: EMERGENCY MEDICINE

## 2019-03-16 PROCEDURE — 81001 URINALYSIS AUTO W/SCOPE: CPT | Performed by: EMERGENCY MEDICINE

## 2019-03-16 PROCEDURE — 80048 BASIC METABOLIC PNL TOTAL CA: CPT | Performed by: EMERGENCY MEDICINE

## 2019-03-16 PROCEDURE — 81025 URINE PREGNANCY TEST: CPT | Performed by: EMERGENCY MEDICINE

## 2019-03-16 PROCEDURE — 82009 KETONE BODYS QUAL: CPT | Performed by: EMERGENCY MEDICINE

## 2019-03-16 PROCEDURE — 99284 EMERGENCY DEPT VISIT MOD MDM: CPT

## 2019-03-16 PROCEDURE — 87086 URINE CULTURE/COLONY COUNT: CPT | Performed by: EMERGENCY MEDICINE

## 2019-03-16 PROCEDURE — 87147 CULTURE TYPE IMMUNOLOGIC: CPT | Performed by: EMERGENCY MEDICINE

## 2019-03-16 PROCEDURE — 83690 ASSAY OF LIPASE: CPT | Performed by: EMERGENCY MEDICINE

## 2019-03-16 PROCEDURE — 96372 THER/PROPH/DIAG INJ SC/IM: CPT

## 2019-03-16 PROCEDURE — 83735 ASSAY OF MAGNESIUM: CPT | Performed by: EMERGENCY MEDICINE

## 2019-03-16 PROCEDURE — 85025 COMPLETE CBC W/AUTO DIFF WBC: CPT | Performed by: EMERGENCY MEDICINE

## 2019-03-16 PROCEDURE — 74018 RADEX ABDOMEN 1 VIEW: CPT

## 2019-03-16 PROCEDURE — 82948 REAGENT STRIP/BLOOD GLUCOSE: CPT

## 2019-03-16 PROCEDURE — 96360 HYDRATION IV INFUSION INIT: CPT

## 2019-03-16 RX ORDER — FLUCONAZOLE 100 MG/1
200 TABLET ORAL ONCE
Status: COMPLETED | OUTPATIENT
Start: 2019-03-16 | End: 2019-03-16

## 2019-03-16 RX ORDER — AMOXICILLIN 250 MG
1 CAPSULE ORAL DAILY
Qty: 20 TABLET | Refills: 0 | Status: SHIPPED | OUTPATIENT
Start: 2019-03-16 | End: 2022-04-28

## 2019-03-16 RX ORDER — POLYETHYLENE GLYCOL 3350 17 G/17G
17 POWDER, FOR SOLUTION ORAL DAILY
Qty: 14 EACH | Refills: 0 | Status: SHIPPED | OUTPATIENT
Start: 2019-03-16 | End: 2020-02-12 | Stop reason: HOSPADM

## 2019-03-16 RX ORDER — VENLAFAXINE HYDROCHLORIDE 150 MG/1
300 CAPSULE, EXTENDED RELEASE ORAL DAILY
COMMUNITY

## 2019-03-16 RX ORDER — AMOXICILLIN AND CLAVULANATE POTASSIUM 875; 125 MG/1; MG/1
1 TABLET, FILM COATED ORAL EVERY 12 HOURS
Qty: 14 TABLET | Refills: 0 | Status: SHIPPED | OUTPATIENT
Start: 2019-03-16 | End: 2019-03-23

## 2019-03-16 RX ADMIN — FLUCONAZOLE 200 MG: 100 TABLET ORAL at 16:13

## 2019-03-16 RX ADMIN — SODIUM CHLORIDE 1000 ML: 0.9 INJECTION, SOLUTION INTRAVENOUS at 14:27

## 2019-03-16 RX ADMIN — INSULIN LISPRO 12 UNITS: 100 INJECTION, SOLUTION INTRAVENOUS; SUBCUTANEOUS at 15:40

## 2019-03-16 NOTE — ED PROVIDER NOTES
History  Chief Complaint   Patient presents with    Hyperglycemia - no symptoms     States started with a toothache right upper tooth 3 days ago and since blood sugar is 400 or more  Does sugar 3 times a day with humalog coverage  Last BS at 9a 452 and took 6 units     Dental Pain    Constipation     Hx of constipation , no BM for several days     44 y/o female presents with right toothache pain, 3 days ago getting worse  Also has constipation has not moved her bowels a week, she is on methadone  Is not on laxatives  Patient on insulin for DM2 and says her blood sugar running in 400s  Denies abdominal pain, nausea,vomiting,fevers,chills,dysuria, urgency or frequency, and chest pain or dyspnea  also has a yeast infection  History provided by:  Patient   used: No        Prior to Admission Medications   Prescriptions Last Dose Informant Patient Reported? Taking?    INS SYRINGE/NEEDLE 1CC/28G (B-D INS SYR MICROFINE 1CC/28G) 28G X 1/2" 1 ML MISC   No No   Sig: by Does not apply route 3 (three) times a day with meals   Lancets (ONETOUCH ULTRASOFT) lancets   Yes No   ONE TOUCH ULTRA TEST test strip   No No   Sig: Test 3 times a day BEFORE MEALS   divalproex sodium (DEPAKOTE ER) 500 mg 24 hr tablet 3/16/2019 at 0900  No Yes   Sig: Take 1 tablet (500 mg total) by mouth 2 (two) times a day   gabapentin (NEURONTIN) 800 mg tablet 3/16/2019 at 0900  No Yes   Sig: Take 1 tablet (800 mg total) by mouth 3 (three) times a day   glucose blood (ONE TOUCH ULTRA TEST) test strip   No No   Sig: Test 3 times a day before meals   insulin lispro (HumaLOG) 100 units/mL injection 3/16/2019 at 0900 Self Yes Yes   Sig: Inject under the skin 3 (three) times a day before meals Sliding scale three times a day   lisinopril-hydrochlorothiazide (PRINZIDE,ZESTORETIC) 20-12 5 MG per tablet 3/16/2019 at Unknown time  No Yes   Sig: Take 1 tablet by mouth daily   methadone (DOLOPHINE) 10 mg/mL oral concentrated solution 3/16/2019 at Unknown time Self Yes Yes   Sig: Take 100 mg by mouth daily Methadone clinic   traZODone (DESYREL) 50 mg tablet 3/15/2019 at Unknown time  No Yes   Sig: Take 1 tablet (50 mg total) by mouth daily at bedtime   venlafaxine (EFFEXOR-XR) 150 mg 24 hr capsule 3/16/2019 at Unknown time Self Yes Yes   Sig: Take 150 mg by mouth daily      Facility-Administered Medications: None       Past Medical History:   Diagnosis Date    Depression     Diabetes mellitus ([address] )     Hypertension     Neuropathy        Past Surgical History:   Procedure Laterality Date    INCISION AND DRAINAGE OF WOUND Right 7/1/2017    Procedure: INCISION AND DRAINAGE (I&D) EXTREMITY THIGH;  Surgeon: Rikcey Leonard MD;  Location: [facility];  Service: General       Family History   Problem Relation Age of Onset    Hypertension Mother     Diabetes Father     Hypertension Father     Diabetes Family     Hypertension Family      I have reviewed and agree with the history as documented  Social History     Tobacco Use    Smoking status: Never Smoker    Smokeless tobacco: Never Used   Substance Use Topics    Alcohol use: No    Drug use: No     Types: Heroin     Comment: lasr use per patient April 2017        Review of Systems   All other systems reviewed and are negative  Physical Exam  Physical Exam   Constitutional: She is oriented to person, place, and time  She appears well-developed and well-nourished  HENT:   Head: Normocephalic and atraumatic  Right pre molar mild erythema no edema, no evidence of abscess   Eyes: Pupils are equal, round, and reactive to light  EOM are normal    Neck: Normal range of motion  Neck supple  Cardiovascular: Normal rate and regular rhythm  Pulmonary/Chest: Effort normal and breath sounds normal    Abdominal: Soft  Bowel sounds are normal  She exhibits no distension and no mass  There is no tenderness  There is no rebound and no guarding  No hernia  Musculoskeletal: Normal range of motion  Neurological: She is alert and oriented to person, place, and time  Skin: Skin is warm and dry  Psychiatric: She has a normal mood and affect  Nursing note and vitals reviewed        Vital Signs  ED Triage Vitals [03/16/19 1355]   Temperature Pulse Resp Blood Pressure SpO2   98 8 °F (37 1 °C) 90 -- (!) 180/94 94 %      Temp Source Heart Rate Source Patient Position - Orthostatic VS BP Location FiO2 (%)   Oral Monitor Sitting Left arm --      Pain Score       7           Vitals:    03/16/19 1355   BP: (!) 180/94   Pulse: 90   Patient Position - Orthostatic VS: Sitting       qSOFA     Row Name 03/16/19 1355                Altered mental status GCS < 15  --        Respiratory Rate > / =22  --        Systolic BP < / =109  0        Q Sofa Score  --              Visual Acuity      ED Medications  Medications   sodium chloride 0 9 % bolus 1,000 mL (0 mL Intravenous Stopped 3/16/19 1620)   insulin lispro (HumaLOG) 100 units/mL subcutaneous injection 12 Units (12 Units Subcutaneous Given 3/16/19 1540)   fluconazole (DIFLUCAN) tablet 200 mg (200 mg Oral Given 3/16/19 1613)       Diagnostic Studies  Results Reviewed     Procedure Component Value Units Date/Time    Urine culture [[id number]]  (Abnormal) Collected:  03/16/19 1426    Lab Status:  Final result Specimen:  Urine, Clean Catch Updated:  03/18/19 1624     Urine Culture 60,000-69,000 cfu/ml Beta Hemolytic Streptococcus Group B      80,000-89,000 cfu/ml     Fingerstick Glucose (POCT) [[id number]]  (Abnormal) Collected:  03/16/19 1546    Lab Status:  Final result Updated:  03/16/19 1547     POC Glucose 421 mg/dl     Basic metabolic panel [[id number]]  (Abnormal) Collected:  03/16/19 1426    Lab Status:  Final result Specimen:  Blood from Arm, Right Updated:  03/16/19 1505     Sodium 128 mmol/L      Potassium 5 7 mmol/L      Chloride 91 mmol/L      CO2 31 mmol/L      ANION GAP 6 mmol/L      BUN 9 mg/dL      Creatinine 0 96 mg/dL      Glucose 517 mg/dL      Calcium 8 8 mg/dL      eGFR 73 ml/min/1 73sq m     Narrative:       National Kidney Disease Education Program recommendations are as follows:  GFR calculation is accurate only with a steady state creatinine  Chronic Kidney disease less than 60 ml/min/1 73 sq  meters  Kidney failure less than 15 ml/min/1 73 sq  meters      Magnesium [[id number]]  (Normal) Collected:  03/16/19 1426    Lab Status:  Final result Specimen:  Blood from Arm, Right Updated:  03/16/19 1503     Magnesium 1 8 mg/dL     Acetone [[id number]]  (Normal) Collected:  03/16/19 1426    Lab Status:  Final result Specimen:  Blood from Arm, Right Updated:  03/16/19 1459     Acetone, Bld Negative    Hepatic function panel [[id number]]  (Abnormal) Collected:  03/16/19 1426    Lab Status:  Final result Specimen:  Blood from Arm, Right Updated:  03/16/19 1452     Total Bilirubin 0 70 mg/dL      Bilirubin, Direct 0 00 mg/dL      Alkaline Phosphatase 74 U/L      AST 75 U/L      ALT 53 U/L      Total Protein 7 8 g/dL      Albumin 3 1 g/dL     Lipase [[id number]]  (Abnormal) Collected:  03/16/19 1426    Lab Status:  Final result Specimen:  Blood from Arm, Right Updated:  03/16/19 1452     Lipase 40 u/L     Urine Microscopic [[id number]]  (Abnormal) Collected:  03/16/19 1427    Lab Status:  Final result Specimen:  Urine, Clean Catch Updated:  03/16/19 1442     RBC, UA 0-1 /hpf      WBC, UA 2-4 /hpf      Epithelial Cells Occasional /hpf      Bacteria, UA Occasional /hpf     Blood gas, venous [[id number]]  (Abnormal) Collected:  03/16/19 1426    Lab Status:  Final result Specimen:  Blood from Arm, Right Updated:  03/16/19 1436     pH, Balwinder 7 375     pCO2, Balwinder 52 0 mm Hg      pO2, Balwinder 63 2 mm Hg      HCO3, Balwinder 29 7 mmol/L      Base Excess, Balwinder 3 3 mmol/L      O2 Content, Balwinder 18 5 ml/dL      O2 HGB, VENOUS 88 2 %     UA w Reflex to Microscopic [[id number]]  (Abnormal) Collected:  03/16/19 1427    Lab Status:  Final result Specimen:  Urine, Clean Catch Updated:  03/16/19 1436     Color, UA Light Yellow     Clarity, UA Clear     Specific Gravity, UA <=1 005     pH, UA 6 0     Leukocytes, UA Elevated glucose may cause decreased leukocyte values  See urine microscopic for [facility] result/     Nitrite, UA Negative     Protein, UA Negative mg/dl      Glucose, UA >=1000 (1%) mg/dl      Ketones, UA Negative mg/dl      Urobilinogen, UA 0 2 E U /dl      Bilirubin, UA Negative     Blood, UA Negative    CBC and differential [[id number]] Collected:  03/16/19 1426    Lab Status:  Final result Specimen:  Blood from Arm, Right Updated:  03/16/19 1433     WBC 8 97 Thousand/uL      RBC 4 66 Million/uL      Hemoglobin 14 0 g/dL      Hematocrit 40 6 %      MCV 87 fL      MCH 30 0 pg      MCHC 34 5 g/dL      RDW 13 1 %      MPV 9 6 fL      Platelets 770 Thousands/uL      nRBC 0 /100 WBCs      Neutrophils Relative 64 %      Immat GRANS % 0 %      Lymphocytes Relative 29 %      Monocytes Relative 6 %      Eosinophils Relative 1 %      Basophils Relative 0 %      Neutrophils Absolute 5 63 Thousands/µL      Immature Grans Absolute 0 03 Thousand/uL      Lymphocytes Absolute 2 58 Thousands/µL      Monocytes Absolute 0 57 Thousand/µL      Eosinophils Absolute 0 13 Thousand/µL      Basophils Absolute 0 03 Thousands/µL     POCT pregnancy, urine [[id number]]  (Normal) Resulted:  03/16/19 1432    Lab Status:  Final result Updated:  03/16/19 1432     EXT PREG TEST UR (Ref: Negative) negative    Fingerstick Glucose (POCT) [[id number]]  (Abnormal) Collected:  03/16/19 1406    Lab Status:  Final result Updated:  03/16/19 1407     POC Glucose >500 mg/dl                  XR abdomen 1 view kub   Final Result by Caroline Hoyt MD (03/17 2148)      Constipation pattern  No evidence of bowel obstruction        Potential left-sided nephrolithiasis               Workstation performed: [id number]                    Procedures  Procedures       Phone Contacts  ED Phone Contact    ED Course                               MDM  Number of Diagnoses or Management Options  Constipation:   Hyperglycemia:   Pain, dental:   Diagnosis management comments: Patient evaluated with labs, imaging  I reviewed the results and discussed them with the patient  Patient discharged with appropriate instructions medications and follow-up  Patient verbalized understanding had no further questions at the time of discharge  Patient had stable vital signs and well-appearing at the time of discharge  Amount and/or Complexity of Data Reviewed  Clinical lab tests: reviewed and ordered  Tests in the radiology section of CPT®: reviewed and ordered  Tests in the medicine section of CPT®: ordered and reviewed    Patient Progress  Patient progress: stable      Disposition  Final diagnoses:   Constipation   Hyperglycemia   Pain, dental     Time reflects when diagnosis was documented in both MDM as applicable and the Disposition within this note     Time User Action Codes Description Comment    3/16/2019  4:04 PM BronwynpuQiana Add [K59 00] Constipation     3/16/2019  4:04 PM Qiana Kelley Add [R73 9] Hyperglycemia     3/16/2019  4:04 PM AnepuDebbie Add [K08 89] Pain, dental       ED Disposition     ED Disposition Condition Date/Time Comment    Discharge Stable Sat Mar 16, 2019  4:04 PM Marcia Barron discharge to home/self care              Follow-up Information     Follow up With Specialties Details Why Contact Info Additional Information    [address] [facility] Emergency Department Emergency Medicine  If symptoms worsen [address] [facility] [id number] [phone] [facility], [place], [place], [address]          Discharge Medication List as of 3/16/2019  4:06 PM      START taking these medications    Details   al mag oxide-diphenhydramine-lidocaine viscous (MAGIC MOUTHWASH) 1:1:1 suspension Swish and spit 10 mL every 4 (four) hours as needed for mouth pain or discomfort, Starting Sat 3/16/2019, Print      amoxicillin-clavulanate (AUGMENTIN) 875-125 mg per tablet Take 1 tablet by mouth every 12 (twelve) hours for 7 days, Starting Sat 3/16/2019, Until Sat 3/23/2019, Print      polyethylene glycol (MIRALAX) 17 g packet Take 17 g by mouth daily, Starting Sat 3/16/2019, Print      senna-docusate sodium (SENOKOT S) 8 6-50 mg per tablet Take 1 tablet by mouth daily, Starting Sat 3/16/2019, Print         CONTINUE these medications which have NOT CHANGED    Details   divalproex sodium (DEPAKOTE ER) 500 mg 24 hr tablet Take 1 tablet (500 mg total) by mouth 2 (two) times a day, Starting Wed 6/6/2018, Normal      gabapentin (NEURONTIN) 800 mg tablet Take 1 tablet (800 mg total) by mouth 3 (three) times a day, Starting Mon 11/26/2018, Normal      !! glucose blood (ONE TOUCH ULTRA TEST) test strip Test 3 times a day before meals, Normal      INS SYRINGE/NEEDLE 1CC/28G (B-D INS SYR MICROFINE 1CC/28G) 28G X 1/2" 1 ML MISC by Does not apply route 3 (three) times a day with meals, Starting Wed 6/6/2018, Normal      insulin lispro (HumaLOG) 100 units/mL injection Inject under the skin 3 (three) times a day before meals Sliding scale three times a day, Historical Med      Lancets (ONETOUCH ULTRASOFT) lancets Historical Med      lisinopril-hydrochlorothiazide (PRINZIDE,ZESTORETIC) 20-12 5 MG per tablet Take 1 tablet by mouth daily, Starting Mon 11/26/2018, Normal      methadone (DOLOPHINE) 10 mg/mL oral concentrated solution Take 100 mg by mouth daily Methadone clinic, Historical Med      !! ONE TOUCH ULTRA TEST test strip Test 3 times a day BEFORE MEALS, Normal      traZODone (DESYREL) 50 mg tablet Take 1 tablet (50 mg total) by mouth daily at bedtime, Starting Mon 11/26/2018, Normal      venlafaxine (EFFEXOR-XR) 150 mg 24 hr capsule Take 150 mg by mouth daily, Historical Med      insulin glargine (BASAGLAR KWIKPEN) 100 units/mL injection pen Inject 30 Units under the skin daily, Starting Mon 11/26/2018, Normal       !! - Potential duplicate medications found   Please discuss with provider  No discharge procedures on file      ED Provider  Electronically Signed by           Matthew Castellon DO  03/19/19 5454

## 2019-03-16 NOTE — ED NOTES
Went into room and patient angry stated "this place is terrible and I just want to go home  They brought me back from xray and did not hang my fluid bag back up, and the call bell was way over there and the sides were up so I couldn't walk out there  I was yelling and yelling for a half hour before anyone came  Finally some lady came and hung up my bag  I dont have a ride home so I need to hurry up and get done and get out of here"  Apologized to pt for her experience   Let patient know that her blood glucose is very high at 517 and that the doctor is aware  Assured pt that we will be trying to get that under control as quickly as possible        Virginia Bird, CHICA  03/16/19 3305

## 2019-03-18 LAB
BACTERIA UR CULT: ABNORMAL
BACTERIA UR CULT: ABNORMAL

## 2019-03-19 ENCOUNTER — TELEPHONE (OUTPATIENT)
Dept: FAMILY MEDICINE CLINIC | Facility: CLINIC | Age: 44
End: 2019-03-19

## 2019-03-19 RX ORDER — INSULIN GLARGINE 100 [IU]/ML
30 INJECTION, SOLUTION SUBCUTANEOUS
COMMUNITY
End: 2019-05-28 | Stop reason: SDUPTHER

## 2019-05-28 DIAGNOSIS — N18.2 OTHER SPECIFIED DIABETES MELLITUS WITH STAGE 2 CHRONIC KIDNEY DISEASE, WITHOUT LONG-TERM CURRENT USE OF INSULIN (HCC): Primary | ICD-10-CM

## 2019-05-28 DIAGNOSIS — E13.22 OTHER SPECIFIED DIABETES MELLITUS WITH STAGE 2 CHRONIC KIDNEY DISEASE, WITHOUT LONG-TERM CURRENT USE OF INSULIN (HCC): Primary | ICD-10-CM

## 2019-05-29 RX ORDER — INSULIN GLARGINE 100 [IU]/ML
30 INJECTION, SOLUTION SUBCUTANEOUS
Qty: 9 ML | Refills: 3 | Status: SHIPPED | OUTPATIENT
Start: 2019-05-29 | End: 2019-07-01 | Stop reason: SDUPTHER

## 2019-06-01 DIAGNOSIS — E11.8 TYPE 2 DIABETES MELLITUS WITH COMPLICATION, WITHOUT LONG-TERM CURRENT USE OF INSULIN (HCC): Primary | ICD-10-CM

## 2019-06-03 RX ORDER — INSULIN GLARGINE 100 [IU]/ML
INJECTION, SOLUTION SUBCUTANEOUS
Qty: 15 PEN | Refills: 5 | Status: SHIPPED | OUTPATIENT
Start: 2019-06-03 | End: 2019-07-01 | Stop reason: SDUPTHER

## 2019-06-12 DIAGNOSIS — I10 ESSENTIAL HYPERTENSION: ICD-10-CM

## 2019-06-13 RX ORDER — LISINOPRIL AND HYDROCHLOROTHIAZIDE 20; 12.5 MG/1; MG/1
TABLET ORAL
Qty: 60 TABLET | Refills: 2 | Status: SHIPPED | OUTPATIENT
Start: 2019-06-13 | End: 2020-09-18 | Stop reason: SDUPTHER

## 2019-07-01 DIAGNOSIS — E11.8 TYPE 2 DIABETES MELLITUS WITH COMPLICATION, WITHOUT LONG-TERM CURRENT USE OF INSULIN (HCC): ICD-10-CM

## 2019-09-17 ENCOUNTER — APPOINTMENT (EMERGENCY)
Dept: RADIOLOGY | Facility: HOSPITAL | Age: 44
End: 2019-09-17
Payer: COMMERCIAL

## 2019-09-17 ENCOUNTER — HOSPITAL ENCOUNTER (OUTPATIENT)
Facility: HOSPITAL | Age: 44
Setting detail: OBSERVATION
Discharge: HOME | End: 2019-09-18
Attending: EMERGENCY MEDICINE | Admitting: FAMILY MEDICINE
Payer: COMMERCIAL

## 2019-09-17 DIAGNOSIS — E66.01 MORBIDLY OBESE (HCC): ICD-10-CM

## 2019-09-17 DIAGNOSIS — R41.82 ALTERED MENTAL STATUS: ICD-10-CM

## 2019-09-17 DIAGNOSIS — R73.9 HYPERGLYCEMIA: Primary | ICD-10-CM

## 2019-09-17 PROBLEM — D64.9 ANEMIA: Status: RESOLVED | Noted: 2017-06-30 | Resolved: 2019-09-17

## 2019-09-17 PROBLEM — R11.2 NAUSEA & VOMITING: Status: RESOLVED | Noted: 2018-11-26 | Resolved: 2019-09-17

## 2019-09-17 PROBLEM — B00.7: Status: RESOLVED | Noted: 2017-04-18 | Resolved: 2019-09-17

## 2019-09-17 PROBLEM — K13.79 MOUTH SORES: Status: RESOLVED | Noted: 2017-04-18 | Resolved: 2019-09-17

## 2019-09-17 PROBLEM — F32.A ANXIETY AND DEPRESSION: Status: ACTIVE | Noted: 2017-04-18

## 2019-09-17 PROBLEM — M62.830 BACK MUSCLE SPASM: Status: RESOLVED | Noted: 2017-06-23 | Resolved: 2019-09-17

## 2019-09-17 PROBLEM — F32.A DEPRESSION: Status: RESOLVED | Noted: 2017-04-18 | Resolved: 2019-09-17

## 2019-09-17 PROBLEM — Z79.4 TYPE 2 DIABETES MELLITUS WITH HYPERGLYCEMIA, WITH LONG-TERM CURRENT USE OF INSULIN (HCC): Status: ACTIVE | Noted: 2017-04-18

## 2019-09-17 PROBLEM — E11.65 TYPE 2 DIABETES MELLITUS WITH HYPERGLYCEMIA, WITH LONG-TERM CURRENT USE OF INSULIN (HCC): Status: ACTIVE | Noted: 2017-04-18

## 2019-09-17 PROBLEM — L03.113 CELLULITIS OF RIGHT HAND: Status: RESOLVED | Noted: 2018-01-15 | Resolved: 2019-09-17

## 2019-09-17 PROBLEM — M54.6 ACUTE RIGHT-SIDED THORACIC BACK PAIN: Status: RESOLVED | Noted: 2017-06-25 | Resolved: 2019-09-17

## 2019-09-17 LAB
ALBUMIN SERPL BCP-MCNC: 3.4 G/DL (ref 3.5–5)
ALP SERPL-CCNC: 80 U/L (ref 46–116)
ALT SERPL W P-5'-P-CCNC: 51 U/L (ref 12–78)
AMPHETAMINES SERPL QL SCN: NEGATIVE
ANION GAP SERPL CALCULATED.3IONS-SCNC: 6 MMOL/L (ref 4–13)
APTT PPP: 26 SECONDS (ref 25–32)
AST SERPL W P-5'-P-CCNC: 20 U/L (ref 5–45)
BACTERIA UR QL AUTO: ABNORMAL /HPF
BARBITURATES UR QL: NEGATIVE
BASE EX.OXY STD BLDV CALC-SCNC: 68.7 % (ref 60–80)
BASE EXCESS BLDV CALC-SCNC: 3.7 MMOL/L
BASOPHILS # BLD AUTO: 0.01 THOUSANDS/ΜL (ref 0–0.1)
BASOPHILS NFR BLD AUTO: 0 % (ref 0–1)
BENZODIAZ UR QL: NEGATIVE
BETA-HYDROXYBUTYRATE: 0.2 MMOL/L
BILIRUB SERPL-MCNC: 0.4 MG/DL (ref 0.2–1)
BILIRUB UR QL STRIP: NEGATIVE
BUN SERPL-MCNC: 11 MG/DL (ref 5–25)
CALCIUM SERPL-MCNC: 9 MG/DL (ref 8.3–10.1)
CHLORIDE SERPL-SCNC: 92 MMOL/L (ref 100–108)
CLARITY UR: CLEAR
CO2 SERPL-SCNC: 32 MMOL/L (ref 21–32)
COCAINE UR QL: NEGATIVE
COLOR UR: ABNORMAL
CREAT SERPL-MCNC: 1.18 MG/DL (ref 0.6–1.3)
EOSINOPHIL # BLD AUTO: 0.16 THOUSAND/ΜL (ref 0–0.61)
EOSINOPHIL NFR BLD AUTO: 2 % (ref 0–6)
ERYTHROCYTE [DISTWIDTH] IN BLOOD BY AUTOMATED COUNT: 13 % (ref 11.6–15.1)
EST. AVERAGE GLUCOSE BLD GHB EST-MCNC: 260 MG/DL
EXT PREG TEST URINE: NEGATIVE
EXT. CONTROL ED NAV: ABNORMAL
GFR SERPL CREATININE-BSD FRML MDRD: 57 ML/MIN/1.73SQ M
GLUCOSE SERPL-MCNC: 289 MG/DL (ref 65–140)
GLUCOSE SERPL-MCNC: 321 MG/DL (ref 65–140)
GLUCOSE SERPL-MCNC: 492 MG/DL (ref 65–140)
GLUCOSE SERPL-MCNC: 584 MG/DL (ref 65–140)
GLUCOSE SERPL-MCNC: >500 MG/DL (ref 65–140)
GLUCOSE UR STRIP-MCNC: ABNORMAL MG/DL
HBA1C MFR BLD: 10.7 % (ref 4.2–6.3)
HCO3 BLDV-SCNC: 33 MMOL/L (ref 24–30)
HCT VFR BLD AUTO: 42.4 % (ref 34.8–46.1)
HGB BLD-MCNC: 14.2 G/DL (ref 11.5–15.4)
HGB UR QL STRIP.AUTO: ABNORMAL
IMM GRANULOCYTES # BLD AUTO: 0.02 THOUSAND/UL (ref 0–0.2)
IMM GRANULOCYTES NFR BLD AUTO: 0 % (ref 0–2)
INR PPP: 0.93 (ref 0.91–1.09)
KETONES UR STRIP-MCNC: NEGATIVE MG/DL
LEUKOCYTE ESTERASE UR QL STRIP: ABNORMAL
LYMPHOCYTES # BLD AUTO: 2.34 THOUSANDS/ΜL (ref 0.6–4.47)
LYMPHOCYTES NFR BLD AUTO: 32 % (ref 14–44)
MAGNESIUM SERPL-MCNC: 1.8 MG/DL (ref 1.6–2.6)
MCH RBC QN AUTO: 29.5 PG (ref 26.8–34.3)
MCHC RBC AUTO-ENTMCNC: 33.5 G/DL (ref 31.4–37.4)
MCV RBC AUTO: 88 FL (ref 82–98)
METHADONE UR QL: POSITIVE
MONOCYTES # BLD AUTO: 0.45 THOUSAND/ΜL (ref 0.17–1.22)
MONOCYTES NFR BLD AUTO: 6 % (ref 4–12)
NEUTROPHILS # BLD AUTO: 4.36 THOUSANDS/ΜL (ref 1.85–7.62)
NEUTS SEG NFR BLD AUTO: 60 % (ref 43–75)
NITRITE UR QL STRIP: NEGATIVE
NON-SQ EPI CELLS URNS QL MICRO: ABNORMAL /HPF
NRBC BLD AUTO-RTO: 0 /100 WBCS
O2 CT BLDV-SCNC: 14.5 ML/DL
OPIATES UR QL SCN: NEGATIVE
PCO2 BLDV: 71.8 MM HG (ref 42–50)
PCP UR QL: NEGATIVE
PH BLDV: 7.28 [PH] (ref 7.3–7.4)
PH UR STRIP.AUTO: 5.5 [PH]
PLATELET # BLD AUTO: 243 THOUSANDS/UL (ref 149–390)
PMV BLD AUTO: 9.4 FL (ref 8.9–12.7)
PO2 BLDV: 39.5 MM HG (ref 35–45)
POTASSIUM SERPL-SCNC: 4.3 MMOL/L (ref 3.5–5.3)
PROT SERPL-MCNC: 7.9 G/DL (ref 6.4–8.2)
PROT UR STRIP-MCNC: NEGATIVE MG/DL
PROTHROMBIN TIME: 10 SECONDS (ref 9.8–12)
RBC # BLD AUTO: 4.82 MILLION/UL (ref 3.81–5.12)
RBC #/AREA URNS AUTO: ABNORMAL /HPF
SODIUM SERPL-SCNC: 130 MMOL/L (ref 136–145)
SP GR UR STRIP.AUTO: 1.01 (ref 1–1.03)
THC UR QL: NEGATIVE
TROPONIN I SERPL-MCNC: <0.02 NG/ML
TSH SERPL DL<=0.05 MIU/L-ACNC: 1.66 UIU/ML (ref 0.36–3.74)
UROBILINOGEN UR QL STRIP.AUTO: 0.2 E.U./DL
VALPROATE SERPL-MCNC: 52 UG/ML (ref 50–100)
WBC # BLD AUTO: 7.34 THOUSAND/UL (ref 4.31–10.16)
WBC #/AREA URNS AUTO: ABNORMAL /HPF

## 2019-09-17 PROCEDURE — 96361 HYDRATE IV INFUSION ADD-ON: CPT

## 2019-09-17 PROCEDURE — 80053 COMPREHEN METABOLIC PANEL: CPT | Performed by: EMERGENCY MEDICINE

## 2019-09-17 PROCEDURE — 80164 ASSAY DIPROPYLACETIC ACD TOT: CPT | Performed by: EMERGENCY MEDICINE

## 2019-09-17 PROCEDURE — 96360 HYDRATION IV INFUSION INIT: CPT

## 2019-09-17 PROCEDURE — 36415 COLL VENOUS BLD VENIPUNCTURE: CPT | Performed by: EMERGENCY MEDICINE

## 2019-09-17 PROCEDURE — 83735 ASSAY OF MAGNESIUM: CPT | Performed by: EMERGENCY MEDICINE

## 2019-09-17 PROCEDURE — 96372 THER/PROPH/DIAG INJ SC/IM: CPT

## 2019-09-17 PROCEDURE — 83036 HEMOGLOBIN GLYCOSYLATED A1C: CPT | Performed by: STUDENT IN AN ORGANIZED HEALTH CARE EDUCATION/TRAINING PROGRAM

## 2019-09-17 PROCEDURE — 87081 CULTURE SCREEN ONLY: CPT | Performed by: FAMILY MEDICINE

## 2019-09-17 PROCEDURE — 81001 URINALYSIS AUTO W/SCOPE: CPT | Performed by: EMERGENCY MEDICINE

## 2019-09-17 PROCEDURE — 84484 ASSAY OF TROPONIN QUANT: CPT | Performed by: EMERGENCY MEDICINE

## 2019-09-17 PROCEDURE — 82805 BLOOD GASES W/O2 SATURATION: CPT | Performed by: EMERGENCY MEDICINE

## 2019-09-17 PROCEDURE — 82948 REAGENT STRIP/BLOOD GLUCOSE: CPT

## 2019-09-17 PROCEDURE — 80307 DRUG TEST PRSMV CHEM ANLYZR: CPT | Performed by: EMERGENCY MEDICINE

## 2019-09-17 PROCEDURE — 82010 KETONE BODYS QUAN: CPT | Performed by: EMERGENCY MEDICINE

## 2019-09-17 PROCEDURE — 85025 COMPLETE CBC W/AUTO DIFF WBC: CPT | Performed by: EMERGENCY MEDICINE

## 2019-09-17 PROCEDURE — 81025 URINE PREGNANCY TEST: CPT | Performed by: EMERGENCY MEDICINE

## 2019-09-17 PROCEDURE — 85610 PROTHROMBIN TIME: CPT | Performed by: EMERGENCY MEDICINE

## 2019-09-17 PROCEDURE — 84443 ASSAY THYROID STIM HORMONE: CPT | Performed by: EMERGENCY MEDICINE

## 2019-09-17 PROCEDURE — 99285 EMERGENCY DEPT VISIT HI MDM: CPT

## 2019-09-17 PROCEDURE — 93005 ELECTROCARDIOGRAM TRACING: CPT

## 2019-09-17 PROCEDURE — 70450 CT HEAD/BRAIN W/O DYE: CPT

## 2019-09-17 PROCEDURE — 85730 THROMBOPLASTIN TIME PARTIAL: CPT | Performed by: EMERGENCY MEDICINE

## 2019-09-17 RX ORDER — ACETAMINOPHEN 325 MG/1
650 TABLET ORAL EVERY 6 HOURS PRN
Status: CANCELLED | OUTPATIENT
Start: 2019-09-17

## 2019-09-17 RX ORDER — METHADONE HYDROCHLORIDE 10 MG/1
110 TABLET ORAL DAILY
Status: DISCONTINUED | OUTPATIENT
Start: 2019-09-18 | End: 2019-09-18 | Stop reason: HOSPADM

## 2019-09-17 RX ORDER — DIVALPROEX SODIUM 500 MG/1
500 TABLET, EXTENDED RELEASE ORAL 2 TIMES DAILY
Status: DISCONTINUED | OUTPATIENT
Start: 2019-09-17 | End: 2019-09-18 | Stop reason: HOSPADM

## 2019-09-17 RX ORDER — INSULIN GLARGINE 100 [IU]/ML
30 INJECTION, SOLUTION SUBCUTANEOUS
Status: DISCONTINUED | OUTPATIENT
Start: 2019-09-17 | End: 2019-09-18 | Stop reason: HOSPADM

## 2019-09-17 RX ORDER — TRAZODONE HYDROCHLORIDE 50 MG/1
50 TABLET ORAL
Status: DISCONTINUED | OUTPATIENT
Start: 2019-09-17 | End: 2019-09-18 | Stop reason: HOSPADM

## 2019-09-17 RX ORDER — POLYETHYLENE GLYCOL 3350 17 G/17G
17 POWDER, FOR SOLUTION ORAL DAILY
Status: CANCELLED | OUTPATIENT
Start: 2019-09-17

## 2019-09-17 RX ORDER — ONDANSETRON 2 MG/ML
4 INJECTION INTRAMUSCULAR; INTRAVENOUS EVERY 6 HOURS PRN
Status: CANCELLED | OUTPATIENT
Start: 2019-09-17

## 2019-09-17 RX ORDER — CLONAZEPAM 1 MG/1
1 TABLET ORAL 3 TIMES DAILY
COMMUNITY
End: 2020-02-12 | Stop reason: HOSPADM

## 2019-09-17 RX ORDER — VENLAFAXINE HYDROCHLORIDE 150 MG/1
150 CAPSULE, EXTENDED RELEASE ORAL DAILY
Status: DISCONTINUED | OUTPATIENT
Start: 2019-09-18 | End: 2019-09-18 | Stop reason: HOSPADM

## 2019-09-17 RX ORDER — MAGNESIUM HYDROXIDE/ALUMINUM HYDROXICE/SIMETHICONE 120; 1200; 1200 MG/30ML; MG/30ML; MG/30ML
30 SUSPENSION ORAL EVERY 6 HOURS PRN
Status: CANCELLED | OUTPATIENT
Start: 2019-09-17

## 2019-09-17 RX ORDER — GABAPENTIN 400 MG/1
800 CAPSULE ORAL 3 TIMES DAILY
Status: DISCONTINUED | OUTPATIENT
Start: 2019-09-17 | End: 2019-09-18 | Stop reason: HOSPADM

## 2019-09-17 RX ADMIN — INSULIN HUMAN 15 UNITS: 100 INJECTION, SOLUTION PARENTERAL at 12:00

## 2019-09-17 RX ADMIN — INSULIN LISPRO 6 UNITS: 100 INJECTION, SOLUTION INTRAVENOUS; SUBCUTANEOUS at 21:02

## 2019-09-17 RX ADMIN — SODIUM CHLORIDE 1000 ML: 0.9 INJECTION, SOLUTION INTRAVENOUS at 10:58

## 2019-09-17 RX ADMIN — GABAPENTIN 800 MG: 400 CAPSULE ORAL at 17:59

## 2019-09-17 RX ADMIN — SODIUM CHLORIDE 1000 ML: 0.9 INJECTION, SOLUTION INTRAVENOUS at 12:02

## 2019-09-17 RX ADMIN — INSULIN GLARGINE 30 UNITS: 100 INJECTION, SOLUTION SUBCUTANEOUS at 21:01

## 2019-09-17 RX ADMIN — DIVALPROEX SODIUM 500 MG: 500 TABLET, EXTENDED RELEASE ORAL at 17:59

## 2019-09-17 NOTE — ED PROVIDER NOTES
History  Chief Complaint   Patient presents with    Dizziness     Pt reports 35 minutes ago pt reports feeling dizzy and "just not feeling right"  Pt reports thinking this is "a problem with my sugar  Pt reports being diabetis and taking long acting lantus as well as short acting insulin  19-year-old female presents with the confusion altered mentation feeling not right  She says this happens every time her blood sugar is elevated  Patient has a type 2 diabetic currently insulin dependent  She says she is compliant with her medications and is working hard on her diet  patient denies any chest pain,dyspnea, nausea,vomiting,abdominal pain,diarrhea,fevers,chills, neck pain,stiffness or any other symptoms  No focal symptoms of weakness, numbness, no recent head trauma, not on anticoagulation  History provided by:  Patient   used: No        Prior to Admission Medications   Prescriptions Last Dose Informant Patient Reported? Taking?    INS SYRINGE/NEEDLE 1CC/28G (B-D INS SYR MICROFINE 1CC/28G) 28G X 1/2" 1 ML MISC   No No   Sig: by Does not apply route 3 (three) times a day with meals   Lancets (ONETOUCH ULTRASOFT) lancets   Yes No   ONE TOUCH ULTRA TEST test strip   No No   Sig: Test 3 times a day BEFORE MEALS   al mag oxide-diphenhydramine-lidocaine viscous (MAGIC MOUTHWASH) 1:1:1 suspension Not Taking at Unknown time  No No   Sig: Swish and spit 10 mL every 4 (four) hours as needed for mouth pain or discomfort   Patient not taking: Reported on 9/17/2019   clonazePAM (KlonoPIN) 1 mg tablet 9/17/2019 at Unknown time  Yes Yes   Sig: Take 1 mg by mouth 2 (two) times a day   divalproex sodium (DEPAKOTE ER) 500 mg 24 hr tablet 9/17/2019 at Unknown time  No Yes   Sig: Take 1 tablet (500 mg total) by mouth 2 (two) times a day   gabapentin (NEURONTIN) 800 mg tablet 9/17/2019 at Unknown time  No Yes   Sig: Take 1 tablet (800 mg total) by mouth 3 (three) times a day   glucose blood (ONE TOUCH ULTRA TEST) test strip   No No   Sig: Test 3 times a day before meals   insulin glargine (LANTUS SOLOSTAR) 100 units/mL injection pen 9/17/2019 at Unknown time  No Yes   Sig: Inject 30 Units under the skin daily   insulin lispro (HumaLOG) 100 units/mL injection 9/17/2019 at Unknown time Self Yes Yes   Sig: Inject under the skin 3 (three) times a day before meals Sliding scale three times a day   lisinopril-hydrochlorothiazide (PRINZIDE,ZESTORETIC) 20-12 5 MG per tablet 9/17/2019 at Unknown time  No Yes   Sig: TAKE 1 TABLET BY MOUTH ONCE A DAY   methadone (DOLOPHINE) 10 mg/mL oral concentrated solution 9/17/2019 at Unknown time Self Yes Yes   Sig: Take 110 mg by mouth daily Methadone clinic   polyethylene glycol (MIRALAX) 17 g packet 9/17/2019 at Unknown time  No Yes   Sig: Take 17 g by mouth daily   senna-docusate sodium (SENOKOT S) 8 6-50 mg per tablet 9/17/2019 at Unknown time  No Yes   Sig: Take 1 tablet by mouth daily   traZODone (DESYREL) 50 mg tablet 9/16/2019 at Unknown time  No Yes   Sig: Take 1 tablet (50 mg total) by mouth daily at bedtime   venlafaxine (EFFEXOR-XR) 150 mg 24 hr capsule 9/17/2019 at Unknown time Self Yes Yes   Sig: Take 150 mg by mouth daily      Facility-Administered Medications: None       Past Medical History:   Diagnosis Date    Depression     Diabetes mellitus ([address] )     Hypertension     Neuropathy        Past Surgical History:   Procedure Laterality Date    INCISION AND DRAINAGE OF WOUND Right 7/1/2017    Procedure: INCISION AND DRAINAGE (I&D) EXTREMITY THIGH;  Surgeon: Ramírez Grider MD;  Location: [facility];  Service: General       Family History   Problem Relation Age of Onset    Hypertension Mother     Diabetes Father     Hypertension Father     Diabetes Family     Hypertension Family      I have reviewed and agree with the history as documented      Social History     Tobacco Use    Smoking status: Never Smoker    Smokeless tobacco: Never Used   Substance Use Topics  Alcohol use: No    Drug use: No     Types: Heroin     Comment: lasr use per patient April 2017        Review of Systems   All other systems reviewed and are negative  Physical Exam  Physical Exam   Constitutional: She is oriented to person, place, and time  She appears well-developed and well-nourished  HENT:   Head: Normocephalic and atraumatic  Eyes: Pupils are equal, round, and reactive to light  EOM are normal    Neck: Normal range of motion  Neck supple  Cardiovascular: Normal rate and regular rhythm  Pulmonary/Chest: Effort normal and breath sounds normal    Abdominal: Soft  Bowel sounds are normal  She exhibits no distension and no mass  There is no tenderness  There is no rebound and no guarding  No hernia  Musculoskeletal: Normal range of motion  Neurological: She is alert and oriented to person, place, and time  She displays normal reflexes  No cranial nerve deficit or sensory deficit  She exhibits normal muscle tone  Coordination normal    Skin: Skin is warm and dry  Psychiatric: She has a normal mood and affect  Nursing note and vitals reviewed        Vital Signs  ED Triage Vitals   Temperature Pulse Respirations Blood Pressure SpO2   09/17/19 1014 09/17/19 1014 09/17/19 1014 09/17/19 1014 09/17/19 1014   (!) 97 3 °F (36 3 °C) 75 20 123/79 96 %      Temp Source Heart Rate Source Patient Position - Orthostatic VS BP Location FiO2 (%)   09/17/19 1014 09/17/19 1014 09/17/19 1014 09/17/19 1014 --   Oral Monitor Sitting Right arm       Pain Score       09/17/19 1232       No Pain           Vitals:    09/17/19 1014 09/17/19 1232 09/17/19 1419 09/17/19 1537   BP: 123/79 126/80 135/68 132/76   Pulse: 75 64 68 71   Patient Position - Orthostatic VS: Sitting  Sitting Lying         Visual Acuity      ED Medications  Medications   divalproex sodium (DEPAKOTE ER) 24 hr tablet 500 mg (has no administration in time range)   gabapentin (NEURONTIN) capsule 800 mg (has no administration in time range)   insulin glargine (LANTUS) subcutaneous injection 30 Units 0 3 mL (has no administration in time range)   lisinopril-hydrochlorothiazide (PRINZIDE 20/12  5) combo dose (has no administration in time range)   methadone (DOLOPHINE) 10 mg/mL oral concentrated solution 100 mg (has no administration in time range)   traZODone (DESYREL) tablet 50 mg (has no administration in time range)   venlafaxine (EFFEXOR-XR) 24 hr capsule 150 mg (has no administration in time range)   sodium chloride 0 9 % bolus 1,000 mL (1,000 mL Intravenous New Bag 9/17/19 1058)   sodium chloride 0 9 % bolus 1,000 mL (1,000 mL Intravenous New Bag 9/17/19 1202)   insulin regular (HumuLIN R,NovoLIN R) injection 15 Units (15 Units Subcutaneous Given 9/17/19 1200)       Diagnostic Studies  Results Reviewed     Procedure Component Value Units Date/Time    Hemoglobin A1C w/ EAG Estimation [[id number]]  (Abnormal) Collected:  09/17/19 1058    Lab Status:  Final result Specimen:  Blood from Arm, Left Updated:  09/17/19 1630     Hemoglobin A1C 10 7 %       mg/dl     Fingerstick Glucose (POCT) [[id number]]  (Abnormal) Collected:  09/17/19 1414    Lab Status:  Final result Updated:  09/17/19 1416     POC Glucose 321 mg/dl     POCT pregnancy, urine [[id number]]  (Abnormal) Resulted:  09/17/19 1407    Lab Status:  Final result Updated:  09/17/19 1408     EXT PREG TEST UR (Ref: Negative) negative     Control valid    Rapid drug screen, urine [[id number]]  (Abnormal) Collected:  09/17/19 1230    Lab Status:  Final result Specimen:  Urine, Clean Catch Updated:  09/17/19 1302     Amph/Meth UR Negative     Barbiturate Ur Negative     Benzodiazepine Urine Negative     Cocaine Urine Negative     Methadone Urine Positive     Opiate Urine Negative     PCP Ur Negative     THC Urine Negative    Narrative:       Presumptive report  If requested, specimen will be sent to reference lab for confirmation  FOR MEDICAL PURPOSES ONLY     IF CONFIRMATION NEEDED PLEASE CONTACT THE LAB WITHIN 5 DAYS  Drug Screen Cutoff Levels:  AMPHETAMINE/METHAMPHETAMINES  1000 ng/mL  BARBITURATES     200 ng/mL  BENZODIAZEPINES     200 ng/mL  COCAINE      300 ng/mL  METHADONE      300 ng/mL  OPIATES      300 ng/mL  PHENCYCLIDINE     25 ng/mL  THC       50 ng/mL      Valproic acid level, total [[id number]]  (Normal) Collected:  09/17/19 1058    Lab Status:  Final result Specimen:  Blood Updated:  09/17/19 1255     Valproic Acid, Total 52 ug/mL     Urine Microscopic [[id number]]  (Abnormal) Collected:  09/17/19 1230    Lab Status:  Final result Specimen:  Urine, Clean Catch Updated:  09/17/19 1252     RBC, UA None Seen /hpf      WBC, UA 1-2 /hpf      Epithelial Cells Occasional /hpf      Bacteria, UA None Seen /hpf     Fingerstick Glucose (POCT) [[id number]]  (Abnormal) Collected:  09/17/19 1243    Lab Status:  Final result Updated:  09/17/19 1246     POC Glucose 492 mg/dl     UA w Reflex to Microscopic [[id number]]  (Abnormal) Collected:  09/17/19 1230    Lab Status:  Final result Specimen:  Urine, Clean Catch Updated:  09/17/19 1246     Color, UA Light Yellow     Clarity, UA Clear     Specific Gravity, UA 1 010     pH, UA 5 5     Leukocytes, UA Elevated glucose may cause decreased leukocyte values   See urine microscopic for [facility] result/     Nitrite, UA Negative     Protein, UA Negative mg/dl      Glucose, UA >=1000 (1%) mg/dl      Ketones, UA Negative mg/dl      Urobilinogen, UA 0 2 E U /dl      Bilirubin, UA Negative     Blood, UA Trace-Intact    Comprehensive metabolic panel [[id number]]  (Abnormal) Collected:  09/17/19 1058    Lab Status:  Final result Specimen:  Blood from Arm, Left Updated:  09/17/19 1153     Sodium 130 mmol/L      Potassium 4 3 mmol/L      Chloride 92 mmol/L      CO2 32 mmol/L      ANION GAP 6 mmol/L      BUN 11 mg/dL      Creatinine 1 18 mg/dL      Glucose 584 mg/dL      Calcium 9 0 mg/dL      AST 20 U/L      ALT 51 U/L      Alkaline Phosphatase 80 U/L      Total Protein 7 9 g/dL      Albumin 3 4 g/dL      Total Bilirubin 0 40 mg/dL      eGFR 57 ml/min/1 73sq m     Narrative:       [place] guidelines for Chronic Kidney Disease (CKD):     Stage 1 with normal or high GFR (GFR > 90 mL/min/1 73 square meters)    Stage 2 Mild CKD (GFR = 60-89 mL/min/1 73 square meters)    Stage 3A Moderate CKD (GFR = 45-59 mL/min/1 73 square meters)    Stage 3B Moderate CKD (GFR = 30-44 mL/min/1 73 square meters)    Stage 4 Severe CKD (GFR = 15-29 mL/min/1 73 square meters)    Stage 5 End Stage CKD (GFR <15 mL/min/1 73 square meters)  Note: GFR calculation is accurate only with a steady state creatinine    TSH [[id number]]  (Normal) Collected:  09/17/19 1058    Lab Status:  Final result Specimen:  Blood from Arm, Left Updated:  09/17/19 1152     TSH 3RD GENERATON 1 661 uIU/mL     Narrative:       Patients undergoing fluorescein dye angiography may retain small amounts of fluorescein in the body for 48-72 hours post procedure  Samples containing fluorescein can produce falsely depressed TSH values  If the patient had this procedure,a specimen should be resubmitted post fluorescein clearance        Magnesium [[id number]]  (Normal) Collected:  09/17/19 1058    Lab Status:  Final result Specimen:  Blood from Arm, Left Updated:  09/17/19 1152     Magnesium 1 8 mg/dL     Protime-INR [[id number]]  (Normal) Collected:  09/17/19 1058    Lab Status:  Final result Specimen:  Blood from Arm, Left Updated:  09/17/19 1136     Protime 10 0 seconds      INR 0 93    APTT [[id number]]  (Normal) Collected:  09/17/19 1058    Lab Status:  Final result Specimen:  Blood from Arm, Left Updated:  09/17/19 1136     PTT 26 seconds     Troponin I [[id number]]  (Normal) Collected:  09/17/19 1058    Lab Status:  Final result Specimen:  Blood from Arm, Left Updated:  09/17/19 1125     Troponin I <0 02 ng/mL     Beta Hydroxybutyrate [[id number]]  (Normal) Collected:  09/17/19 1058    Lab Status:  Final result Specimen:  Blood from Arm, Left Updated:  09/17/19 1107     BETA-HYDROXYBUTYRATE 0 2 mmol/L     Blood gas, venous [[id number]]  (Abnormal) Collected:  09/17/19 1058    Lab Status:  Final result Specimen:  Blood from Arm, Left Updated:  09/17/19 1105     pH, Balwinder 7 280     pCO2, Balwinder 71 8 mm Hg      pO2, Balwinder 39 5 mm Hg      HCO3, Balwinder 33 0 mmol/L      Base Excess, Balwinder 3 7 mmol/L      O2 Content, Balwinder 14 5 ml/dL      O2 HGB, VENOUS 68 7 %     CBC and differential [[id number]] Collected:  09/17/19 1058    Lab Status:  Final result Specimen:  Blood from Arm, Left Updated:  09/17/19 1104     WBC 7 34 Thousand/uL      RBC 4 82 Million/uL      Hemoglobin 14 2 g/dL      Hematocrit 42 4 %      MCV 88 fL      MCH 29 5 pg      MCHC 33 5 g/dL      RDW 13 0 %      MPV 9 4 fL      Platelets 347 Thousands/uL      nRBC 0 /100 WBCs      Neutrophils Relative 60 %      Immat GRANS % 0 %      Lymphocytes Relative 32 %      Monocytes Relative 6 %      Eosinophils Relative 2 %      Basophils Relative 0 %      Neutrophils Absolute 4 36 Thousands/µL      Immature Grans Absolute 0 02 Thousand/uL      Lymphocytes Absolute 2 34 Thousands/µL      Monocytes Absolute 0 45 Thousand/µL      Eosinophils Absolute 0 16 Thousand/µL      Basophils Absolute 0 01 Thousands/µL     Fingerstick Glucose (POCT) [[id number]]  (Abnormal) Collected:  09/17/19 1014    Lab Status:  Final result Updated:  09/17/19 1017     POC Glucose >500 mg/dl                  CT head without contrast   Final Result by Julieta Prieto MD (09/17 1226)      No CT evidence of acute intracranial process                    Workstation performed: [id number]                    Procedures  ECG 12 Lead Documentation Only  Performed by: Graham Elias DO  Authorized by: Graham Elias DO     ECG reviewed by me, the ED Provider: yes    Patient location:  ED  Previous ECG:     Previous ECG:  Unavailable  Interpretation:     Interpretation: non-specific    Rate:     ECG rate assessment: normal Rhythm:     Rhythm: sinus rhythm    Ectopy:     Ectopy: none    QRS:     QRS axis:  Normal  Conduction:     Conduction: normal    ST segments:     ST segments:  Non-specific  T waves:     T waves: non-specific             ED Course                               MDM  Number of Diagnoses or Management Options  Altered mental status:   Hyperglycemia:   Diagnosis management comments:  Patient evaluated in the ED with EKG labs CT of the head  Results reviewed and discussed with the patient  I gave her IV fluids and insulin subcutaneous  However patient's blood sugar did not improve much and she still remained altered where she says she still confused and last time this happened she was admitted and when her blood sugar was corrected she improved with her mental status  Admitted to the [place] [id number] for further evaluation and management         Amount and/or Complexity of Data Reviewed  Clinical lab tests: ordered and reviewed  Tests in the radiology section of CPT®: ordered and reviewed  Tests in the medicine section of CPT®: ordered and reviewed    Patient Progress  Patient progress: stable      Disposition  Final diagnoses:   Hyperglycemia   Altered mental status     Time reflects when diagnosis was documented in both MDM as applicable and the Disposition within this note     Time User Action Codes Description Comment    9/17/2019  1:50 PM Qiana Kelley Add [R73 9] Hyperglycemia     9/17/2019  1:50 PM Qiana Kelley Add [R41 82] Altered mental status     9/17/2019  2:37 PM Mouna Block Add [E66 01] Morbidly obese [facility])       ED Disposition     ED Disposition Condition Date/Time Comment    Admit Stable Tue Sep 17, 2019  1:50 PM          Follow-up Information    None         Current Discharge Medication List      CONTINUE these medications which have NOT CHANGED    Details   clonazePAM (KlonoPIN) 1 mg tablet Take 1 mg by mouth 2 (two) times a day      divalproex sodium (DEPAKOTE ER) 500 mg 24 hr tablet Take 1 tablet (500 mg total) by mouth 2 (two) times a day  Qty: 60 tablet, Refills: 3    Associated Diagnoses: History of seizure      gabapentin (NEURONTIN) 800 mg tablet Take 1 tablet (800 mg total) by mouth 3 (three) times a day  Qty: 90 tablet, Refills: 3    Associated Diagnoses: Type 2 diabetes mellitus with diabetic neuropathy, with long-term current use of insulin ([facility])      insulin glargine (LANTUS SOLOSTAR) 100 units/mL injection pen Inject 30 Units under the skin daily  Qty: 9 pen, Refills: 3    Comments: If note covered by insurance, Can give alternative long-acting of basaglar pen with equivalent dosage of 30 u qHS    Associated Diagnoses: Type 2 diabetes mellitus with complication, without long-term current use of insulin ([facility])      insulin lispro (HumaLOG) 100 units/mL injection Inject under the skin 3 (three) times a day before meals Sliding scale three times a day      lisinopril-hydrochlorothiazide (PRINZIDE,ZESTORETIC) 20-12 5 MG per tablet TAKE 1 TABLET BY MOUTH ONCE A DAY  Qty: 60 tablet, Refills: 2    Associated Diagnoses: Essential hypertension      methadone (DOLOPHINE) 10 mg/mL oral concentrated solution Take 110 mg by mouth daily Methadone clinic      polyethylene glycol (MIRALAX) 17 g packet Take 17 g by mouth daily  Qty: 14 each, Refills: 0    Associated Diagnoses: Hyperglycemia; Pain, dental      senna-docusate sodium (SENOKOT S) 8 6-50 mg per tablet Take 1 tablet by mouth daily  Qty: 20 tablet, Refills: 0    Associated Diagnoses: Hyperglycemia; Pain, dental      traZODone (DESYREL) 50 mg tablet Take 1 tablet (50 mg total) by mouth daily at bedtime  Qty: 60 tablet, Refills: 0    Associated Diagnoses: Other insomnia      venlafaxine (EFFEXOR-XR) 150 mg 24 hr capsule Take 150 mg by mouth daily      al mag oxide-diphenhydramine-lidocaine viscous (MAGIC MOUTHWASH) 1:1:1 suspension Swish and spit 10 mL every 4 (four) hours as needed for mouth pain or discomfort  Qty: 180 mL, Refills: 0 Associated Diagnoses: Pain, dental      !! glucose blood (ONE TOUCH ULTRA TEST) test strip Test 3 times a day before meals  Qty: 100 each, Refills: 0    Comments: Or alternative that is compatible with glucose win  Associated Diagnoses: Type 2 diabetes mellitus with diabetic neuropathy, with long-term current use of insulin (HCC)      INS SYRINGE/NEEDLE 1CC/28G (B-D INS SYR MICROFINE 1CC/28G) 28G X 1/2" 1 ML MISC by Does not apply route 3 (three) times a day with meals  Qty: 100 each, Refills: 3    Comments: Please give enough for 30 days supply for 3xday  Associated Diagnoses: Type 2 diabetes mellitus with diabetic neuropathy, with long-term current use of insulin (HCC)      Lancets (ONETOUCH ULTRASOFT) lancets       !! ONE TOUCH ULTRA TEST test strip Test 3 times a day BEFORE MEALS  Qty: 100 each, Refills: 3    Associated Diagnoses: Type 2 diabetes mellitus with diabetic neuropathy, with long-term current use of insulin ([facility] [address] )       ! ! - Potential duplicate medications found  Please discuss with provider  No discharge procedures on file      ED Provider  Electronically Signed by           Violeta Uribe DO  09/17/19 [id number]

## 2019-09-17 NOTE — H&P
H&P Exam - Martínez Has Plunk 45 y o  female MRN: [id number]    Unit/Bed#: [facility] Encounter: [id number]      Assessment/Plan     Assessment:  43-year-old with a past medical history of diabetes, depression, hypertension, bipolar, anxiety and depression who is being admitted for hyperglycemia under the service of Dr Essence Ramirez and is expected to stay less than 2 midnights  Patient is a full code and will be discharged home once stable  Plan:  Other insomnia  Assessment & Plan  Stable  -cont trazadone 50mg qhs    Bipolar 1 disorder (HCC)  Assessment & Plan  Stable  -cont depatoke 500mg BID    History of hypothyroidism  Assessment & Plan  -not currently on medications  -will check TSH    Morbidly obese (HCC)  Assessment & Plan  BMI 43  -nutrition consult    History of drug abuse  Assessment & Plan  Stable  -cont methadone 100mg daily    Anxiety and depression  Assessment & Plan  Stable  -cont effexor 150mg daily    Hypertension  Assessment & Plan  Stable  -cont prinzide daily    * Type 2 diabetes mellitus with hyperglycemia, with long-term current use of insulin [facility])  Assessment & Plan  Lab Results   Component Value Date    HGBA1C 7 5 (H) 01/16/2018       Recent Labs     09/17/19  1014 09/17/19  1243 09/17/19  1414   POCGLU >500* 492* 321*       Blood Sugar Average: Last 72 hrs:  (P) 271     -BG >500 POA  -s/p 15u insulin  - cont lantus 30u daily and ISS algo 4  -IVF  -accuchecks qachs  -HgA1c pending  -cont gabapentin 800mg TID for neuropathy        History of Present Illness     HPI:  Jacob Rios is a 45 y o  female a past medical history of diabetes type 2 with neuropathy, hypertension, depression anxiety, bipolar disorder, history of substance abuse presents to the ER blood sugar greater 500  Patient states that this morning at the methadone clinic she was not feeling well and was complaining of some dizziness and the doctor there suggested that she might be hypoglycemic    She was given oreos and orange juice and then her sugar was checked and noted to be greater than 500  Patient was sent to the ER for further evaluation  In the ER, insulin 15 IV fluids were started and labs were drawn  CT head done which was negative  PCP: Zak Bartholomew MD    Review of Systems    Historical Information   Past Medical History:   Diagnosis Date    Depression     Diabetes mellitus ([address] )     Hypertension     Neuropathy      Past Surgical History:   Procedure Laterality Date    INCISION AND DRAINAGE OF WOUND Right 7/1/2017    Procedure: INCISION AND DRAINAGE (I&D) EXTREMITY THIGH;  Surgeon: Alex Barfield MD;  Location: [facility];  Service: General     Social History   Social History     Substance and Sexual Activity   Alcohol Use No     Social History     Substance and Sexual Activity   Drug Use No    Types: Heroin    Comment: lasr use per patient April 2017     Social History     Tobacco Use   Smoking Status Never Smoker   Smokeless Tobacco Never Used     Family History:   Family History   Problem Relation Age of Onset    Hypertension Mother     Diabetes Father     Hypertension Father     Diabetes Family     Hypertension Family        Meds/Allergies   PTA meds:   Prior to Admission Medications   Prescriptions Last Dose Informant Patient Reported? Taking?    INS SYRINGE/NEEDLE 1CC/28G (B-D INS SYR MICROFINE 1CC/28G) 28G X 1/2" 1 ML MISC   No No   Sig: by Does not apply route 3 (three) times a day with meals   Lancets (ONETOUCH ULTRASOFT) lancets   Yes No   ONE TOUCH ULTRA TEST test strip   No No   Sig: Test 3 times a day BEFORE MEALS   al mag oxide-diphenhydramine-lidocaine viscous (MAGIC MOUTHWASH) 1:1:1 suspension   No No   Sig: Swish and spit 10 mL every 4 (four) hours as needed for mouth pain or discomfort   divalproex sodium (DEPAKOTE ER) 500 mg 24 hr tablet   No No   Sig: Take 1 tablet (500 mg total) by mouth 2 (two) times a day   gabapentin (NEURONTIN) 800 mg tablet   No No   Sig: Take 1 tablet (800 mg total) by mouth 3 (three) times a day   glucose blood (ONE TOUCH ULTRA TEST) test strip   No No   Sig: Test 3 times a day before meals   insulin glargine (LANTUS SOLOSTAR) 100 units/mL injection pen   No No   Sig: Inject 30 Units under the skin daily   insulin lispro (HumaLOG) 100 units/mL injection  Self Yes No   Sig: Inject under the skin 3 (three) times a day before meals Sliding scale three times a day   lisinopril-hydrochlorothiazide (PRINZIDE,ZESTORETIC) 20-12 5 MG per tablet   No No   Sig: TAKE 1 TABLET BY MOUTH ONCE A DAY   methadone (DOLOPHINE) 10 mg/mL oral concentrated solution  Self Yes No   Sig: Take 100 mg by mouth daily Methadone clinic   polyethylene glycol (MIRALAX) 17 g packet   No No   Sig: Take 17 g by mouth daily   senna-docusate sodium (SENOKOT S) 8 6-50 mg per tablet   No No   Sig: Take 1 tablet by mouth daily   traZODone (DESYREL) 50 mg tablet   No No   Sig: Take 1 tablet (50 mg total) by mouth daily at bedtime   venlafaxine (EFFEXOR-XR) 150 mg 24 hr capsule  Self Yes No   Sig: Take 150 mg by mouth daily      Facility-Administered Medications: None     Allergies   Allergen Reactions    Septra [Sulfamethoxazole-Trimethoprim] Hives       Objective   Vitals: Blood pressure 135/68, pulse 68, temperature (!) 97 3 °F (36 3 °C), temperature source Oral, resp  rate 18, height 5' 3" (1 6 m), weight 111 kg (244 lb), last menstrual period 08/30/2019, SpO2 96 %, not currently breastfeeding  No intake or output data in the 24 hours ending 09/17/19 1438    Invasive Devices     Peripheral Intravenous Line            Peripheral IV 09/17/19 Left less than 1 day                Physical Exam   Constitutional: She is oriented to person, place, and time  She appears well-developed and well-nourished  HENT:   Head: Normocephalic and atraumatic  Eyes: Pupils are equal, round, and reactive to light  Cardiovascular: Normal rate and regular rhythm  Pulmonary/Chest: Effort normal and breath sounds normal    Abdominal: Soft   Bowel sounds are normal    Musculoskeletal: Normal range of motion  Neurological: She is alert and oriented to person, place, and time  No cranial nerve deficit  Skin: Skin is warm  Psychiatric: She has a normal mood and affect  Vitals reviewed  Lab Results: I have personally reviewed pertinent reports       Results for orders placed or performed during the hospital encounter of 09/17/19   CBC and differential   Result Value Ref Range    WBC 7 34 4 31 - 10 16 Thousand/uL    RBC 4 82 3 81 - 5 12 Million/uL    Hemoglobin 14 2 11 5 - 15 4 g/dL    Hematocrit 42 4 34 8 - 46 1 %    MCV 88 82 - 98 fL    MCH 29 5 26 8 - 34 3 pg    MCHC 33 5 31 4 - 37 4 g/dL    RDW 13 0 11 6 - 15 1 %    MPV 9 4 8 9 - 12 7 fL    Platelets 351 028 - 469 Thousands/uL    nRBC 0 /100 WBCs    Neutrophils Relative 60 43 - 75 %    Immat GRANS % 0 0 - 2 %    Lymphocytes Relative 32 14 - 44 %    Monocytes Relative 6 4 - 12 %    Eosinophils Relative 2 0 - 6 %    Basophils Relative 0 0 - 1 %    Neutrophils Absolute 4 36 1 85 - 7 62 Thousands/µL    Immature Grans Absolute 0 02 0 00 - 0 20 Thousand/uL    Lymphocytes Absolute 2 34 0 60 - 4 47 Thousands/µL    Monocytes Absolute 0 45 0 17 - 1 22 Thousand/µL    Eosinophils Absolute 0 16 0 00 - 0 61 Thousand/µL    Basophils Absolute 0 01 0 00 - 0 10 Thousands/µL   Comprehensive metabolic panel   Result Value Ref Range    Sodium 130 (L) 136 - 145 mmol/L    Potassium 4 3 3 5 - 5 3 mmol/L    Chloride 92 (L) 100 - 108 mmol/L    CO2 32 21 - 32 mmol/L    ANION GAP 6 4 - 13 mmol/L    BUN 11 5 - 25 mg/dL    Creatinine 1 18 0 60 - 1 30 mg/dL    Glucose 584 (HH) 65 - 140 mg/dL    Calcium 9 0 8 3 - 10 1 mg/dL    AST 20 5 - 45 U/L    ALT 51 12 - 78 U/L    Alkaline Phosphatase 80 46 - 116 U/L    Total Protein 7 9 6 4 - 8 2 g/dL    Albumin 3 4 (L) 3 5 - 5 0 g/dL    Total Bilirubin 0 40 0 20 - 1 00 mg/dL    eGFR 57 ml/min/1 73sq m   Protime-INR   Result Value Ref Range    Protime 10 0 9 8 - 12 0 seconds    INR 0 93 0 91 - 1 09   APTT   Result Value Ref Range    PTT 26 25 - 32 seconds   TSH   Result Value Ref Range    TSH 3RD GENERATON 1 661 0 358 - 3 740 uIU/mL   Magnesium   Result Value Ref Range    Magnesium 1 8 1 6 - 2 6 mg/dL   Troponin I   Result Value Ref Range    Troponin I <0 02 <=0 04 ng/mL   Blood gas, venous   Result Value Ref Range    pH, Balwinder 7 280 (L) 7 300 - 7 400    pCO2, Balwinder 71 8 (H) 42 0 - 50 0 mm Hg    pO2, Balwinder 39 5 35 0 - 45 0 mm Hg    HCO3, Balwinder 33 0 (H) 24 - 30 mmol/L    Base Excess, Balwinder 3 7 mmol/L    O2 Content, Balwinder 14 5 ml/dL    O2 HGB, VENOUS 68 7 60 0 - 80 0 %   Beta Hydroxybutyrate   Result Value Ref Range    BETA-HYDROXYBUTYRATE 0 2 <0 6 mmol/L   UA w Reflex to Microscopic   Result Value Ref Range    Color, UA Light Yellow     Clarity, UA Clear     Specific [place], UA 1 010 1 000 - 1 030    pH, UA 5 5 5 0, 5 5, 6 0, 6 5, 7 0, 7 5, 8 0, 8 5, 9 0    Leukocytes, UA (A) Negative     Elevated glucose may cause decreased leukocyte values   See urine microscopic for [facility] result/    Nitrite, UA Negative Negative    Protein, UA Negative Negative mg/dl    Glucose, UA >=1000 (1%) (A) Negative mg/dl    Ketones, UA Negative Negative mg/dl    Urobilinogen, UA 0 2 0 2, 1 0 E U /dl E U /dl    Bilirubin, UA Negative Negative    Blood, UA Trace-Intact (A) Negative   Valproic acid level, total   Result Value Ref Range    Valproic Acid, Total 52 50 - 100 ug/mL   Rapid drug screen, urine   Result Value Ref Range    Amph/Meth UR Negative Negative    Barbiturate Ur Negative Negative    Benzodiazepine Urine Negative Negative    Cocaine Urine Negative Negative    Methadone Urine Positive (A) Negative    Opiate Urine Negative Negative    PCP Ur Negative Negative    THC Urine Negative Negative   Urine Microscopic   Result Value Ref Range    RBC, UA None Seen None Seen, 0-5 /hpf    WBC, UA 1-2 (A) None Seen, 0-5, 5-55, 5-65 /hpf    Epithelial Cells Occasional None Seen, Occasional /hpf    Bacteria, UA None Seen None Seen, Occasional /hpf   POCT pregnancy, urine   Result Value Ref Range    EXT PREG TEST UR (Ref: Negative) negative     Control valid    Fingerstick Glucose (POCT)   Result Value Ref Range    POC Glucose >500 (HH) 65 - 140 mg/dl   Fingerstick Glucose (POCT)   Result Value Ref Range    POC Glucose 492 (H) 65 - 140 mg/dl   Fingerstick Glucose (POCT)   Result Value Ref Range    POC Glucose 321 (H) 65 - 140 mg/dl     Imaging: I have personally reviewed pertinent reports  CT head without contrast   Final Result      No CT evidence of acute intracranial process  Workstation performed: [id number]             EKG, Pathology, and Other Studies: I have personally reviewed pertinent reports  Code Status: Prior  Advance Directive and Living Will:      Power of :    POLST:      Counseling / Coordination of Care  Total floor / unit time spent today 30 minutes  Greater than 50% of total time was spent with the patient and / or family counseling and / or coordination of care  A description of the counseling / coordination of care: Kelvin Garces

## 2019-09-17 NOTE — ASSESSMENT & PLAN NOTE
Lab Results   Component Value Date    HGBA1C 10 7 (H) 09/17/2019       Recent Labs     09/17/19  1243 09/17/19  1414 09/17/19  2051 09/18/19  0703   POCGLU 492* 321* 289* 253*       Blood Sugar Average: Last 72 hrs:  (P) 271     -BG >500 POA  -s/p 15u insulin  - cont lantus 30u daily and ISS algo 4  -IVF  -accuchecks qachs  -HgA1c 10 7  -cont gabapentin 800mg TID for neuropathy

## 2019-09-17 NOTE — LETTER
[address] [facility]  [place] [address]  Dept: [phone]    September 18, 2019     Patient: Cyndi Mcneill   YOB: 1975   Date of Visit: 9/17/2019       To Whom it May Concern:    Amy Sandhufisher is under my professional care  She was seen in the hospital from 9/17/2019   to 09/18/19  She may return to work on 9/19/2019 without limitations  If you have any questions or concerns, please don't hesitate to call           Sincerely,          Celestino Zamarripa, DO

## 2019-09-17 NOTE — ED NOTES
Patient transported to [address] [place] [address],[facility], RN  09/17/19 4549

## 2019-09-18 VITALS
SYSTOLIC BLOOD PRESSURE: 111 MMHG | DIASTOLIC BLOOD PRESSURE: 62 MMHG | BODY MASS INDEX: 43.23 KG/M2 | HEART RATE: 62 BPM | HEIGHT: 63 IN | OXYGEN SATURATION: 97 % | TEMPERATURE: 96.4 F | RESPIRATION RATE: 18 BRPM | WEIGHT: 244 LBS

## 2019-09-18 LAB
ATRIAL RATE: 68 BPM
GLUCOSE SERPL-MCNC: 253 MG/DL (ref 65–140)
P AXIS: 33 DEGREES
PR INTERVAL: 152 MS
QRS AXIS: -9 DEGREES
QRSD INTERVAL: 78 MS
QT INTERVAL: 410 MS
QTC INTERVAL: 435 MS
T WAVE AXIS: -16 DEGREES
VENTRICULAR RATE: 68 BPM

## 2019-09-18 PROCEDURE — NC001 PR NO CHARGE: Performed by: FAMILY MEDICINE

## 2019-09-18 PROCEDURE — 93010 ELECTROCARDIOGRAM REPORT: CPT | Performed by: INTERNAL MEDICINE

## 2019-09-18 PROCEDURE — 82948 REAGENT STRIP/BLOOD GLUCOSE: CPT

## 2019-09-18 PROCEDURE — 99219 PR INITIAL OBSERVATION CARE/DAY 50 MINUTES: CPT | Performed by: FAMILY MEDICINE

## 2019-09-18 RX ORDER — IBUPROFEN 400 MG/1
400 TABLET ORAL ONCE
Status: COMPLETED | OUTPATIENT
Start: 2019-09-18 | End: 2019-09-18

## 2019-09-18 RX ADMIN — GABAPENTIN 800 MG: 400 CAPSULE ORAL at 08:37

## 2019-09-18 RX ADMIN — IBUPROFEN 400 MG: 400 TABLET ORAL at 03:30

## 2019-09-18 RX ADMIN — VENLAFAXINE HYDROCHLORIDE 150 MG: 150 CAPSULE, EXTENDED RELEASE ORAL at 08:37

## 2019-09-18 RX ADMIN — METHADONE HYDROCHLORIDE 110 MG: 10 TABLET ORAL at 07:42

## 2019-09-18 RX ADMIN — LISINOPRIL: 20 TABLET ORAL at 08:37

## 2019-09-18 RX ADMIN — INSULIN LISPRO 6 UNITS: 100 INJECTION, SOLUTION INTRAVENOUS; SUBCUTANEOUS at 08:47

## 2019-09-18 RX ADMIN — DIVALPROEX SODIUM 500 MG: 500 TABLET, EXTENDED RELEASE ORAL at 08:37

## 2019-09-18 NOTE — PROGRESS NOTES
[address] [facility] [address] [facility] Practice Progress Note - Zahraa Barron 45 y o  female MRN: [id number]    Unit/Bed#: [address] [place], [place] [address] Encounter: [id number]      Assessment/Plan:  * Type 2 diabetes mellitus with hyperglycemia, with long-term current use of insulin [facility])  Assessment & Plan  Lab Results   Component Value Date    HGBA1C 10 7 (H) 09/17/2019       Recent Labs     09/17/19  1243 09/17/19  1414 09/17/19  2051 09/18/19  0703   POCGLU 492* 321* 289* 253*       Blood Sugar Average: Last 72 hrs:  (P) 271     -BG >500 POA  -s/p 15u insulin  - cont lantus 30u daily and ISS algo 4  -IVF  -accuchecks qachs  -HgA1c 10 7  -cont gabapentin 800mg TID for neuropathy    Other insomnia  Assessment & Plan  Stable  -cont trazadone 50mg qhs    Bipolar 1 disorder (HCC)  Assessment & Plan  Stable  -cont depatoke 500mg BID    History of hypothyroidism  Assessment & Plan  -not currently on medications  -will check TSH    Morbidly obese (HCC)  Assessment & Plan  BMI 43  -nutrition consult    History of drug abuse  Assessment & Plan  Stable  -cont methadone 100mg daily    Anxiety and depression  Assessment & Plan  Stable  -cont effexor 150mg daily    Hypertension  Assessment & Plan  Stable  -cont prinzide daily    Subjective:   Patient was seen examined at bedside  She is complaining of hypnagogic hallucinations  Otherwise has no complaints could  No acute events overnight  Plan to discharge home today    Objective:     Vitals: Blood pressure 111/62, pulse 62, temperature (!) 96 4 °F (35 8 °C), temperature source Tympanic, resp  rate 18, height 5' 3" (1 6 m), weight 111 kg (244 lb), last menstrual period 08/30/2019, SpO2 97 %, not currently breastfeeding  ,Body mass index is 43 22 kg/m²    Wt Readings from Last 3 Encounters:   09/17/19 111 kg (244 lb)   03/16/19 117 kg (258 lb)   01/19/19 119 kg (263 lb)       Intake/Output Summary (Last 24 hours) at 9/18/2019 1050  Last data filed at 9/17/2019 1800  Gross per 24 hour   Intake 200 ml   Output -- Net 200 ml       Physical Exam:   General appearance: alert and oriented, in no acute distress  Lungs: clear to auscultation bilaterally  Heart: regular rate and rhythm, S1, S2 normal, no murmur, click, rub or gallop  Extremities: no edema, redness or tenderness in the calves or thighs     Recent Results (from the past 24 hour(s))   CBC and differential    Collection Time: 09/17/19 10:58 AM   Result Value Ref Range    WBC 7 34 4 31 - 10 16 Thousand/uL    RBC 4 82 3 81 - 5 12 Million/uL    Hemoglobin 14 2 11 5 - 15 4 g/dL    Hematocrit 42 4 34 8 - 46 1 %    MCV 88 82 - 98 fL    MCH 29 5 26 8 - 34 3 pg    MCHC 33 5 31 4 - 37 4 g/dL    RDW 13 0 11 6 - 15 1 %    MPV 9 4 8 9 - 12 7 fL    Platelets 470 508 - 487 Thousands/uL    nRBC 0 /100 WBCs    Neutrophils Relative 60 43 - 75 %    Immat GRANS % 0 0 - 2 %    Lymphocytes Relative 32 14 - 44 %    Monocytes Relative 6 4 - 12 %    Eosinophils Relative 2 0 - 6 %    Basophils Relative 0 0 - 1 %    Neutrophils Absolute 4 36 1 85 - 7 62 Thousands/µL    Immature Grans Absolute 0 02 0 00 - 0 20 Thousand/uL    Lymphocytes Absolute 2 34 0 60 - 4 47 Thousands/µL    Monocytes Absolute 0 45 0 17 - 1 22 Thousand/µL    Eosinophils Absolute 0 16 0 00 - 0 61 Thousand/µL    Basophils Absolute 0 01 0 00 - 0 10 Thousands/µL   Comprehensive metabolic panel    Collection Time: 09/17/19 10:58 AM   Result Value Ref Range    Sodium 130 (L) 136 - 145 mmol/L    Potassium 4 3 3 5 - 5 3 mmol/L    Chloride 92 (L) 100 - 108 mmol/L    CO2 32 21 - 32 mmol/L    ANION GAP 6 4 - 13 mmol/L    BUN 11 5 - 25 mg/dL    Creatinine 1 18 0 60 - 1 30 mg/dL    Glucose 584 (HH) 65 - 140 mg/dL    Calcium 9 0 8 3 - 10 1 mg/dL    AST 20 5 - 45 U/L    ALT 51 12 - 78 U/L    Alkaline Phosphatase 80 46 - 116 U/L    Total Protein 7 9 6 4 - 8 2 g/dL    Albumin 3 4 (L) 3 5 - 5 0 g/dL    Total Bilirubin 0 40 0 20 - 1 00 mg/dL    eGFR 57 ml/min/1 73sq m   Protime-INR    Collection Time: 09/17/19 10:58 AM   Result Value Ref Range    Protime 10 0 9 8 - 12 0 seconds    INR 0 93 0 91 - 1 09   APTT    Collection Time: 09/17/19 10:58 AM   Result Value Ref Range    PTT 26 25 - 32 seconds   TSH    Collection Time: 09/17/19 10:58 AM   Result Value Ref Range    TSH 3RD GENERATON 1 661 0 358 - 3 740 uIU/mL   Magnesium    Collection Time: 09/17/19 10:58 AM   Result Value Ref Range    Magnesium 1 8 1 6 - 2 6 mg/dL   Troponin I    Collection Time: 09/17/19 10:58 AM   Result Value Ref Range    Troponin I <0 02 <=0 04 ng/mL   Blood gas, venous    Collection Time: 09/17/19 10:58 AM   Result Value Ref Range    pH, Balwinder 7 280 (L) 7 300 - 7 400    pCO2, Balwinder 71 8 (H) 42 0 - 50 0 mm Hg    pO2, Balwinder 39 5 35 0 - 45 0 mm Hg    HCO3, Balwinder 33 0 (H) 24 - 30 mmol/L    Base Excess, Balwinder 3 7 mmol/L    O2 Content, Balwinder 14 5 ml/dL    O2 HGB, VENOUS 68 7 60 0 - 80 0 %   Beta Hydroxybutyrate    Collection Time: 09/17/19 10:58 AM   Result Value Ref Range    BETA-HYDROXYBUTYRATE 0 2 <0 6 mmol/L   Valproic acid level, total    Collection Time: 09/17/19 10:58 AM   Result Value Ref Range    Valproic Acid, Total 52 50 - 100 ug/mL   Hemoglobin A1C w/ EAG Estimation    Collection Time: 09/17/19 10:58 AM   Result Value Ref Range    Hemoglobin A1C 10 7 (H) 4 2 - 6 3 %     mg/dl   UA w Reflex to Microscopic    Collection Time: 09/17/19 12:30 PM   Result Value Ref Range    Color, UA Light Yellow     Clarity, UA Clear     Specific [place], UA 1 010 1 000 - 1 030    pH, UA 5 5 5 0, 5 5, 6 0, 6 5, 7 0, 7 5, 8 0, 8 5, 9 0    Leukocytes, UA (A) Negative     Elevated glucose may cause decreased leukocyte values   See urine microscopic for [facility] result/    Nitrite, UA Negative Negative    Protein, UA Negative Negative mg/dl    Glucose, UA >=1000 (1%) (A) Negative mg/dl    Ketones, UA Negative Negative mg/dl    Urobilinogen, UA 0 2 0 2, 1 0 E U /dl E U /dl    Bilirubin, UA Negative Negative    Blood, UA Trace-Intact (A) Negative   Rapid drug screen, urine    Collection Time: 09/17/19 12:30 PM   Result Value Ref Range    Amph/Meth UR Negative Negative    Barbiturate Ur Negative Negative    Benzodiazepine Urine Negative Negative    Cocaine Urine Negative Negative    Methadone Urine Positive (A) Negative    Opiate Urine Negative Negative    PCP Ur Negative Negative    THC Urine Negative Negative   Urine Microscopic    Collection Time: 09/17/19 12:30 PM   Result Value Ref Range    RBC, UA None Seen None Seen, 0-5 /hpf    WBC, UA 1-2 (A) None Seen, 0-5, 5-55, 5-65 /hpf    Epithelial Cells Occasional None Seen, Occasional /hpf    Bacteria, UA None Seen None Seen, Occasional /hpf   Fingerstick Glucose (POCT)    Collection Time: 09/17/19 12:43 PM   Result Value Ref Range    POC Glucose 492 (H) 65 - 140 mg/dl   POCT pregnancy, urine    Collection Time: 09/17/19  2:07 PM   Result Value Ref Range    EXT PREG TEST UR (Ref: Negative) negative     Control valid    Fingerstick Glucose (POCT)    Collection Time: 09/17/19  2:14 PM   Result Value Ref Range    POC Glucose 321 (H) 65 - 140 mg/dl   Fingerstick Glucose (POCT)    Collection Time: 09/17/19  8:51 PM   Result Value Ref Range    POC Glucose 289 (H) 65 - 140 mg/dl   Fingerstick Glucose (POCT)    Collection Time: 09/18/19  7:03 AM   Result Value Ref Range    POC Glucose 253 (H) 65 - 140 mg/dl       Current Facility-Administered Medications   Medication Dose Route Frequency Provider Last Rate Last Dose    divalproex sodium (DEPAKOTE ER) 24 hr tablet 500 mg  500 mg Oral BID Mortimer Lather, MD   500 mg at 09/18/19 0837    gabapentin (NEURONTIN) capsule 800 mg  800 mg Oral TID Mobile DO Brigido   800 mg at 09/18/19 0837    insulin glargine (LANTUS) subcutaneous injection 30 Units 0 3 mL  30 Units Subcutaneous HS Mortimer Lather, MD   30 Units at 09/17/19 2101    insulin lispro (HumaLOG) 100 units/mL subcutaneous injection 2-12 Units  2-12 Units Subcutaneous TID AC James Vyas MD   6 Units at 09/18/19 0847    insulin lispro (HumaLOG) 100 units/mL subcutaneous injection 2-12 Units  2-12 Units Subcutaneous HS James Vyas MD   6 Units at 09/17/19 2102    lisinopril-hydrochlorothiazide (PRINZIDE 20/12  5) combo dose   Oral Daily Azucena Schirmer, MD        methadone (DOLOPHINE) tablet 110 mg  110 mg Oral Daily Azucena Schirmer, MD   110 mg at 09/18/19 0742    traZODone (DESYREL) tablet 50 mg  50 mg Oral HS Azucena Schirmer, MD        venlafaxine [facility] ) 24 hr capsule 150 mg  150 mg Oral Daily Azucena Schirmer, MD   150 mg at 09/18/19 5182     Current Outpatient Medications   Medication Sig Dispense Refill    clonazePAM (KlonoPIN) 1 mg tablet Take 1 mg by mouth 2 (two) times a day      divalproex sodium (DEPAKOTE ER) 500 mg 24 hr tablet Take 1 tablet (500 mg total) by mouth 2 (two) times a day 60 tablet 3    gabapentin (NEURONTIN) 800 mg tablet Take 1 tablet (800 mg total) by mouth 3 (three) times a day 90 tablet 3    insulin glargine (LANTUS SOLOSTAR) 100 units/mL injection pen Inject 30 Units under the skin daily 9 pen 3    insulin lispro (HumaLOG) 100 units/mL injection Inject under the skin 3 (three) times a day before meals Sliding scale three times a day      lisinopril-hydrochlorothiazide (PRINZIDE,ZESTORETIC) 20-12 5 MG per tablet TAKE 1 TABLET BY MOUTH ONCE A DAY 60 tablet 2    methadone (DOLOPHINE) 10 mg/mL oral concentrated solution Take 110 mg by mouth daily Methadone clinic      polyethylene glycol (MIRALAX) 17 g packet Take 17 g by mouth daily 14 each 0    senna-docusate sodium (SENOKOT S) 8 6-50 mg per tablet Take 1 tablet by mouth daily 20 tablet 0    traZODone (DESYREL) 50 mg tablet Take 1 tablet (50 mg total) by mouth daily at bedtime 60 tablet 0    venlafaxine (EFFEXOR-XR) 150 mg 24 hr capsule Take 150 mg by mouth daily      al mag oxide-diphenhydramine-lidocaine viscous (MAGIC MOUTHWASH) 1:1:1 suspension Swish and spit 10 mL every 4 (four) hours as needed for mouth pain or discomfort (Patient not taking: Reported on 9/17/2019) 180 mL 0    glucose blood (ONE TOUCH ULTRA TEST) test strip Test 3 times a day before meals 100 each 0    INS SYRINGE/NEEDLE 1CC/28G (B-D INS SYR MICROFINE 1CC/28G) 28G X 1/2" 1 ML MISC by Does not apply route 3 (three) times a day with meals 100 each 3    Lancets (ONETOUCH ULTRASOFT) lancets       ONE TOUCH ULTRA TEST test strip Test 3 times a day BEFORE MEALS 100 each 3       Invasive Devices     Peripheral Intravenous Line            Peripheral IV 09/17/19 Left Arm less than 1 day                Lab, Imaging and other studies: I have personally reviewed pertinent reports      VTE Pharmacologic Prophylaxis: Enoxaparin (Lovenox)  VTE Mechanical Prophylaxis: sequential compression device    Kaylee Ovalles DO

## 2019-09-18 NOTE — PLAN OF CARE
Problem: METABOLIC, FLUID AND ELECTROLYTES - ADULT  Goal: Glucose maintained within target range  Description  INTERVENTIONS:  - Monitor Blood Glucose as ordered  - Assess for signs and symptoms of hyperglycemia and hypoglycemia  - Administer ordered medications to maintain glucose within target range  - Assess nutritional intake and initiate nutrition service referral as needed  Outcome: Progressing

## 2019-09-18 NOTE — UTILIZATION REVIEW
Initial Clinical Review    Admission: Date/Time/Statement:    Admission Orders (From admission, onward)     Ordered        09/17/19 1350  Place in Observation (expected length of stay for this patient is less than two midnights)  Once                   Orders Placed This Encounter   Procedures    Place in Observation (expected length of stay for this patient is less than two midnights)     Standing Status:   Standing     Number of Occurrences:   1     Order Specific Question:   Admitting Physician     Answer:   Imtiaz Carvajal     Order Specific Question:   Level of Care     Answer:   Med Surg [16]     ED Arrival Information     Expected Arrival Acuity Means of Arrival Escorted By Service Admission Type    - 9/17/2019 09:48 Urgent Walk-In Family Member General Medicine Urgent    Arrival Complaint    dizziness        Chief Complaint   Patient presents with    Dizziness     Pt reports 35 minutes ago pt reports feeling dizzy and "just not feeling right"  Pt reports thinking this is "a problem with my sugar  Pt reports being diabetis and taking long acting lantus as well as short acting insulin  Assessment/Plan:   24-year-old female presents with the confusion altered mentation feeling not right  She says this happens every time her blood sugar is elevated  Patient has a type 2 diabetic currently insulin dependent  She says she is compliant with her medications and is working hard on her diet  patient denies any chest pain,dyspnea, nausea,vomiting,abdominal pain,diarrhea,fevers,chills, neck pain,stiffness or any other symptoms  No focal symptoms of weakness, numbness, no recent head trauma, not on anticoagulation    Type 2 diabetes mellitus with hyperglycemia, with long-term current use of insulin [facility])  Assessment & Plan        Lab Results   Component Value Date     HGBA1C 7 5 (H) 01/16/2018            Recent Labs     09/17/19  1014 09/17/19  1243 09/17/19  1414   POCGLU >500* 492* 321*      Blood Sugar Average: Last 72 hrs:  (P) 271   -BG >500 POA  -s/p 15u insulin  - cont lantus 30u daily and ISS algo 4  -IVF  -accuchecks qachs  -HgA1c pending  -cont gabapentin 800mg TID for neuropathy    ED Triage Vitals   Temperature Pulse Respirations Blood Pressure SpO2   09/17/19 1014 09/17/19 1014 09/17/19 1014 09/17/19 1014 09/17/19 1014   (!) 97 3 °F (36 3 °C) 75 20 123/79 96 %      Temp Source Heart Rate Source Patient Position - Orthostatic VS BP Location FiO2 (%)   09/17/19 1014 09/17/19 1014 09/17/19 1014 09/17/19 1014 --   Oral Monitor Sitting Right arm       Pain Score       09/17/19 1232       No Pain        Wt Readings from Last 1 Encounters:   09/17/19 111 kg (244 lb)     Additional Vital Signs:   09/18/19 0003  97 4 °F (36 3 °C)Abnormal   67  18  121/72  94 %  None (Room air)  Lying   09/17/19 1537  98 4 °F (36 9 °C)  71  18  132/76  95 %  None (Room air)  Lying     Pertinent Labs/Diagnostic Test Results:   Results from last 7 days   Lab Units 09/17/19  1058   WBC Thousand/uL 7 34   HEMOGLOBIN g/dL 14 2   HEMATOCRIT % 42 4   PLATELETS Thousands/uL 243   NEUTROS ABS Thousands/µL 4 36     Results from last 7 days   Lab Units 09/17/19  1058   SODIUM mmol/L 130*   POTASSIUM mmol/L 4 3   CHLORIDE mmol/L 92*   CO2 mmol/L 32   ANION GAP mmol/L 6   BUN mg/dL 11   CREATININE mg/dL 1 18   EGFR ml/min/1 73sq m 57   CALCIUM mg/dL 9 0   MAGNESIUM mg/dL 1 8     Results from last 7 days   Lab Units 09/17/19  1058   AST U/L 20   ALT U/L 51   ALK PHOS U/L 80   TOTAL PROTEIN g/dL 7 9   ALBUMIN g/dL 3 4*   TOTAL BILIRUBIN mg/dL 0 40     Results from last 7 days   Lab Units 09/17/19  2051 09/17/19  1414 09/17/19  1243 09/17/19  1014   POC GLUCOSE mg/dl 289* 321* 492* >500*     Results from last 7 days   Lab Units 09/17/19  1058   GLUCOSE RANDOM mg/dL 584*     Results from last 7 days   Lab Units 09/17/19  1058   HEMOGLOBIN A1C % 10 7*   EAG mg/dl 260     BETA-HYDROXYBUTYRATE   Date Value Ref Range Status   09/17/2019 0 2 <0 6 mmol/L Final      Results from last 7 days   Lab Units 09/17/19  1058   PH MAG  7 280*   PCO2 MAG mm Hg 71 8*   PO2 MAG mm Hg 39 5   HCO3 MAG mmol/L 33 0*   BASE EXC MAG mmol/L 3 7   O2 CONTENT MAG ml/dL 14 5   O2 HGB, VENOUS % 68 7     Results from last 7 days   Lab Units 09/17/19  1058   TROPONIN I ng/mL <0 02     Results from last 7 days   Lab Units 09/17/19  1058   PROTIME seconds 10 0   INR  0 93   PTT seconds 26     Results from last 7 days   Lab Units 09/17/19  1058   TSH 3RD GENERATON uIU/mL 1 661     Results from last 7 days   Lab Units 09/17/19  1230   CLARITY UA  Clear   COLOR UA  Light Yellow   SPEC GRAV UA  1 010   PH UA  5 5   GLUCOSE UA mg/dl >=1000 (1%)*   KETONES UA mg/dl Negative   BLOOD UA  Trace-Intact*   PROTEIN UA mg/dl Negative   NITRITE UA  Negative   BILIRUBIN UA  Negative   UROBILINOGEN UA E U /dl 0 2   LEUKOCYTES UA  Elevated glucose may cause decreased leukocyte values   See urine microscopic for [facility] result/*   WBC UA /hpf 1-2*   RBC UA /hpf None Seen   BACTERIA UA /hpf None Seen   EPITHELIAL CELLS WET PREP /hpf Occasional     Results from last 7 days   Lab Units 09/17/19  1230   AMPH/METH  Negative   BARBITURATE UR  Negative   BENZODIAZEPINE UR  Negative   COCAINE UR  Negative   METHADONE URINE  Positive*   OPIATE UR  Negative   PCP UR  Negative   THC UR  Negative     CT HEAD=No CT evidence of acute intracranial process  EKG=Rhythm: sinus rhythm    Ectopy:     Ectopy: none    QRS:     QRS axis:  Normal  Conduction:     Conduction: normal    ST segments:     ST segments:  Non-specific  T waves:     T waves: non-specific    ED Treatment:   Medication Administration from 09/17/2019 0948 to 09/17/2019 1518       Date/Time Order Dose Route Action Action by Comments     09/17/2019 1058 sodium chloride 0 9 % bolus 1,000 mL 1,000 mL Intravenous New Kartik Barney RN      09/17/2019 1202 sodium chloride 0 9 % bolus 1,000 mL 1,000 mL Intravenous [facility]      09/17/2019 1200 insulin regular (HumuLIN R,NovoLIN R) injection 15 Units 15 Units Subcutaneous Given Cheo Wilson RN         Past Medical History:   Diagnosis Date    Depression     Diabetes mellitus ([facility] )     Hypertension     Neuropathy      Present on Admission:   Hypertension   Anxiety and depression   History of drug abuse   Morbidly obese ([facility] )   History of hypothyroidism   Bipolar 1 disorder (HCC)   Other insomnia  Admitting Diagnosis: Altered mental status [R41 82]  Hyperglycemia [R73 9]  Morbidly obese ([facility] ) [E66 01]  Dizzinesses [R42]  Age/Sex: 45 y o  female  Admission Orders:  MED SURG  ACCUCHECKS WITH COVERAGE SCALE  Current Facility-Administered Medications:  divalproex sodium 500 mg Oral BID   gabapentin 800 mg Oral TID   insulin glargine 30 Units Subcutaneous HS   insulin lispro 2-12 Units Subcutaneous TID AC   insulin lispro 2-12 Units Subcutaneous HS   lisinopril-hydrochlorothiazide (PRINZIDE 20/12  5) combo dose  Oral Daily   methadone 110 mg Oral Daily   traZODone 50 mg Oral HS   venlafaxine 150 mg Oral Daily     Network Utilization Review Department  Phone: [phone]; Fax [phone]  [email or web address]  ATTENTION: Please call with any questions or concerns to [phone]  and carefully listen to the prompts so that you are directed to the right person  Send all requests for admission clinical reviews, approved or denied determinations and any other requests to fax [phone]   All voicemails are confidential

## 2019-09-18 NOTE — DISCHARGE SUMMARY
[facility] Discharge Summary - Medical Carlene Barron 45 y o  female MRN: [id number]    [place] [address] [facility] / Bed: Tang Riddle-* Encounter: [id number]    BRIEF OVERVIEW  Admitting Provider: Javier Sloan DO  Discharge Provider: Javier Sloan DO    Discharge To: Home    Outpatient Follow-Up:  [facility]  Things to address at first follow up visit:  Foot sugar control, medication compliance, diet  Labs and results pending at discharge:  None    Admission Date: 9/17/2019     Discharge Date: 9/18/2019 10:27 AM    Primary Discharge Diagnosis  Principal Problem:    Type 2 diabetes mellitus with hyperglycemia, with long-term current use of insulin ([facility])  Active Problems:    Hypertension    Anxiety and depression    History of drug abuse    Morbidly obese ([facility])    History of hypothyroidism    Bipolar 1 disorder ([facility])    Other insomnia  Resolved Problems:    * No resolved hospital problems   *      Secondary Discharge Diagnoses  None  Consulting Providers   None        DETAILS OF HOSPITAL STAY    Procedures Performed/Pertinent Test results  Results for orders placed or performed during the hospital encounter of 09/17/19   MRSA culture   Result Value Ref Range    MRSA Culture Only       No Methicillin Resistant Staphlyococcus aureus (MRSA) isolated   CBC and differential   Result Value Ref Range    WBC 7 34 4 31 - 10 16 Thousand/uL    RBC 4 82 3 81 - 5 12 Million/uL    Hemoglobin 14 2 11 5 - 15 4 g/dL    Hematocrit 42 4 34 8 - 46 1 %    MCV 88 82 - 98 fL    MCH 29 5 26 8 - 34 3 pg    MCHC 33 5 31 4 - 37 4 g/dL    RDW 13 0 11 6 - 15 1 %    MPV 9 4 8 9 - 12 7 fL    Platelets 530 661 - 472 Thousands/uL    nRBC 0 /100 WBCs    Neutrophils Relative 60 43 - 75 %    Immat GRANS % 0 0 - 2 %    Lymphocytes Relative 32 14 - 44 %    Monocytes Relative 6 4 - 12 %    Eosinophils Relative 2 0 - 6 %    Basophils Relative 0 0 - 1 %    Neutrophils Absolute 4 36 1 85 - 7 62 Thousands/µL Immature Grans Absolute 0 02 0 00 - 0 20 Thousand/uL    Lymphocytes Absolute 2 34 0 60 - 4 47 Thousands/µL    Monocytes Absolute 0 45 0 17 - 1 22 Thousand/µL    Eosinophils Absolute 0 16 0 00 - 0 61 Thousand/µL    Basophils Absolute 0 01 0 00 - 0 10 Thousands/µL   Comprehensive metabolic panel   Result Value Ref Range    Sodium 130 (L) 136 - 145 mmol/L    Potassium 4 3 3 5 - 5 3 mmol/L    Chloride 92 (L) 100 - 108 mmol/L    CO2 32 21 - 32 mmol/L    ANION GAP 6 4 - 13 mmol/L    BUN 11 5 - 25 mg/dL    Creatinine 1 18 0 60 - 1 30 mg/dL    Glucose 584 (HH) 65 - 140 mg/dL    Calcium 9 0 8 3 - 10 1 mg/dL    AST 20 5 - 45 U/L    ALT 51 12 - 78 U/L    Alkaline Phosphatase 80 46 - 116 U/L    Total Protein 7 9 6 4 - 8 2 g/dL    Albumin 3 4 (L) 3 5 - 5 0 g/dL    Total Bilirubin 0 40 0 20 - 1 00 mg/dL    eGFR 57 ml/min/1 73sq m   Protime-INR   Result Value Ref Range    Protime 10 0 9 8 - 12 0 seconds    INR 0 93 0 91 - 1 09   APTT   Result Value Ref Range    PTT 26 25 - 32 seconds   TSH   Result Value Ref Range    TSH 3RD GENERATON 1 661 0 358 - 3 740 uIU/mL   Magnesium   Result Value Ref Range    Magnesium 1 8 1 6 - 2 6 mg/dL   Troponin I   Result Value Ref Range    Troponin I <0 02 <=0 04 ng/mL   Blood gas, venous   Result Value Ref Range    pH, Balwinder 7 280 (L) 7 300 - 7 400    pCO2, Balwinder 71 8 (H) 42 0 - 50 0 mm Hg    pO2, Balwinder 39 5 35 0 - 45 0 mm Hg    HCO3, Balwinder 33 0 (H) 24 - 30 mmol/L    Base Excess, Balwinder 3 7 mmol/L    O2 Content, Balwinder 14 5 ml/dL    O2 HGB, VENOUS 68 7 60 0 - 80 0 %   Beta Hydroxybutyrate   Result Value Ref Range    BETA-HYDROXYBUTYRATE 0 2 <0 6 mmol/L   UA w Reflex to Microscopic   Result Value Ref Range    Color, UA Light Yellow     Clarity, UA Clear     Specific [place], UA 1 010 1 000 - 1 030    pH, UA 5 5 5 0, 5 5, 6 0, 6 5, 7 0, 7 5, 8 0, 8 5, 9 0    Leukocytes, UA (A) Negative     Elevated glucose may cause decreased leukocyte values   See urine microscopic for [facility] result/    Nitrite, UA Negative Negative Protein, UA Negative Negative mg/dl    Glucose, UA >=1000 (1%) (A) Negative mg/dl    Ketones, UA Negative Negative mg/dl    Urobilinogen, UA 0 2 0 2, 1 0 E U /dl E U /dl    Bilirubin, UA Negative Negative    Blood, UA Trace-Intact (A) Negative   Valproic acid level, total   Result Value Ref Range    Valproic Acid, Total 52 50 - 100 ug/mL   Rapid drug screen, urine   Result Value Ref Range    Amph/Meth UR Negative Negative    Barbiturate Ur Negative Negative    Benzodiazepine Urine Negative Negative    Cocaine Urine Negative Negative    Methadone Urine Positive (A) Negative    Opiate Urine Negative Negative    PCP Ur Negative Negative    THC Urine Negative Negative   Urine Microscopic   Result Value Ref Range    RBC, UA None Seen None Seen, 0-5 /hpf    WBC, UA 1-2 (A) None Seen, 0-5, 5-55, 5-65 /hpf    Epithelial Cells Occasional None Seen, Occasional /hpf    Bacteria, UA None Seen None Seen, Occasional /hpf   Hemoglobin A1C w/ EAG Estimation   Result Value Ref Range    Hemoglobin A1C 10 7 (H) 4 2 - 6 3 %     mg/dl   POCT pregnancy, urine   Result Value Ref Range    EXT PREG TEST UR (Ref: Negative) negative     Control valid    ECG 12 lead   Result Value Ref Range    Ventricular Rate 68 BPM    Atrial Rate 68 BPM    VA Interval 152 ms    QRSD Interval 78 ms    QT Interval 410 ms    QTC Interval 435 ms    P Axis 33 degrees    QRS Axis -9 degrees    T Wave Axis -16 degrees   Fingerstick Glucose (POCT)   Result Value Ref Range    POC Glucose >500 (HH) 65 - 140 mg/dl   Fingerstick Glucose (POCT)   Result Value Ref Range    POC Glucose 492 (H) 65 - 140 mg/dl   Fingerstick Glucose (POCT)   Result Value Ref Range    POC Glucose 321 (H) 65 - 140 mg/dl   Fingerstick Glucose (POCT)   Result Value Ref Range    POC Glucose 289 (H) 65 - 140 mg/dl   Fingerstick Glucose (POCT)   Result Value Ref Range    POC Glucose 253 (H) 65 - 140 mg/dl     CT head without contrast   Final Result      No CT evidence of acute intracranial process  Workstation performed: [id number]              HPI  Viktoriya Simpson is a 45 y o  female a past medical history of diabetes type 2 with neuropathy, hypertension, depression anxiety, bipolar disorder, history of substance abuse presents to the ER blood sugar greater 500  Patient states that this morning at the methadone clinic she was not feeling well and was complaining of some dizziness and the doctor there suggested that she might be hypoglycemic  She was given oreos and orange juice and then her sugar was checked and noted to be greater than 500  Patient was sent to the ER for further evaluation      In the ER, insulin 15 IV fluids were started and labs were drawn  CT head done which was negative  Hospital Course  Patient was admitted to the hospital for hyperglycemia she was started on insulin sliding scale algorithm and continued on her home Lantus regimen  Her sugars came down and she was discharged in stable condition  She was instructed to follow up with her primary care provider he continue all home medications      Physical Exam at Discharge  See progress note from day of discharge    Medications   al mag oxide-diphenhydramine-lidocaine viscous 1:1:1 suspension   Commonly known as: MAGIC MOUTHWASH   Swish and spit 10 mL every 4 (four) hours as needed for mouth pain or discomfort   Refills: 0         clonazePAM 1 mg tablet   Commonly known as: KlonoPIN   For: takes for anxiety   Take 1 mg by mouth 2 (two) times a day   Refills: 0         divalproex sodium 500 mg 24 hr tablet   Commonly known as: DEPAKOTE ER   Take 1 tablet (500 mg total) by mouth 2 (two) times a day   Refills: 3         gabapentin 800 mg tablet   Commonly known as: NEURONTIN   Take 1 tablet (800 mg total) by mouth 3 (three) times a day   Refills: 3         insulin glargine 100 units/mL injection pen   Commonly known as: LANTUS SOLOSTAR   Inject 30 Units under the skin daily   Refills: 3   Doctor's comments: If note covered by insurance, Can give alternative long-acting of basaglar pen with equivalent dosage of 30 u qHS  insulin lispro 100 units/mL injection   Commonly known as: HumaLOG   Inject under the skin 3 (three) times a day before meals Sliding scale three times a day   Refills: 0         lisinopril-hydrochlorothiazide 20-12 5 MG per tablet   Commonly known as: PRINZIDE,ZESTORETIC   TAKE 1 TABLET BY MOUTH ONCE A DAY   Refills: 2         methadone 10 mg/mL oral concentrated solution   Commonly known as: DOLOPHINE   Take 110 mg by mouth daily Methadone clinic   Refills: 0         * ONE TOUCH ULTRA TEST test strip   Generic drug: glucose blood   Test 3 times a day BEFORE MEALS   Refills: 3         * glucose blood test strip   Commonly known as: ONE TOUCH ULTRA TEST   Test 3 times a day before meals   Refills: 0   Doctor's comments: Or alternative that is compatible with glucose win         polyethylene glycol 17 g packet   Commonly known as: MIRALAX   Take 17 g by mouth daily   Refills: 0         senna-docusate sodium 8 6-50 mg per tablet   Commonly known as: SENOKOT S   Take 1 tablet by mouth daily   Refills: 0         traZODone 50 mg tablet   Commonly known as: DESYREL   Take 1 tablet (50 mg total) by mouth daily at bedtime   Refills: 0         venlafaxine 150 mg 24 hr capsule   Commonly known as: EFFEXOR-XR   Take 150 mg by mouth daily   Refills: 0             Allergies  Allergies   Allergen Reactions    Septra [Sulfamethoxazole-Trimethoprim] Hives       Diet restrictions: As tolerated  Activity restrictions: As tolerated  Code Status: Prior  Advance Directive and Living Will: <no information>  Power of :    POLST:      Discharge Condition: stable      Discharge  Statement   I spent 25 minutes discharging the patient  This time was spent on the day of discharge  I had direct contact with the patient on the day of discharge   Additional documentation is required if more than 30 minutes were spent on discharge

## 2019-09-19 ENCOUNTER — TRANSITIONAL CARE MANAGEMENT (OUTPATIENT)
Dept: FAMILY MEDICINE CLINIC | Facility: CLINIC | Age: 44
End: 2019-09-19

## 2019-09-19 LAB — MRSA NOSE QL CULT: NORMAL

## 2019-09-19 NOTE — NURSING NOTE
Discharge and medication instructions reviewed with patient  She understood the information  IV saline lock discontinued  Prior to leaving unit  Pt insisted on ambulating to [facility]

## 2020-02-05 ENCOUNTER — APPOINTMENT (EMERGENCY)
Dept: RADIOLOGY | Facility: HOSPITAL | Age: 45
DRG: 493 | End: 2020-02-05
Payer: COMMERCIAL

## 2020-02-05 ENCOUNTER — HOSPITAL ENCOUNTER (INPATIENT)
Facility: HOSPITAL | Age: 45
LOS: 5 days | Discharge: HOME | DRG: 493 | End: 2020-02-12
Attending: EMERGENCY MEDICINE | Admitting: FAMILY MEDICINE
Payer: COMMERCIAL

## 2020-02-05 DIAGNOSIS — K80.20 CHOLELITHIASES: ICD-10-CM

## 2020-02-05 DIAGNOSIS — R11.0 NAUSEA: ICD-10-CM

## 2020-02-05 DIAGNOSIS — F41.9 ANXIETY AND DEPRESSION: ICD-10-CM

## 2020-02-05 DIAGNOSIS — Z87.898 HISTORY OF SEIZURE: ICD-10-CM

## 2020-02-05 DIAGNOSIS — R11.2 NAUSEA & VOMITING: Primary | ICD-10-CM

## 2020-02-05 DIAGNOSIS — F32.A ANXIETY AND DEPRESSION: ICD-10-CM

## 2020-02-05 DIAGNOSIS — E87.5 HYPERKALEMIA: ICD-10-CM

## 2020-02-05 DIAGNOSIS — E87.1 HYPONATREMIA: ICD-10-CM

## 2020-02-05 DIAGNOSIS — N30.00 ACUTE CYSTITIS WITHOUT HEMATURIA: ICD-10-CM

## 2020-02-05 DIAGNOSIS — R10.11 RIGHT UPPER QUADRANT PAIN: ICD-10-CM

## 2020-02-05 DIAGNOSIS — R50.9 FEVER, UNSPECIFIED FEVER CAUSE: ICD-10-CM

## 2020-02-05 DIAGNOSIS — R73.9 HYPERGLYCEMIA: ICD-10-CM

## 2020-02-05 LAB
ALBUMIN SERPL BCP-MCNC: 3.4 G/DL (ref 3.5–5)
ALP SERPL-CCNC: 70 U/L (ref 46–116)
ALT SERPL W P-5'-P-CCNC: 32 U/L (ref 12–78)
ANION GAP SERPL CALCULATED.3IONS-SCNC: 4 MMOL/L (ref 4–13)
ANION GAP SERPL CALCULATED.3IONS-SCNC: 4 MMOL/L (ref 4–13)
AST SERPL W P-5'-P-CCNC: 40 U/L (ref 5–45)
BASOPHILS # BLD AUTO: 0.02 THOUSANDS/ΜL (ref 0–0.1)
BASOPHILS NFR BLD AUTO: 0 % (ref 0–1)
BETA-HYDROXYBUTYRATE: 0.1 MMOL/L
BILIRUB SERPL-MCNC: 0.6 MG/DL (ref 0.2–1)
BUN SERPL-MCNC: 17 MG/DL (ref 5–25)
BUN SERPL-MCNC: 19 MG/DL (ref 5–25)
CALCIUM SERPL-MCNC: 8.7 MG/DL (ref 8.3–10.1)
CALCIUM SERPL-MCNC: 9 MG/DL (ref 8.3–10.1)
CHLORIDE SERPL-SCNC: 91 MMOL/L (ref 100–108)
CHLORIDE SERPL-SCNC: 99 MMOL/L (ref 100–108)
CO2 SERPL-SCNC: 31 MMOL/L (ref 21–32)
CO2 SERPL-SCNC: 33 MMOL/L (ref 21–32)
CREAT SERPL-MCNC: 1.1 MG/DL (ref 0.6–1.3)
CREAT SERPL-MCNC: 1.2 MG/DL (ref 0.6–1.3)
EOSINOPHIL # BLD AUTO: 0.18 THOUSAND/ΜL (ref 0–0.61)
EOSINOPHIL NFR BLD AUTO: 2 % (ref 0–6)
ERYTHROCYTE [DISTWIDTH] IN BLOOD BY AUTOMATED COUNT: 12.6 % (ref 11.6–15.1)
GFR SERPL CREATININE-BSD FRML MDRD: 55 ML/MIN/1.73SQ M
GFR SERPL CREATININE-BSD FRML MDRD: 62 ML/MIN/1.73SQ M
GLUCOSE SERPL-MCNC: 168 MG/DL (ref 65–140)
GLUCOSE SERPL-MCNC: 213 MG/DL (ref 65–140)
GLUCOSE SERPL-MCNC: 334 MG/DL (ref 65–140)
GLUCOSE SERPL-MCNC: 375 MG/DL (ref 65–140)
HCT VFR BLD AUTO: 39.3 % (ref 34.8–46.1)
HGB BLD-MCNC: 13.6 G/DL (ref 11.5–15.4)
IMM GRANULOCYTES # BLD AUTO: 0.02 THOUSAND/UL (ref 0–0.2)
IMM GRANULOCYTES NFR BLD AUTO: 0 % (ref 0–2)
LYMPHOCYTES # BLD AUTO: 2.51 THOUSANDS/ΜL (ref 0.6–4.47)
LYMPHOCYTES NFR BLD AUTO: 31 % (ref 14–44)
MCH RBC QN AUTO: 29.3 PG (ref 26.8–34.3)
MCHC RBC AUTO-ENTMCNC: 34.6 G/DL (ref 31.4–37.4)
MCV RBC AUTO: 85 FL (ref 82–98)
MONOCYTES # BLD AUTO: 0.57 THOUSAND/ΜL (ref 0.17–1.22)
MONOCYTES NFR BLD AUTO: 7 % (ref 4–12)
NEUTROPHILS # BLD AUTO: 4.8 THOUSANDS/ΜL (ref 1.85–7.62)
NEUTS SEG NFR BLD AUTO: 60 % (ref 43–75)
NRBC BLD AUTO-RTO: 0 /100 WBCS
PLATELET # BLD AUTO: 266 THOUSANDS/UL (ref 149–390)
PMV BLD AUTO: 9.6 FL (ref 8.9–12.7)
POTASSIUM SERPL-SCNC: 3.9 MMOL/L (ref 3.5–5.3)
POTASSIUM SERPL-SCNC: 5.7 MMOL/L (ref 3.5–5.3)
PROT SERPL-MCNC: 8 G/DL (ref 6.4–8.2)
RBC # BLD AUTO: 4.64 MILLION/UL (ref 3.81–5.12)
SODIUM SERPL-SCNC: 128 MMOL/L (ref 136–145)
SODIUM SERPL-SCNC: 134 MMOL/L (ref 136–145)
WBC # BLD AUTO: 8.1 THOUSAND/UL (ref 4.31–10.16)

## 2020-02-05 PROCEDURE — 87081 CULTURE SCREEN ONLY: CPT | Performed by: STUDENT IN AN ORGANIZED HEALTH CARE EDUCATION/TRAINING PROGRAM

## 2020-02-05 PROCEDURE — 82948 REAGENT STRIP/BLOOD GLUCOSE: CPT

## 2020-02-05 PROCEDURE — 96374 THER/PROPH/DIAG INJ IV PUSH: CPT

## 2020-02-05 PROCEDURE — 80048 BASIC METABOLIC PNL TOTAL CA: CPT | Performed by: STUDENT IN AN ORGANIZED HEALTH CARE EDUCATION/TRAINING PROGRAM

## 2020-02-05 PROCEDURE — 99285 EMERGENCY DEPT VISIT HI MDM: CPT

## 2020-02-05 PROCEDURE — 96361 HYDRATE IV INFUSION ADD-ON: CPT

## 2020-02-05 PROCEDURE — 80053 COMPREHEN METABOLIC PANEL: CPT | Performed by: EMERGENCY MEDICINE

## 2020-02-05 PROCEDURE — 74022 RADEX COMPL AQT ABD SERIES: CPT

## 2020-02-05 PROCEDURE — 36415 COLL VENOUS BLD VENIPUNCTURE: CPT | Performed by: EMERGENCY MEDICINE

## 2020-02-05 PROCEDURE — 85025 COMPLETE CBC W/AUTO DIFF WBC: CPT | Performed by: EMERGENCY MEDICINE

## 2020-02-05 PROCEDURE — 93005 ELECTROCARDIOGRAM TRACING: CPT

## 2020-02-05 PROCEDURE — 99285 EMERGENCY DEPT VISIT HI MDM: CPT | Performed by: EMERGENCY MEDICINE

## 2020-02-05 PROCEDURE — 82010 KETONE BODYS QUAN: CPT | Performed by: EMERGENCY MEDICINE

## 2020-02-05 RX ORDER — GABAPENTIN 400 MG/1
800 CAPSULE ORAL 3 TIMES DAILY
Status: DISCONTINUED | OUTPATIENT
Start: 2020-02-05 | End: 2020-02-07

## 2020-02-05 RX ORDER — DIVALPROEX SODIUM 500 MG/1
500 TABLET, EXTENDED RELEASE ORAL 2 TIMES DAILY
Status: DISCONTINUED | OUTPATIENT
Start: 2020-02-05 | End: 2020-02-12 | Stop reason: HOSPADM

## 2020-02-05 RX ORDER — CLONAZEPAM 1 MG/1
1 TABLET ORAL 3 TIMES DAILY
Status: DISCONTINUED | OUTPATIENT
Start: 2020-02-05 | End: 2020-02-06

## 2020-02-05 RX ORDER — PRAZOSIN HYDROCHLORIDE 1 MG/1
3 CAPSULE ORAL
COMMUNITY
End: 2020-09-18 | Stop reason: SDUPTHER

## 2020-02-05 RX ORDER — INSULIN GLARGINE 100 [IU]/ML
30 INJECTION, SOLUTION SUBCUTANEOUS
Status: DISCONTINUED | OUTPATIENT
Start: 2020-02-05 | End: 2020-02-12 | Stop reason: HOSPADM

## 2020-02-05 RX ORDER — VENLAFAXINE HYDROCHLORIDE 150 MG/1
300 CAPSULE, EXTENDED RELEASE ORAL DAILY
Status: DISCONTINUED | OUTPATIENT
Start: 2020-02-06 | End: 2020-02-12 | Stop reason: HOSPADM

## 2020-02-05 RX ORDER — SODIUM CHLORIDE 9 MG/ML
125 INJECTION, SOLUTION INTRAVENOUS CONTINUOUS
Status: DISCONTINUED | OUTPATIENT
Start: 2020-02-05 | End: 2020-02-07

## 2020-02-05 RX ORDER — PRAZOSIN HYDROCHLORIDE 1 MG/1
3 CAPSULE ORAL
Status: DISCONTINUED | OUTPATIENT
Start: 2020-02-05 | End: 2020-02-12 | Stop reason: HOSPADM

## 2020-02-05 RX ORDER — METHADONE HYDROCHLORIDE 10 MG/1
120 TABLET ORAL
Status: DISCONTINUED | OUTPATIENT
Start: 2020-02-06 | End: 2020-02-12 | Stop reason: HOSPADM

## 2020-02-05 RX ORDER — ONDANSETRON 2 MG/ML
4 INJECTION INTRAMUSCULAR; INTRAVENOUS ONCE
Status: COMPLETED | OUTPATIENT
Start: 2020-02-05 | End: 2020-02-05

## 2020-02-05 RX ADMIN — SODIUM CHLORIDE 1000 ML: 0.9 INJECTION, SOLUTION INTRAVENOUS at 15:59

## 2020-02-05 RX ADMIN — GABAPENTIN 800 MG: 400 CAPSULE ORAL at 21:06

## 2020-02-05 RX ADMIN — INSULIN LISPRO 1 UNITS: 100 INJECTION, SOLUTION INTRAVENOUS; SUBCUTANEOUS at 22:31

## 2020-02-05 RX ADMIN — DIVALPROEX SODIUM 500 MG: 500 TABLET, FILM COATED, EXTENDED RELEASE ORAL at 20:05

## 2020-02-05 RX ADMIN — SODIUM CHLORIDE 1000 ML: 0.9 INJECTION, SOLUTION INTRAVENOUS at 16:35

## 2020-02-05 RX ADMIN — INSULIN GLARGINE 30 UNITS: 100 INJECTION, SOLUTION SUBCUTANEOUS at 22:31

## 2020-02-05 RX ADMIN — CLONAZEPAM 1 MG: 1 TABLET ORAL at 21:06

## 2020-02-05 RX ADMIN — ONDANSETRON 4 MG: 2 INJECTION INTRAMUSCULAR; INTRAVENOUS at 15:59

## 2020-02-05 RX ADMIN — PRAZOSIN HYDROCHLORIDE 3 MG: 1 CAPSULE ORAL at 22:30

## 2020-02-05 RX ADMIN — INSULIN HUMAN 7 UNITS: 100 INJECTION, SOLUTION PARENTERAL at 17:30

## 2020-02-05 RX ADMIN — SODIUM CHLORIDE 125 ML/HR: 0.9 INJECTION, SOLUTION INTRAVENOUS at 20:15

## 2020-02-05 NOTE — ASSESSMENT & PLAN NOTE
· Continue home Prinzide 20-12 5 mg p o  daily  · prazosin 3 mg p o  daily-may also be psychiatric medication

## 2020-02-05 NOTE — ED PROVIDER NOTES
History  Chief Complaint   Patient presents with    Vomiting     c/o vomiting and chills for a couple of days  sugars were reading over 500     Patient is a 47-year-old female presents with complaint of a 3 day history of nausea and vomiting and unable keep anything down except some small sips of water  Patient states she is diabetic and her sugars have been running over 500s at home  Patient denies any illnesses prior to the onset of this episode  Patient denies any chest pain, no real abdominal pain but states she is constipated and has had a bowel movement in approximately 8 days  Patient denies any fevers or chills, no cough cold or flu-like symptoms  Patient denies any urinary symptoms  Patient states she has never been in DKA in the past           Prior to Admission Medications   Prescriptions Last Dose Informant Patient Reported? Taking?    INS SYRINGE/NEEDLE 1CC/28G (B-D INS SYR MICROFINE 1CC/28G) 28G X 1/2" 1 ML MISC   No No   Sig: by Does not apply route 3 (three) times a day with meals   Lancets (ONETOUCH ULTRASOFT) lancets   Yes No   ONE TOUCH ULTRA TEST test strip   No No   Sig: Test 3 times a day BEFORE MEALS   al mag oxide-diphenhydramine-lidocaine viscous (MAGIC MOUTHWASH) 1:1:1 suspension   No No   Sig: Swish and spit 10 mL every 4 (four) hours as needed for mouth pain or discomfort   Patient not taking: Reported on 9/19/2019   clonazePAM (KlonoPIN) 1 mg tablet   Yes No   Sig: Take 1 mg by mouth 3 (three) times a day    divalproex sodium (DEPAKOTE ER) 500 mg 24 hr tablet   No No   Sig: Take 1 tablet (500 mg total) by mouth 2 (two) times a day   gabapentin (NEURONTIN) 800 mg tablet   No No   Sig: Take 1 tablet (800 mg total) by mouth 3 (three) times a day   glucose blood (ONE TOUCH ULTRA TEST) test strip   No No   Sig: Test 3 times a day before meals   insulin glargine (LANTUS SOLOSTAR) 100 units/mL injection pen   No No   Sig: Inject 30 Units under the skin daily   Patient taking differently: Inject 30 Units under the skin daily at bedtime    insulin lispro (HumaLOG) 100 units/mL injection  Self Yes No   Sig: Inject under the skin 3 (three) times a day before meals Sliding scale three times a day   lisinopril-hydrochlorothiazide (PRINZIDE,ZESTORETIC) 20-12 5 MG per tablet   No No   Sig: TAKE 1 TABLET BY MOUTH ONCE A DAY   methadone (DOLOPHINE) 10 mg/mL oral concentrated solution 2/5/2020 at Unknown time Self Yes Yes   Sig: Take 120 mg by mouth daily Methadone clinic   polyethylene glycol (MIRALAX) 17 g packet   No No   Sig: Take 17 g by mouth daily   Patient not taking: Reported on 9/19/2019   prazosin (MINIPRESS) 1 mg capsule   Yes Yes   Sig: Take 3 mg by mouth daily at bedtime   senna-docusate sodium (SENOKOT S) 8 6-50 mg per tablet   No No   Sig: Take 1 tablet by mouth daily   Patient not taking: Reported on 9/19/2019   traZODone (DESYREL) 50 mg tablet Not Taking at Unknown time  No No   Sig: Take 1 tablet (50 mg total) by mouth daily at bedtime   Patient not taking: Reported on 2/5/2020   venlafaxine (EFFEXOR-XR) 150 mg 24 hr capsule  Self Yes No   Sig: Take 300 mg by mouth daily       Facility-Administered Medications: None       Past Medical History:   Diagnosis Date    Anxiety     Depression     Diabetes mellitus ([address] )     Hypertension     Neuropathy        Past Surgical History:   Procedure Laterality Date    INCISION AND DRAINAGE OF WOUND Right 7/1/2017    Procedure: INCISION AND DRAINAGE (I&D) EXTREMITY THIGH;  Surgeon: Shine Ocampo MD;  Location: [facility];  Service: General       Family History   Problem Relation Age of Onset    Hypertension Mother     Diabetes Father     Hypertension Father     Diabetes Family     Hypertension Family      I have reviewed and agree with the history as documented      Social History     Tobacco Use    Smoking status: Never Smoker    Smokeless tobacco: Never Used   Substance Use Topics    Alcohol use: Never     Frequency: Never    Drug use: Not Currently        Review of Systems   Constitutional: Negative for chills and fever  HENT: Negative for congestion, facial swelling and trouble swallowing  Eyes: Negative for photophobia and visual disturbance  Respiratory: Negative for chest tightness and shortness of breath  Cardiovascular: Negative for chest pain and leg swelling  Gastrointestinal: Positive for constipation, nausea and vomiting  Negative for abdominal pain  Genitourinary: Negative for difficulty urinating and dysuria  Musculoskeletal: Negative for back pain and neck pain  Skin: Negative  Neurological: Negative for weakness and numbness  Hematological: Negative  Psychiatric/Behavioral: Negative  Physical Exam  Physical Exam   Constitutional: She is oriented to person, place, and time  She appears well-developed and well-nourished  HENT:   Head: Normocephalic and atraumatic  Neck: Normal range of motion  Neck supple  Cardiovascular: Normal rate and regular rhythm  Pulmonary/Chest: Breath sounds normal  Tachypnea noted  No respiratory distress  Abdominal: Soft  Bowel sounds are decreased  There is generalized tenderness  There is no rigidity, no rebound and no guarding  Musculoskeletal: Normal range of motion  Neurological: She is alert and oriented to person, place, and time  Skin: Skin is warm and dry  Capillary refill takes 2 to 3 seconds  Psychiatric: She has a normal mood and affect  Nursing note and vitals reviewed        Vital Signs  ED Triage Vitals [02/05/20 1518]   Temperature Pulse Respirations Blood Pressure SpO2   (!) 96 2 °F (35 7 °C) 78 (!) 24 141/80 100 %      Temp Source Heart Rate Source Patient Position - Orthostatic VS BP Location FiO2 (%)   Tympanic Monitor Sitting Right arm --      Pain Score       2           Vitals:    02/05/20 1518 02/05/20 1630 02/05/20 1730   BP: 141/80 151/74 125/69   Pulse: 78 62 62   Patient Position - Orthostatic VS: Sitting  Sitting         Visual Acuity      ED Medications  Medications   sodium chloride 0 9 % bolus 1,000 mL (0 mL Intravenous Stopped 2/5/20 1634)   ondansetron (ZOFRAN) injection 4 mg (4 mg Intravenous Given 2/5/20 1559)   sodium chloride 0 9 % bolus 1,000 mL (0 mL Intravenous Stopped 2/5/20 1730)   insulin regular (HumuLIN R,NovoLIN R) injection 7 Units (7 Units Intravenous Given 2/5/20 1730)       Diagnostic Studies  Results Reviewed     Procedure Component Value Units Date/Time    UA w Reflex to Microscopic w Reflex to Culture [[id number]]     Lab Status:  No result Specimen:  Urine     Comprehensive metabolic panel [[id number]]  (Abnormal) Collected:  02/05/20 1554    Lab Status:  Final result Specimen:  Blood from Arm, Left Updated:  02/05/20 1617     Sodium 128 mmol/L      Potassium 5 7 mmol/L      Chloride 91 mmol/L      CO2 33 mmol/L      ANION GAP 4 mmol/L      BUN 19 mg/dL      Creatinine 1 20 mg/dL      Glucose 334 mg/dL      Calcium 9 0 mg/dL      AST 40 U/L      ALT 32 U/L      Alkaline Phosphatase 70 U/L      Total Protein 8 0 g/dL      Albumin 3 4 g/dL      Total Bilirubin 0 60 mg/dL      eGFR 55 ml/min/1 73sq m     Narrative:       [place] guidelines for Chronic Kidney Disease (CKD):     Stage 1 with normal or high GFR (GFR > 90 mL/min/1 73 square meters)    Stage 2 Mild CKD (GFR = 60-89 mL/min/1 73 square meters)    Stage 3A Moderate CKD (GFR = 45-59 mL/min/1 73 square meters)    Stage 3B Moderate CKD (GFR = 30-44 mL/min/1 73 square meters)    Stage 4 Severe CKD (GFR = 15-29 mL/min/1 73 square meters)    Stage 5 End Stage CKD (GFR <15 mL/min/1 73 square meters)  Note: GFR calculation is accurate only with a steady state creatinine    Fingerstick Glucose (POCT) [[id number]]  (Abnormal) Collected:  02/05/20 1528    Lab Status:  Final result Updated:  02/05/20 1607     POC Glucose 375 mg/dl     Beta Hydroxybutyrate [[id number]]  (Normal) Collected:  02/05/20 1554    Lab Status:  Final result Specimen: Blood from Arm, Left Updated:  02/05/20 1600     BETA-HYDROXYBUTYRATE 0 1 mmol/L     CBC and differential [[id number]] Collected:  02/05/20 1554    Lab Status:  Final result Specimen:  Blood from Arm, Left Updated:  02/05/20 1559     WBC 8 10 Thousand/uL      RBC 4 64 Million/uL      Hemoglobin 13 6 g/dL      Hematocrit 39 3 %      MCV 85 fL      MCH 29 3 pg      MCHC 34 6 g/dL      RDW 12 6 %      MPV 9 6 fL      Platelets 114 Thousands/uL      nRBC 0 /100 WBCs      Neutrophils Relative 60 %      Immat GRANS % 0 %      Lymphocytes Relative 31 %      Monocytes Relative 7 %      Eosinophils Relative 2 %      Basophils Relative 0 %      Neutrophils Absolute 4 80 Thousands/µL      Immature Grans Absolute 0 02 Thousand/uL      Lymphocytes Absolute 2 51 Thousands/µL      Monocytes Absolute 0 57 Thousand/µL      Eosinophils Absolute 0 18 Thousand/µL      Basophils Absolute 0 02 Thousands/µL                  XR abdomen obstruction series   Final Result by Clint Montalvo MD (02/05 1716)      There is a large volume of stool in the colon suggesting constipation  Bowel gas pattern is otherwise unremarkable           Workstation performed: [id number]                    Procedures  ECG 12 Lead Documentation Only  Date/Time: 2/5/2020 5:35 PM  Performed by: Amanda Paz MD  Authorized by: Amanda Paz MD     Indications / Diagnosis:  Hyperkalemia  ECG reviewed by me, the ED Provider: yes    Previous ECG:     Previous ECG:  Unavailable    Comparison to cardiac monitor: Yes    Interpretation:     Interpretation: abnormal    Rate:     ECG rate:  63    ECG rate assessment: normal    Rhythm:     Rhythm: sinus rhythm    Ectopy:     Ectopy: none    QRS:     QRS axis:  Normal    QRS intervals:  Normal  Conduction:     Conduction: normal    ST segments:     ST segments:  Normal  T waves:     T waves: flattening      Flattening:  V6, V5 and V4             ED Course                               MDM  Number of Diagnoses or Management Options  Hyperglycemia:   Hyperkalemia:   Hyponatremia:   Nausea & vomiting:   Diagnosis management comments: Patient shows no signs of being in DKA, she is hyperglycemic with electrolyte disturbances  She is going to be admitted to the hospital for further treatment  I answered all questions best my ability, the patient verbalizes understanding and is in agreement with the assessment plan  Amount and/or Complexity of Data Reviewed  Clinical lab tests: ordered and reviewed  Tests in the radiology section of CPT®: ordered and reviewed          Disposition  Final diagnoses:   Nausea & vomiting   Hyponatremia   Hyperglycemia   Hyperkalemia     Time reflects when diagnosis was documented in both MDM as applicable and the Disposition within this note     Time User Action Codes Description Comment    2/5/2020  5:22 PM Aleksandra Fariba [R11 2] Nausea & vomiting     2/5/2020  5:22 PM Aleksandra Fariba [E87 1] Hyponatremia     2/5/2020  5:22 PM Donovan Kettle Add [R73 9] Hyperglycemia     2/5/2020  5:22 PM Donovan Kettle Add [E87 5] Hyperkalemia       ED Disposition     ED Disposition Condition Date/Time Comment    Admit Stable Wed Feb 5, 2020  5:22 PM Case was discussed with Dr Thomas Montes and the patient's admission status was agreed to be Admission Status: observation status to the service of Dr Thomas Montes           Follow-up Information    None         Current Discharge Medication List      CONTINUE these medications which have NOT CHANGED    Details   methadone (DOLOPHINE) 10 mg/mL oral concentrated solution Take 120 mg by mouth daily Methadone clinic      prazosin (MINIPRESS) 1 mg capsule Take 3 mg by mouth daily at bedtime      al mag oxide-diphenhydramine-lidocaine viscous (MAGIC MOUTHWASH) 1:1:1 suspension Swish and spit 10 mL every 4 (four) hours as needed for mouth pain or discomfort  Qty: 180 mL, Refills: 0    Associated Diagnoses: Pain, dental      clonazePAM (KlonoPIN) 1 mg tablet Take 1 mg by mouth 3 (three) times a day       divalproex sodium (DEPAKOTE ER) 500 mg 24 hr tablet Take 1 tablet (500 mg total) by mouth 2 (two) times a day  Qty: 60 tablet, Refills: 3    Associated Diagnoses: History of seizure      gabapentin (NEURONTIN) 800 mg tablet Take 1 tablet (800 mg total) by mouth 3 (three) times a day  Qty: 90 tablet, Refills: 3    Associated Diagnoses: Type 2 diabetes mellitus with diabetic neuropathy, with long-term current use of insulin ([address] )      ! ! glucose blood (ONE TOUCH ULTRA TEST) test strip Test 3 times a day before meals  Qty: 100 each, Refills: 0    Comments: Or alternative that is compatible with glucose win  Associated Diagnoses: Type 2 diabetes mellitus with diabetic neuropathy, with long-term current use of insulin (HCC)      INS SYRINGE/NEEDLE 1CC/28G (B-D INS SYR MICROFINE 1CC/28G) 28G X 1/2" 1 ML MISC by Does not apply route 3 (three) times a day with meals  Qty: 100 each, Refills: 3    Comments: Please give enough for 30 days supply for 3xday  Associated Diagnoses: Type 2 diabetes mellitus with diabetic neuropathy, with long-term current use of insulin (HCC)      insulin glargine (LANTUS SOLOSTAR) 100 units/mL injection pen Inject 30 Units under the skin daily  Qty: 9 pen, Refills: 3    Comments: If note covered by insurance, Can give alternative long-acting of basaglar pen with equivalent dosage of 30 u qHS    Associated Diagnoses: Type 2 diabetes mellitus with complication, without long-term current use of insulin (HCC)      insulin lispro (HumaLOG) 100 units/mL injection Inject under the skin 3 (three) times a day before meals Sliding scale three times a day      Lancets (ONETOUCH ULTRASOFT) lancets       lisinopril-hydrochlorothiazide (PRINZIDE,ZESTORETIC) 20-12 5 MG per tablet TAKE 1 TABLET BY MOUTH ONCE A DAY  Qty: 60 tablet, Refills: 2    Associated Diagnoses: Essential hypertension      !! ONE TOUCH ULTRA TEST test strip Test 3 times a day BEFORE MEALS  Qty: 100 each, Refills: 3 Associated Diagnoses: Type 2 diabetes mellitus with diabetic neuropathy, with long-term current use of insulin (HCC)      polyethylene glycol (MIRALAX) 17 g packet Take 17 g by mouth daily  Qty: 14 each, Refills: 0    Associated Diagnoses: Hyperglycemia; Pain, dental      senna-docusate sodium (SENOKOT S) 8 6-50 mg per tablet Take 1 tablet by mouth daily  Qty: 20 tablet, Refills: 0    Associated Diagnoses: Hyperglycemia; Pain, dental      traZODone (DESYREL) 50 mg tablet Take 1 tablet (50 mg total) by mouth daily at bedtime  Qty: 60 tablet, Refills: 0    Associated Diagnoses: Other insomnia      venlafaxine (EFFEXOR-XR) 150 mg 24 hr capsule Take 300 mg by mouth daily        ! ! - Potential duplicate medications found  Please discuss with provider  No discharge procedures on file      ED Provider  Electronically Signed by           Getachew Mcclain MD  02/05/20 0027

## 2020-02-05 NOTE — H&P
H&P Exam - Gary Barorn 39 y o  female MRN: [id number]    Unit/Bed#: [address] [place] [address] Encounter: [id number]    Patient will be admitted under observation status  He will be admitted to Dr Kelin Reyes service  Assessment/Plan  * Nausea and Vomiting  Assessment & Plan  Patient presents with 3 days of nausea and vomiting  Likely viral in nature as she notes that she has had sick contacts recently  Since she was not feeling well today she did not check her sugars today and thus did not take her insulin  No blood in vomit  · Aggressive hydration with  mL/hr  · Recheck electrolytes this evening    Type 2 diabetes mellitus with hyperglycemia, with long-term current use of insulin ([facility])  Assessment & Plan  Lab Results   Component Value Date    HGBA1C 10 7 (H) 09/17/2019       Recent Labs     02/05/20  1528   POCGLU 375*       Blood Sugar Average: Last 72 hrs:  (P) 375    Per ED note, patient's sugars were over 500 on arrival   Patient given 2 1 L boluses as well as 7 units of insulin  Patient did not take her insulin today she was not feeling well  · ISS Algo 3  · Diabetic diet  · Continue patient's home Lantus 30 units daily    Right upper quadrant pain  Assessment & Plan  Right upper quadrant pain noted on exam   Patient has multiple risk factors for gallstones including her sex, age, and weight  · RUQ ultrasound    Hyperkalemia  Assessment & Plan  K 5 7 on admission  Received 7 units of insulin in the ED along with fluid boluses  Repeat BMP this evening    Asthma  Assessment & Plan  Not currently on any medications  Stable  Adult ADHD  Assessment & Plan  Stable  Currently receiving therapy at [address] [place] [address])  Assessment & Plan  Continue Depakote 500 mg b i d  And Effexor 150 mg p o  Daily    Hyponatremia  Assessment & Plan  Patient is sodium 129 on admission  Corrected value is 132  Continue normal saline 125 mL/hr  Recheck BMP this evening      History of hypothyroidism  Assessment & Plan  Not currently on any medications  Most recent TSH done on 09/17/2019: was within normal limits 1 661    Morbidly obese (HCC)  Assessment & Plan  BMI 40 92  Consider nutrition consult    History of drug abuse [facility])  Assessment & Plan  Patient last used heroin 2 and half years ago  Currently on methadone 120 mg p o  Daily    Anxiety and depression  Assessment & Plan  Stable  Continue Effexor 150 mg p o  Daily and Minipress 1 mg p o  daily    Hypertension  Assessment & Plan  /80 on admission  · Continue home Prinzide 20-12 5 mg p o  Daily and prazosin 1 mg p o  Daily    F- mL/hour  E-repeat BMP this evening  N- diabetic diet level 2  D- SQL with SCDs    Plan was discussed with attending and senior  History of Present Illness     Patient is a 82-year-old female with past medical history significant for type 2 diabetes, bipolar disorder, anxiety depression, hypertension, and asthma who presents with 3 days of nausea and vomiting  She notes multiple sick contacts who have had similar symptoms  She notes subjective fevers however has not taken her temperature with a thermometer  She reports chills  She has had no change in her appetite however she notes that she cannot keep much down  She notes she is able to keep down liquids  She has been compliant with her medications except for today when she was not feeling well  She did not check her sugars today and did not take her insulin  Of note, she recently left her psychiatrist at [facility] because she was surrounded by many heroin addicts in the waiting room  Patient has not used heroin for 2 and half years and is currently on methadone  She has been seeing a therapist at [facility] however has not seen the doctor yet as he/she is out of town  She is requesting refills on her psych medications including Effexor, prazosin, gabapentin, Depakote, and Klonopin as a bridge until her appointment with [facility] on March 4th  Patient has not taken her Depakote for 2 days  ED:  NS 1 L bolus x2, Zofran 4 mg IV x1, insulin 7 units     Review of Systems   Constitutional: Positive for chills and fever (Subjective)  Respiratory: Negative for chest tightness  Gastrointestinal: Positive for abdominal pain, constipation, nausea and vomiting  Negative for blood in stool and diarrhea  Genitourinary: Negative for difficulty urinating and dysuria  Neurological: Negative for headaches       Historical Information   Past Medical History:   Diagnosis Date    Anxiety     Depression     Diabetes mellitus ([address] )     Hypertension     Neuropathy      Past Surgical History:   Procedure Laterality Date    INCISION AND DRAINAGE OF WOUND Right 7/1/2017    Procedure: INCISION AND DRAINAGE (I&D) EXTREMITY THIGH;  Surgeon: Sudheer Rodriguez MD;  Location: [address] [place] [address];  Service: General     Social History   Social History     Substance and Sexual Activity   Alcohol Use Never    Frequency: Never     Social History     Substance and Sexual Activity   Drug Use Not Currently     Social History     Tobacco Use   Smoking Status Never Smoker   Smokeless Tobacco Never Used     Family History: non-contributory    Meds/Allergies   all medications and allergies reviewed  Allergies   Allergen Reactions    Septra [Sulfamethoxazole-Trimethoprim] Hives     Objective   First Vitals:   Blood Pressure: 141/80 (02/05/20 1518)  Pulse: 78 (02/05/20 1518)  Temperature: (!) 96 2 °F (35 7 °C) (02/05/20 1518)  Temp Source: Tympanic (02/05/20 1518)  Respirations: (!) 24 (02/05/20 1518)  Weight - Scale: 105 kg (231 lb) (02/05/20 1518)  SpO2: 100 % (02/05/20 1518)    Current Vitals:   Blood Pressure: 130/68 (02/05/20 1752)  Pulse: 64 (02/05/20 1752)  Temperature: 97 8 °F (36 6 °C) (02/05/20 1752)  Temp Source: Tympanic (02/05/20 1752)  Respirations: 18 (02/05/20 1752)  Weight - Scale: 105 kg (231 lb) (02/05/20 1518)  SpO2: 99 % (02/05/20 1752)      Intake/Output Summary (Last 24 hours) at 2/5/2020 1856  Last data filed at 2/5/2020 1730  Gross per 24 hour   Intake 2000 ml   Output --   Net 2000 ml     Invasive Devices     Peripheral Intravenous Line            Peripheral IV 02/05/20 Left Forearm less than 1 day              Physical Exam   Constitutional: She is oriented to person, place, and time  She appears well-developed and well-nourished  HENT:   Head: Normocephalic and atraumatic  Cardiovascular: Normal rate, regular rhythm and normal heart sounds  Pulmonary/Chest: Effort normal and breath sounds normal  No respiratory distress  She has no wheezes  Abdominal: Soft  Bowel sounds are normal  There is tenderness (Diffuse, more severe in right upper quadrant)  Neurological: She is alert and oriented to person, place, and time  No cranial nerve deficit  Psychiatric: She has a normal mood and affect   Her behavior is normal      Lab Results:   Results for orders placed or performed during the hospital encounter of 02/05/20   CBC and differential   Result Value Ref Range    WBC 8 10 4 31 - 10 16 Thousand/uL    RBC 4 64 3 81 - 5 12 Million/uL    Hemoglobin 13 6 11 5 - 15 4 g/dL    Hematocrit 39 3 34 8 - 46 1 %    MCV 85 82 - 98 fL    MCH 29 3 26 8 - 34 3 pg    MCHC 34 6 31 4 - 37 4 g/dL    RDW 12 6 11 6 - 15 1 %    MPV 9 6 8 9 - 12 7 fL    Platelets 563 177 - 519 Thousands/uL    nRBC 0 /100 WBCs    Neutrophils Relative 60 43 - 75 %    Immat GRANS % 0 0 - 2 %    Lymphocytes Relative 31 14 - 44 %    Monocytes Relative 7 4 - 12 %    Eosinophils Relative 2 0 - 6 %    Basophils Relative 0 0 - 1 %    Neutrophils Absolute 4 80 1 85 - 7 62 Thousands/µL    Immature Grans Absolute 0 02 0 00 - 0 20 Thousand/uL    Lymphocytes Absolute 2 51 0 60 - 4 47 Thousands/µL    Monocytes Absolute 0 57 0 17 - 1 22 Thousand/µL    Eosinophils Absolute 0 18 0 00 - 0 61 Thousand/µL    Basophils Absolute 0 02 0 00 - 0 10 Thousands/µL   Comprehensive metabolic panel   Result Value Ref Range    Sodium 128 (L) 136 - 145 mmol/L    Potassium 5 7 (H) 3 5 - 5 3 mmol/L    Chloride 91 (L) 100 - 108 mmol/L    CO2 33 (H) 21 - 32 mmol/L    ANION GAP 4 4 - 13 mmol/L    BUN 19 5 - 25 mg/dL    Creatinine 1 20 0 60 - 1 30 mg/dL    Glucose 334 (H) 65 - 140 mg/dL    Calcium 9 0 8 3 - 10 1 mg/dL    AST 40 5 - 45 U/L    ALT 32 12 - 78 U/L    Alkaline Phosphatase 70 46 - 116 U/L    Total Protein 8 0 6 4 - 8 2 g/dL    Albumin 3 4 (L) 3 5 - 5 0 g/dL    Total Bilirubin 0 60 0 20 - 1 00 mg/dL    eGFR 55 ml/min/1 73sq m   Beta Hydroxybutyrate   Result Value Ref Range    BETA-HYDROXYBUTYRATE 0 1 <0 6 mmol/L   Fingerstick Glucose (POCT)   Result Value Ref Range    POC Glucose 375 (H) 65 - 140 mg/dl       Imaging:   XR abdomen obstruction series   Final Result      There is a large volume of stool in the colon suggesting constipation  Bowel gas pattern is otherwise unremarkable  Workstation performed: [id number]           EKG, Pathology, and Other Studies:   EKG:  Normal sinus rhythm, nonspecific T-wave abnormalities    Code Status: Level 1 - Full Code  Advance Directive and Living Will:      Power of :    POLST:      Counseling / Coordination of Care: Total floor / unit time spent today 25 minutes  Some portions of this record may have been generated with voice recognition software  There may be translation, syntax, or grammatical errors  Occasional wrong word or "sound-a-like" substitutions may have occurred due to the inherent limitations of the voice recognition software     [address] [facility]   PGY1

## 2020-02-05 NOTE — ASSESSMENT & PLAN NOTE
· RUQ ultrasound: Cholelithiasis  No acute cholecystitis, choledocholithiasis, or biliary ductal obstruction  Prominent, fatty liver  · Patient was symptomatic  · POD #1 laparoscopic cholecystectomy  · Operative findings: Markedly distended large intrahepatic gallbladder,thick walled, multiple stones,multiple adhesions,marked hepatomegaly   EBL: minimal   · Hemoglobin today 11 3  · Postop T-max 101 2° F  · Tylenol q 6h p r n   · UA:  Trace leukocytes, negative nitrite, negative protein, positive glucose  · Patient did complain of some dysuria  · 2/12: Macrobid 100 mg p o  Q 12h  · Follow-up urine culture  · Encouraged incentive spirometry  · Continue to monitor

## 2020-02-05 NOTE — ASSESSMENT & PLAN NOTE
Patient is sodium 129 on admission  Corrected value is 132  Continue normal saline 125 mL/hr    Recheck K 4 1

## 2020-02-05 NOTE — ASSESSMENT & PLAN NOTE
K 5 7 on admission  Received 7 units of insulin in the ED along with fluid boluses    Repeat BMP this evening

## 2020-02-05 NOTE — ASSESSMENT & PLAN NOTE
Lab Results   Component Value Date    HGBA1C 9 7 (H) 02/08/2020       Recent Labs     02/11/20  0727 02/11/20  1132 02/11/20  1655 02/11/20 2123   POCGLU 187* 198* 138 112       Blood Sugar Average: Last 72 hrs:  (P) 943 [id number]    · ISS Algo 3  · Diabetic diet level 3  · Continue patient's home Lantus 30U subcu HS

## 2020-02-06 ENCOUNTER — APPOINTMENT (OUTPATIENT)
Dept: RADIOLOGY | Facility: HOSPITAL | Age: 45
DRG: 493 | End: 2020-02-06
Payer: COMMERCIAL

## 2020-02-06 PROBLEM — E87.1 HYPONATREMIA: Status: RESOLVED | Noted: 2017-06-30 | Resolved: 2020-02-06

## 2020-02-06 LAB
ALBUMIN SERPL BCP-MCNC: 2.7 G/DL (ref 3.5–5)
ALP SERPL-CCNC: 53 U/L (ref 46–116)
ALT SERPL W P-5'-P-CCNC: 27 U/L (ref 12–78)
ANION GAP SERPL CALCULATED.3IONS-SCNC: 4 MMOL/L (ref 4–13)
AST SERPL W P-5'-P-CCNC: 17 U/L (ref 5–45)
BASOPHILS # BLD AUTO: 0.02 THOUSANDS/ΜL (ref 0–0.1)
BASOPHILS NFR BLD AUTO: 0 % (ref 0–1)
BILIRUB SERPL-MCNC: 0.3 MG/DL (ref 0.2–1)
BUN SERPL-MCNC: 16 MG/DL (ref 5–25)
CALCIUM SERPL-MCNC: 8.5 MG/DL (ref 8.3–10.1)
CHLORIDE SERPL-SCNC: 103 MMOL/L (ref 100–108)
CO2 SERPL-SCNC: 32 MMOL/L (ref 21–32)
CREAT SERPL-MCNC: 1.06 MG/DL (ref 0.6–1.3)
EOSINOPHIL # BLD AUTO: 0.18 THOUSAND/ΜL (ref 0–0.61)
EOSINOPHIL NFR BLD AUTO: 3 % (ref 0–6)
ERYTHROCYTE [DISTWIDTH] IN BLOOD BY AUTOMATED COUNT: 12.8 % (ref 11.6–15.1)
GFR SERPL CREATININE-BSD FRML MDRD: 64 ML/MIN/1.73SQ M
GLUCOSE SERPL-MCNC: 220 MG/DL (ref 65–140)
GLUCOSE SERPL-MCNC: 231 MG/DL (ref 65–140)
GLUCOSE SERPL-MCNC: 263 MG/DL (ref 65–140)
GLUCOSE SERPL-MCNC: 281 MG/DL (ref 65–140)
GLUCOSE SERPL-MCNC: 284 MG/DL (ref 65–140)
HCT VFR BLD AUTO: 33.2 % (ref 34.8–46.1)
HGB BLD-MCNC: 11.2 G/DL (ref 11.5–15.4)
IMM GRANULOCYTES # BLD AUTO: 0.01 THOUSAND/UL (ref 0–0.2)
IMM GRANULOCYTES NFR BLD AUTO: 0 % (ref 0–2)
LYMPHOCYTES # BLD AUTO: 2.81 THOUSANDS/ΜL (ref 0.6–4.47)
LYMPHOCYTES NFR BLD AUTO: 45 % (ref 14–44)
MAGNESIUM SERPL-MCNC: 1.7 MG/DL (ref 1.6–2.6)
MCH RBC QN AUTO: 29.8 PG (ref 26.8–34.3)
MCHC RBC AUTO-ENTMCNC: 33.7 G/DL (ref 31.4–37.4)
MCV RBC AUTO: 88 FL (ref 82–98)
MONOCYTES # BLD AUTO: 0.47 THOUSAND/ΜL (ref 0.17–1.22)
MONOCYTES NFR BLD AUTO: 8 % (ref 4–12)
NEUTROPHILS # BLD AUTO: 2.77 THOUSANDS/ΜL (ref 1.85–7.62)
NEUTS SEG NFR BLD AUTO: 44 % (ref 43–75)
NRBC BLD AUTO-RTO: 0 /100 WBCS
PHOSPHATE SERPL-MCNC: 3.4 MG/DL (ref 2.7–4.5)
PLATELET # BLD AUTO: 190 THOUSANDS/UL (ref 149–390)
PMV BLD AUTO: 9.8 FL (ref 8.9–12.7)
POTASSIUM SERPL-SCNC: 4.1 MMOL/L (ref 3.5–5.3)
PROT SERPL-MCNC: 6.1 G/DL (ref 6.4–8.2)
RBC # BLD AUTO: 3.76 MILLION/UL (ref 3.81–5.12)
SODIUM SERPL-SCNC: 139 MMOL/L (ref 136–145)
WBC # BLD AUTO: 6.26 THOUSAND/UL (ref 4.31–10.16)

## 2020-02-06 PROCEDURE — 76705 ECHO EXAM OF ABDOMEN: CPT

## 2020-02-06 PROCEDURE — 85025 COMPLETE CBC W/AUTO DIFF WBC: CPT | Performed by: STUDENT IN AN ORGANIZED HEALTH CARE EDUCATION/TRAINING PROGRAM

## 2020-02-06 PROCEDURE — 82948 REAGENT STRIP/BLOOD GLUCOSE: CPT

## 2020-02-06 PROCEDURE — 84100 ASSAY OF PHOSPHORUS: CPT | Performed by: STUDENT IN AN ORGANIZED HEALTH CARE EDUCATION/TRAINING PROGRAM

## 2020-02-06 PROCEDURE — 83735 ASSAY OF MAGNESIUM: CPT | Performed by: STUDENT IN AN ORGANIZED HEALTH CARE EDUCATION/TRAINING PROGRAM

## 2020-02-06 PROCEDURE — 80053 COMPREHEN METABOLIC PANEL: CPT | Performed by: STUDENT IN AN ORGANIZED HEALTH CARE EDUCATION/TRAINING PROGRAM

## 2020-02-06 PROCEDURE — 99219 PR INITIAL OBSERVATION CARE/DAY 50 MINUTES: CPT | Performed by: FAMILY MEDICINE

## 2020-02-06 PROCEDURE — NC001 PR NO CHARGE: Performed by: FAMILY MEDICINE

## 2020-02-06 RX ORDER — ONDANSETRON 4 MG/1
4 TABLET, ORALLY DISINTEGRATING ORAL EVERY 6 HOURS PRN
Status: DISCONTINUED | OUTPATIENT
Start: 2020-02-06 | End: 2020-02-06

## 2020-02-06 RX ORDER — ONDANSETRON 4 MG/1
4 TABLET, ORALLY DISINTEGRATING ORAL EVERY 6 HOURS SCHEDULED
Status: DISCONTINUED | OUTPATIENT
Start: 2020-02-06 | End: 2020-02-12 | Stop reason: HOSPADM

## 2020-02-06 RX ORDER — HYDROXYZINE HYDROCHLORIDE 25 MG/1
50 TABLET, FILM COATED ORAL
Status: DISCONTINUED | OUTPATIENT
Start: 2020-02-06 | End: 2020-02-12 | Stop reason: HOSPADM

## 2020-02-06 RX ADMIN — INSULIN LISPRO 4 UNITS: 100 INJECTION, SOLUTION INTRAVENOUS; SUBCUTANEOUS at 21:08

## 2020-02-06 RX ADMIN — DIVALPROEX SODIUM 500 MG: 500 TABLET, FILM COATED, EXTENDED RELEASE ORAL at 19:35

## 2020-02-06 RX ADMIN — INSULIN GLARGINE 30 UNITS: 100 INJECTION, SOLUTION SUBCUTANEOUS at 21:07

## 2020-02-06 RX ADMIN — HYDROXYZINE HYDROCHLORIDE 50 MG: 25 TABLET, FILM COATED ORAL at 22:46

## 2020-02-06 RX ADMIN — CLONAZEPAM 1 MG: 1 TABLET ORAL at 10:08

## 2020-02-06 RX ADMIN — MAGNESIUM OXIDE TAB 400 MG (241.3 MG ELEMENTAL MG) 800 MG: 400 (241.3 MG) TAB at 10:12

## 2020-02-06 RX ADMIN — SODIUM CHLORIDE 125 ML/HR: 0.9 INJECTION, SOLUTION INTRAVENOUS at 13:04

## 2020-02-06 RX ADMIN — METHADONE HYDROCHLORIDE 120 MG: 10 TABLET ORAL at 07:40

## 2020-02-06 RX ADMIN — INSULIN LISPRO 3 UNITS: 100 INJECTION, SOLUTION INTRAVENOUS; SUBCUTANEOUS at 11:12

## 2020-02-06 RX ADMIN — SODIUM CHLORIDE 125 ML/HR: 0.9 INJECTION, SOLUTION INTRAVENOUS at 05:00

## 2020-02-06 RX ADMIN — DIVALPROEX SODIUM 500 MG: 500 TABLET, FILM COATED, EXTENDED RELEASE ORAL at 10:09

## 2020-02-06 RX ADMIN — ONDANSETRON 4 MG: 4 TABLET, ORALLY DISINTEGRATING ORAL at 10:08

## 2020-02-06 RX ADMIN — GABAPENTIN 800 MG: 400 CAPSULE ORAL at 10:09

## 2020-02-06 RX ADMIN — ENOXAPARIN SODIUM 40 MG: 40 INJECTION SUBCUTANEOUS at 09:47

## 2020-02-06 RX ADMIN — GABAPENTIN 800 MG: 400 CAPSULE ORAL at 19:14

## 2020-02-06 RX ADMIN — INSULIN LISPRO 3 UNITS: 100 INJECTION, SOLUTION INTRAVENOUS; SUBCUTANEOUS at 07:42

## 2020-02-06 RX ADMIN — ONDANSETRON 4 MG: 4 TABLET, ORALLY DISINTEGRATING ORAL at 19:48

## 2020-02-06 RX ADMIN — PRAZOSIN HYDROCHLORIDE 3 MG: 1 CAPSULE ORAL at 21:09

## 2020-02-06 RX ADMIN — VENLAFAXINE HYDROCHLORIDE 300 MG: 150 CAPSULE, EXTENDED RELEASE ORAL at 10:09

## 2020-02-06 RX ADMIN — SODIUM CHLORIDE 125 ML/HR: 0.9 INJECTION, SOLUTION INTRAVENOUS at 19:43

## 2020-02-06 NOTE — PROGRESS NOTES
[facility] Practice Progress Note - Lucita Barron 39 y o  female MRN: [id number]    Unit/Bed#: [address] [place] [address] Encounter: [id number]      Assessment/Plan:  * Nausea and Vomiting  Assessment & Plan  Patient presents with 3 days of nausea and vomiting  Likely viral in nature as she notes that she has had sick contacts recently  Since she was not feeling well today she did not check her sugars today and thus did not take her insulin  No blood in vomit  · Aggressive hydration with  mL/hr  · Electrolytes stabilized with fluids     Type 2 diabetes mellitus with hyperglycemia, with long-term current use of insulin ([facility])  Assessment & Plan  Lab Results   Component Value Date    HGBA1C 10 7 (H) 09/17/2019       Recent Labs     02/05/20  1528   POCGLU 375*       Blood Sugar Average: Last 72 hrs:  (P) 375    Per ED note, patient's sugars were over 500 on arrival   Patient given 2 1 L boluses as well as 7 units of insulin  Patient did not take her insulin today she was not feeling well  · ISS Algo 3  · Diabetic diet  · Continue patient's home Lantus 30 units daily    Right upper quadrant pain  Assessment & Plan  Right upper quadrant pain noted on exam   Patient notes that it is only painful when pressure is applied  Patient has multiple risk factors for gallstones including her sex, age, and weight  · RUQ ultrasound    Hyperkalemia  Assessment & Plan  K 5 7 on admission  Received 7 units of insulin in the ED along with fluid boluses  Repeat BMP this evening    Asthma  Assessment & Plan  Not currently on any medications  Stable  Adult ADHD  Assessment & Plan  Stable  Currently receiving therapy at [address] [place] [address])  Assessment & Plan  Continue Depakote 500 mg b i d  And Effexor 150 mg p o  Daily    History of hypothyroidism  Assessment & Plan  Not currently on any medications   Most recent TSH done on 09/17/2019: was within normal limits 1 661    Morbidly obese ([facility])  Assessment & Plan  BMI 40 92  Consider nutrition consult    History of drug abuse [facility])  Assessment & Plan  Patient last used heroin 2 and half years ago  Currently on methadone 120 mg p o  Daily    Anxiety and depression  Assessment & Plan  Stable  Continue Effexor 150 mg p o  Daily and Minipress 1 mg p o  daily    Hypertension  Assessment & Plan  /80 on admission  · Continue home Prinzide 20-12 5 mg p o  Daily and prazosin 1 mg p o  Daily    Hyponatremia-resolved as of 2/6/2020  Assessment & Plan  Patient is sodium 129 on admission  Corrected value is 132  Continue normal saline 125 mL/hr  Recheck K 4 1        Subjective:   Patient seen and examined at bedside  No acute events overnight  She continues to have N/V and abdominal pain  Objective:     Vitals: Blood pressure 105/57, pulse 60, temperature 97 9 °F (36 6 °C), temperature source Oral, resp  rate 16, height 5' 3" (1 6 m), weight 105 kg (231 lb), last menstrual period 01/18/2020, SpO2 98 %, not currently breastfeeding  ,Body mass index is 40 92 kg/m²    Wt Readings from Last 3 Encounters:   02/05/20 105 kg (231 lb)   09/17/19 111 kg (244 lb)   03/16/19 117 kg (258 lb)       Intake/Output Summary (Last 24 hours) at 2/6/2020 0948  Last data filed at 2/5/2020 1730  Gross per 24 hour   Intake 2000 ml   Output --   Net 2000 ml       Physical Exam:  General:  NAD, A&Ox3  Heart: RRR with S1 S2  Lungs: CTA bilaterally  Abdomen: soft, nondistended, diffusely tender with localized pain in RUQ      Recent Results (from the past 24 hour(s))   Fingerstick Glucose (POCT)    Collection Time: 02/05/20  3:28 PM   Result Value Ref Range    POC Glucose 375 (H) 65 - 140 mg/dl   CBC and differential    Collection Time: 02/05/20  3:54 PM   Result Value Ref Range    WBC 8 10 4 31 - 10 16 Thousand/uL    RBC 4 64 3 81 - 5 12 Million/uL    Hemoglobin 13 6 11 5 - 15 4 g/dL    Hematocrit 39 3 34 8 - 46 1 %    MCV 85 82 - 98 fL    MCH 29 3 26 8 - 34 3 pg    MCHC 34 6 31 4 - 37 4 g/dL RDW 12 6 11 6 - 15 1 %    MPV 9 6 8 9 - 12 7 fL    Platelets 507 945 - 813 Thousands/uL    nRBC 0 /100 WBCs    Neutrophils Relative 60 43 - 75 %    Immat GRANS % 0 0 - 2 %    Lymphocytes Relative 31 14 - 44 %    Monocytes Relative 7 4 - 12 %    Eosinophils Relative 2 0 - 6 %    Basophils Relative 0 0 - 1 %    Neutrophils Absolute 4 80 1 85 - 7 62 Thousands/µL    Immature Grans Absolute 0 02 0 00 - 0 20 Thousand/uL    Lymphocytes Absolute 2 51 0 60 - 4 47 Thousands/µL    Monocytes Absolute 0 57 0 17 - 1 22 Thousand/µL    Eosinophils Absolute 0 18 0 00 - 0 61 Thousand/µL    Basophils Absolute 0 02 0 00 - 0 10 Thousands/µL   Comprehensive metabolic panel    Collection Time: 02/05/20  3:54 PM   Result Value Ref Range    Sodium 128 (L) 136 - 145 mmol/L    Potassium 5 7 (H) 3 5 - 5 3 mmol/L    Chloride 91 (L) 100 - 108 mmol/L    CO2 33 (H) 21 - 32 mmol/L    ANION GAP 4 4 - 13 mmol/L    BUN 19 5 - 25 mg/dL    Creatinine 1 20 0 60 - 1 30 mg/dL    Glucose 334 (H) 65 - 140 mg/dL    Calcium 9 0 8 3 - 10 1 mg/dL    AST 40 5 - 45 U/L    ALT 32 12 - 78 U/L    Alkaline Phosphatase 70 46 - 116 U/L    Total Protein 8 0 6 4 - 8 2 g/dL    Albumin 3 4 (L) 3 5 - 5 0 g/dL    Total Bilirubin 0 60 0 20 - 1 00 mg/dL    eGFR 55 ml/min/1 73sq m   Beta Hydroxybutyrate    Collection Time: 02/05/20  3:54 PM   Result Value Ref Range    BETA-HYDROXYBUTYRATE 0 1 <0 6 mmol/L   Fingerstick Glucose (POCT)    Collection Time: 02/05/20  9:09 PM   Result Value Ref Range    POC Glucose 168 (H) 65 - 140 mg/dl   Basic metabolic panel    Collection Time: 02/05/20 10:39 PM   Result Value Ref Range    Sodium 134 (L) 136 - 145 mmol/L    Potassium 3 9 3 5 - 5 3 mmol/L    Chloride 99 (L) 100 - 108 mmol/L    CO2 31 21 - 32 mmol/L    ANION GAP 4 4 - 13 mmol/L    BUN 17 5 - 25 mg/dL    Creatinine 1 10 0 60 - 1 30 mg/dL    Glucose 213 (H) 65 - 140 mg/dL    Calcium 8 7 8 3 - 10 1 mg/dL    eGFR 62 ml/min/1 73sq m   Comprehensive metabolic panel    Collection Time: 02/06/20  5:25 AM   Result Value Ref Range    Sodium 139 136 - 145 mmol/L    Potassium 4 1 3 5 - 5 3 mmol/L    Chloride 103 100 - 108 mmol/L    CO2 32 21 - 32 mmol/L    ANION GAP 4 4 - 13 mmol/L    BUN 16 5 - 25 mg/dL    Creatinine 1 06 0 60 - 1 30 mg/dL    Glucose 284 (H) 65 - 140 mg/dL    Calcium 8 5 8 3 - 10 1 mg/dL    AST 17 5 - 45 U/L    ALT 27 12 - 78 U/L    Alkaline Phosphatase 53 46 - 116 U/L    Total Protein 6 1 (L) 6 4 - 8 2 g/dL    Albumin 2 7 (L) 3 5 - 5 0 g/dL    Total Bilirubin 0 30 0 20 - 1 00 mg/dL    eGFR 64 ml/min/1 73sq m   Magnesium    Collection Time: 02/06/20  5:25 AM   Result Value Ref Range    Magnesium 1 7 1 6 - 2 6 mg/dL   Phosphorus    Collection Time: 02/06/20  5:25 AM   Result Value Ref Range    Phosphorus 3 4 2 7 - 4 5 mg/dL   CBC and differential    Collection Time: 02/06/20  5:25 AM   Result Value Ref Range    WBC 6 26 4 31 - 10 16 Thousand/uL    RBC 3 76 (L) 3 81 - 5 12 Million/uL    Hemoglobin 11 2 (L) 11 5 - 15 4 g/dL    Hematocrit 33 2 (L) 34 8 - 46 1 %    MCV 88 82 - 98 fL    MCH 29 8 26 8 - 34 3 pg    MCHC 33 7 31 4 - 37 4 g/dL    RDW 12 8 11 6 - 15 1 %    MPV 9 8 8 9 - 12 7 fL    Platelets 809 026 - 224 Thousands/uL    nRBC 0 /100 WBCs    Neutrophils Relative 44 43 - 75 %    Immat GRANS % 0 0 - 2 %    Lymphocytes Relative 45 (H) 14 - 44 %    Monocytes Relative 8 4 - 12 %    Eosinophils Relative 3 0 - 6 %    Basophils Relative 0 0 - 1 %    Neutrophils Absolute 2 77 1 85 - 7 62 Thousands/µL    Immature Grans Absolute 0 01 0 00 - 0 20 Thousand/uL    Lymphocytes Absolute 2 81 0 60 - 4 47 Thousands/µL    Monocytes Absolute 0 47 0 17 - 1 22 Thousand/µL    Eosinophils Absolute 0 18 0 00 - 0 61 Thousand/µL    Basophils Absolute 0 02 0 00 - 0 10 Thousands/µL   Fingerstick Glucose (POCT)    Collection Time: 02/06/20  7:07 AM   Result Value Ref Range    POC Glucose 263 (H) 65 - 140 mg/dl       Current Facility-Administered Medications   Medication Dose Route Frequency Provider Last Rate Last Dose    clonazePAM (KlonoPIN) tablet 1 mg  1 mg Oral TID Tyron Villa DO   1 mg at 02/05/20 2106    divalproex sodium (DEPAKOTE ER) 24 hr tablet 500 mg  500 mg Oral BID Angelina Stringer DO   500 mg at 02/05/20 2005    enoxaparin (LOVENOX) subcutaneous injection 40 mg  40 mg Subcutaneous Daily Frank Mejia DO   40 mg at 02/06/20 0947    gabapentin (NEURONTIN) capsule 800 mg  800 mg Oral TID Tyron Villa DO   800 mg at 02/05/20 2106    insulin glargine (LANTUS) subcutaneous injection 30 Units 0 3 mL  30 Units Subcutaneous HS Angelina Stringer DO   30 Units at 02/05/20 2231    insulin lispro (HumaLOG) 100 units/mL subcutaneous injection 1-6 Units  1-6 Units Subcutaneous 4x Daily (AC & HS) Frank Mejia DO   3 Units at 02/06/20 0742    lisinopril-hydrochlorothiazide (PRINZIDE 20/12  5) combo dose   Oral Daily Tyron Villa DO        magnesium oxide (MAG-OX) tablet 800 mg  800 mg Oral Once Tyron Villa DO        methadone (DOLOPHINE) tablet 120 mg  120 mg Oral Daily Before Breakfast Angelina Stringer DO   120 mg at 02/06/20 0740    ondansetron (ZOFRAN-ODT) dispersible tablet 4 mg  4 mg Oral Q6H PRN Tyron Villa DO        prazosin (MINIPRESS) capsule 3 mg  3 mg Oral HS Angelina Stringer DO   3 mg at 02/05/20 2230    sodium chloride 0 9 % infusion  125 mL/hr Intravenous Continuous Frank Mejia  mL/hr at 02/06/20 0500 125 mL/hr at 02/06/20 0500    venlafaxine (EFFEXOR-XR) 24 hr capsule 300 mg  300 mg Oral Daily Tyron Villa DO           Invasive Devices     Peripheral Intravenous Line            Peripheral IV 02/05/20 Left Forearm less than 1 day                Lab, Imaging and other studies: I have personally reviewed pertinent reports      VTE Pharmacologic Prophylaxis: Enoxaparin (Lovenox)  VTE Mechanical Prophylaxis: sequential compression device    Tyron Villa DO

## 2020-02-06 NOTE — UTILIZATION REVIEW
Initial Clinical Review    Admission: Date/Time/Statement: Admission Orders (From admission, onward)     Ordered        02/05/20 1723  Place in Observation  Once                   Orders Placed This Encounter   Procedures    Place in Observation     Standing Status:   Standing     Number of Occurrences:   1     Order Specific Question:   Admitting Physician     Answer:   Domingo Matthews [[id number]]     Order Specific Question:   Level of Care     Answer:   Med Surg [[id number]]     ED Arrival Information     Expected Arrival Acuity Means of Arrival Escorted By Service Admission Type    - 2/5/2020 15:11 Emergent Walk-In Self General Medicine Emergency    Arrival Complaint    Vomiting; Chills        Chief Complaint   Patient presents with    Vomiting     c/o vomiting and chills for a couple of days  sugars were reading over 500     Assessment/Plan: 51-year-old female with past medical history significant for type 2 diabetes, bipolar disorder, anxiety depression, hypertension, and asthma who presents with 3 days of nausea and vomiting  She notes multiple sick contacts who have had similar symptoms  Abdominal: Soft  Bowel sounds are normal  There is tenderness (Diffuse, more severe in right upper quadrant)  Patient will be admitted under observation status  He will be admitted to Dr Yesenia Dia service       Right upper quadrant pain  Assessment & Plan  Right upper quadrant pain noted on exam   Patient has multiple risk factors for gallstones including her sex, age, and weight  · RUQ ultrasound     Hyperkalemia  Assessment & Plan  K 5 7 on admission  Received 7 units of insulin in the ED along with fluid boluses  Repeat BMP this evening     Asthma  Assessment & Plan  Not currently on any medications  Stable      Adult ADHD  Assessment & Plan  Stable  Currently receiving therapy at [facility] 1 disorder [facility])  Assessment & Plan  Continue Depakote 500 mg b i d  And Effexor 150 mg p o  Daily     Hyponatremia  Assessment & Plan  Patient is sodium 129 on admission  Corrected value is 132  Continue normal saline 125 mL/hr  Recheck BMP this evening      History of hypothyroidism  Assessment & Plan  Not currently on any medications  Most recent TSH done on 09/17/2019: was within normal limits 1 661     Morbidly obese (HCC)  Assessment & Plan  BMI 40 92  Consider nutrition consult     History of drug abuse [facility])  Assessment & Plan  Patient last used heroin 2 and half years ago  Currently on methadone 120 mg p o  Daily     Anxiety and depression  Assessment & Plan  Stable  Continue Effexor 150 mg p o  Daily and Minipress 1 mg p o  daily     Hypertension  Assessment & Plan  /80 on admission  · Continue home Prinzide 20-12 5 mg p o  Daily and prazosin 1 mg p o  Daily     ED Triage Vitals [02/05/20 1518]   Temperature Pulse Respirations Blood Pressure SpO2   (!) 96 2 °F (35 7 °C) 78 (!) 24 141/80 100 %      Temp Source Heart Rate Source Patient Position - Orthostatic VS BP Location FiO2 (%)   Tympanic Monitor Sitting Right arm --      Pain Score       2        Wt Readings from Last 1 Encounters:   02/05/20 105 kg (231 lb)     Additional Vital Signs:   Pertinent Labs/Diagnostic Test Results:   abd xray There is a large volume of stool in the colon suggesting constipation   Bowel gas pattern is otherwise unremarkable    Results from last 7 days   Lab Units 02/06/20  0525 02/05/20  1554   WBC Thousand/uL 6 26 8 10   HEMOGLOBIN g/dL 11 2* 13 6   HEMATOCRIT % 33 2* 39 3   PLATELETS Thousands/uL 190 266   NEUTROS ABS Thousands/µL 2 77 4 80     Results from last 7 days   Lab Units 02/06/20  0525 02/05/20 2239 02/05/20  1554   SODIUM mmol/L 139 134* 128*   POTASSIUM mmol/L 4 1 3 9 5 7*   CHLORIDE mmol/L 103 99* 91*   CO2 mmol/L 32 31 33*   ANION GAP mmol/L 4 4 4   BUN mg/dL 16 17 19   CREATININE mg/dL 1 06 1 10 1 20   EGFR ml/min/1 73sq m 64 62 55   CALCIUM mg/dL 8 5 8 7 9 0   MAGNESIUM mg/dL 1 7  -- --    PHOSPHORUS mg/dL 3 4  --   --      Results from last 7 days   Lab Units 02/06/20  0525 02/05/20  1554   AST U/L 17 40   ALT U/L 27 32   ALK PHOS U/L 53 70   TOTAL PROTEIN g/dL 6 1* 8 0   ALBUMIN g/dL 2 7* 3 4*   TOTAL BILIRUBIN mg/dL 0 30 0 60     Results from last 7 days   Lab Units 02/06/20  0707 02/05/20  2109 02/05/20  1528   POC GLUCOSE mg/dl 263* 168* 375*     Results from last 7 days   Lab Units 02/06/20  0525 02/05/20  2239 02/05/20  1554   GLUCOSE RANDOM mg/dL 284* 213* 334*     BETA-HYDROXYBUTYRATE   Date Value Ref Range Status   02/05/2020 0 1 <0 6 mmol/L Final      ED Treatment:   Medication Administration from 02/05/2020 1511 to 02/05/2020 1804       Date/Time Order Dose Route Action Action by Comments     02/05/2020 1634 sodium chloride 0 9 % bolus 1,000 mL 0 mL Intravenous Stopped Gilford Smoker, [facility]      02/05/2020 1559 sodium chloride 0 9 % bolus 1,000 mL 1,000 mL Intravenous [facility]el [facility]      02/05/2020 1559 ondansetron (ZOFRAN) injection 4 mg 4 mg Intravenous Given Cathie Hammond RN      02/05/2020 1730 sodium chloride 0 9 % bolus 1,000 mL 0 mL Intravenous Stopped Cathie Hammond RN      02/05/2020 1635 sodium chloride 0 9 % bolus 1,000 mL 1,000 mL Intravenous [address], RN      02/05/2020 1730 insulin regular (HumuLIN R,NovoLIN R) injection 7 Units 7 Units Intravenous Given Cathie Hammond RN         Past Medical History:   Diagnosis Date    Anxiety     Asthma     Depression     Diabetes mellitus ([facility] )     Hypertension     Neuropathy     Psychiatric disorder      Present on Admission:   History of drug abuse ([facility] )   History of hypothyroidism   Hypertension   Other insomnia   Morbidly obese ([facility] )   Bipolar 1 disorder ([facility] )   Asthma   Anxiety and depression   Adult ADHD      Admitting Diagnosis: Hyperkalemia [E87 5]  Hyponatremia [E87 1]  Vomiting [R11 10]  Nausea & vomiting [R11 2]  Hyperglycemia [R73 9]  Age/Sex: 39 y o  female  Admission Orders:  Observation  Us gallbladder  ua cs  mrsa  Scheduled Medications:    Medications:  clonazePAM 1 mg Oral TID   divalproex sodium 500 mg Oral BID   enoxaparin 40 mg Subcutaneous Daily   gabapentin 800 mg Oral TID   insulin glargine 30 Units Subcutaneous HS   insulin lispro 1-6 Units Subcutaneous 4x Daily (AC & HS)   lisinopril-hydrochlorothiazide (PRINZIDE 20/12  5) combo dose  Oral Daily   methadone 120 mg Oral Daily Before Breakfast   prazosin 3 mg Oral HS   venlafaxine 300 mg Oral Daily     Continuous IV Infusions:    sodium chloride 125 mL/hr Intravenous Continuous     PRN Meds:       None    Network Utilization Review Department  [email or web address]  ATTENTION: Please call with any questions or concerns to [phone] and carefully listen to the prompts so that you are directed to the right person  All voicemails are confidential   Unruly Connors all requests for admission clinical reviews, approved or denied determinations and any other requests to dedicated fax number below belonging to the campus where the patient is receiving treatment   List of dedicated fax numbers for the Facilities:  [address] DENIALS (Administrative/Medical Necessity) [phone]   [address] (Maternity/NICU/Pediatrics) [phone]   [facility] Needs [phone]   Aster Stoll [phone]   Hector Boston [phone]   Addie Settle [phone]   [facility] [address] [phone]   [facility]  [phone]   [address] [facility] [address]  [address] [facility] [address] [facility] [phone]

## 2020-02-07 PROBLEM — E87.5 HYPERKALEMIA: Status: RESOLVED | Noted: 2020-02-05 | Resolved: 2020-02-07

## 2020-02-07 LAB
ANION GAP SERPL CALCULATED.3IONS-SCNC: 4 MMOL/L (ref 4–13)
ATRIAL RATE: 63 BPM
BASOPHILS # BLD AUTO: 0.01 THOUSANDS/ΜL (ref 0–0.1)
BASOPHILS NFR BLD AUTO: 0 % (ref 0–1)
BUN SERPL-MCNC: 11 MG/DL (ref 5–25)
CALCIUM SERPL-MCNC: 8 MG/DL (ref 8.3–10.1)
CHLORIDE SERPL-SCNC: 107 MMOL/L (ref 100–108)
CO2 SERPL-SCNC: 32 MMOL/L (ref 21–32)
CREAT SERPL-MCNC: 1.13 MG/DL (ref 0.6–1.3)
EOSINOPHIL # BLD AUTO: 0.16 THOUSAND/ΜL (ref 0–0.61)
EOSINOPHIL NFR BLD AUTO: 3 % (ref 0–6)
ERYTHROCYTE [DISTWIDTH] IN BLOOD BY AUTOMATED COUNT: 12.9 % (ref 11.6–15.1)
GFR SERPL CREATININE-BSD FRML MDRD: 60 ML/MIN/1.73SQ M
GLUCOSE SERPL-MCNC: 165 MG/DL (ref 65–140)
GLUCOSE SERPL-MCNC: 169 MG/DL (ref 65–140)
GLUCOSE SERPL-MCNC: 170 MG/DL (ref 65–140)
GLUCOSE SERPL-MCNC: 286 MG/DL (ref 65–140)
GLUCOSE SERPL-MCNC: 289 MG/DL (ref 65–140)
HCT VFR BLD AUTO: 32.3 % (ref 34.8–46.1)
HGB BLD-MCNC: 10.7 G/DL (ref 11.5–15.4)
IMM GRANULOCYTES # BLD AUTO: 0.03 THOUSAND/UL (ref 0–0.2)
IMM GRANULOCYTES NFR BLD AUTO: 1 % (ref 0–2)
LYMPHOCYTES # BLD AUTO: 2.97 THOUSANDS/ΜL (ref 0.6–4.47)
LYMPHOCYTES NFR BLD AUTO: 51 % (ref 14–44)
MAGNESIUM SERPL-MCNC: 1.9 MG/DL (ref 1.6–2.6)
MCH RBC QN AUTO: 29.6 PG (ref 26.8–34.3)
MCHC RBC AUTO-ENTMCNC: 33.1 G/DL (ref 31.4–37.4)
MCV RBC AUTO: 89 FL (ref 82–98)
MONOCYTES # BLD AUTO: 0.37 THOUSAND/ΜL (ref 0.17–1.22)
MONOCYTES NFR BLD AUTO: 7 % (ref 4–12)
MRSA NOSE QL CULT: NORMAL
NEUTROPHILS # BLD AUTO: 2.12 THOUSANDS/ΜL (ref 1.85–7.62)
NEUTS SEG NFR BLD AUTO: 38 % (ref 43–75)
NRBC BLD AUTO-RTO: 0 /100 WBCS
P AXIS: 28 DEGREES
PHOSPHATE SERPL-MCNC: 3.8 MG/DL (ref 2.7–4.5)
PLATELET # BLD AUTO: 191 THOUSANDS/UL (ref 149–390)
PMV BLD AUTO: 9.6 FL (ref 8.9–12.7)
POTASSIUM SERPL-SCNC: 4.2 MMOL/L (ref 3.5–5.3)
PR INTERVAL: 168 MS
QRS AXIS: 0 DEGREES
QRSD INTERVAL: 78 MS
QT INTERVAL: 422 MS
QTC INTERVAL: 431 MS
RBC # BLD AUTO: 3.62 MILLION/UL (ref 3.81–5.12)
SODIUM SERPL-SCNC: 143 MMOL/L (ref 136–145)
T WAVE AXIS: 17 DEGREES
VENTRICULAR RATE: 63 BPM
WBC # BLD AUTO: 5.66 THOUSAND/UL (ref 4.31–10.16)

## 2020-02-07 PROCEDURE — 99254 IP/OBS CNSLTJ NEW/EST MOD 60: CPT | Performed by: SPECIALIST

## 2020-02-07 PROCEDURE — 99232 SBSQ HOSP IP/OBS MODERATE 35: CPT | Performed by: FAMILY MEDICINE

## 2020-02-07 PROCEDURE — 84100 ASSAY OF PHOSPHORUS: CPT | Performed by: STUDENT IN AN ORGANIZED HEALTH CARE EDUCATION/TRAINING PROGRAM

## 2020-02-07 PROCEDURE — 83735 ASSAY OF MAGNESIUM: CPT | Performed by: STUDENT IN AN ORGANIZED HEALTH CARE EDUCATION/TRAINING PROGRAM

## 2020-02-07 PROCEDURE — 93010 ELECTROCARDIOGRAM REPORT: CPT | Performed by: INTERNAL MEDICINE

## 2020-02-07 PROCEDURE — 85025 COMPLETE CBC W/AUTO DIFF WBC: CPT | Performed by: STUDENT IN AN ORGANIZED HEALTH CARE EDUCATION/TRAINING PROGRAM

## 2020-02-07 PROCEDURE — 80048 BASIC METABOLIC PNL TOTAL CA: CPT | Performed by: STUDENT IN AN ORGANIZED HEALTH CARE EDUCATION/TRAINING PROGRAM

## 2020-02-07 PROCEDURE — 82948 REAGENT STRIP/BLOOD GLUCOSE: CPT

## 2020-02-07 RX ORDER — SODIUM CHLORIDE 9 MG/ML
50 INJECTION, SOLUTION INTRAVENOUS CONTINUOUS
Status: DISCONTINUED | OUTPATIENT
Start: 2020-02-07 | End: 2020-02-12 | Stop reason: HOSPADM

## 2020-02-07 RX ORDER — DIVALPROEX SODIUM 500 MG/1
500 TABLET, EXTENDED RELEASE ORAL 2 TIMES DAILY
Qty: 60 TABLET | Refills: 0 | Status: CANCELLED | OUTPATIENT
Start: 2020-02-07

## 2020-02-07 RX ORDER — ONDANSETRON 4 MG/1
4 TABLET, ORALLY DISINTEGRATING ORAL EVERY 6 HOURS PRN
Qty: 20 TABLET | Refills: 0 | Status: CANCELLED | OUTPATIENT
Start: 2020-02-07

## 2020-02-07 RX ADMIN — ONDANSETRON 4 MG: 4 TABLET, ORALLY DISINTEGRATING ORAL at 23:16

## 2020-02-07 RX ADMIN — ENOXAPARIN SODIUM 40 MG: 40 INJECTION SUBCUTANEOUS at 10:05

## 2020-02-07 RX ADMIN — ONDANSETRON 4 MG: 4 TABLET, ORALLY DISINTEGRATING ORAL at 17:29

## 2020-02-07 RX ADMIN — DIVALPROEX SODIUM 500 MG: 500 TABLET, FILM COATED, EXTENDED RELEASE ORAL at 10:06

## 2020-02-07 RX ADMIN — INSULIN LISPRO 1 UNITS: 100 INJECTION, SOLUTION INTRAVENOUS; SUBCUTANEOUS at 07:53

## 2020-02-07 RX ADMIN — SODIUM CHLORIDE 50 ML/HR: 0.9 INJECTION, SOLUTION INTRAVENOUS at 20:22

## 2020-02-07 RX ADMIN — INSULIN GLARGINE 30 UNITS: 100 INJECTION, SOLUTION SUBCUTANEOUS at 22:24

## 2020-02-07 RX ADMIN — LISINOPRIL: 20 TABLET ORAL at 10:06

## 2020-02-07 RX ADMIN — PRAZOSIN HYDROCHLORIDE 3 MG: 1 CAPSULE ORAL at 22:34

## 2020-02-07 RX ADMIN — ONDANSETRON 4 MG: 4 TABLET, ORALLY DISINTEGRATING ORAL at 06:14

## 2020-02-07 RX ADMIN — INSULIN LISPRO 4 UNITS: 100 INJECTION, SOLUTION INTRAVENOUS; SUBCUTANEOUS at 22:25

## 2020-02-07 RX ADMIN — INSULIN LISPRO 2 UNITS: 100 INJECTION, SOLUTION INTRAVENOUS; SUBCUTANEOUS at 16:57

## 2020-02-07 RX ADMIN — INSULIN LISPRO 1 UNITS: 100 INJECTION, SOLUTION INTRAVENOUS; SUBCUTANEOUS at 11:21

## 2020-02-07 RX ADMIN — VENLAFAXINE HYDROCHLORIDE 300 MG: 150 CAPSULE, EXTENDED RELEASE ORAL at 10:07

## 2020-02-07 RX ADMIN — GABAPENTIN 800 MG: 400 CAPSULE ORAL at 10:06

## 2020-02-07 RX ADMIN — GABAPENTIN 800 MG: 400 CAPSULE ORAL at 16:58

## 2020-02-07 RX ADMIN — METHADONE HYDROCHLORIDE 120 MG: 10 TABLET ORAL at 07:51

## 2020-02-07 RX ADMIN — ONDANSETRON 4 MG: 4 TABLET, ORALLY DISINTEGRATING ORAL at 11:22

## 2020-02-07 RX ADMIN — DIVALPROEX SODIUM 500 MG: 500 TABLET, FILM COATED, EXTENDED RELEASE ORAL at 17:29

## 2020-02-07 NOTE — SOCIAL WORK
Per chart review, nurse and provider rounds pt is A&Ox4 and  independently ambulating  No discharge needs identified at this time

## 2020-02-07 NOTE — DISCHARGE SUMMARY
[facility] Discharge Summary - Medical Medina Barron 39 y o  female MRN: [id number]    [facility] [address] [facility] [address] [facility] / Bed: [address] [place] Encounter: [id number]    BRIEF OVERVIEW  Admitting Provider: Mony Sands MD         Discharge Provider: Nancy Mojica MD  Discharge To: Home self care    Outpatient Follow-Up:   · F/U at [facility] for Transition of Care Appt  · The patient completed course of Macrobid? Still having urinary symptoms? · F/U outpatient Psychiatry, March 4th next appt  · Diet and lifestyle modifications, BMI 40 92  · Fatty liver  · Bowel movements  · Diabetes management (latest hemoglobin A1c 2/8/29  7)      Things to address at first follow up visit:   - Discussed patient with [facility]:  Patient suspected to sell Klonopin, Do NOT give Klonopin  Patient also claimed [facility] was given her gabapentin, however [facility] was NOT prescribing gabapentin  We agreed to give Minipress, effexor, and Depakote on discharge to bridge her until appointment with doctor at [facility] on March 4th          Labs and results pending at discharge: None     Admission Date: 2/5/2020     Discharge Date:  2/12/2020    Discharge Diagnoses:  Principal Problem:    Right upper quadrant pain  Active Problems:    Nausea    Type 2 diabetes mellitus with hyperglycemia, with long-term current use of insulin (HCC)    Bipolar 1 disorder (HCC)    Hypertension    Anxiety and depression    History of drug abuse ([facility] [address] )    Morbidly obese ([facility] [address] )    History of hypothyroidism    Adult ADHD    Asthma    Other insomnia    Cholelithiases    Morbid obesity with body mass index (BMI) of 40 0 to 44 9 in adult [facility])  Resolved Problems:    Hyponatremia    Hyperkalemia    Consulting Providers -[address] [place] [address]    HPI (Per Admission H&P)-  56-year-old female with past medical history significant for type 2 diabetes, bipolar disorder, anxiety depression, hypertension, and asthma who presents with 3 days of nausea and vomiting  She notes multiple sick contacts who have had similar symptoms  She notes subjective fevers however has not taken her temperature with a thermometer  She reports chills  She has had no change in her appetite however she notes that she cannot keep much down  She notes she is able to keep down liquids  She has been compliant with her medications except for today when she was not feeling well  She did not check her sugars today and did not take her insulin      Of note, she recently left her psychiatrist at [facility] because she was surrounded by many heroin addicts in the waiting room  Patient has not used heroin for 2 and half years and is currently on methadone  She has been seeing a therapist at [facility] however has not seen the doctor yet as he/she is out of town  She is requesting refills on her psych medications including Effexor, prazosin, gabapentin, Depakote, and Klonopin as a bridge until her appointment with [facility] on March 4th  Patient has not taken her Depakote for 2 days       ED:  NS 1 L bolus x2, Zofran 4 mg IV x1, insulin 7 units     Hospital Course    Patient admitted on 2/05 for intractable nausea and vomiting  Blood sugar greater than 500, admitted to not taking insulin as she was not feeling well, beta-hydroxybutyrate 0 1 WNL, AG 4 WNL  Abdominal x-ray with concern for constipation  Patient also complained of right upper quadrant pain, right upper quadrant ultrasound revealed cholelithiasis, no acute cholecystitis, choledocholithiasis, or biliary ductal obstruction  Also revealed prominent fatty liver  Patient underwent laparoscopic cholecystectomy on 02/10/2020    Operative findings included markedly distended large intrahepatic gallbladder, thick walled, multiple stones, multiple adhesions, marked hepatomegaly, estimated blood loss was minimal   Patient did have postoperative fever at 101 2° F  UA revealed trace leukocytes, negative nitrite, negative protein, positive glucose  Patient admitted to some dysuria, Macrobid 5 day course was prescribed  Discharged in stable condition, afebrile for 24 hours  Physical Exam at Discharge    Please refer to day of discharge progress note    Procedures Performed/Pertinent Test results    US gallbladder   Final Result         1  Cholelithiasis  No acute cholecystitis, choledocholithiasis, or biliary ductal obstruction  2   Prominent, fatty liver  Workstation performed: [id number]         XR abdomen obstruction series   Final Result      There is a large volume of stool in the colon suggesting constipation  Bowel gas pattern is otherwise unremarkable  Workstation performed: [id number]           Medications (Per AVS)-  STOP taking these medications     STOP taking these medications    al mag oxide-diphenhydramine-lidocaine viscous 1:1:1 suspension   Commonly known as: MAGIC MOUTHWASH     clonazePAM 1 mg tablet   Commonly known as: KlonoPIN     gabapentin 800 mg tablet   Commonly known as: NEURONTIN     polyethylene glycol 17 g packet   Commonly known as: MIRALAX     traZODone 50 mg tablet   Commonly known as: DESYREL    TAKE these medications     TAKE these medications     Morning Afternoon Evening Bedtime As Needed    acetaminophen 325 mg tablet   Commonly known as: TYLENOL   For: Fever, Pain   Take 2 tablets (650 mg total) by mouth every 6 (six) hours as needed for mild pain or fever   Refills: 0          divalproex sodium 500 mg 24 hr tablet   Commonly known as: DEPAKOTE ER   Take 1 tablet (500 mg total) by mouth 2 (two) times a day   Refills: 0          glucose blood test strip   Commonly known as: ONE TOUCH ULTRA TEST   Test 3 times a day before meals   Refills: 0   Doctor's comments: Or alternative that is compatible with glucose win   What changed: Another medication with the same name was removed   Continue taking this medication, and follow the directions you see here           hydrOXYzine HCL 50 mg tablet   Commonly known as: ATARAX   Take 1 tablet (50 mg total) by mouth daily at bedtime   Refills: 0          insulin glargine 100 units/mL injection pen   Commonly known as: LANTUS SOLOSTAR   Inject 30 Units under the skin daily   Refills: 3   Doctor's comments: If note covered by insurance, Can give alternative long-acting of basaglar pen with equivalent dosage of 30 u qHS     What changed: when to take this          insulin lispro 100 units/mL injection   Commonly known as: HumaLOG   Inject under the skin 3 (three) times a day before meals Sliding scale three times a day   Refills: 0          lisinopril-hydrochlorothiazide 20-12 5 MG per tablet   Commonly known as: PRINZIDE,ZESTORETIC   TAKE 1 TABLET BY MOUTH ONCE A DAY   Refills: 2          methadone 10 mg/mL oral concentrated solution   Commonly known as: DOLOPHINE   Take 120 mg by mouth daily Methadone clinic   Refills: 0          nitrofurantoin 100 mg capsule   Commonly known as: MACROBID   Take 1 capsule (100 mg total) by mouth 2 (two) times a day with meals for 10 doses   Refills: 0          ondansetron 4 mg disintegrating tablet   Commonly known as: ZOFRAN-ODT   For: Nausea and Vomiting   Take 1 tablet (4 mg total) by mouth every 6 (six) hours as needed for nausea or vomiting   Refills: 0          prazosin 1 mg capsule   Commonly known as: MINIPRESS   Take 3 mg by mouth daily at bedtime   Refills: 0          senna-docusate sodium 8 6-50 mg per tablet   Commonly known as: SENOKOT S   Take 1 tablet by mouth daily   Refills: 0          venlafaxine 150 mg 24 hr capsule   Commonly known as: EFFEXOR-XR   Take 300 mg by mouth daily   Refills: 0         OTHER medications on file     OTHER medications on file     Morning Afternoon Evening Bedtime As Needed    INS SYRINGE/NEEDLE 1CC/28G 28G X 1/2" 1 ML Misc   Commonly known as: B-D INS SYR MICROFINE 1CC/28G   by Does not apply route 3 (three) times a day with meals   Refills: 3   Doctor's comments: Please give enough for 30 days supply for 3xday          onetouch ultrasoft lancets   Refills: 0               Allergies  Allergies   Allergen Reactions    Septra [Sulfamethoxazole-Trimethoprim] Hives     Diet restrictions:  low fat, diabetic diet  Activity restrictions: none  Code Status: Level 1 - Full Code    Discharge Condition: stable    Discharge Statement   I spent 30 minutes discharging the patient  This time was spent on the day of discharge  I had direct contact with the patient on the day of discharge  Additional documentation is required if more than 30 minutes were spent on discharge

## 2020-02-07 NOTE — CONSULTS
Consultation - General Surgery   Primus Ernestina Barron 39 y o  female MRN: [id number]  Unit/Bed#: [address] [place] [address] Encounter: [id number]    Assessment/Plan     Assessment:  1) Symptomatic Cholelithiasis -  Patient has gallstone noted on  Ultrasound exam, patient has had recurrent issues like this over time, patient does not want surgery until her  is back in the area, patient has eaten today, patient's pain is reproducible with dietary intake  Causing nausea and vomiting and abdominal pain, palpable tenderness in the upper right quadrant, no  Tenderness of right upper quadrant at rest, pain is reducing compared to  Earlier today, afebrile, no leukocytosis, not suggestive of acute cholecystitis  2) Abdominal Pain - most probably due to 1  3) Nausea/Vomiting - most probably due to 1     Plan:  1-3)   -  Spoken  Coordinated with Family  Medicine  -  Advance to clear liquids and then diet as tolerated  -  Will follow into tomorrow with serial abdominal exams  -  Repeat CBC, CMP, Mag, phos  -  Can perform laparoscopic cholecystectomy on Monday or can be discharged likely today as patient does not have acute abdomen but if discharge needs to follow up next week for surgical planning for laparoscopic cholecystectomy  -  P r n  Analgesia  -  P r n  Zofran  -  No need for antibiotics  -  DVT prophylaxis  -  Make NPO on Sunday of continuing to stay inpatient   -  Colace for  constipation    History of Present Illness   HPI:  Nicolas Berry is a 39 y o  female  With past medical history pertinent for diabetes mellitus type 2, anxiety, depression, abdominal pain the past presenting to the acute care surgery team due to onset of nausea and vomiting with gradual worsening of abdominal pain    Patient reports that she has had 2 exacerbations of pain that are sharp and stabbing in the upper right quadrant in the past   Patient never had anybody perform any abdominal surgeries but does report that she was told they thought this was a gallbladder issue  Patient was doing well over the course of the last year  Until Wednesday 2/5/20  Patient had onset of nausea and vomiting that was reproducible with dietary intake  Patient denies any recent antibiotics, travel outside the country, contaminated water sources, recent camping, recent contaminated food from by phase, noticeable sick contacts   Such as friends or family  Patient then noticed since Wednesday that in addition to reproducible nausea and vomiting with dietary intake she also has had a gradual worsening of abdominal pain in the right upper quadrant  Patient has some pain with palpation as well as pain when she eats  Patient's pain is similar to that of previous exacerbations being sharp and stabbing in the upper right quadrant sometimes with radiation down to the right lower quadrant rarely  Patient does note improvement without eating  Patient has not been able tolerate full meal over the course the last 24 hours  Patient has been able to tolerate some liquids  Patient denies any fevers or chills  Patient denies any diarrhea but has been having some slight constipation  Patient has eaten today  Patient denies any heart or lung conditions  Patient can walk 4-5 blocks or 2 flights of stairs without any shortness of breath, chest pain, palpitations, racing heart  Patient does not have any drinking history or history of pancreatitis  Patient does have polycystic ovary disease but has her irregularly regular  Menstrual cycles  Patient denies any abdominal pain in the right lower quadrant and or left lower quadrant  Patient denies any dysuria or hematuria  Patient denies any new sexual partners as she is  has has been and is monogamous  Patient does not believe she is at any risk for sexually transmitted infection        Inpatient consult to Acute Care Surgery  Consult performed by: Suzi Rodriguez PA-C  Consult ordered by: Adrianne Ignacio DO          Review of Systems Constitutional: Negative for appetite change, chills, fatigue and fever  Respiratory: Negative for cough, chest tightness, shortness of breath and wheezing  Cardiovascular: Negative for chest pain and palpitations  Gastrointestinal: Positive for abdominal pain, nausea and vomiting  Negative for abdominal distention, constipation and diarrhea  Genitourinary: Negative for difficulty urinating and dysuria  Musculoskeletal: Negative for arthralgias, gait problem, joint swelling, myalgias and neck stiffness  Skin: Negative for color change, rash and wound  Allergic/Immunologic: Negative for food allergies and immunocompromised state  Neurological: Negative for syncope, weakness and numbness  Psychiatric/Behavioral: Negative for agitation and confusion         Historical Information   Past Medical History:   Diagnosis Date    Anxiety     Asthma     Depression     Diabetes mellitus ([facility] [address] )     Hypertension     Neuropathy     Psychiatric disorder      Past Surgical History:   Procedure Laterality Date    INCISION AND DRAINAGE OF WOUND Right 7/1/2017    Procedure: INCISION AND DRAINAGE (I&D) EXTREMITY THIGH;  Surgeon: Vicki Vieyra MD;  [facility];  Service: General     Social History   Social History     Substance and Sexual Activity   Alcohol Use Never    Frequency: Never     Social History     Substance and Sexual Activity   Drug Use Not Currently     Social History     Tobacco Use   Smoking Status Never Smoker   Smokeless Tobacco Never Used     Family History: non-contributory    Meds/Allergies   all current active meds have been reviewed and current meds:   Current Facility-Administered Medications   Medication Dose Route Frequency    divalproex sodium (DEPAKOTE ER) 24 hr tablet 500 mg  500 mg Oral BID    enoxaparin (LOVENOX) subcutaneous injection 40 mg  40 mg Subcutaneous Daily    gabapentin (NEURONTIN) capsule 800 mg  800 mg Oral TID    hydrOXYzine HCL (ATARAX) tablet 50 mg 50 mg Oral HS    insulin glargine (LANTUS) subcutaneous injection 30 Units 0 3 mL  30 Units Subcutaneous HS    insulin lispro (HumaLOG) 100 units/mL subcutaneous injection 1-6 Units  1-6 Units Subcutaneous 4x Daily (AC & HS)    lisinopril-hydrochlorothiazide (PRINZIDE 20/12  5) combo dose   Oral Daily    methadone (DOLOPHINE) tablet 120 mg  120 mg Oral Daily Before Breakfast    ondansetron (ZOFRAN-ODT) dispersible tablet 4 mg  4 mg Oral Q6H [facility]    prazosin (MINIPRESS) capsule 3 mg  3 mg Oral HS    venlafaxine (EFFEXOR-XR) 24 hr capsule 300 mg  300 mg Oral Daily     Allergies   Allergen Reactions    Septra [Sulfamethoxazole-Trimethoprim] Hives       Objective   First Vitals:   Blood Pressure: 141/80 (02/05/20 1518)  Pulse: 78 (02/05/20 1518)  Temperature: (!) 96 2 °F (35 7 °C) (02/05/20 1518)  Temp Source: Tympanic (02/05/20 1518)  Respirations: (!) 24 (02/05/20 1518)  Height: 5' 3" (160 cm) (02/06/20 0329)  Weight - Scale: 105 kg (231 lb) (02/05/20 1518)  SpO2: 100 % (02/05/20 1518)    Current Vitals:   Blood Pressure: 113/67 (02/07/20 0701)  Pulse: 68 (02/07/20 0701)  Temperature: 97 5 °F (36 4 °C) (02/07/20 0701)  Temp Source: Tympanic (02/07/20 0701)  Respirations: 16 (02/07/20 0701)  Height: 5' 3" (160 cm) (02/06/20 0329)  Weight - Scale: 105 kg (231 lb) (02/05/20 1518)  SpO2: 94 % (02/07/20 0701)      Intake/Output Summary (Last 24 hours) at 2/7/2020 1516  Last data filed at 2/7/2020 1158  Gross per 24 hour   Intake 1850 ml   Output 500 ml   Net 1350 ml       Invasive Devices     Peripheral Intravenous Line            Peripheral IV 02/05/20 Left Forearm 1 day                Physical Exam   Constitutional: She is oriented to person, place, and time  Vital signs are normal  She appears well-developed and well-nourished  She is cooperative  She does not have a sickly appearance  She does not appear ill  No distress  She is not intubated  HENT:   Head: Normocephalic and atraumatic     Right Ear: Hearing and external ear normal  No decreased hearing is noted  Left Ear: Hearing and external ear normal  No decreased hearing is noted  Nose: Nose normal    Cardiovascular: Normal rate, regular rhythm, S1 normal and S2 normal  Exam reveals no friction rub  No murmur heard  Pulmonary/Chest: Effort normal and breath sounds normal  No accessory muscle usage or stridor  No apnea, no tachypnea and no bradypnea  She is not intubated  No respiratory distress  She has no decreased breath sounds  She has no wheezes  She has no rhonchi  She has no rales  Abdominal: Soft  Normal appearance and bowel sounds are normal  She exhibits no distension  There is no hepatosplenomegaly  There is tenderness in the right upper quadrant and epigastric area  There is no rigidity and no guarding  No hernia  Hernia confirmed negative in the ventral area  Neurological: She is alert and oriented to person, place, and time  She has normal strength  She is not disoriented  No sensory deficit  GCS eye subscore is 4  GCS verbal subscore is 5  GCS motor subscore is 6  Skin: Skin is warm, dry and intact  Capillary refill takes less than 2 seconds  Lab Results:   I have personally reviewed pertinent lab results  , CBC:   Lab Results   Component Value Date    WBC 5 66 02/07/2020    HGB 10 7 (L) 02/07/2020    HCT 32 3 (L) 02/07/2020    MCV 89 02/07/2020     02/07/2020    MCH 29 6 02/07/2020    MCHC 33 1 02/07/2020    RDW 12 9 02/07/2020    MPV 9 6 02/07/2020    NRBC 0 02/07/2020   , CMP:   Lab Results   Component Value Date    SODIUM 143 02/07/2020    K 4 2 02/07/2020     02/07/2020    CO2 32 02/07/2020    BUN 11 02/07/2020    CREATININE 1 13 02/07/2020    CALCIUM 8 0 (L) 02/07/2020    EGFR 60 02/07/2020     Imaging: I have personally reviewed pertinent reports  EKG, Pathology, and Other Studies: I have personally reviewed pertinent reports  Counseling / Coordination of Care  Total floor / unit time spent today 45 minutes  Greater than 50% of total time was spent with the patient and / or family counseling and / or coordination of care  A description of the counseling / coordination of care: Developing plan, reviewing with attending, coordinating care, physical exam, history taking, reviewing labs, reviewing imaging, coordinating with Internal Medicine/Family Medicine

## 2020-02-07 NOTE — DISCHARGE INSTRUCTIONS

## 2020-02-07 NOTE — PROGRESS NOTES
[facility] Progress Note - Ambar Barron 39 y o  female MRN: [id number]    Unit/Bed#: [address] [place] [address] Encounter: [id number]      Assessment/Plan:    * Right upper quadrant pain  Assessment & Plan  Right upper quadrant pain noted on exam   Patient notes that it is only painful when pressure is applied  Patient has multiple risk factors for gallstones including her sex, age, and weight  · RUQ ultrasound: Cholelithiasis  No acute cholecystitis, choledocholithiasis, or biliary ductal obstruction  Prominent, fatty liver  · Patient symptomatic, Surgery consulted  · Plan for cholecystectomy on Monday 2/10  · NPO at midnight    Nausea  Assessment & Plan  Patient presents with 3 days of nausea and vomiting  Possibly viral in nature as she notes that she has had sick contacts recently  Since she was not feeling well today she did not check her sugars today and thus did not take her insulin  No blood in vomit  · Electrolytes stabilized with IVF  · Zofran    Type 2 diabetes mellitus with hyperglycemia, with long-term current use of insulin [facility])  Assessment & Plan  Lab Results   Component Value Date    HGBA1C 10 7 (H) 09/17/2019       Recent Labs     02/06/20 2012 02/07/20  0738 02/07/20  1105 02/07/20  1652   POCGLU 281* 169* 170* 289*       Blood Sugar Average: Last 72 hrs:  (P) 838 [id number]    Per ED note, patient's sugars were over 500 on arrival   Patient given 2 1 L boluses as well as 7 units of insulin  Patient did not take her insulin today she was not feeling well  · ISS Algo 3  · Diabetic diet  · Continue patient's home Lantus 30 units daily    Bipolar 1 disorder (HCC)  Assessment & Plan  Continue Depakote 500 mg b i d  And Effexor 150 mg p o  Daily     Asthma  Assessment & Plan  Not currently on any medications  Stable  Adult ADHD  Assessment & Plan  Stable    Currently receiving therapy at [facility]    History of hypothyroidism  Assessment & Plan  Not currently on any medications  Most recent TSH done on 09/17/2019: was within normal limits 1 661     Morbidly obese (HCC)  Assessment & Plan  BMI 40 92  Consider nutrition consult     History of drug abuse [facility])  Assessment & Plan  Patient last used heroin 2 and half years ago  Currently on methadone 120 mg p o  Daily     Anxiety and depression  Assessment & Plan   Stable  Continue Effexor 150 mg p o  Daily and Minipress 1 mg p o  Daily     Hypertension  Assessment & Plan  /80 on admission  · Continue home Prinzide 20-12 5 mg p o  Daily and prazosin 1 mg p o  Daily     Hyperkalemia-resolved as of 2/7/2020  Assessment & Plan  K 5 7 on admission  Received 7 units of insulin in the ED along with fluid boluses  Repeat BMP this evening    Hyponatremia-resolved as of 2/6/2020  Assessment & Plan  Patient is sodium 129 on admission  Corrected value is 132  Continue normal saline 125 mL/hr  Recheck K 4 1      Subjective:     53-year-old female seen and examined at bedside this morning  Patient did not appear to be any acute distress, eating breakfast   Patient discussed the she plans to continue to follow-up at [facility] for psychiatry related counseling/medication management March 4th  Objective:     Vitals: Blood pressure 137/63, pulse 93, temperature 98 1 °F (36 7 °C), resp  rate 18, height 5' 3" (1 6 m), weight 105 kg (231 lb), last menstrual period 01/18/2020, SpO2 95 %, not currently breastfeeding  ,Body mass index is 40 92 kg/m²    Wt Readings from Last 3 Encounters:   02/05/20 105 kg (231 lb)   09/17/19 111 kg (244 lb)   03/16/19 117 kg (258 lb)       Intake/Output Summary (Last 24 hours) at 2/7/2020 1735  Last data filed at 2/7/2020 1158  Gross per 24 hour   Intake 1850 ml   Output 500 ml   Net 1350 ml       Physical Exam:     General: Pt is AAO x 3, not in any acute distress  Cardio: RRR, S1 and S2 +  Resp: CTA b/l  Abdomen: soft, BS+, tenderness upon palpation right upper quadrant, no guarding, no rigidity  Extremities: no cyanosis or edema  PP 2+ b/l         Recent Results (from the past 24 hour(s))   Fingerstick Glucose (POCT)    Collection Time: 02/06/20  8:12 PM   Result Value Ref Range    POC Glucose 281 (H) 65 - 140 mg/dl   Basic metabolic panel    Collection Time: 02/07/20  6:09 AM   Result Value Ref Range    Sodium 143 136 - 145 mmol/L    Potassium 4 2 3 5 - 5 3 mmol/L    Chloride 107 100 - 108 mmol/L    CO2 32 21 - 32 mmol/L    ANION GAP 4 4 - 13 mmol/L    BUN 11 5 - 25 mg/dL    Creatinine 1 13 0 60 - 1 30 mg/dL    Glucose 165 (H) 65 - 140 mg/dL    Calcium 8 0 (L) 8 3 - 10 1 mg/dL    eGFR 60 ml/min/1 73sq m   Magnesium    Collection Time: 02/07/20  6:09 AM   Result Value Ref Range    Magnesium 1 9 1 6 - 2 6 mg/dL   Phosphorus    Collection Time: 02/07/20  6:09 AM   Result Value Ref Range    Phosphorus 3 8 2 7 - 4 5 mg/dL   CBC and differential    Collection Time: 02/07/20  6:09 AM   Result Value Ref Range    WBC 5 66 4 31 - 10 16 Thousand/uL    RBC 3 62 (L) 3 81 - 5 12 Million/uL    Hemoglobin 10 7 (L) 11 5 - 15 4 g/dL    Hematocrit 32 3 (L) 34 8 - 46 1 %    MCV 89 82 - 98 fL    MCH 29 6 26 8 - 34 3 pg    MCHC 33 1 31 4 - 37 4 g/dL    RDW 12 9 11 6 - 15 1 %    MPV 9 6 8 9 - 12 7 fL    Platelets 271 802 - 121 Thousands/uL    nRBC 0 /100 WBCs    Neutrophils Relative 38 (L) 43 - 75 %    Immat GRANS % 1 0 - 2 %    Lymphocytes Relative 51 (H) 14 - 44 %    Monocytes Relative 7 4 - 12 %    Eosinophils Relative 3 0 - 6 %    Basophils Relative 0 0 - 1 %    Neutrophils Absolute 2 12 1 85 - 7 62 Thousands/µL    Immature Grans Absolute 0 03 0 00 - 0 20 Thousand/uL    Lymphocytes Absolute 2 97 0 60 - 4 47 Thousands/µL    Monocytes Absolute 0 37 0 17 - 1 22 Thousand/µL    Eosinophils Absolute 0 16 0 00 - 0 61 Thousand/µL    Basophils Absolute 0 01 0 00 - 0 10 Thousands/µL   Fingerstick Glucose (POCT)    Collection Time: 02/07/20  7:38 AM   Result Value Ref Range    POC Glucose 169 (H) 65 - 140 mg/dl   Fingerstick Glucose (POCT)    Collection Time: 02/07/20 11:05 AM   Result Value Ref Range    POC Glucose 170 (H) 65 - 140 mg/dl   Fingerstick Glucose (POCT)    Collection Time: 02/07/20  4:52 PM   Result Value Ref Range    POC Glucose 289 (H) 65 - 140 mg/dl       Current Facility-Administered Medications   Medication Dose Route Frequency Provider Last Rate Last Dose    divalproex sodium (DEPAKOTE ER) 24 hr tablet 500 mg  500 mg Oral BID Angelina Myke, DO   500 mg at 02/07/20 1729    enoxaparin (LOVENOX) subcutaneous injection 40 mg  40 mg Subcutaneous Daily India Vo, DO   40 mg at 02/07/20 1005    gabapentin (NEURONTIN) capsule 800 mg  800 mg Oral TID Kiana Waters Myke, DO   800 mg at 02/07/20 1658    hydrOXYzine HCL (ATARAX) tablet 50 mg  50 mg Oral HS Leeann Traoredan, DO   50 mg at 02/06/20 2246    insulin glargine (LANTUS) subcutaneous injection 30 Units 0 3 mL  30 Units Subcutaneous HS Angelina Myke, DO   30 Units at 02/06/20 2107    insulin lispro (HumaLOG) 100 units/mL subcutaneous injection 1-6 Units  1-6 Units Subcutaneous 4x Daily (AC & HS) India Vo, DO   2 Units at 02/07/20 1657    lisinopril-hydrochlorothiazide (PRINZIDE 20/12  5) combo dose   Oral Daily Alvira Pump, DO        methadone (DOLOPHINE) tablet 120 mg  120 mg Oral Daily Before Breakfast Angelina Stringer, DO   120 mg at 02/07/20 0751    ondansetron (ZOFRAN-ODT) dispersible tablet 4 mg  4 mg Oral Q6H [address] Angelina Viotto, DO   4 mg at 02/07/20 1729    prazosin (MINIPRESS) capsule 3 mg  3 mg Oral HS Angelina Betoo, DO   3 mg at 02/06/20 2109    venlafaxine (EFFEXOR-XR) 24 hr capsule 300 mg  300 mg Oral Daily Angelina Betoo, DO   300 mg at 02/07/20 1007       Invasive Devices     Peripheral Intravenous Line            Peripheral IV 02/05/20 Left Forearm 2 days                Lab, Imaging and other studies: I have personally reviewed pertinent reports      VTE Pharmacologic Prophylaxis: Enoxaparin (Lovenox)  VTE Mechanical Prophylaxis: sequential compression device    Dayanara Sport, DO

## 2020-02-08 LAB
ANION GAP SERPL CALCULATED.3IONS-SCNC: 3 MMOL/L (ref 4–13)
BASOPHILS # BLD AUTO: 0.02 THOUSANDS/ΜL (ref 0–0.1)
BASOPHILS NFR BLD AUTO: 0 % (ref 0–1)
BUN SERPL-MCNC: 7 MG/DL (ref 5–25)
CALCIUM SERPL-MCNC: 8.2 MG/DL (ref 8.3–10.1)
CHLORIDE SERPL-SCNC: 104 MMOL/L (ref 100–108)
CO2 SERPL-SCNC: 34 MMOL/L (ref 21–32)
CREAT SERPL-MCNC: 1.01 MG/DL (ref 0.6–1.3)
EOSINOPHIL # BLD AUTO: 0.14 THOUSAND/ΜL (ref 0–0.61)
EOSINOPHIL NFR BLD AUTO: 3 % (ref 0–6)
ERYTHROCYTE [DISTWIDTH] IN BLOOD BY AUTOMATED COUNT: 12.8 % (ref 11.6–15.1)
EST. AVERAGE GLUCOSE BLD GHB EST-MCNC: 232 MG/DL
GFR SERPL CREATININE-BSD FRML MDRD: 68 ML/MIN/1.73SQ M
GLUCOSE SERPL-MCNC: 150 MG/DL (ref 65–140)
GLUCOSE SERPL-MCNC: 183 MG/DL (ref 65–140)
GLUCOSE SERPL-MCNC: 212 MG/DL (ref 65–140)
GLUCOSE SERPL-MCNC: 214 MG/DL (ref 65–140)
GLUCOSE SERPL-MCNC: 229 MG/DL (ref 65–140)
HBA1C MFR BLD: 9.7 % (ref 4.2–6.3)
HCT VFR BLD AUTO: 32.7 % (ref 34.8–46.1)
HGB BLD-MCNC: 11 G/DL (ref 11.5–15.4)
IMM GRANULOCYTES # BLD AUTO: 0.03 THOUSAND/UL (ref 0–0.2)
IMM GRANULOCYTES NFR BLD AUTO: 1 % (ref 0–2)
LYMPHOCYTES # BLD AUTO: 2.94 THOUSANDS/ΜL (ref 0.6–4.47)
LYMPHOCYTES NFR BLD AUTO: 52 % (ref 14–44)
MAGNESIUM SERPL-MCNC: 1.8 MG/DL (ref 1.6–2.6)
MCH RBC QN AUTO: 29.9 PG (ref 26.8–34.3)
MCHC RBC AUTO-ENTMCNC: 33.6 G/DL (ref 31.4–37.4)
MCV RBC AUTO: 89 FL (ref 82–98)
MONOCYTES # BLD AUTO: 0.41 THOUSAND/ΜL (ref 0.17–1.22)
MONOCYTES NFR BLD AUTO: 7 % (ref 4–12)
NEUTROPHILS # BLD AUTO: 2.07 THOUSANDS/ΜL (ref 1.85–7.62)
NEUTS SEG NFR BLD AUTO: 37 % (ref 43–75)
NRBC BLD AUTO-RTO: 0 /100 WBCS
PHOSPHATE SERPL-MCNC: 3.8 MG/DL (ref 2.7–4.5)
PLATELET # BLD AUTO: 171 THOUSANDS/UL (ref 149–390)
PMV BLD AUTO: 10.2 FL (ref 8.9–12.7)
POTASSIUM SERPL-SCNC: 3.9 MMOL/L (ref 3.5–5.3)
RBC # BLD AUTO: 3.68 MILLION/UL (ref 3.81–5.12)
SODIUM SERPL-SCNC: 141 MMOL/L (ref 136–145)
WBC # BLD AUTO: 5.61 THOUSAND/UL (ref 4.31–10.16)

## 2020-02-08 PROCEDURE — 84100 ASSAY OF PHOSPHORUS: CPT | Performed by: STUDENT IN AN ORGANIZED HEALTH CARE EDUCATION/TRAINING PROGRAM

## 2020-02-08 PROCEDURE — 99232 SBSQ HOSP IP/OBS MODERATE 35: CPT | Performed by: FAMILY MEDICINE

## 2020-02-08 PROCEDURE — 80048 BASIC METABOLIC PNL TOTAL CA: CPT | Performed by: STUDENT IN AN ORGANIZED HEALTH CARE EDUCATION/TRAINING PROGRAM

## 2020-02-08 PROCEDURE — 85025 COMPLETE CBC W/AUTO DIFF WBC: CPT | Performed by: STUDENT IN AN ORGANIZED HEALTH CARE EDUCATION/TRAINING PROGRAM

## 2020-02-08 PROCEDURE — 99232 SBSQ HOSP IP/OBS MODERATE 35: CPT | Performed by: SURGERY

## 2020-02-08 PROCEDURE — 82948 REAGENT STRIP/BLOOD GLUCOSE: CPT

## 2020-02-08 PROCEDURE — 83735 ASSAY OF MAGNESIUM: CPT | Performed by: STUDENT IN AN ORGANIZED HEALTH CARE EDUCATION/TRAINING PROGRAM

## 2020-02-08 PROCEDURE — 3046F HEMOGLOBIN A1C LEVEL >9.0%: CPT | Performed by: NURSE PRACTITIONER

## 2020-02-08 PROCEDURE — 83036 HEMOGLOBIN GLYCOSYLATED A1C: CPT | Performed by: STUDENT IN AN ORGANIZED HEALTH CARE EDUCATION/TRAINING PROGRAM

## 2020-02-08 RX ORDER — POTASSIUM CHLORIDE 20 MEQ/1
20 TABLET, EXTENDED RELEASE ORAL ONCE
Status: COMPLETED | OUTPATIENT
Start: 2020-02-08 | End: 2020-02-08

## 2020-02-08 RX ADMIN — VENLAFAXINE HYDROCHLORIDE 300 MG: 150 CAPSULE, EXTENDED RELEASE ORAL at 08:51

## 2020-02-08 RX ADMIN — DIVALPROEX SODIUM 500 MG: 500 TABLET, FILM COATED, EXTENDED RELEASE ORAL at 17:46

## 2020-02-08 RX ADMIN — INSULIN LISPRO 3 UNITS: 100 INJECTION, SOLUTION INTRAVENOUS; SUBCUTANEOUS at 12:46

## 2020-02-08 RX ADMIN — HYDROXYZINE HYDROCHLORIDE 50 MG: 25 TABLET, FILM COATED ORAL at 22:11

## 2020-02-08 RX ADMIN — POTASSIUM CHLORIDE 20 MEQ: 1500 TABLET, EXTENDED RELEASE ORAL at 08:49

## 2020-02-08 RX ADMIN — METHADONE HYDROCHLORIDE 120 MG: 10 TABLET ORAL at 06:08

## 2020-02-08 RX ADMIN — ONDANSETRON 4 MG: 4 TABLET, ORALLY DISINTEGRATING ORAL at 12:46

## 2020-02-08 RX ADMIN — INSULIN LISPRO 1 UNITS: 100 INJECTION, SOLUTION INTRAVENOUS; SUBCUTANEOUS at 08:50

## 2020-02-08 RX ADMIN — INSULIN LISPRO 2 UNITS: 100 INJECTION, SOLUTION INTRAVENOUS; SUBCUTANEOUS at 22:08

## 2020-02-08 RX ADMIN — ENOXAPARIN SODIUM 40 MG: 40 INJECTION SUBCUTANEOUS at 08:49

## 2020-02-08 RX ADMIN — ONDANSETRON 4 MG: 4 TABLET, ORALLY DISINTEGRATING ORAL at 06:07

## 2020-02-08 RX ADMIN — SODIUM CHLORIDE 50 ML/HR: 0.9 INJECTION, SOLUTION INTRAVENOUS at 21:02

## 2020-02-08 RX ADMIN — INSULIN LISPRO 3 UNITS: 100 INJECTION, SOLUTION INTRAVENOUS; SUBCUTANEOUS at 17:46

## 2020-02-08 RX ADMIN — PRAZOSIN HYDROCHLORIDE 3 MG: 1 CAPSULE ORAL at 22:11

## 2020-02-08 RX ADMIN — LISINOPRIL: 20 TABLET ORAL at 08:48

## 2020-02-08 RX ADMIN — INSULIN GLARGINE 30 UNITS: 100 INJECTION, SOLUTION SUBCUTANEOUS at 22:07

## 2020-02-08 RX ADMIN — ONDANSETRON 4 MG: 4 TABLET, ORALLY DISINTEGRATING ORAL at 17:46

## 2020-02-08 RX ADMIN — INSULIN LISPRO 2 UNITS: 100 INJECTION, SOLUTION INTRAVENOUS; SUBCUTANEOUS at 12:47

## 2020-02-08 RX ADMIN — DIVALPROEX SODIUM 500 MG: 500 TABLET, FILM COATED, EXTENDED RELEASE ORAL at 08:49

## 2020-02-08 RX ADMIN — ONDANSETRON 4 MG: 4 TABLET, ORALLY DISINTEGRATING ORAL at 23:34

## 2020-02-08 RX ADMIN — INSULIN LISPRO 2 UNITS: 100 INJECTION, SOLUTION INTRAVENOUS; SUBCUTANEOUS at 17:46

## 2020-02-08 NOTE — PROGRESS NOTES
Progress Note - General Surgery   Tara Barron 39 y o  female MRN: [id number]  Unit/Bed#: [address] [place] [address] Encounter: [id number]    Assessment:  1) Symptomatic Cholelithiasis -  Patient has gallstone noted on  Ultrasound exam, patient has had recurrent issues like this over time, patient does not want surgery until her  is back in the area, patient has eaten today, patient's pain is reproducible with dietary intake  Causing nausea and vomiting and abdominal pain, palpable tenderness in the upper right quadrant, afebrile, no leukocytosis, currently AVSS but is exhibiting more palpable tenderness in the upper right quadrant similar findings suggestive of Macias point tenderness today, may be manifesting early stages of acute cholecystitis compared to what was previously thought to be symptomatic cholelithiasis,  is coming back home from business trip, patient feels historically to be the same as yesterday, patient does not feel that she has as much pain when she is eating clear liquids as compared to regular food, physical exam does not necessarily correspond to her historical counts and she does seem to have slightly increased abdominal tenderness compared to yesterday  2) Abdominal Pain - most probably due to 1  3) Nausea/Vomiting - most probably due to 1     Plan:  1-3)   - repeat CBC, BMP, Mag, phos  - replete electrolytes as indicated  - can advance to regular diet patient seems to be more comfortable on clear liquids or full liquids  - DVT prophylaxis  - continue to plan for laparoscopic cholecystectomy on Monday  - hold DVT prophylaxis and NPO at midnight on Sunday  - p r n  Analgesia per Family Medicine team  - routine vitals and serial abdominal exams  - at Ancef and Flagyl if patient if presents with fever or leukocytosis  - patient Education  - tight glucose control recommended    Subjective/Objective   Chief Complaint:  I feel the same    Subjective:  Patient seen examined at bedside    Patient denies any acute events overnight  Patient reports that she feels the same still having abdominal pain with movement and palpation as well as with trying to eat more regular food  Patient has been tolerating clear and full liquids  Patient did have 1 episode of nausea and vomiting overnight  Patient reports that she still passing gas  Patient denies any fevers or chills  Patient denies any progressively worsening pain directly over the right upper quadrant  Patient does report that she has some slight pain at rest     Objective:     Blood pressure 118/66, pulse 55, temperature (!) 95 1 °F (35 1 °C), temperature source Tympanic, resp  rate 20, height 5' 3" (1 6 m), weight 105 kg (231 lb), last menstrual period 01/18/2020, SpO2 98 %, not currently breastfeeding  ,Body mass index is 40 92 kg/m²  No intake or output data in the 24 hours ending 02/08/20 1459    Invasive Devices     Peripheral Intravenous Line            Peripheral IV 02/05/20 Left Forearm 2 days                Physical Exam: /66 (BP Location: Right arm)   Pulse 55   Temp (!) 95 1 °F (35 1 °C) (Tympanic)   Resp 20   Ht 5' 3" (1 6 m)   Wt 105 kg (231 lb)   LMP 01/18/2020   SpO2 98%   Breastfeeding No   BMI 40 92 kg/m²   General appearance: alert and oriented, in no acute distress  Head: Normocephalic, without obvious abnormality, atraumatic  Lungs: clear to auscultation bilaterally  Heart: regular rate and rhythm, S1, S2 normal, no murmur, click, rub or gallop  Abdomen: Soft, increased tenderness in the right upper quadrant and epigastric regions, mild Macias point tenderness elicited, normoactive bowel sounds, nondistended  Skin: Skin color, texture, turgor normal  No rashes or lesions    Lab, Imaging and other studies:  I have personally reviewed pertinent lab results    , CBC:   Lab Results   Component Value Date    WBC 5 61 02/08/2020    HGB 11 0 (L) 02/08/2020    HCT 32 7 (L) 02/08/2020    MCV 89 02/08/2020     02/08/2020 MCH 29 9 02/08/2020    MCHC 33 6 02/08/2020    RDW 12 8 02/08/2020    MPV 10 2 02/08/2020    NRBC 0 02/08/2020   , CMP:   Lab Results   Component Value Date    SODIUM 141 02/08/2020    K 3 9 02/08/2020     02/08/2020    CO2 34 (H) 02/08/2020    BUN 7 02/08/2020    CREATININE 1 01 02/08/2020    CALCIUM 8 2 (L) 02/08/2020    EGFR 68 02/08/2020     VTE Pharmacologic Prophylaxis: Enoxaparin (Lovenox)  VTE Mechanical Prophylaxis: sequential compression device

## 2020-02-08 NOTE — PROGRESS NOTES
[facility] Progress Note - Tian Barron 39 y o  female MRN: [id number]    Unit/Bed#: [address] [place] [address] Encounter: [id number]      Assessment/Plan:  * Right upper quadrant pain  Assessment & Plan  Right upper quadrant pain noted on exam   Patient notes that it is only painful when pressure is applied  Patient has multiple risk factors for gallstones including her sex, age, and weight  · RUQ ultrasound: Cholelithiasis  No acute cholecystitis, choledocholithiasis, or biliary ductal obstruction  Prominent, fatty liver  · Patient symptomatic, Surgery consulted  · Plan for cholecystectomy on Monday 2/10  · NPO at midnight, refrain from narcotic pain medication    Nausea  Assessment & Plan  Patient presents with 3 days of nausea and vomiting  Possibly viral in nature as she notes that she has had sick contacts recently  Since she was not feeling well today she did not check her sugars today and thus did not take her insulin  No blood in vomit  · Electrolytes stabilized with IVF  · Zofran    Asthma  Assessment & Plan  Not currently on any medications  Stable  Adult ADHD  Assessment & Plan  Stable  Currently receiving therapy at [address] [place] [address] disorder [facility])  Assessment & Plan  Continue Depakote 500 mg b i d  And Effexor 150 mg p o  Daily     History of hypothyroidism  Assessment & Plan  Not currently on any medications  Most recent TSH done on 09/17/2019: was within normal limits 1 661     Morbidly obese (HCC)  Assessment & Plan  BMI 40 92  Consider nutrition consult     History of drug abuse [facility])  Assessment & Plan  Patient last used heroin 2 and half years ago  Currently on methadone 120 mg p o  Daily     Anxiety and depression  Assessment & Plan   Stable  Continue Effexor 150 mg p o  Daily and Minipress 1 mg p o   Daily     Type 2 diabetes mellitus with hyperglycemia, with long-term current use of insulin [facility])  Assessment & Plan  Lab Results   Component Value Date    HGBA1C 10 7 (H) 09/17/2019       Recent Labs     02/07/20  0738 02/07/20  1105 02/07/20  1652 02/07/20  2047   POCGLU 169* 170* 289* 286*       Blood Sugar Average: Last 72 hrs:  (P) 245  2    Per ED note, patient's sugars were over 500 on arrival   Patient given 2 1 L boluses as well as 7 units of insulin  Patient did not take her insulin today she was not feeling well  · ISS Algo 3  · Diabetic diet  · Continue patient's home Lantus 30 units daily  · Sugars have been elevated, consider switching Lantus to 35 units    Hypertension  Assessment & Plan  /80 on admission  · Continue home Prinzide 20-12 5 mg p o  Daily and prazosin 1 mg p o  Daily     Hyperkalemia-resolved as of 2/7/2020  Assessment & Plan  K 5 7 on admission  Received 7 units of insulin in the ED along with fluid boluses  Repeat BMP this evening    Hyponatremia-resolved as of 2/6/2020  Assessment & Plan  Patient is sodium 129 on admission  Corrected value is 132  Continue normal saline 125 mL/hr  Recheck K 4 1    GLOBAL:  Replete electrolytes as needed  SCDs and Lovenox  IV NS 50 cc/hour  Carbohydrate level 3 diet      Subjective:   Patient seen examined bedside, no acute events overnight  Patient states that she is still having continuing abdominal pain in epigastrium and right upper quadrant areas  Patient states that she has been eating, however not as much as she usually eats due to pain  Denied is fevers, chills nausea or vomiting  Objective:     Vitals: Blood pressure 134/94, pulse 68, temperature 98 3 °F (36 8 °C), temperature source Oral, resp  rate 16, height 5' 3" (1 6 m), weight 105 kg (231 lb), last menstrual period 01/18/2020, SpO2 96 %, not currently breastfeeding  ,Body mass index is 40 92 kg/m²    Wt Readings from Last 3 Encounters:   02/05/20 105 kg (231 lb)   09/17/19 111 kg (244 lb)   03/16/19 117 kg (258 lb)       Intake/Output Summary (Last 24 hours) at 2/8/2020 9236  Last data filed at 2/7/2020 1158  Gross per 24 hour   Intake 900 ml   Output 500 ml   Net 400 ml       Physical Exam:   General: Pt is AAO x 3, not in any acute distress  Cardio: RRR, S1 and S2 +, no murmurs/rubs/gallops/clicks  Resp: CTA b/l, no wheezes/rales/rhonchi  Abdomen: soft, tenderness elicited RUQ, epigastrium  Bowel sounds present  Extremities: no cyanosis or edema  PP 2+ b/l         Recent Results (from the past 24 hour(s))   Fingerstick Glucose (POCT)    Collection Time: 02/07/20 11:05 AM   Result Value Ref Range    POC Glucose 170 (H) 65 - 140 mg/dl   Fingerstick Glucose (POCT)    Collection Time: 02/07/20  4:52 PM   Result Value Ref Range    POC Glucose 289 (H) 65 - 140 mg/dl   Fingerstick Glucose (POCT)    Collection Time: 02/07/20  8:47 PM   Result Value Ref Range    POC Glucose 286 (H) 65 - 140 mg/dl   Basic metabolic panel    Collection Time: 02/08/20  4:46 AM   Result Value Ref Range    Sodium 141 136 - 145 mmol/L    Potassium 3 9 3 5 - 5 3 mmol/L    Chloride 104 100 - 108 mmol/L    CO2 34 (H) 21 - 32 mmol/L    ANION GAP 3 (L) 4 - 13 mmol/L    BUN 7 5 - 25 mg/dL    Creatinine 1 01 0 60 - 1 30 mg/dL    Glucose 183 (H) 65 - 140 mg/dL    Calcium 8 2 (L) 8 3 - 10 1 mg/dL    eGFR 68 ml/min/1 73sq m   CBC and differential    Collection Time: 02/08/20  4:46 AM   Result Value Ref Range    WBC 5 61 4 31 - 10 16 Thousand/uL    RBC 3 68 (L) 3 81 - 5 12 Million/uL    Hemoglobin 11 0 (L) 11 5 - 15 4 g/dL    Hematocrit 32 7 (L) 34 8 - 46 1 %    MCV 89 82 - 98 fL    MCH 29 9 26 8 - 34 3 pg    MCHC 33 6 31 4 - 37 4 g/dL    RDW 12 8 11 6 - 15 1 %    MPV 10 2 8 9 - 12 7 fL    Platelets 395 047 - 023 Thousands/uL    nRBC 0 /100 WBCs    Neutrophils Relative 37 (L) 43 - 75 %    Immat GRANS % 1 0 - 2 %    Lymphocytes Relative 52 (H) 14 - 44 %    Monocytes Relative 7 4 - 12 %    Eosinophils Relative 3 0 - 6 %    Basophils Relative 0 0 - 1 %    Neutrophils Absolute 2 07 1 85 - 7 62 Thousands/µL    Immature Grans Absolute 0 03 0 00 - 0 20 Thousand/uL    Lymphocytes Absolute 2 94 0 60 - 4 47 Thousands/µL    Monocytes Absolute 0 41 0 17 - 1 22 Thousand/µL    Eosinophils Absolute 0 14 0 00 - 0 61 Thousand/µL    Basophils Absolute 0 02 0 00 - 0 10 Thousands/µL   Magnesium    Collection Time: 02/08/20  4:46 AM   Result Value Ref Range    Magnesium 1 8 1 6 - 2 6 mg/dL   Phosphorus    Collection Time: 02/08/20  4:46 AM   Result Value Ref Range    Phosphorus 3 8 2 7 - 4 5 mg/dL   Fingerstick Glucose (POCT)    Collection Time: 02/08/20  7:29 AM   Result Value Ref Range    POC Glucose 150 (H) 65 - 140 mg/dl       Current Facility-Administered Medications   Medication Dose Route Frequency Provider Last Rate Last Dose    divalproex sodium (DEPAKOTE ER) 24 hr tablet 500 mg  500 mg Oral BID Angelina Stringer DO   500 mg at 02/07/20 1729    enoxaparin (LOVENOX) subcutaneous injection 40 mg  40 mg Subcutaneous Daily Garret Gonzales DO   40 mg at 02/07/20 1005    hydrOXYzine HCL (ATARAX) tablet 50 mg  50 mg Oral HS Leeann Turcios DO   50 mg at 02/06/20 2246    insulin glargine (LANTUS) subcutaneous injection 30 Units 0 3 mL  30 Units Subcutaneous HS Angelina Stringer DO   30 Units at 02/07/20 2224    insulin lispro (HumaLOG) 100 units/mL subcutaneous injection 1-6 Units  1-6 Units Subcutaneous 4x Daily (AC & HS) Garret Gonzales DO   4 Units at 02/07/20 2225    lisinopril-hydrochlorothiazide (PRINZIDE 20/12  5) combo dose   Oral Daily Pauline Suarez DO        methadone (DOLOPHINE) tablet 120 mg  120 mg Oral Daily Before Breakfast Angelina Stringer DO   120 mg at 02/08/20 0608    ondansetron (ZOFRAN-ODT) dispersible tablet 4 mg  4 mg Oral Q6H [address] Angelina Stringer DO   4 mg at 02/08/20 0607    potassium chloride (K-DUR,KLOR-CON) CR tablet 20 mEq  20 mEq Oral Once Dinesh Galvan MD        prazosin (MINIPRESS) capsule 3 mg  3 mg Oral HS Angelina Stringer DO   3 mg at 02/07/20 2234    sodium chloride 0 9 % infusion  50 mL/hr Intravenous Continuous Angelina Stringer DO 50 mL/hr at 02/07/20 2022 50 mL/hr at 02/07/20 2022    venlafaxine (EFFEXOR-XR) 24 hr capsule 300 mg  300 mg Oral Daily Angelina Stringer DO   300 mg at 02/07/20 1007       Invasive Devices     Peripheral Intravenous Line            Peripheral IV 02/05/20 Left Forearm 2 days                Lab, Imaging and other studies: I have personally reviewed pertinent reports      VTE Pharmacologic Prophylaxis: Enoxaparin (Lovenox)  VTE Mechanical Prophylaxis: sequential compression device    Anastasia Meraz MD

## 2020-02-09 LAB
ALBUMIN SERPL BCP-MCNC: 2.8 G/DL (ref 3.5–5)
ALP SERPL-CCNC: 50 U/L (ref 46–116)
ALT SERPL W P-5'-P-CCNC: 35 U/L (ref 12–78)
ANION GAP SERPL CALCULATED.3IONS-SCNC: 5 MMOL/L (ref 4–13)
AST SERPL W P-5'-P-CCNC: 24 U/L (ref 5–45)
BASOPHILS # BLD AUTO: 0.02 THOUSANDS/ΜL (ref 0–0.1)
BASOPHILS NFR BLD AUTO: 0 % (ref 0–1)
BILIRUB SERPL-MCNC: 0.3 MG/DL (ref 0.2–1)
BUN SERPL-MCNC: 6 MG/DL (ref 5–25)
CALCIUM SERPL-MCNC: 8.3 MG/DL (ref 8.3–10.1)
CHLORIDE SERPL-SCNC: 104 MMOL/L (ref 100–108)
CO2 SERPL-SCNC: 33 MMOL/L (ref 21–32)
CREAT SERPL-MCNC: 1.01 MG/DL (ref 0.6–1.3)
EOSINOPHIL # BLD AUTO: 0.18 THOUSAND/ΜL (ref 0–0.61)
EOSINOPHIL NFR BLD AUTO: 3 % (ref 0–6)
ERYTHROCYTE [DISTWIDTH] IN BLOOD BY AUTOMATED COUNT: 12.9 % (ref 11.6–15.1)
GFR SERPL CREATININE-BSD FRML MDRD: 68 ML/MIN/1.73SQ M
GLUCOSE SERPL-MCNC: 125 MG/DL (ref 65–140)
GLUCOSE SERPL-MCNC: 134 MG/DL (ref 65–140)
GLUCOSE SERPL-MCNC: 152 MG/DL (ref 65–140)
GLUCOSE SERPL-MCNC: 179 MG/DL (ref 65–140)
GLUCOSE SERPL-MCNC: 186 MG/DL (ref 65–140)
HCT VFR BLD AUTO: 33.4 % (ref 34.8–46.1)
HGB BLD-MCNC: 11 G/DL (ref 11.5–15.4)
IMM GRANULOCYTES # BLD AUTO: 0.02 THOUSAND/UL (ref 0–0.2)
IMM GRANULOCYTES NFR BLD AUTO: 0 % (ref 0–2)
LYMPHOCYTES # BLD AUTO: 2.23 THOUSANDS/ΜL (ref 0.6–4.47)
LYMPHOCYTES NFR BLD AUTO: 42 % (ref 14–44)
MAGNESIUM SERPL-MCNC: 1.9 MG/DL (ref 1.6–2.6)
MCH RBC QN AUTO: 29.3 PG (ref 26.8–34.3)
MCHC RBC AUTO-ENTMCNC: 32.9 G/DL (ref 31.4–37.4)
MCV RBC AUTO: 89 FL (ref 82–98)
MONOCYTES # BLD AUTO: 0.33 THOUSAND/ΜL (ref 0.17–1.22)
MONOCYTES NFR BLD AUTO: 6 % (ref 4–12)
NEUTROPHILS # BLD AUTO: 2.54 THOUSANDS/ΜL (ref 1.85–7.62)
NEUTS SEG NFR BLD AUTO: 49 % (ref 43–75)
NRBC BLD AUTO-RTO: 0 /100 WBCS
PHOSPHATE SERPL-MCNC: 3.8 MG/DL (ref 2.7–4.5)
PLATELET # BLD AUTO: 177 THOUSANDS/UL (ref 149–390)
PMV BLD AUTO: 9.6 FL (ref 8.9–12.7)
POTASSIUM SERPL-SCNC: 3.7 MMOL/L (ref 3.5–5.3)
PROT SERPL-MCNC: 6.5 G/DL (ref 6.4–8.2)
RBC # BLD AUTO: 3.76 MILLION/UL (ref 3.81–5.12)
SODIUM SERPL-SCNC: 142 MMOL/L (ref 136–145)
WBC # BLD AUTO: 5.32 THOUSAND/UL (ref 4.31–10.16)

## 2020-02-09 PROCEDURE — 85025 COMPLETE CBC W/AUTO DIFF WBC: CPT | Performed by: STUDENT IN AN ORGANIZED HEALTH CARE EDUCATION/TRAINING PROGRAM

## 2020-02-09 PROCEDURE — 83735 ASSAY OF MAGNESIUM: CPT | Performed by: STUDENT IN AN ORGANIZED HEALTH CARE EDUCATION/TRAINING PROGRAM

## 2020-02-09 PROCEDURE — 82948 REAGENT STRIP/BLOOD GLUCOSE: CPT

## 2020-02-09 PROCEDURE — 84100 ASSAY OF PHOSPHORUS: CPT | Performed by: STUDENT IN AN ORGANIZED HEALTH CARE EDUCATION/TRAINING PROGRAM

## 2020-02-09 PROCEDURE — 80053 COMPREHEN METABOLIC PANEL: CPT | Performed by: STUDENT IN AN ORGANIZED HEALTH CARE EDUCATION/TRAINING PROGRAM

## 2020-02-09 PROCEDURE — 99232 SBSQ HOSP IP/OBS MODERATE 35: CPT | Performed by: FAMILY MEDICINE

## 2020-02-09 RX ORDER — MAGNESIUM SULFATE 1 G/100ML
1 INJECTION INTRAVENOUS ONCE
Status: COMPLETED | OUTPATIENT
Start: 2020-02-09 | End: 2020-02-10

## 2020-02-09 RX ORDER — POTASSIUM CHLORIDE 20 MEQ/1
40 TABLET, EXTENDED RELEASE ORAL ONCE
Status: COMPLETED | OUTPATIENT
Start: 2020-02-09 | End: 2020-02-09

## 2020-02-09 RX ADMIN — ONDANSETRON 4 MG: 4 TABLET, ORALLY DISINTEGRATING ORAL at 05:59

## 2020-02-09 RX ADMIN — LISINOPRIL: 20 TABLET ORAL at 08:38

## 2020-02-09 RX ADMIN — INSULIN LISPRO 1 UNITS: 100 INJECTION, SOLUTION INTRAVENOUS; SUBCUTANEOUS at 17:00

## 2020-02-09 RX ADMIN — MAGNESIUM SULFATE HEPTAHYDRATE 1 G: 1 INJECTION, SOLUTION INTRAVENOUS at 22:17

## 2020-02-09 RX ADMIN — INSULIN LISPRO 3 UNITS: 100 INJECTION, SOLUTION INTRAVENOUS; SUBCUTANEOUS at 11:45

## 2020-02-09 RX ADMIN — SODIUM CHLORIDE 50 ML/HR: 0.9 INJECTION, SOLUTION INTRAVENOUS at 20:26

## 2020-02-09 RX ADMIN — DIVALPROEX SODIUM 500 MG: 500 TABLET, FILM COATED, EXTENDED RELEASE ORAL at 08:38

## 2020-02-09 RX ADMIN — INSULIN GLARGINE 30 UNITS: 100 INJECTION, SOLUTION SUBCUTANEOUS at 22:17

## 2020-02-09 RX ADMIN — VENLAFAXINE HYDROCHLORIDE 300 MG: 150 CAPSULE, EXTENDED RELEASE ORAL at 08:39

## 2020-02-09 RX ADMIN — PRAZOSIN HYDROCHLORIDE 3 MG: 1 CAPSULE ORAL at 22:16

## 2020-02-09 RX ADMIN — INSULIN LISPRO 1 UNITS: 100 INJECTION, SOLUTION INTRAVENOUS; SUBCUTANEOUS at 22:18

## 2020-02-09 RX ADMIN — INSULIN LISPRO 3 UNITS: 100 INJECTION, SOLUTION INTRAVENOUS; SUBCUTANEOUS at 17:01

## 2020-02-09 RX ADMIN — METHADONE HYDROCHLORIDE 120 MG: 10 TABLET ORAL at 06:00

## 2020-02-09 RX ADMIN — ENOXAPARIN SODIUM 40 MG: 40 INJECTION SUBCUTANEOUS at 08:38

## 2020-02-09 RX ADMIN — HYDROXYZINE HYDROCHLORIDE 50 MG: 25 TABLET, FILM COATED ORAL at 22:17

## 2020-02-09 RX ADMIN — DIVALPROEX SODIUM 500 MG: 500 TABLET, FILM COATED, EXTENDED RELEASE ORAL at 17:01

## 2020-02-09 RX ADMIN — INSULIN LISPRO 3 UNITS: 100 INJECTION, SOLUTION INTRAVENOUS; SUBCUTANEOUS at 08:38

## 2020-02-09 RX ADMIN — POTASSIUM CHLORIDE 40 MEQ: 1500 TABLET, EXTENDED RELEASE ORAL at 22:17

## 2020-02-09 NOTE — PROGRESS NOTES
[facility] Progress Note - Susan Barron 39 y o  female MRN: [id number]    Unit/Bed#: [address] [place] [address] Encounter: [id number]      Assessment/Plan:  Nausea  Assessment & Plan  Patient presents with 3 days of nausea and vomiting  Possibly viral in nature as she notes that she has had sick contacts recently  Since she was not feeling well today she did not check her sugars today and thus did not take her insulin  No blood in vomit  · Electrolytes stabilized with IVF  · Zofran    Type 2 diabetes mellitus with hyperglycemia, with long-term current use of insulin [facility])  Assessment & Plan  Lab Results   Component Value Date    HGBA1C 10 7 (H) 09/17/2019       Recent Labs     02/07/20  0738 02/07/20  1105 02/07/20  1652 02/07/20  2047   POCGLU 169* 170* 289* 286*       Blood Sugar Average: Last 72 hrs:  (P) 245  2    Per ED note, patient's sugars were over 500 on arrival   Patient given 2 1 L boluses as well as 7 units of insulin  Patient did not take her insulin today she was not feeling well  · ISS Algo 3  · Diabetic diet  · Continue patient's home Lantus 30 units daily  · Sugars have been elevated, consider switching Lantus to 35 units    * Right upper quadrant pain  Assessment & Plan  Right upper quadrant pain noted on exam   Patient notes that it is only painful when pressure is applied  Patient has multiple risk factors for gallstones including her sex, age, and weight  · RUQ ultrasound: Cholelithiasis  No acute cholecystitis, choledocholithiasis, or biliary ductal obstruction  Prominent, fatty liver  · Patient symptomatic, Surgery consulted  · Plan for cholecystectomy on Monday 2/10  · NPO at midnight, refrain from narcotic pain medication    Asthma  Assessment & Plan  Not currently on any medications  Stable  Adult ADHD  Assessment & Plan  Stable  Currently receiving therapy at [address] [place] [address] disorder [facility])  Assessment & Plan  Continue Depakote 500 mg b i d   And Effexor 150 mg p o  Daily     History of hypothyroidism  Assessment & Plan  Not currently on any medications  Most recent TSH done on 09/17/2019: was within normal limits 1 661     Morbidly obese (HCC)  Assessment & Plan  BMI 40 92  Consider nutrition consult     History of drug abuse [facility])  Assessment & Plan  Patient last used heroin 2 and half years ago  Currently on methadone 120 mg p o  Daily     Anxiety and depression  Assessment & Plan   Stable  Continue Effexor 150 mg p o  Daily and Minipress 1 mg p o  Daily     Hypertension  Assessment & Plan  /80 on admission  · Continue home Prinzide 20-12 5 mg p o  Daily and prazosin 1 mg p o  Daily     Hyperkalemia-resolved as of 2/7/2020  Assessment & Plan  K 5 7 on admission  Received 7 units of insulin in the ED along with fluid boluses  Repeat BMP this evening    Hyponatremia-resolved as of 2/6/2020  Assessment & Plan  Patient is sodium 129 on admission  Corrected value is 132  Continue normal saline 125 mL/hr  Recheck K 4 1    F-normal saline 50 cc an hour  E-replete electrolytes as needed  N- low fat low carb diet, NPO at midnight  D-SQL,  hold tomorrow before surgery      Subjective:   Patient seen and examined at bedside  Patient continues to have mild nausea however this is improving and she has been able to eat  She complains that IV in right arm is irritating her and has asked the nurse to switch to left arm  She has no questions about her procedure tomorrow  Objective:     Vitals: Blood pressure 126/70, pulse 61, temperature 98 1 °F (36 7 °C), temperature source Tympanic, resp  rate 16, height 5' 3" (1 6 m), weight 105 kg (231 lb), last menstrual period 01/18/2020, SpO2 95 %, not currently breastfeeding  ,Body mass index is 40 92 kg/m²    Wt Readings from Last 3 Encounters:   02/05/20 105 kg (231 lb)   09/17/19 111 kg (244 lb)   03/16/19 117 kg (258 lb)       Intake/Output Summary (Last 24 hours) at 2/9/2020 1322  Last data filed at 2/8/2020 2102  Gross per 24 hour   Intake 1000 ml   Output --   Net 1000 ml       Physical Exam:   General:  A&O x3, NAD  Heart:  RRR with S1-S2  Lungs:  CTA bilaterally  Abdomen:  Diffuse tenderness, tenderness worse in right upper quadrant and epigastric region  Extremities:  No edema    Recent Results (from the past 24 hour(s))   Fingerstick Glucose (POCT)    Collection Time: 02/08/20  4:08 PM   Result Value Ref Range    POC Glucose 212 (H) 65 - 140 mg/dl   Fingerstick Glucose (POCT)    Collection Time: 02/08/20  8:27 PM   Result Value Ref Range    POC Glucose 214 (H) 65 - 140 mg/dl   CBC and differential    Collection Time: 02/09/20  5:10 AM   Result Value Ref Range    WBC 5 32 4 31 - 10 16 Thousand/uL    RBC 3 76 (L) 3 81 - 5 12 Million/uL    Hemoglobin 11 0 (L) 11 5 - 15 4 g/dL    Hematocrit 33 4 (L) 34 8 - 46 1 %    MCV 89 82 - 98 fL    MCH 29 3 26 8 - 34 3 pg    MCHC 32 9 31 4 - 37 4 g/dL    RDW 12 9 11 6 - 15 1 %    MPV 9 6 8 9 - 12 7 fL    Platelets 754 257 - 216 Thousands/uL    nRBC 0 /100 WBCs    Neutrophils Relative 49 43 - 75 %    Immat GRANS % 0 0 - 2 %    Lymphocytes Relative 42 14 - 44 %    Monocytes Relative 6 4 - 12 %    Eosinophils Relative 3 0 - 6 %    Basophils Relative 0 0 - 1 %    Neutrophils Absolute 2 54 1 85 - 7 62 Thousands/µL    Immature Grans Absolute 0 02 0 00 - 0 20 Thousand/uL    Lymphocytes Absolute 2 23 0 60 - 4 47 Thousands/µL    Monocytes Absolute 0 33 0 17 - 1 22 Thousand/µL    Eosinophils Absolute 0 18 0 00 - 0 61 Thousand/µL    Basophils Absolute 0 02 0 00 - 0 10 Thousands/µL   Magnesium    Collection Time: 02/09/20  5:10 AM   Result Value Ref Range    Magnesium 1 9 1 6 - 2 6 mg/dL   Phosphorus    Collection Time: 02/09/20  5:10 AM   Result Value Ref Range    Phosphorus 3 8 2 7 - 4 5 mg/dL   Comprehensive metabolic panel    Collection Time: 02/09/20  5:10 AM   Result Value Ref Range    Sodium 142 136 - 145 mmol/L    Potassium 3 7 3 5 - 5 3 mmol/L    Chloride 104 100 - 108 mmol/L    CO2 33 (H) 21 - 32 mmol/L    ANION GAP 5 4 - 13 mmol/L    BUN 6 5 - 25 mg/dL    Creatinine 1 01 0 60 - 1 30 mg/dL    Glucose 152 (H) 65 - 140 mg/dL    Calcium 8 3 8 3 - 10 1 mg/dL    AST 24 5 - 45 U/L    ALT 35 12 - 78 U/L    Alkaline Phosphatase 50 46 - 116 U/L    Total Protein 6 5 6 4 - 8 2 g/dL    Albumin 2 8 (L) 3 5 - 5 0 g/dL    Total Bilirubin 0 30 0 20 - 1 00 mg/dL    eGFR 68 ml/min/1 73sq m   Fingerstick Glucose (POCT)    Collection Time: 02/09/20  7:17 AM   Result Value Ref Range    POC Glucose 125 65 - 140 mg/dl   Fingerstick Glucose (POCT)    Collection Time: 02/09/20 11:28 AM   Result Value Ref Range    POC Glucose 134 65 - 140 mg/dl       Current Facility-Administered Medications   Medication Dose Route Frequency Provider Last Rate Last Dose    divalproex sodium (DEPAKOTE ER) 24 hr tablet 500 mg  500 mg Oral BID Angelina Stringer DO   500 mg at 02/09/20 1344    hydrOXYzine HCL (ATARAX) tablet 50 mg  50 mg Oral HS Leeann Turcios DO   50 mg at 02/08/20 2211    insulin glargine (LANTUS) subcutaneous injection 30 Units 0 3 mL  30 Units Subcutaneous HS Angelina Stringer DO   30 Units at 02/08/20 2207    insulin lispro (HumaLOG) 100 units/mL subcutaneous injection 1-6 Units  1-6 Units Subcutaneous 4x Daily (AC & HS) Sheron Chin, DO   2 Units at 02/08/20 2208    insulin lispro (HumaLOG) 100 units/mL subcutaneous injection 3 Units  3 Units Subcutaneous TID [facility] Aissatou Baugh MD   3 Units at 02/09/20 1145    lisinopril-hydrochlorothiazide (PRINZIDE 20/12  5) combo dose   Oral Daily Tiffanie Sanchez DO        methadone (DOLOPHINE) tablet 120 mg  120 mg Oral Daily Before Breakfast Angelina Stringer DO   120 mg at 02/09/20 0600    ondansetron (ZOFRAN-ODT) dispersible tablet 4 mg  4 mg Oral Q6H [address] Angelina Stringer DO   4 mg at 02/09/20 0559    prazosin (MINIPRESS) capsule 3 mg  3 mg Oral HS Angelina Stringer DO   3 mg at 02/08/20 2218    sodium chloride 0 9 % infusion  50 mL/hr Intravenous Continuous Angelina Stringer DO 50 mL/hr at 02/08/20 2102 50 mL/hr at 02/08/20 2102    venlafaxine (EFFEXOR-XR) 24 hr capsule 300 mg  300 mg Oral Daily Angelina Stringer DO   300 mg at 02/09/20 9523       Invasive Devices     Peripheral Intravenous Line            Peripheral IV 02/08/20 Right;Ventral (anterior) Forearm less than 1 day                Lab, Imaging and other studies: I have personally reviewed pertinent reports      VTE Pharmacologic Prophylaxis: Enoxaparin (Lovenox)  VTE Mechanical Prophylaxis: sequential compression device    [employer], DO

## 2020-02-09 NOTE — QUICK NOTE
Patient is currently at baseline compared to yesterday  No overt changes overnight in vitals or laboratory findings  Patient still will be planned for laparoscopic cholecystectomy tomorrow 2/10/20  Family medicine team has been made aware of holding DVT prophylaxis and diet at midnight  Consent will be obtained tomorrow morning  Case request has been placed  Patient is aware that we are planning for laparoscopic cholecystectomy tomorrow

## 2020-02-10 ENCOUNTER — ANESTHESIA EVENT (INPATIENT)
Dept: PERIOP | Facility: HOSPITAL | Age: 45
DRG: 493 | End: 2020-02-10
Payer: COMMERCIAL

## 2020-02-10 ENCOUNTER — ANESTHESIA (INPATIENT)
Dept: PERIOP | Facility: HOSPITAL | Age: 45
DRG: 493 | End: 2020-02-10
Payer: COMMERCIAL

## 2020-02-10 PROBLEM — E66.01 MORBID OBESITY WITH BODY MASS INDEX (BMI) OF 40.0 TO 44.9 IN ADULT (HCC): Status: ACTIVE | Noted: 2020-02-10

## 2020-02-10 PROBLEM — K80.20 CHOLELITHIASES: Status: ACTIVE | Noted: 2020-02-05

## 2020-02-10 LAB
ALBUMIN SERPL BCP-MCNC: 2.7 G/DL (ref 3.5–5)
ALP SERPL-CCNC: 47 U/L (ref 46–116)
ALT SERPL W P-5'-P-CCNC: 36 U/L (ref 12–78)
ANION GAP SERPL CALCULATED.3IONS-SCNC: 7 MMOL/L (ref 4–13)
AST SERPL W P-5'-P-CCNC: 28 U/L (ref 5–45)
BASOPHILS # BLD AUTO: 0.01 THOUSANDS/ΜL (ref 0–0.1)
BASOPHILS NFR BLD AUTO: 0 % (ref 0–1)
BILIRUB SERPL-MCNC: 0.3 MG/DL (ref 0.2–1)
BUN SERPL-MCNC: 7 MG/DL (ref 5–25)
CALCIUM SERPL-MCNC: 8.2 MG/DL (ref 8.3–10.1)
CHLORIDE SERPL-SCNC: 104 MMOL/L (ref 100–108)
CO2 SERPL-SCNC: 29 MMOL/L (ref 21–32)
CREAT SERPL-MCNC: 0.99 MG/DL (ref 0.6–1.3)
EOSINOPHIL # BLD AUTO: 0.15 THOUSAND/ΜL (ref 0–0.61)
EOSINOPHIL NFR BLD AUTO: 3 % (ref 0–6)
ERYTHROCYTE [DISTWIDTH] IN BLOOD BY AUTOMATED COUNT: 12.9 % (ref 11.6–15.1)
GFR SERPL CREATININE-BSD FRML MDRD: 70 ML/MIN/1.73SQ M
GLUCOSE SERPL-MCNC: 147 MG/DL (ref 65–140)
GLUCOSE SERPL-MCNC: 152 MG/DL (ref 65–140)
GLUCOSE SERPL-MCNC: 171 MG/DL (ref 65–140)
GLUCOSE SERPL-MCNC: 179 MG/DL (ref 65–140)
GLUCOSE SERPL-MCNC: 72 MG/DL (ref 65–140)
GLUCOSE SERPL-MCNC: 76 MG/DL (ref 65–140)
HCG SERPL QL: NEGATIVE
HCT VFR BLD AUTO: 34.6 % (ref 34.8–46.1)
HGB BLD-MCNC: 11.5 G/DL (ref 11.5–15.4)
IMM GRANULOCYTES # BLD AUTO: 0.02 THOUSAND/UL (ref 0–0.2)
IMM GRANULOCYTES NFR BLD AUTO: 0 % (ref 0–2)
LYMPHOCYTES # BLD AUTO: 2.2 THOUSANDS/ΜL (ref 0.6–4.47)
LYMPHOCYTES NFR BLD AUTO: 44 % (ref 14–44)
MAGNESIUM SERPL-MCNC: 2.1 MG/DL (ref 1.6–2.6)
MCH RBC QN AUTO: 29.6 PG (ref 26.8–34.3)
MCHC RBC AUTO-ENTMCNC: 33.2 G/DL (ref 31.4–37.4)
MCV RBC AUTO: 89 FL (ref 82–98)
MONOCYTES # BLD AUTO: 0.32 THOUSAND/ΜL (ref 0.17–1.22)
MONOCYTES NFR BLD AUTO: 6 % (ref 4–12)
NEUTROPHILS # BLD AUTO: 2.31 THOUSANDS/ΜL (ref 1.85–7.62)
NEUTS SEG NFR BLD AUTO: 47 % (ref 43–75)
NRBC BLD AUTO-RTO: 0 /100 WBCS
PHOSPHATE SERPL-MCNC: 3.4 MG/DL (ref 2.7–4.5)
PLATELET # BLD AUTO: 174 THOUSANDS/UL (ref 149–390)
PMV BLD AUTO: 9.4 FL (ref 8.9–12.7)
POTASSIUM SERPL-SCNC: 4.2 MMOL/L (ref 3.5–5.3)
PROT SERPL-MCNC: 6.4 G/DL (ref 6.4–8.2)
RBC # BLD AUTO: 3.88 MILLION/UL (ref 3.81–5.12)
SODIUM SERPL-SCNC: 140 MMOL/L (ref 136–145)
WBC # BLD AUTO: 5.01 THOUSAND/UL (ref 4.31–10.16)

## 2020-02-10 PROCEDURE — 47562 LAPAROSCOPIC CHOLECYSTECTOMY: CPT | Performed by: SPECIALIST

## 2020-02-10 PROCEDURE — 88304 TISSUE EXAM BY PATHOLOGIST: CPT | Performed by: PATHOLOGY

## 2020-02-10 PROCEDURE — 85025 COMPLETE CBC W/AUTO DIFF WBC: CPT | Performed by: STUDENT IN AN ORGANIZED HEALTH CARE EDUCATION/TRAINING PROGRAM

## 2020-02-10 PROCEDURE — 99232 SBSQ HOSP IP/OBS MODERATE 35: CPT | Performed by: FAMILY MEDICINE

## 2020-02-10 PROCEDURE — 84100 ASSAY OF PHOSPHORUS: CPT | Performed by: STUDENT IN AN ORGANIZED HEALTH CARE EDUCATION/TRAINING PROGRAM

## 2020-02-10 PROCEDURE — 82948 REAGENT STRIP/BLOOD GLUCOSE: CPT

## 2020-02-10 PROCEDURE — 83735 ASSAY OF MAGNESIUM: CPT | Performed by: STUDENT IN AN ORGANIZED HEALTH CARE EDUCATION/TRAINING PROGRAM

## 2020-02-10 PROCEDURE — 99232 SBSQ HOSP IP/OBS MODERATE 35: CPT | Performed by: SPECIALIST

## 2020-02-10 PROCEDURE — 47562 LAPAROSCOPIC CHOLECYSTECTOMY: CPT | Performed by: PHYSICIAN ASSISTANT

## 2020-02-10 PROCEDURE — 80053 COMPREHEN METABOLIC PANEL: CPT | Performed by: STUDENT IN AN ORGANIZED HEALTH CARE EDUCATION/TRAINING PROGRAM

## 2020-02-10 PROCEDURE — 84703 CHORIONIC GONADOTROPIN ASSAY: CPT | Performed by: ANESTHESIOLOGY

## 2020-02-10 PROCEDURE — 0FT44ZZ RESECTION OF GALLBLADDER, PERCUTANEOUS ENDOSCOPIC APPROACH: ICD-10-PCS | Performed by: SPECIALIST

## 2020-02-10 RX ORDER — GLYCOPYRROLATE 0.2 MG/ML
INJECTION INTRAMUSCULAR; INTRAVENOUS AS NEEDED
Status: DISCONTINUED | OUTPATIENT
Start: 2020-02-10 | End: 2020-02-10 | Stop reason: SURG

## 2020-02-10 RX ORDER — MIDAZOLAM HYDROCHLORIDE 2 MG/2ML
INJECTION, SOLUTION INTRAMUSCULAR; INTRAVENOUS AS NEEDED
Status: DISCONTINUED | OUTPATIENT
Start: 2020-02-10 | End: 2020-02-10 | Stop reason: SURG

## 2020-02-10 RX ORDER — LIDOCAINE HYDROCHLORIDE 10 MG/ML
INJECTION, SOLUTION EPIDURAL; INFILTRATION; INTRACAUDAL; PERINEURAL AS NEEDED
Status: DISCONTINUED | OUTPATIENT
Start: 2020-02-10 | End: 2020-02-10 | Stop reason: SURG

## 2020-02-10 RX ORDER — NEOSTIGMINE METHYLSULFATE 1 MG/ML
INJECTION INTRAVENOUS AS NEEDED
Status: DISCONTINUED | OUTPATIENT
Start: 2020-02-10 | End: 2020-02-10 | Stop reason: SURG

## 2020-02-10 RX ORDER — ROCURONIUM BROMIDE 10 MG/ML
INJECTION, SOLUTION INTRAVENOUS AS NEEDED
Status: DISCONTINUED | OUTPATIENT
Start: 2020-02-10 | End: 2020-02-10 | Stop reason: SURG

## 2020-02-10 RX ORDER — CEFAZOLIN SODIUM 2 G/50ML
2000 SOLUTION INTRAVENOUS
Status: COMPLETED | OUTPATIENT
Start: 2020-02-10 | End: 2020-02-10

## 2020-02-10 RX ORDER — BUPIVACAINE HYDROCHLORIDE AND EPINEPHRINE 2.5; 5 MG/ML; UG/ML
INJECTION, SOLUTION INFILTRATION; PERINEURAL AS NEEDED
Status: DISCONTINUED | OUTPATIENT
Start: 2020-02-10 | End: 2020-02-10 | Stop reason: HOSPADM

## 2020-02-10 RX ORDER — ONDANSETRON 2 MG/ML
4 INJECTION INTRAMUSCULAR; INTRAVENOUS ONCE AS NEEDED
Status: DISCONTINUED | OUTPATIENT
Start: 2020-02-10 | End: 2020-02-10 | Stop reason: HOSPADM

## 2020-02-10 RX ORDER — DEXAMETHASONE SODIUM PHOSPHATE 4 MG/ML
INJECTION, SOLUTION INTRA-ARTICULAR; INTRALESIONAL; INTRAMUSCULAR; INTRAVENOUS; SOFT TISSUE AS NEEDED
Status: DISCONTINUED | OUTPATIENT
Start: 2020-02-10 | End: 2020-02-10 | Stop reason: SURG

## 2020-02-10 RX ORDER — ONDANSETRON 2 MG/ML
INJECTION INTRAMUSCULAR; INTRAVENOUS AS NEEDED
Status: DISCONTINUED | OUTPATIENT
Start: 2020-02-10 | End: 2020-02-10 | Stop reason: SURG

## 2020-02-10 RX ORDER — FENTANYL CITRATE 50 UG/ML
INJECTION, SOLUTION INTRAMUSCULAR; INTRAVENOUS AS NEEDED
Status: DISCONTINUED | OUTPATIENT
Start: 2020-02-10 | End: 2020-02-10 | Stop reason: SURG

## 2020-02-10 RX ORDER — EPHEDRINE SULFATE 50 MG/ML
INJECTION INTRAVENOUS AS NEEDED
Status: DISCONTINUED | OUTPATIENT
Start: 2020-02-10 | End: 2020-02-10 | Stop reason: SURG

## 2020-02-10 RX ORDER — PROPOFOL 10 MG/ML
INJECTION, EMULSION INTRAVENOUS AS NEEDED
Status: DISCONTINUED | OUTPATIENT
Start: 2020-02-10 | End: 2020-02-10 | Stop reason: SURG

## 2020-02-10 RX ORDER — KETOROLAC TROMETHAMINE 30 MG/ML
30 INJECTION, SOLUTION INTRAMUSCULAR; INTRAVENOUS ONCE
Status: COMPLETED | OUTPATIENT
Start: 2020-02-10 | End: 2020-02-10

## 2020-02-10 RX ORDER — FENTANYL CITRATE/PF 50 MCG/ML
25 SYRINGE (ML) INJECTION
Status: DISCONTINUED | OUTPATIENT
Start: 2020-02-10 | End: 2020-02-10 | Stop reason: HOSPADM

## 2020-02-10 RX ADMIN — LIDOCAINE HYDROCHLORIDE 80 MG: 10 INJECTION, SOLUTION EPIDURAL; INFILTRATION; INTRACAUDAL; PERINEURAL at 16:33

## 2020-02-10 RX ADMIN — KETOROLAC TROMETHAMINE 30 MG: 30 INJECTION, SOLUTION INTRAMUSCULAR at 18:35

## 2020-02-10 RX ADMIN — INSULIN GLARGINE 30 UNITS: 100 INJECTION, SOLUTION SUBCUTANEOUS at 21:43

## 2020-02-10 RX ADMIN — EPHEDRINE SULFATE 5 MG: 50 INJECTION, SOLUTION INTRAVENOUS at 16:44

## 2020-02-10 RX ADMIN — ONDANSETRON 4 MG: 2 INJECTION INTRAMUSCULAR; INTRAVENOUS at 16:33

## 2020-02-10 RX ADMIN — PRAZOSIN HYDROCHLORIDE 3 MG: 1 CAPSULE ORAL at 21:43

## 2020-02-10 RX ADMIN — PROPOFOL 200 MG: 10 INJECTION, EMULSION INTRAVENOUS at 16:33

## 2020-02-10 RX ADMIN — METHADONE HYDROCHLORIDE 120 MG: 10 TABLET ORAL at 06:44

## 2020-02-10 RX ADMIN — DEXAMETHASONE SODIUM PHOSPHATE 4 MG: 4 INJECTION, SOLUTION INTRA-ARTICULAR; INTRALESIONAL; INTRAMUSCULAR; INTRAVENOUS; SOFT TISSUE at 16:33

## 2020-02-10 RX ADMIN — ONDANSETRON 4 MG: 4 TABLET, ORALLY DISINTEGRATING ORAL at 23:48

## 2020-02-10 RX ADMIN — SODIUM CHLORIDE 50 ML/HR: 0.9 INJECTION, SOLUTION INTRAVENOUS at 20:15

## 2020-02-10 RX ADMIN — DIVALPROEX SODIUM 500 MG: 500 TABLET, FILM COATED, EXTENDED RELEASE ORAL at 08:10

## 2020-02-10 RX ADMIN — MIDAZOLAM HYDROCHLORIDE 2 MG: 1 INJECTION, SOLUTION INTRAMUSCULAR; INTRAVENOUS at 16:29

## 2020-02-10 RX ADMIN — ROCURONIUM BROMIDE 50 MG: 10 INJECTION, SOLUTION INTRAVENOUS at 16:33

## 2020-02-10 RX ADMIN — PHENYLEPHRINE HYDROCHLORIDE 50 MCG/MIN: 10 INJECTION INTRAVENOUS at 16:47

## 2020-02-10 RX ADMIN — LISINOPRIL: 20 TABLET ORAL at 08:10

## 2020-02-10 RX ADMIN — PHENYLEPHRINE HYDROCHLORIDE 100 MCG: 10 INJECTION INTRAVENOUS at 16:47

## 2020-02-10 RX ADMIN — NEOSTIGMINE METHYLSULFATE 3 MG: 1 INJECTION INTRAVENOUS at 17:59

## 2020-02-10 RX ADMIN — FENTANYL CITRATE 50 MCG: 50 INJECTION, SOLUTION INTRAMUSCULAR; INTRAVENOUS at 16:33

## 2020-02-10 RX ADMIN — CEFAZOLIN SODIUM 2000 MG: 2 SOLUTION INTRAVENOUS at 16:37

## 2020-02-10 RX ADMIN — HYDROXYZINE HYDROCHLORIDE 50 MG: 25 TABLET, FILM COATED ORAL at 21:43

## 2020-02-10 RX ADMIN — VENLAFAXINE HYDROCHLORIDE 300 MG: 150 CAPSULE, EXTENDED RELEASE ORAL at 10:28

## 2020-02-10 RX ADMIN — GLYCOPYRROLATE 0.2 MG: 0.2 INJECTION, SOLUTION INTRAMUSCULAR; INTRAVENOUS at 17:59

## 2020-02-10 RX ADMIN — EPHEDRINE SULFATE 10 MG: 50 INJECTION, SOLUTION INTRAVENOUS at 16:47

## 2020-02-10 NOTE — ANESTHESIA PREPROCEDURE EVALUATION
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

## 2020-02-10 NOTE — UTILIZATION REVIEW
Continued Stay Review    Date: 2/8/20                          Current Patient Class: inaptient    Current Level of Care: acute    Assessment/Plan:   SURGEON  Symptomatic Cholelithiasis -  Patient has gallstone noted on  Ultrasound exam, patient has had recurrent issues like this over time, patient does not want surgery until her  is back in the area, patient has eaten today, patient's pain is reproducible with dietary intake  Causing nausea and vomiting and abdominal pain, palpable tenderness in the upper right quadrant  but is exhibiting more palpable tenderness in the upper right quadrant similar findings suggestive of Macias point tenderness today, manifesting early stages of acute cholecystitis   Will repeat cbc bmp mg phos  Replete electrolytes   Need tight glucose control, hold dvt prophlaxis and npo tonight  Continue iv ancef flagyl  ATTENDING  Still nausea, symptomatic cholelithiasis possible acute choleystitis  Plan for or Monday  Electrolytes stabilized with ivf   Sugars have been elevated switching to  Lantus to 35u Continue ivf  Pertinent Labs/Diagnostic Results:   Results from last 7 days   Lab Units 02/08/20  0446 02/07/20  0609 02/06/20  0525   WBC Thousand/uL 5 61 5 66 6 26   HEMOGLOBIN g/dL 11 0* 10 7* 11 2*   HEMATOCRIT % 32 7* 32 3* 33 2*   PLATELETS Thousands/uL 171 191 190   NEUTROS ABS Thousands/µL 2 07 2 12 2 77     Results from last 7 days   SODIUM mmol/L 140 142 141 143 139   POTASSIUM mmol/L 4 2 3 7 3 9 4 2 4 1   CHLORIDE mmol/L 104 104 104 107 103   CO2 mmol/L 29 33* 34* 32 32   ANION GAP mmol/L 7 5 3* 4 4   BUN mg/dL 7 6 7 11 16   CREATININE mg/dL 0 99 1 01 1 01 1 13 1 06   EGFR ml/min/1 73sq m 70 68 68 60 64   CALCIUM mg/dL 8 2* 8 3 8 2* 8 0* 8 5   MAGNESIUM mg/dL 2 1 1 9 1 8 1 9 1 7   PHOSPHORUS mg/dL 3 4 3 8 3 8 3 8 3 4     Results from last 7 days   Lab Units 02/06/20  0525 02/05/20  1554   AST U/L 17 40   ALT U/L 27 32   ALK PHOS U/L 53 70   TOTAL PROTEIN g/dL 6 1* 8 0 ALBUMIN g/dL 2 7* 3 4*   TOTAL BILIRUBIN mg/dL 0 30 0 60     Results from last 7 days   Lab Units 02/08/20 2027 02/08/20  1608 02/08/20  1112 02/08/20  0729 02/07/20  2047   POC GLUCOSE mg/dl 214* 212* 229* 150* 286*     Results from last 7 days   Lab Units 02/08/20  0446 02/07/20  0609 02/06/20  0525 02/05/20  2239 02/05/20  1554   GLUCOSE RANDOM mg/dL 183* 165* 284* 213* 334*     Results from last 7 days   Lab Units 02/08/20  0446   HEMOGLOBIN A1C % 9 7*   EAG mg/dl 232     BETA-HYDROXYBUTYRATE   Date Value Ref Range Status   02/05/2020 0 1 <0 6 mmol/L Final      Vital Signs:   Blood pressure 134/94, pulse 68, temperature 98 3 °F (36 8 °C), temperature source Oral, resp  rate 16, height 5' 3" (1 6 m), weight 105 kg (231 lb), last menstrual period 01/18/2020, SpO2 96 %, not currently breastfeeding  ,Body mass index is 40 92 kg/m²  inpatient  Medications:   Scheduled Medications:    Medications:  divalproex sodium 500 mg Oral BID   hydrOXYzine HCL 50 mg Oral HS   insulin glargine 30 Units Subcutaneous HS   insulin lispro 1-6 Units Subcutaneous 4x Daily (AC & HS)   insulin lispro 3 Units Subcutaneous TID AC   lisinopril-hydrochlorothiazide (PRINZIDE 20/12  5) combo dose  Oral Daily   methadone 120 mg Oral Daily Before Breakfast   ondansetron 4 mg Oral Q6H DENEEN   prazosin 3 mg Oral HS   venlafaxine 300 mg Oral Daily     Continuous IV Infusions:    sodium chloride 50 mL/hr Intravenous Continuous     PRN Meds:       Discharge Plan: tbd    Network Utilization Review Department  [email or web address]  ATTENTION: Please call with any questions or concerns to [phone] and carefully listen to the prompts so that you are directed to the right person  All voicemails are confidential   Deshawn Dumont all requests for admission clinical reviews, approved or denied determinations and any other requests to dedicated fax number below belonging to the campus where the patient is receiving treatment   List of dedicated fax numbers for the Facilities:  [address] [facility] [address] DENIALS (Administrative/Medical Necessity) [phone]   [address]  (Maternity/NICU/Pediatrics) [phone]   Luis Ruth [phone]   [facility] [phone]   Alvarenga Chula [phone]   Kathy Ford [facility] [address] [place] [address] [phone]   [facility]  [phone]   [facility]  [address] [facility] [address] [facility] [phone]

## 2020-02-10 NOTE — UTILIZATION REVIEW
Continued Stay Review    Date:    2/10/20                       Current Patient Class: inpatient   Current Level of Care: acute    Assessment/Plan:   OR  Procedure(s):  CHOLECYSTECTOMY LAPAROSCOPIC   Anesthesia:  General  Operative Findings:  Markedly distended Large intrahepatic GB  Thick Walled   Multiple Stone   Multiple adhesions  Marked Hepatomegaly     Pertinent Labs/Diagnostic Results:   Results from last 7 days   Lab Units 02/10/20  0550 02/09/20  0510 02/08/20  0446 02/07/20  0609 02/06/20  0525   WBC Thousand/uL 5 01 5 32 5 61 5 66 6 26   HEMOGLOBIN g/dL 11 5 11 0* 11 0* 10 7* 11 2*   HEMATOCRIT % 34 6* 33 4* 32 7* 32 3* 33 2*   PLATELETS Thousands/uL 174 177 171 191 190   NEUTROS ABS Thousands/µL 2 31 2 54 2 07 2 12 2 77         Results from last 7 days   Lab Units 02/10/20  0550 02/09/20  0510 02/08/20  0446 02/07/20  0609 02/06/20  0525   SODIUM mmol/L 140 142 141 143 139   POTASSIUM mmol/L 4 2 3 7 3 9 4 2 4 1   CHLORIDE mmol/L 104 104 104 107 103   CO2 mmol/L 29 33* 34* 32 32   ANION GAP mmol/L 7 5 3* 4 4   BUN mg/dL 7 6 7 11 16   CREATININE mg/dL 0 99 1 01 1 01 1 13 1 06   EGFR ml/min/1 73sq m 70 68 68 60 64   CALCIUM mg/dL 8 2* 8 3 8 2* 8 0* 8 5   MAGNESIUM mg/dL 2 1 1 9 1 8 1 9 1 7   PHOSPHORUS mg/dL 3 4 3 8 3 8 3 8 3 4     Results from last 7 days   Lab Units 02/10/20  0550 02/09/20  0510 02/06/20  0525 02/05/20  1554   AST U/L 28 24 17 40   ALT U/L 36 35 27 32   ALK PHOS U/L 47 50 53 70   TOTAL PROTEIN g/dL 6 4 6 5 6 1* 8 0   ALBUMIN g/dL 2 7* 2 8* 2 7* 3 4*   TOTAL BILIRUBIN mg/dL 0 30 0 30 0 30 0 60     Results from last 7 days   Lab Units 02/10/20  1227 02/10/20  1121 02/10/20  0712 02/09/20  2108 02/09/20  1618 02/09/20  1128 02/09/20  0717 02/08/20  2027 02/08/20  1608 02/08/20  1112   POC GLUCOSE mg/dl 76 72 152* 179* 186* 134 125 214* 212* 229*     Results from last 7 days   Lab Units 02/10/20  0550 02/09/20  0510 02/08/20  0446 02/07/20  5523 02/06/20  0525 02/05/20  2239 02/05/20  1554 GLUCOSE RANDOM mg/dL 147* 152* 183* 165* 284* 213* 334*     Results from last 7 days   Lab Units 02/08/20  0446   HEMOGLOBIN A1C % 9 7*   EAG mg/dl 232     BETA-HYDROXYBUTYRATE   Date Value Ref Range Status   02/05/2020 0 1 <0 6 mmol/L Final      Vital Signs:   02/10/20 0804  97 6 °F (36 4 °C)  59  --  158/77  --  100 %  None (Room air)  Lying   02/09/20 2330  97 6 °F (36 4 °C)  --  --  167/77  --  --             NPO  OR  Tissue exam  cmp   Medications:   Scheduled Medications:    Medications:  divalproex sodium 500 mg Oral BID   hydrOXYzine HCL 50 mg Oral HS   insulin glargine 30 Units Subcutaneous HS   insulin lispro 1-6 Units Subcutaneous 4x Daily (AC & HS)   insulin lispro 3 Units Subcutaneous TID AC   lisinopril-hydrochlorothiazide (PRINZIDE 20/12  5) combo dose  Oral Daily   methadone 120 mg Oral Daily Before Breakfast   ondansetron 4 mg Oral Q6H DENEEN   prazosin 3 mg Oral HS   venlafaxine 300 mg Oral Daily     Continuous IV Infusions:    sodium chloride 50 mL/hr Intravenous Continuous     PRN Meds:       Discharge Plan: tbd    Network Utilization Review Department  [email or web address]  ATTENTION: Please call with any questions or concerns to [phone] and carefully listen to the prompts so that you are directed to the right person  All voicemails are confidential   Steven Hallmark all requests for admission clinical reviews, approved or denied determinations and any other requests to dedicated fax number below belonging to the campus where the patient is receiving treatment   List of dedicated fax numbers for the Facilities:  FACILITY NAME UR FAX NUMBER   ADMISSION DENIALS (Administrative/Medical Necessity) [phone]   [address] [place] [address] (Maternity/NICU/Pediatrics) [phone]   Charles Bower [phone]   Devika Vail [phone]   [address] [phone] 11 [facility] [phone]   [facility]  [phone] 4703 [facility]  [phone]   [address] [place] [address] [phone]

## 2020-02-10 NOTE — ANESTHESIA POSTPROCEDURE EVALUATION
Post-Op Assessment Note    CV Status:  Stable  Pain Score: 3    Pain management: adequate     Mental Status:  Awake and alert   Hydration Status:  Stable   PONV Controlled:  None   Airway Patency:  Patent  Airway: intubated   Post Op Vitals Reviewed: Yes      Staff: Anesthesiologist           BP   134/86   Temp      Pulse  76   Resp      SpO2   96

## 2020-02-10 NOTE — UTILIZATION REVIEW
Continued Stay Review    Date: 2/9/20                          Current Patient Class: inpatient     Current Level of Care: acute    Assessment/Plan: Patient continue with ruq abdominal pain tenderness nausea , npo tonight for laparoscopic cholecystectomy tomorrow 2/10/20       Pertinent Labs/Diagnostic Results:   Results from last 7 days   Lab Units 02/09/20  0510 02/08/20  0446 02/07/20  0609 02/06/20  0525   WBC Thousand/uL 5 32 5 61 5 66 6 26   HEMOGLOBIN g/dL 11 0* 11 0* 10 7* 11 2*   HEMATOCRIT % 33 4* 32 7* 32 3* 33 2*   PLATELETS Thousands/uL 177 171 191 190   NEUTROS ABS Thousands/µL 2 54 2 07 2 12 2 77     Results from last 7 days   Lab Units 02/09/20  0510 02/08/20  0446 02/07/20  0609 02/06/20  0525   SODIUM mmol/L 142 141 143 139   POTASSIUM mmol/L 3 7 3 9 4 2 4 1   CHLORIDE mmol/L 104 104 107 103   CO2 mmol/L 33* 34* 32 32   ANION GAP mmol/L 5 3* 4 4   BUN mg/dL 6 7 11 16   CREATININE mg/dL 1 01 1 01 1 13 1 06   EGFR ml/min/1 73sq m 68 68 60 64   CALCIUM mg/dL 8 3 8 2* 8 0* 8 5   MAGNESIUM mg/dL 1 9 1 8 1 9 1 7   PHOSPHORUS mg/dL 3 8 3 8 3 8 3 4     Results from last 7 days   Lab Units 02/09/20  0510 02/06/20  0525 02/05/20  1554   AST U/L 24 17 40   ALT U/L 35 27 32   ALK PHOS U/L 50 53 70   TOTAL PROTEIN g/dL 6 5 6 1* 8 0   ALBUMIN g/dL 2 8* 2 7* 3 4*   TOTAL BILIRUBIN mg/dL 0 30 0 30 0 60     Results from last 7 days   Lab Units 02/09/20  2108 02/09/20  1618 02/09/20  1128 02/09/20  0717 02/08/20  2027 02/08/20  1608 02/08/20  1112 02/08/20  0729 02/07/20  2047   POC GLUCOSE mg/dl 179* 186* 134 125 214* 212* 229* 150* 286*     Results from last 7 days   Lab Units 02/09/20  0510 02/08/20  0446 02/07/20  0609 02/06/20  0525 02/05/20  2239 02/05/20  1554   GLUCOSE RANDOM mg/dL 152* 183* 165* 284* 213* 334*     Results from last 7 days   Lab Units 02/08/20  0446   HEMOGLOBIN A1C % 9 7*   EAG mg/dl 232     BETA-HYDROXYBUTYRATE   Date Value Ref Range Status   02/05/2020 0 1 <0 6 mmol/L Final      VBlood pressure 126/70, pulse 61, temperature 98 1 °F (36 7 °C), temperature source Tympanic, resp  rate 16, height 5' 3" (1 6 m), weight 105 kg (231 lb), last menstrual period 01/18/2020, SpO2 95 %, not currently breastfeeding  ,Body mass index is 40 92 kg/m²  ital Signs:     Inpatient   Npo after midnight  Hold prophlaxsis  Medications:   Scheduled Medications:    Medications:  divalproex sodium 500 mg Oral BID   hydrOXYzine HCL 50 mg Oral HS   insulin glargine 30 Units Subcutaneous HS   insulin lispro 1-6 Units Subcutaneous 4x Daily (AC & HS)   insulin lispro 3 Units Subcutaneous TID AC   lisinopril-hydrochlorothiazide (PRINZIDE 20/12  5) combo dose  Oral Daily   methadone 120 mg Oral Daily Before Breakfast   ondansetron 4 mg Oral Q6H DENEEN   prazosin 3 mg Oral HS   venlafaxine 300 mg Oral Daily     Continuous IV Infusions:    sodium chloride 50 mL/hr Intravenous Continuous     PRN Meds:       Discharge Plan: tbd    Network Utilization Review Department  [email or web address]  ATTENTION: Please call with any questions or concerns to [phone] and carefully listen to the prompts so that you are directed to the right person  All voicemails are confidential   Deshawn Dumont all requests for admission clinical reviews, approved or denied determinations and any other requests to dedicated fax number below belonging to the campus where the patient is receiving treatment   List of dedicated fax numbers for the Facilities:  FACILITY NAME UR FAX NUMBER   ADMISSION DENIALS (Administrative/Medical Necessity) [phone]   [address] [place] [address] (Maternity/NICU/Pediatrics) [phone]   Ian Barnett [phone]   Carol Rodriguez [phone]   [place] Melvin [address] [address] [phone]

## 2020-02-10 NOTE — OP NOTE
General SurgeryCHOLECYSTECTOMY LAPAROSCOPIC Op Note    Marcia Barron  2/10/2020    Pre-op Diagnosis:   Cholelithiases [K80 20]    PMH  Past Medical History:   Diagnosis Date    Anxiety     Asthma     Depression     Diabetes mellitus ([place] [address] )     Hypertension     Neuropathy     Psychiatric disorder        Post-op Diagnosis:   Patient Active Problem List   Diagnosis    Hypertension    Type 2 diabetes mellitus with hyperglycemia, with long-term current use of insulin ([place] [address] )    Anxiety and depression    History of drug abuse ([place] [address] )    Morbidly obese ([place] [address] )    History of hypothyroidism    Bipolar 1 disorder ([place] [address] )    Adult ADHD    Asthma    Nausea    Other insomnia    Right upper quadrant pain    Cholelithiases    Morbid obesity with body mass index (BMI) of 40 0 to 44 9 in adult [facility])       Procedure(s):  CHOLECYSTECTOMY LAPAROSCOPIC    Surgeon(s):  ERIC Madera MD    Anesthesia:  General    Operative Findings:  Markedly distended Large intrahepatic GB  Thick Walled   Multiple Stone   Multiple adhesions  Marked Hepatomegaly    Assistant:  Mr Aric Haji of [facility] was utilized as a first assistant as there is no surgical residency program at [facility]    Staff:   Circulator: Addis Ayala RN; Bria Bauer RN  Scrub Person: Carola Alejandre RN    Jose Stevenson was identified by me  Proper consent was obtained  The risks, benefits, alternatives,and probabilities of success were discussed in detail with no guarantee made as to outcome  All questions were answered to the patient's satisfaction  Site was marked prior coming to OR    Operative site was cleansed with ChloraPrep and draped in usual sterile fashion  Jose Stevenson was given pre-operative antibiotics  Body mass index is 40 92 kg/m²  Jose Stevenson is ASA 3 and Fire risk- 3  Jose Stevenson was re-identified in the OR   Site was identified and detailed timeout was performed with Anesthesia and OR staff     Supraraumbilical transverse incision was made  The skin and subcutaneous tissue was cut along the line of incision  With open Slava technique a 10mm port was placed  Pneumoperitoneum was created with the pressure marked up to 15 mmHg and maintained during the procedure with high flow carbon dioxide  Gallbladder was identified  Three other ports were placed, one in the subxiphoid 10mm subcostal, one 5mm midclavicular subcostal and other one 5mm ant axillary subcostal  Gallbladder was grasped with a grasper introduced from the lateralmost 5 mm port at the fundus and was retracted upward laterally  Another grasper was introduced from the midclavicular port 5 mm and Duggan's pouch of the gallbladder was grasped and was retracted downward and laterally   Patient has a very dilated short cystic duct  The infundibulum of the gallbladder and cystic duct were clearly identified as well as the cystic artery  After obtaining the critical view, Cystic duct was doubly clipped and cut between the clips  Cystic artery was doubly clipped and cut between the clips  Gallbladder was then removed from the gallbladder bed with Bovie dissection  Proper hemostasis was achieved  No active bleeding was noted  The gallbladder was then placed in the Endo pouch and was delivered through the infraumbilical transverse incision  All the 10 mm port sites were closed in double layers with figure of 8-0 PDS, 2-0 for the sheath and the skin was approximated with subcuticular Monocryl  Remaining all 5 mm ports were stitch with a single layer with subcuticular Monocryl skin approximation All the ports site were thoroughly infiltrated with Marcaine with Epinephrine  Sterile dressing was applied to all port site incisions    Marcia A Plunk tolerated the procedure well, remain stable during and after the procedure  At the end of the procedure all the instruments, needle and sponge counts were noted to be correct     Sterile dresing was applied and patient was transfered to recovery area in stable condition  Estimated Blood Loss:  Minimal    Specimens:  Order Name Source Comment Collection Info Order Time   PREGNANCY TEST (HCG QUALITATIVE) Arm, Right  Collected By: Daljit Garcia RN 2/10/2020  3:39 PM   TISSUE EXAM Gallbladder  Collected By: Geremias Moraes MD 2/10/2020  6:08 PM         Drains:  * No LDAs found *    Complications:  None    Total OR Time  * Missing case tracking time(s) *   or    I was present for the entire procedure No qualified resident was available  A physician's assistant was required due to the intensity of the surgery  Physician assistant was required for camera operation, hemostasis retraction and the closure of the surgical wound  Physician assistant was present during the entire procedure  Patient Disposition:  PACU       Geremias Moraes MD MS [facility]  Date: 2/10/2020  Time: 6:12 PM  Copy to PCP: No primary care provider on file

## 2020-02-10 NOTE — PROGRESS NOTES
Progress Note - General Surgery   Beau Barron 39 y o  female MRN: [id number]  Unit/Bed#: [address] [place] [address] Encounter: [id number]    Assessment:  1) Symptomatic Cholelithiasis -  improved compared to over the week, still has some degree of right upper quadrant pain but dramatically reduced compared to admission, patient does have slight guarding with palpation, no leukocytosis and afebrile, does not appear to have cholecystitis, AVSS overnight  2) Abdominal Pain - improved, due to 1  3) Nausea/Vomiting - resolved, due to 1    Plan:  1-3)   - NPO  - hold DVT prophylaxis  - case request placed  - consent obtained today  - p r n  analgesic medications per [facility] Medicine with methadone  - p r n  Zofran  - tight glucose control    Subjective/Objective   Chief Complaint:  I am doing the same as yesterday  Subjective:  Patient was seen examined at bedside  Patient denies any acute events overnight  Patient denies any nausea vomiting  Patient is passing gas and was able tolerate liquids well without any issues  Patient denies any fevers or chills  Patient is ready to have a laparoscopic cholecystectomy  Patient's  is in the area now  Objective:     Blood pressure 158/77, pulse 59, temperature 97 6 °F (36 4 °C), temperature source Oral, resp  rate 16, height 5' 3" (1 6 m), weight 105 kg (231 lb), last menstrual period 01/18/2020, SpO2 100 %, not currently breastfeeding  ,Body mass index is 40 92 kg/m²      No intake or output data in the 24 hours ending 02/10/20 1332    Invasive Devices     Peripheral Intravenous Line            Peripheral IV 02/08/20 Right;Ventral (anterior) Forearm 1 day                Physical Exam: /77 (BP Location: Left arm)   Pulse 59   Temp 97 6 °F (36 4 °C) (Oral)   Resp 16   Ht 5' 3" (1 6 m)   Wt 105 kg (231 lb)   LMP 01/18/2020   SpO2 100%   Breastfeeding No   BMI 40 92 kg/m²   General appearance: alert and oriented, in no acute distress  Head: Normocephalic, without obvious abnormality, atraumatic  Abdomen: Soft, nondistended, mild guarding and slight tenderness in the upper right quadrant  Skin: Skin color, texture, turgor normal  No rashes or lesions    Lab, Imaging and other studies:  I have personally reviewed pertinent lab results    , CBC:   Lab Results   Component Value Date    WBC 5 01 02/10/2020    HGB 11 5 02/10/2020    HCT 34 6 (L) 02/10/2020    MCV 89 02/10/2020     02/10/2020    MCH 29 6 02/10/2020    MCHC 33 2 02/10/2020    RDW 12 9 02/10/2020    MPV 9 4 02/10/2020    NRBC 0 02/10/2020   , CMP:   Lab Results   Component Value Date    SODIUM 140 02/10/2020    K 4 2 02/10/2020     02/10/2020    CO2 29 02/10/2020    BUN 7 02/10/2020    CREATININE 0 99 02/10/2020    CALCIUM 8 2 (L) 02/10/2020    AST 28 02/10/2020    ALT 36 02/10/2020    ALKPHOS 47 02/10/2020    EGFR 70 02/10/2020     VTE Pharmacologic Prophylaxis: held  VTE Mechanical Prophylaxis: sequential compression device

## 2020-02-10 NOTE — UTILIZATION REVIEW
Initial Clinical Review    Admission: Date/Time/Statement: Admission Orders (From admission, onward)     Ordered        02/07/20 1915  Inpatient Admission  Once                Orders Placed This Encounter   Procedures    Inpatient Admission     Standing Status:   Standing     Number of Occurrences:   1     Order Specific Question:   Admitting Physician     Answer:   Yamil Chavez     Order Specific Question:   Level of Care     Answer:   Med Surg [16]     Order Specific Question:   Estimated length of stay     Answer:   More than 2 Midnights     Order Specific Question:   Certification     Answer:   I certify that inpatient services are medically necessary for this patient for a duration of greater than two midnights  See H&P and MD Progress Notes for additional information about the patient's course of treatment  ED Arrival Information     Expected Arrival Acuity Means of Arrival Escorted By Service Admission Type    - 2/5/2020 15:11 Emergent Walk-In Self General Medicine Emergency    Arrival Complaint    Vomiting; Chills        Chief Complaint   Patient presents with    Vomiting     c/o vomiting and chills for a couple of days   sugars were reading over 500     Assessment/Plan:   SURGERY CONSULT  Assessment:  1) Symptomatic Cholelithiasis -  Patient has gallstone noted on  Ultrasound exam, patient has had recurrent issues like this over time, patient does not want surgery until her  is back in the area, patient has eaten today, patient's pain is reproducible with dietary intake  Causing nausea and vomiting and abdominal pain, palpable tenderness in the upper right quadrant, no  Tenderness of right upper quadrant at rest, pain is reducing compared to  Earlier today, afebrile, no leukocytosis, not suggestive of acute cholecystitis  2) Abdominal Pain - most probably due to 1  3) Nausea/Vomiting - most probably due to 1   Plan:  1-3)   -  Spoken  Coordinated with Family  Medicine  -  Advance to clear liquids and then diet as tolerated  -  Will follow into tomorrow with serial abdominal exams  -  Repeat CBC, CMP, Mag, phos  -  Can perform laparoscopic cholecystectomy on Monday   -  P r n  Analgesia  -  P r n  Zofran  -  DVT prophylaxis  -  Make NPO on Sunday of continuing to stay inpatient   -  Colace for  constipation   ATTENDING  RUQ pain  Right upper quadrant pain noted on exam   Patient notes that it is  painful when pressure is applied  bilaterally   Abdomen: obese, normal BS, tenderness in RUQ and epigastric region     Patient has multiple risk factors for gallstones including her sex, age, and weight  RUQ ultrasound: Cholelithiasis   No acute cholecystitis, choledocholithiasis, or biliary ductal obstruction  Prominent, fatty liver  Patient symptomatic, Surgery consulted  Plan for cholecystectomy on Monday 2/10 NPO at midnight  DM  Per ED note, patient's sugars were over 500 on arrival   Patient given 2 1 L boluses as well as 7 units of insulin  Patient did not take her insulin today she was not feeling well  · ISS Algo 3  · Diabetic diet  · Continue patient's home Lantus 30 units daily  · POCT q4hrs while patient is NPO   ED Triage Vitals [02/05/20 1518]   Temperature Pulse Respirations Blood Pressure SpO2   (!) 96 2 °F (35 7 °C) 78 (!) 24 141/80 100 %      Temp Source Heart Rate Source Patient Position - Orthostatic VS BP Location FiO2 (%)   Tympanic Monitor Sitting Right arm --      Pain Score       2        Wt Readings from Last 1 Encounters:   02/05/20 105 kg (231 lb)     Additional Vital Signs:   Blood pressure 167/77, pulse 64, temperature 97 6 °F (36 4 °C), resp  rate 16, height 5' 3" (1 6 m), weight 105 kg (231 lb), last menstrual period 01/18/2020, SpO2 96 %, not currently breastfeeding  ,Body mass index is 40 92 kg/m²    Wt Readings from Last 3 Encounters:   02/05/20 105 kg (231 lb)   09/17/19 111 kg (244 lb)   03/16/19 117 kg (258 lb)       Pertinent Labs/Diagnostic Test Results:   Results from last 7 days   Lab Units 02/07/20  0609 02/06/20  0525   WBC Thousand/uL 5 66 6 26   HEMOGLOBIN g/dL 10 7* 11 2*   HEMATOCRIT % 32 3* 33 2*   PLATELETS Thousands/uL 191 190   NEUTROS ABS Thousands/µL 2 12 2 77     Results from last 7 days   Lab Units 02/07/20  0609 02/06/20  0525   SODIUM mmol/L 143 139   POTASSIUM mmol/L 4 2 4 1   CHLORIDE mmol/L 107 103   CO2 mmol/L 32 32   ANION GAP mmol/L 4 4   BUN mg/dL 11 16   CREATININE mg/dL 1 13 1 06   EGFR ml/min/1 73sq m 60 64   CALCIUM mg/dL 8 0* 8 5   MAGNESIUM mg/dL 1 9 1 7   PHOSPHORUS mg/dL 3 8 3 4     Results from last 7 days   Lab Units 02/06/20  0525 02/05/20  1554   AST U/L 17 40   ALT U/L 27 32   ALK PHOS U/L 53 70   TOTAL PROTEIN g/dL 6 1* 8 0   ALBUMIN g/dL 2 7* 3 4*   TOTAL BILIRUBIN mg/dL 0 30 0 60     Results from last 7 days   Lab Units 02/08/20  0729 02/07/20  2047   POC GLUCOSE mg/dl 150* 286*     Results from last 7 days   Lab Units 02/07/20  0609 02/06/20  0525 02/05/20  2239 02/05/20  1554   GLUCOSE RANDOM mg/dL 165* 284* 213* 334*     Results from last 7 days   Lab Units 02/08/20  0446   HEMOGLOBIN A1C % 9 7*   EAG mg/dl 232     BETA-HYDROXYBUTYRATE   Date Value Ref Range Status   02/05/2020 0 1 <0 6 mmol/L Final      ED Treatment:   Medication Administration from 02/05/2020 1511 to 02/05/2020 1804       Date/Time Order Dose Route Action Action by Comments     02/05/2020 1634 sodium chloride 0 9 % bolus 1,000 mL 0 mL Intravenous Stopped Gilford Smoker, CHICA      02/05/2020 1559 sodium chloride 0 9 % bolus 1,000 mL 1,000 mL Intravenous [address] Pankaj silvestre RN      02/05/2020 1559 ondansetron (ZOFRAN) injection 4 mg 4 mg Intravenous Given Cathie Hammond RN      02/05/2020 1730 sodium chloride 0 9 % bolus 1,000 mL 0 mL Intravenous Stopped Cathie Hammond RN      02/05/2020 1635 sodium chloride 0 9 % bolus 1,000 mL 1,000 mL Intravenous Theron [address] Gilford Smoker, [address] [place] [address]      02/05/2020 6328 insulin regular (HumuLIN R,NovoLIN R) injection 7 Units 7 Units Intravenous Given Fern Austin RN         Past Medical History:   Diagnosis Date    Anxiety     Asthma     Depression     Diabetes mellitus [facility] [address]     Hypertension     Neuropathy     Psychiatric disorder      Present on Admission:   History of drug abuse ([facility] [address] )   History of hypothyroidism   Hypertension   Other insomnia   Morbidly obese ([facility])   Bipolar 1 disorder ([facility])   Asthma   Anxiety and depression   Adult ADHD      Admitting Diagnosis: Hyperkalemia [E87 5]  Hyponatremia [E87 1]  Vomiting [R11 10]  Nausea & vomiting [R11 2]  Hyperglycemia [R73 9]  Age/Sex: 39 y o  female  Admission Orders:  INPATIENT  Scheduled Medications:    Medications:  divalproex sodium 500 mg Oral BID   hydrOXYzine HCL 50 mg Oral HS   insulin glargine 30 Units Subcutaneous HS   insulin lispro 1-6 Units Subcutaneous 4x Daily (AC & HS)   insulin lispro 3 Units Subcutaneous TID AC   lisinopril-hydrochlorothiazide (PRINZIDE 20/12  5) combo dose  Oral Daily   methadone 120 mg Oral Daily Before Breakfast   ondansetron 4 mg Oral Q6H DENEEN   prazosin 3 mg Oral HS   venlafaxine 300 mg Oral Daily     Continuous IV Infusions:    sodium chloride 50 mL/hr Intravenous Continuous     PRN Meds:       IP CONSULT TO ACUTE CARE SURGERY    Network Utilization Review Department  [email or web address]  ATTENTION: Please call with any questions or concerns to [phone] and carefully listen to the prompts so that you are directed to the right person  All voicemails are confidential   Haily Higgins all requests for admission clinical reviews, approved or denied determinations and any other requests to dedicated fax number below belonging to the campus where the patient is receiving treatment   List of dedicated fax numbers for the Facilities:  FACILITY NAME UR FAX NUMBER   ADMISSION DENIALS (Administrative/Medical Necessity) [phone]   [address]  (Maternity/NICU/Pediatrics) [place] [address] [facility] [phone]   Jt Baldwin [phone]   [address] [phone]   [facility]  [phone]   [address] [facility] [address] [facility]  [address] [place] [address] [facility] [phone]

## 2020-02-11 PROBLEM — Z90.49 STATUS POST LAPAROSCOPIC CHOLECYSTECTOMY: Status: ACTIVE | Noted: 2020-02-05

## 2020-02-11 PROBLEM — K80.20 CHOLELITHIASES: Status: RESOLVED | Noted: 2020-02-05 | Resolved: 2020-02-11

## 2020-02-11 PROBLEM — K82.8 BILIARY DYSKINESIA: Status: ACTIVE | Noted: 2020-02-11

## 2020-02-11 LAB
ALBUMIN SERPL BCP-MCNC: 2.8 G/DL (ref 3.5–5)
ALP SERPL-CCNC: 48 U/L (ref 46–116)
ALT SERPL W P-5'-P-CCNC: 53 U/L (ref 12–78)
ANION GAP SERPL CALCULATED.3IONS-SCNC: 8 MMOL/L (ref 4–13)
AST SERPL W P-5'-P-CCNC: 55 U/L (ref 5–45)
BASOPHILS # BLD AUTO: 0.01 THOUSANDS/ΜL (ref 0–0.1)
BASOPHILS NFR BLD AUTO: 0 % (ref 0–1)
BILIRUB SERPL-MCNC: 0.5 MG/DL (ref 0.2–1)
BUN SERPL-MCNC: 11 MG/DL (ref 5–25)
CALCIUM SERPL-MCNC: 8.2 MG/DL (ref 8.3–10.1)
CHLORIDE SERPL-SCNC: 101 MMOL/L (ref 100–108)
CO2 SERPL-SCNC: 27 MMOL/L (ref 21–32)
CREAT SERPL-MCNC: 1.04 MG/DL (ref 0.6–1.3)
EOSINOPHIL # BLD AUTO: 0 THOUSAND/ΜL (ref 0–0.61)
EOSINOPHIL NFR BLD AUTO: 0 % (ref 0–6)
ERYTHROCYTE [DISTWIDTH] IN BLOOD BY AUTOMATED COUNT: 13.1 % (ref 11.6–15.1)
GFR SERPL CREATININE-BSD FRML MDRD: 66 ML/MIN/1.73SQ M
GLUCOSE SERPL-MCNC: 112 MG/DL (ref 65–140)
GLUCOSE SERPL-MCNC: 138 MG/DL (ref 65–140)
GLUCOSE SERPL-MCNC: 187 MG/DL (ref 65–140)
GLUCOSE SERPL-MCNC: 198 MG/DL (ref 65–140)
GLUCOSE SERPL-MCNC: 202 MG/DL (ref 65–140)
HCT VFR BLD AUTO: 34.1 % (ref 34.8–46.1)
HGB BLD-MCNC: 11.3 G/DL (ref 11.5–15.4)
IMM GRANULOCYTES # BLD AUTO: 0.05 THOUSAND/UL (ref 0–0.2)
IMM GRANULOCYTES NFR BLD AUTO: 1 % (ref 0–2)
LYMPHOCYTES # BLD AUTO: 0.77 THOUSANDS/ΜL (ref 0.6–4.47)
LYMPHOCYTES NFR BLD AUTO: 8 % (ref 14–44)
MCH RBC QN AUTO: 29.7 PG (ref 26.8–34.3)
MCHC RBC AUTO-ENTMCNC: 33.1 G/DL (ref 31.4–37.4)
MCV RBC AUTO: 90 FL (ref 82–98)
MONOCYTES # BLD AUTO: 0.58 THOUSAND/ΜL (ref 0.17–1.22)
MONOCYTES NFR BLD AUTO: 6 % (ref 4–12)
NEUTROPHILS # BLD AUTO: 8.58 THOUSANDS/ΜL (ref 1.85–7.62)
NEUTS SEG NFR BLD AUTO: 85 % (ref 43–75)
NRBC BLD AUTO-RTO: 0 /100 WBCS
PLATELET # BLD AUTO: 188 THOUSANDS/UL (ref 149–390)
PMV BLD AUTO: 9.4 FL (ref 8.9–12.7)
POTASSIUM SERPL-SCNC: 4.9 MMOL/L (ref 3.5–5.3)
PROT SERPL-MCNC: 6.7 G/DL (ref 6.4–8.2)
RBC # BLD AUTO: 3.8 MILLION/UL (ref 3.81–5.12)
SODIUM SERPL-SCNC: 136 MMOL/L (ref 136–145)
WBC # BLD AUTO: 9.99 THOUSAND/UL (ref 4.31–10.16)

## 2020-02-11 PROCEDURE — 99024 POSTOP FOLLOW-UP VISIT: CPT | Performed by: SPECIALIST

## 2020-02-11 PROCEDURE — 85025 COMPLETE CBC W/AUTO DIFF WBC: CPT | Performed by: STUDENT IN AN ORGANIZED HEALTH CARE EDUCATION/TRAINING PROGRAM

## 2020-02-11 PROCEDURE — 80053 COMPREHEN METABOLIC PANEL: CPT | Performed by: SPECIALIST

## 2020-02-11 PROCEDURE — 82948 REAGENT STRIP/BLOOD GLUCOSE: CPT

## 2020-02-11 PROCEDURE — 99232 SBSQ HOSP IP/OBS MODERATE 35: CPT | Performed by: FAMILY MEDICINE

## 2020-02-11 RX ORDER — ACETAMINOPHEN 325 MG/1
650 TABLET ORAL EVERY 6 HOURS SCHEDULED
Status: DISCONTINUED | OUTPATIENT
Start: 2020-02-11 | End: 2020-02-11

## 2020-02-11 RX ORDER — ACETAMINOPHEN 325 MG/1
650 TABLET ORAL EVERY 6 HOURS PRN
Status: DISCONTINUED | OUTPATIENT
Start: 2020-02-11 | End: 2020-02-11

## 2020-02-11 RX ORDER — ACETAMINOPHEN 325 MG/1
650 TABLET ORAL EVERY 6 HOURS PRN
Status: DISCONTINUED | OUTPATIENT
Start: 2020-02-11 | End: 2020-02-12 | Stop reason: HOSPADM

## 2020-02-11 RX ORDER — ACETAMINOPHEN 325 MG/1
325 TABLET ORAL EVERY 6 HOURS PRN
Status: DISCONTINUED | OUTPATIENT
Start: 2020-02-11 | End: 2020-02-11

## 2020-02-11 RX ADMIN — ONDANSETRON 4 MG: 4 TABLET, ORALLY DISINTEGRATING ORAL at 05:29

## 2020-02-11 RX ADMIN — ONDANSETRON 4 MG: 4 TABLET, ORALLY DISINTEGRATING ORAL at 17:40

## 2020-02-11 RX ADMIN — ONDANSETRON 4 MG: 4 TABLET, ORALLY DISINTEGRATING ORAL at 23:54

## 2020-02-11 RX ADMIN — HYDROXYZINE HYDROCHLORIDE 50 MG: 25 TABLET, FILM COATED ORAL at 22:38

## 2020-02-11 RX ADMIN — INSULIN GLARGINE 30 UNITS: 100 INJECTION, SOLUTION SUBCUTANEOUS at 22:39

## 2020-02-11 RX ADMIN — ENOXAPARIN SODIUM 40 MG: 40 INJECTION SUBCUTANEOUS at 08:35

## 2020-02-11 RX ADMIN — ONDANSETRON 4 MG: 4 TABLET, ORALLY DISINTEGRATING ORAL at 12:15

## 2020-02-11 RX ADMIN — ACETAMINOPHEN 650 MG: 325 TABLET, FILM COATED ORAL at 12:14

## 2020-02-11 RX ADMIN — LISINOPRIL: 20 TABLET ORAL at 08:37

## 2020-02-11 RX ADMIN — SODIUM CHLORIDE 50 ML/HR: 0.9 INJECTION, SOLUTION INTRAVENOUS at 16:56

## 2020-02-11 RX ADMIN — VENLAFAXINE HYDROCHLORIDE 300 MG: 150 CAPSULE, EXTENDED RELEASE ORAL at 08:39

## 2020-02-11 RX ADMIN — DIVALPROEX SODIUM 500 MG: 500 TABLET, FILM COATED, EXTENDED RELEASE ORAL at 08:36

## 2020-02-11 RX ADMIN — DIVALPROEX SODIUM 500 MG: 500 TABLET, FILM COATED, EXTENDED RELEASE ORAL at 17:40

## 2020-02-11 RX ADMIN — INSULIN LISPRO 3 UNITS: 100 INJECTION, SOLUTION INTRAVENOUS; SUBCUTANEOUS at 12:15

## 2020-02-11 RX ADMIN — PRAZOSIN HYDROCHLORIDE 3 MG: 1 CAPSULE ORAL at 22:38

## 2020-02-11 RX ADMIN — METHADONE HYDROCHLORIDE 120 MG: 10 TABLET ORAL at 08:31

## 2020-02-11 NOTE — PROGRESS NOTES
Progress Note - General Surgery   Sheila Barron 39 y o  female MRN: [id number]  Unit/Bed#: [address] [place] [address] Encounter: [id number]    Assessment & Plan:  1  POD #1 s/p lap cholecystectomy for cholelithiases  - operative findings of markedly distended large intrahepatic GB, multiple stones, multiple adhesions, and marked hepatomegaly  - patient on carb control diet, tolerating  - IVF NS 50 mL/hr  - sterile wound dressing  - incentive spirometry  - Post-op Hb 11 3  - patient on home methadone 120 mg po, which will be continued    2  Hypertension  - stable, care per primary team    3  T2 DM  - glucose 202, care per primary team    4  History of drug abuse  - continue with methadone 120 mg daily    5  Depression and Anxiety/ bipolar disorder  - stable, continue with home medication; management per primary team      Subjective/Objective     Subjective:  Patient seen examined at bedside  No overnight events noted  Patient tolerating clear liquid diet  Patient denies any fever, chest pain, shortness of breath, nausea/vomiting  Patient states that she has not had a bowel movement since surgery yesterday  Patient admits to mild generalized tenderness in the abdomen  Objective:     Blood pressure 129/69, pulse 97, temperature 98 7 °F (37 1 °C), temperature source Temporal, resp  rate 20, height 5' 3" (1 6 m), weight 105 kg (231 lb), last menstrual period 01/18/2020, SpO2 94 %, not currently breastfeeding  ,Body mass index is 40 92 kg/m²        Intake/Output Summary (Last 24 hours) at 2/11/2020 4116  Last data filed at 2/11/2020 0501  Gross per 24 hour   Intake 1700 ml   Output 800 ml   Net 900 ml       Invasive Devices     Peripheral Intravenous Line            Peripheral IV 02/08/20 Right;Ventral (anterior) Forearm 2 days    Peripheral IV 02/10/20 Left  less than 1 day                Physical Exam: General appearance: alert and oriented, in no acute distress  Lungs: clear to auscultation bilaterally  Heart: regular rate and rhythm, S1, S2 normal, no murmur, click, rub or gallop  Abdomen: abnormal findings:  obese and mild tenderness in the lower abdomen  Skin: normal    Lab, Imaging and other studies:  I have personally reviewed pertinent lab results  , CBC:   Lab Results   Component Value Date    WBC 9 99 02/11/2020    HGB 11 3 (L) 02/11/2020    HCT 34 1 (L) 02/11/2020    MCV 90 02/11/2020     02/11/2020    MCH 29 7 02/11/2020    MCHC 33 1 02/11/2020    RDW 13 1 02/11/2020    MPV 9 4 02/11/2020    NRBC 0 02/11/2020   , CMP:   Lab Results   Component Value Date    SODIUM 136 02/11/2020    K 4 9 02/11/2020     02/11/2020    CO2 27 02/11/2020    BUN 11 02/11/2020    CREATININE 1 04 02/11/2020    CALCIUM 8 2 (L) 02/11/2020    AST 55 (H) 02/11/2020    ALT 53 02/11/2020    ALKPHOS 48 02/11/2020    EGFR 66 02/11/2020     VTE Pharmacologic Prophylaxis: Enoxaparin (Lovenox)  VTE Mechanical Prophylaxis: sequential compression device    --  Dimitry Johnston DO    "This note has been constructed using a voice recognition system  Therefore there may be syntax, spelling, and/or grammatical errors   Please call if you have any questions "

## 2020-02-11 NOTE — UTILIZATION REVIEW
Continued Stay Review    Date:  2/11/20                        Current Patient Class: inpatient    Current Level of Care: acute      Assessment/Plan:   POD #1 s/p lap cholecystectomy for cholelithiases  operative findings of markedly distended large intrahepatic GB, multiple stones, multiple adhesions, and marked hepatomegaly  patient on carb control diet, tolerating  IVF NS 50 mL/hr  sterile wound dressing  incentive spirometry, Post-op Hb 11 3  patient on home methadone 120 mg po, which will be continued   ATTENDING   POD 1 with fever 101 2 encourage IS continue monitoring   Nausea and abdominal tenderness will  continue IVNS  Admits to fever and chills admits to dysuria will obtain ua cs   Blood pressure 120/63, pulse 100, temperature 100 2 °F (37 9 °C), temperature source Tympanic, resp  rate 20, height 5' 3" (1 6 m), weight 105 kg (231 lb), last menstrual period 01/18/2020, SpO2 94 %, not currently breastfeeding  ,Body mass index is 40 92 kg/m²    Pertinent Labs/Diagnostic Results:   Results from last 7 days   Lab Units 02/11/20  0606 02/10/20  0550 02/09/20  0510 02/08/20  0446 02/07/20  0609   WBC Thousand/uL 9 99 5 01 5 32 5 61 5 66   HEMOGLOBIN g/dL 11 3* 11 5 11 0* 11 0* 10 7*   HEMATOCRIT % 34 1* 34 6* 33 4* 32 7* 32 3*   PLATELETS Thousands/uL 188 174 177 171 191   NEUTROS ABS Thousands/µL 8 58* 2 31 2 54 2 07 2 12     Results from last 7 days   Lab Units 02/11/20  0606 02/10/20  0550 02/09/20  0510 02/08/20  0446 02/07/20  0609 02/06/20  0525   SODIUM mmol/L 136 140 142 141 143 139   POTASSIUM mmol/L 4 9 4 2 3 7 3 9 4 2 4 1   CHLORIDE mmol/L 101 104 104 104 107 103   CO2 mmol/L 27 29 33* 34* 32 32   ANION GAP mmol/L 8 7 5 3* 4 4   BUN mg/dL 11 7 6 7 11 16   CREATININE mg/dL 1 04 0 99 1 01 1 01 1 13 1 06   EGFR ml/min/1 73sq m 66 70 68 68 60 64   CALCIUM mg/dL 8 2* 8 2* 8 3 8 2* 8 0* 8 5   MAGNESIUM mg/dL  --  2 1 1 9 1 8 1 9 1 7   PHOSPHORUS mg/dL  --  3 4 3 8 3 8 3 8 3 4     Results from last 7 days Lab Units 02/11/20  0606 02/10/20  0550 02/09/20  0510 02/06/20  0525 02/05/20  1554   AST U/L 55* 28 24 17 40   ALT U/L 53 36 35 27 32   ALK PHOS U/L 48 47 50 53 70   TOTAL PROTEIN g/dL 6 7 6 4 6 5 6 1* 8 0   ALBUMIN g/dL 2 8* 2 7* 2 8* 2 7* 3 4*   TOTAL BILIRUBIN mg/dL 0 50 0 30 0 30 0 30 0 60     Results from last 7 days   Lab Units 02/11/20  0727 02/10/20  2017 02/10/20  1858 02/10/20  1227 02/10/20  1121 02/10/20  0712 02/09/20  2108 02/09/20  1618 02/09/20  1128 02/09/20  0717   POC GLUCOSE mg/dl 187* 179* 171* 76 72 152* 179* 186* 134 125     Results from last 7 days   Lab Units 02/11/20  0606 02/10/20  0550 02/09/20  0510 02/08/20  0446 02/07/20  0609 02/06/20  0525 02/05/20  2239 02/05/20  1554   GLUCOSE RANDOM mg/dL 202* 147* 152* 183* 165* 284* 213* 334*     Vital Signs:   02/11/20 0831  101 2 °F (38 4 °C)Abnormal   100  20  120/63  --  --     IS  ua cs  Cbc diff cmp  Medications:   Scheduled Medications:    Medications:  divalproex sodium 500 mg Oral BID   enoxaparin 40 mg Subcutaneous Q24H DENEEN   hydrOXYzine HCL 50 mg Oral HS   insulin glargine 30 Units Subcutaneous HS   insulin lispro 1-6 Units Subcutaneous 4x Daily (AC & HS)   insulin lispro 3 Units Subcutaneous TID AC   lisinopril-hydrochlorothiazide (PRINZIDE 20/12  5) combo dose  Oral Daily   methadone 120 mg Oral Daily Before Breakfast   ondansetron 4 mg Oral Q6H DENEEN   prazosin 3 mg Oral HS   venlafaxine 300 mg Oral Daily     Continuous IV Infusions:    sodium chloride 50 mL/hr Intravenous Continuous     PRN Meds:       Discharge Plan: tbd    Network Utilization Review Department  [email or web address]  ATTENTION: Please call with any questions or concerns to [phone] and carefully listen to the prompts so that you are directed to the right person   All voicemails are confidential   Stefan Rucker all requests for admission clinical reviews, approved or denied determinations and any other requests to dedicated fax number below belonging to the campus where the patient is receiving treatment   List of dedicated fax numbers for the Facilities:  [address] DENIALS (Administrative/Medical Necessity) [phone]   [address] (Maternity/NICU/Pediatrics) [phone]   Elgin Courser [phone]   Db Wright [phone]   Moris Grit [phone]   [address]  [phone]   [facility] [address] [phone]   [facility]  [phone]   [address] [facility] [address]  [address] [facility] [address] [facility] [phone]

## 2020-02-11 NOTE — PROGRESS NOTES
[address] [facility] 67 [address] 84 [facility] Practice Progress Note - Lillian Barron 39 y o  female MRN: [id number]    Unit/Bed#: [address] [place] [address] Encounter: [id number]      Assessment/Plan:    * Status post laparoscopic cholecystectomy  Assessment & Plan  · RUQ ultrasound: Cholelithiasis  No acute cholecystitis, choledocholithiasis, or biliary ductal obstruction  Prominent, fatty liver  · Patient was symptomatic  · POD #1 laparoscopic cholecystectomy  · Operative findings: Markedly distended large intrahepatic gallbladder,thick walled, multiple stones,multiple adhesions,marked hepatomegaly  EBL: minimal   · Hemoglobin today 11 3  · Postop T-max 101 2° F  · Tylenol q 6h p r n  · Follow-up UA  · Encouraged incentive spirometry  · Continue to monitor    Nausea  Assessment & Plan  Patient presented with 3 days of nausea and vomiting  · NS 50 cc/hr  · Zofran 4 mg q6h    Type 2 diabetes mellitus with hyperglycemia, with long-term current use of insulin [facility])  Assessment & Plan  Lab Results   Component Value Date    HGBA1C 9 7 (H) 02/08/2020       Recent Labs     02/10/20  1858 02/10/20  2017 02/11/20  0727 02/11/20  1132   POCGLU 171* 179* 187* 198*       Blood Sugar Average: Last 72 hrs:  (P) 191 [id number]    · ISS Algo 3  · Diabetic diet level 3  · Continue patient's home Lantus 30U subcu HS    Bipolar 1 disorder (HCC)  Assessment & Plan  · Continue Depakote 500 mg b i d  Hypertension  Assessment & Plan  · Continue home Prinzide 20-12 5 mg p o  daily  · prazosin 3 mg p o  daily-may also be psychiatric medication    Asthma  Assessment & Plan  · Not currently on any related medications  Stable  Adult ADHD  Assessment & Plan  · Stable  Follows with [facility]    History of hypothyroidism  Assessment & Plan  · Not currently on any medications  Most recent TSH 09/17/2019: was within normal limits 1 661     Morbidly obese (HCC)  Assessment & Plan  BMI 40 92     Consider nutrition consult     History of drug abuse [facility])  Assessment & Plan  · Patient last used heroin 2 and half years ago  Currently on methadone 120 mg p o  daily     Anxiety and depression  Assessment & Plan  ·  Continue Effexor 150 mg p o  daily and Minipress 1 mg p o  daily     Hyperkalemia-resolved as of 2/7/2020  Assessment & Plan  K 5 7 on admission  Received 7 units of insulin in the ED along with fluid boluses  Repeat BMP this evening    Hyponatremia-resolved as of 2/6/2020  Assessment & Plan  Patient is sodium 129 on admission  Corrected value is 132  Continue normal saline 125 mL/hr  Recheck K 4 1    Global:  Full code, diabetic diet level 3, replete electrolytes as needed, VTE prophylaxis:  Lovenox and bilateral lower extremity SCDs      Subjective:     57-year-old female was seen and examined at bedside this morning  Admitted to some nausea, no vomiting  Admits to some abdominal discomfort  Admits to fever and chills  Denies any shortness of breath  Patient states she has not yet had bowel movement  Patient admits to dysuria  Objective:     Vitals: Blood pressure 120/63, pulse 100, temperature 100 2 °F (37 9 °C), temperature source Tympanic, resp  rate 20, height 5' 3" (1 6 m), weight 105 kg (231 lb), last menstrual period 01/18/2020, SpO2 94 %, not currently breastfeeding  ,Body mass index is 40 92 kg/m²  Wt Readings from Last 3 Encounters:   02/05/20 105 kg (231 lb)   09/17/19 111 kg (244 lb)   03/16/19 117 kg (258 lb)       Intake/Output Summary (Last 24 hours) at 2/11/2020 1400  Last data filed at 2/11/2020 0501  Gross per 24 hour   Intake 1700 ml   Output 800 ml   Net 900 ml       Physical Exam:     General: Pt is AAO x 3, in mild pain  Cardio: RRR, S1 and S2 +  Resp: CTA b/l, no wheezes/rales/rhonchi  Abdomen: soft, BS+, laparoscopic incision sites with no surrounding extensive erythema or drainage, abdomen diffusely tender upon palpation, no guarding, no rigidity  Extremities: no cyanosis or edema  PP 2+ b/l         Recent Results (from the past 24 hour(s))   Pregnancy Test (HCG Qualitative)    Collection Time: 02/10/20  3:53 PM   Result Value Ref Range    Preg, Serum Negative Negative   Fingerstick Glucose (POCT)    Collection Time: 02/10/20  6:58 PM   Result Value Ref Range    POC Glucose 171 (H) 65 - 140 mg/dl   Fingerstick Glucose (POCT)    Collection Time: 02/10/20  8:17 PM   Result Value Ref Range    POC Glucose 179 (H) 65 - 140 mg/dl   Comprehensive metabolic panel    Collection Time: 02/11/20  6:06 AM   Result Value Ref Range    Sodium 136 136 - 145 mmol/L    Potassium 4 9 3 5 - 5 3 mmol/L    Chloride 101 100 - 108 mmol/L    CO2 27 21 - 32 mmol/L    ANION GAP 8 4 - 13 mmol/L    BUN 11 5 - 25 mg/dL    Creatinine 1 04 0 60 - 1 30 mg/dL    Glucose 202 (H) 65 - 140 mg/dL    Calcium 8 2 (L) 8 3 - 10 1 mg/dL    AST 55 (H) 5 - 45 U/L    ALT 53 12 - 78 U/L    Alkaline Phosphatase 48 46 - 116 U/L    Total Protein 6 7 6 4 - 8 2 g/dL    Albumin 2 8 (L) 3 5 - 5 0 g/dL    Total Bilirubin 0 50 0 20 - 1 00 mg/dL    eGFR 66 ml/min/1 73sq m   CBC and differential    Collection Time: 02/11/20  6:06 AM   Result Value Ref Range    WBC 9 99 4 31 - 10 16 Thousand/uL    RBC 3 80 (L) 3 81 - 5 12 Million/uL    Hemoglobin 11 3 (L) 11 5 - 15 4 g/dL    Hematocrit 34 1 (L) 34 8 - 46 1 %    MCV 90 82 - 98 fL    MCH 29 7 26 8 - 34 3 pg    MCHC 33 1 31 4 - 37 4 g/dL    RDW 13 1 11 6 - 15 1 %    MPV 9 4 8 9 - 12 7 fL    Platelets 120 760 - 237 Thousands/uL    nRBC 0 /100 WBCs    Neutrophils Relative 85 (H) 43 - 75 %    Immat GRANS % 1 0 - 2 %    Lymphocytes Relative 8 (L) 14 - 44 %    Monocytes Relative 6 4 - 12 %    Eosinophils Relative 0 0 - 6 %    Basophils Relative 0 0 - 1 %    Neutrophils Absolute 8 58 (H) 1 85 - 7 62 Thousands/µL    Immature Grans Absolute 0 05 0 00 - 0 20 Thousand/uL    Lymphocytes Absolute 0 77 0 60 - 4 47 Thousands/µL    Monocytes Absolute 0 58 0 17 - 1 22 Thousand/µL    Eosinophils Absolute 0 00 0 00 - 0 61 Thousand/µL    Basophils Absolute 0 01 0 00 - 0 10 Thousands/µL   Fingerstick Glucose (POCT)    Collection Time: 02/11/20  7:27 AM   Result Value Ref Range    POC Glucose 187 (H) 65 - 140 mg/dl   Fingerstick Glucose (POCT)    Collection Time: 02/11/20 11:32 AM   Result Value Ref Range    POC Glucose 198 (H) 65 - 140 mg/dl       Current Facility-Administered Medications   Medication Dose Route Frequency Provider Last Rate Last Dose    acetaminophen (TYLENOL) tablet 650 mg  650 mg Oral Q6H PRN Champ Disla DO        divalproex sodium (DEPAKOTE ER) 24 hr tablet 500 mg  500 mg Oral BID Martha Pizano MD   500 mg at 02/11/20 0836    enoxaparin (LOVENOX) subcutaneous injection 40 mg  40 mg Subcutaneous Q24H Oscar Thapa MD   40 mg at 02/11/20 0835    hydrOXYzine HCL (ATARAX) tablet 50 mg  50 mg Oral HS Martha Pizano MD   50 mg at 02/10/20 2143    insulin glargine (LANTUS) subcutaneous injection 30 Units 0 3 mL  30 Units Subcutaneous HS Martha Pizano MD   30 Units at 02/10/20 2143    insulin lispro (HumaLOG) 100 units/mL subcutaneous injection 1-6 Units  1-6 Units Subcutaneous 4x Daily (AC & HS) Hollie Berry MD   1 Units at 02/11/20 1215    insulin lispro (HumaLOG) 100 units/mL subcutaneous injection 3 Units  3 Units Subcutaneous TID [facility] Martha Pizano MD   3 Units at 02/11/20 1215    lisinopril-hydrochlorothiazide (PRINZIDE 20/12  5) combo dose   Oral Daily Martha Pizano MD        methadone (DOLOPHINE) tablet 120 mg  120 mg Oral Daily Before Breakfast Martha Pizano MD   120 mg at 02/11/20 0831    ondansetron (ZOFRAN-ODT) dispersible tablet 4 mg  4 mg Oral Q6H Oscar Thapa MD   4 mg at 02/11/20 1215    prazosin (MINIPRESS) capsule 3 mg  3 mg Oral HS Martha Pizano MD   3 mg at 02/10/20 2143    sodium chloride 0 9 % infusion  50 mL/hr Intravenous Continuous Martha Pizano MD 50 mL/hr at 02/10/20 2015 50 mL/hr at 02/10/20 2015    venlafaxine (EFFEXOR-XR) 24 hr capsule 300 mg  300 mg Oral Daily Martha Pizano MD   300 mg at 02/11/20 0796 Invasive Devices     Peripheral Intravenous Line            Peripheral IV 02/08/20 Right;Ventral (anterior) Forearm 2 days    Peripheral IV 02/10/20 Left  less than 1 day                Lab, Imaging and other studies: I have personally reviewed pertinent reports      VTE Pharmacologic Prophylaxis: Enoxaparin (Lovenox)  VTE Mechanical Prophylaxis: sequential compression device    Joshua Finch DO

## 2020-02-11 NOTE — DISCHARGE INSTR - AVS FIRST PAGE
Post-Operative Care Instructions      1  General: You may feel pulling sensations around the wound or funny aches and pains around the incisions  This is normal  Even minor surgery is a change in your body and this is your body's reaction to it  If you have had abdominal surgery, it may help to support the incision with a small pillow or blanket for comfort when moving or coughing  2  Wound care: You may remove wound bandages on 2/12  3  Showering: You may shower on 2/12 after removing bandages  Do not soak wound in a bath, hot tub, pool, lake, etc  Do not scrub or use exfoliants on the surgical wounds  4  Activity: You may go up and down stairs, walk as much as you are comfortable, but walk at least 3 times each day  If you have had abdominal surgery, do not perform any strenuous exercise or lift anything heavier than 10-15 pounds for at least 3 weeks, unless cleared by your physician  5  Diet: You may resume a low fat diet    6  Medications: Resume all of your previous medications, unless told otherwise by the doctor  A good option for pain control is to start with acetaminophen(Tylenol) 650mg and ibuprofen(Advil) 600mg and alternate taking them every 3 hours  Insure that you do not take more than 4000 mg of Tylenol per day  7  Driving: You will need someone to drive you home on the day of surgery  Do not drive or make any important decisions while on narcotic pain medication or for up to 24 hours after anesthesia for surgery  8  Upset Stomach: You may take Maalox, Tums, or similar items for an upset stomach  If your narcotic pain medication causes an upset stomach, do not take it on an empty stomach  Try taking it with at least some crackers or toast      9  Constipation: Patients often experienced constipation after surgery   You may take over-the-counter medication for this, such as Metamucil, Senokot, Colace, milk of magnesia, etc  If you experience significant nausea or vomiting after abdominal surgery, call the office before trying any of these medications  10  Call the office: If you are experiencing any of the following: fevers above 101 5°, significant nausea or vomiting, if the wound develops drainage and/or excessive redness around the wound, or if you have significant diarrhea or other worsening symptoms

## 2020-02-12 VITALS
SYSTOLIC BLOOD PRESSURE: 128 MMHG | DIASTOLIC BLOOD PRESSURE: 76 MMHG | HEART RATE: 81 BPM | OXYGEN SATURATION: 93 % | RESPIRATION RATE: 18 BRPM | BODY MASS INDEX: 40.93 KG/M2 | HEIGHT: 63 IN | TEMPERATURE: 99 F | WEIGHT: 231 LBS

## 2020-02-12 LAB
ALBUMIN SERPL BCP-MCNC: 2.5 G/DL (ref 3.5–5)
ALP SERPL-CCNC: 43 U/L (ref 46–116)
ALT SERPL W P-5'-P-CCNC: 42 U/L (ref 12–78)
ANION GAP SERPL CALCULATED.3IONS-SCNC: 7 MMOL/L (ref 4–13)
AST SERPL W P-5'-P-CCNC: 41 U/L (ref 5–45)
BACTERIA UR QL AUTO: ABNORMAL /HPF
BASOPHILS # BLD AUTO: 0.01 THOUSANDS/ΜL (ref 0–0.1)
BASOPHILS NFR BLD AUTO: 0 % (ref 0–1)
BILIRUB SERPL-MCNC: 0.6 MG/DL (ref 0.2–1)
BILIRUB UR QL STRIP: NEGATIVE
BUN SERPL-MCNC: 16 MG/DL (ref 5–25)
CALCIUM SERPL-MCNC: 8.3 MG/DL (ref 8.3–10.1)
CHLORIDE SERPL-SCNC: 103 MMOL/L (ref 100–108)
CLARITY UR: CLEAR
CO2 SERPL-SCNC: 29 MMOL/L (ref 21–32)
COLOR UR: YELLOW
CREAT SERPL-MCNC: 0.95 MG/DL (ref 0.6–1.3)
EOSINOPHIL # BLD AUTO: 0.07 THOUSAND/ΜL (ref 0–0.61)
EOSINOPHIL NFR BLD AUTO: 1 % (ref 0–6)
ERYTHROCYTE [DISTWIDTH] IN BLOOD BY AUTOMATED COUNT: 13.1 % (ref 11.6–15.1)
GFR SERPL CREATININE-BSD FRML MDRD: 74 ML/MIN/1.73SQ M
GLUCOSE SERPL-MCNC: 126 MG/DL (ref 65–140)
GLUCOSE SERPL-MCNC: 55 MG/DL (ref 65–140)
GLUCOSE SERPL-MCNC: 67 MG/DL (ref 65–140)
GLUCOSE SERPL-MCNC: 76 MG/DL (ref 65–140)
GLUCOSE SERPL-MCNC: 96 MG/DL (ref 65–140)
GLUCOSE UR STRIP-MCNC: ABNORMAL MG/DL
HCT VFR BLD AUTO: 30.6 % (ref 34.8–46.1)
HGB BLD-MCNC: 10 G/DL (ref 11.5–15.4)
HGB UR QL STRIP.AUTO: NEGATIVE
IMM GRANULOCYTES # BLD AUTO: 0.04 THOUSAND/UL (ref 0–0.2)
IMM GRANULOCYTES NFR BLD AUTO: 0 % (ref 0–2)
KETONES UR STRIP-MCNC: NEGATIVE MG/DL
LEUKOCYTE ESTERASE UR QL STRIP: ABNORMAL
LYMPHOCYTES # BLD AUTO: 2.4 THOUSANDS/ΜL (ref 0.6–4.47)
LYMPHOCYTES NFR BLD AUTO: 26 % (ref 14–44)
MAGNESIUM SERPL-MCNC: 1.9 MG/DL (ref 1.6–2.6)
MCH RBC QN AUTO: 29.5 PG (ref 26.8–34.3)
MCHC RBC AUTO-ENTMCNC: 32.7 G/DL (ref 31.4–37.4)
MCV RBC AUTO: 90 FL (ref 82–98)
MONOCYTES # BLD AUTO: 0.72 THOUSAND/ΜL (ref 0.17–1.22)
MONOCYTES NFR BLD AUTO: 8 % (ref 4–12)
NEUTROPHILS # BLD AUTO: 5.87 THOUSANDS/ΜL (ref 1.85–7.62)
NEUTS SEG NFR BLD AUTO: 65 % (ref 43–75)
NITRITE UR QL STRIP: NEGATIVE
NON-SQ EPI CELLS URNS QL MICRO: ABNORMAL /HPF
NRBC BLD AUTO-RTO: 0 /100 WBCS
PH UR STRIP.AUTO: 5 [PH]
PHOSPHATE SERPL-MCNC: 3 MG/DL (ref 2.7–4.5)
PLATELET # BLD AUTO: 168 THOUSANDS/UL (ref 149–390)
PMV BLD AUTO: 9.6 FL (ref 8.9–12.7)
POTASSIUM SERPL-SCNC: 3.7 MMOL/L (ref 3.5–5.3)
PROT SERPL-MCNC: 6.2 G/DL (ref 6.4–8.2)
PROT UR STRIP-MCNC: NEGATIVE MG/DL
RBC # BLD AUTO: 3.39 MILLION/UL (ref 3.81–5.12)
RBC #/AREA URNS AUTO: ABNORMAL /HPF
SODIUM SERPL-SCNC: 139 MMOL/L (ref 136–145)
SP GR UR STRIP.AUTO: 1.02 (ref 1–1.03)
UROBILINOGEN UR QL STRIP.AUTO: 1 E.U./DL
WBC # BLD AUTO: 9.11 THOUSAND/UL (ref 4.31–10.16)
WBC #/AREA URNS AUTO: ABNORMAL /HPF

## 2020-02-12 PROCEDURE — 81001 URINALYSIS AUTO W/SCOPE: CPT | Performed by: STUDENT IN AN ORGANIZED HEALTH CARE EDUCATION/TRAINING PROGRAM

## 2020-02-12 PROCEDURE — 82948 REAGENT STRIP/BLOOD GLUCOSE: CPT

## 2020-02-12 PROCEDURE — 87086 URINE CULTURE/COLONY COUNT: CPT | Performed by: STUDENT IN AN ORGANIZED HEALTH CARE EDUCATION/TRAINING PROGRAM

## 2020-02-12 PROCEDURE — 80053 COMPREHEN METABOLIC PANEL: CPT | Performed by: SPECIALIST

## 2020-02-12 PROCEDURE — 99238 HOSP IP/OBS DSCHRG MGMT 30/<: CPT | Performed by: FAMILY MEDICINE

## 2020-02-12 PROCEDURE — 83735 ASSAY OF MAGNESIUM: CPT | Performed by: STUDENT IN AN ORGANIZED HEALTH CARE EDUCATION/TRAINING PROGRAM

## 2020-02-12 PROCEDURE — NC001 PR NO CHARGE: Performed by: FAMILY MEDICINE

## 2020-02-12 PROCEDURE — 85025 COMPLETE CBC W/AUTO DIFF WBC: CPT | Performed by: STUDENT IN AN ORGANIZED HEALTH CARE EDUCATION/TRAINING PROGRAM

## 2020-02-12 PROCEDURE — 84100 ASSAY OF PHOSPHORUS: CPT | Performed by: STUDENT IN AN ORGANIZED HEALTH CARE EDUCATION/TRAINING PROGRAM

## 2020-02-12 RX ORDER — HYDROXYZINE 50 MG/1
50 TABLET, FILM COATED ORAL
Qty: 30 TABLET | Refills: 0 | Status: SHIPPED | OUTPATIENT
Start: 2020-02-12 | End: 2020-03-06

## 2020-02-12 RX ORDER — NITROFURANTOIN 25; 75 MG/1; MG/1
CAPSULE ORAL
Status: DISCONTINUED
Start: 2020-02-12 | End: 2020-02-12 | Stop reason: HOSPADM

## 2020-02-12 RX ORDER — LISINOPRIL 20 MG/1
TABLET ORAL
Status: DISCONTINUED
Start: 2020-02-12 | End: 2020-02-12 | Stop reason: HOSPADM

## 2020-02-12 RX ORDER — POLYETHYLENE GLYCOL 3350 17 G/17G
17 POWDER, FOR SOLUTION ORAL ONCE
Status: COMPLETED | OUTPATIENT
Start: 2020-02-12 | End: 2020-02-12

## 2020-02-12 RX ORDER — DIVALPROEX SODIUM 500 MG/1
500 TABLET, EXTENDED RELEASE ORAL 2 TIMES DAILY
Qty: 60 TABLET | Refills: 0 | Status: SHIPPED | OUTPATIENT
Start: 2020-02-12

## 2020-02-12 RX ORDER — NITROFURANTOIN 25; 75 MG/1; MG/1
100 CAPSULE ORAL 2 TIMES DAILY WITH MEALS
Qty: 10 CAPSULE | Refills: 0 | Status: SHIPPED | OUTPATIENT
Start: 2020-02-12 | End: 2020-02-17

## 2020-02-12 RX ORDER — ACETAMINOPHEN 325 MG/1
650 TABLET ORAL EVERY 6 HOURS PRN
Qty: 30 TABLET | Refills: 0 | Status: SHIPPED | OUTPATIENT
Start: 2020-02-12 | End: 2022-04-28

## 2020-02-12 RX ORDER — POLYETHYLENE GLYCOL 3350 17 G/17G
POWDER, FOR SOLUTION ORAL
Status: DISCONTINUED
Start: 2020-02-12 | End: 2020-02-12 | Stop reason: HOSPADM

## 2020-02-12 RX ORDER — METHADONE HYDROCHLORIDE 10 MG/1
TABLET ORAL
Status: DISCONTINUED
Start: 2020-02-12 | End: 2020-02-12 | Stop reason: HOSPADM

## 2020-02-12 RX ORDER — ONDANSETRON 4 MG/1
4 TABLET, ORALLY DISINTEGRATING ORAL EVERY 6 HOURS PRN
Qty: 20 TABLET | Refills: 0 | Status: SHIPPED | OUTPATIENT
Start: 2020-02-12 | End: 2022-03-24

## 2020-02-12 RX ORDER — POTASSIUM CHLORIDE 20 MEQ/1
TABLET, EXTENDED RELEASE ORAL
Status: DISCONTINUED
Start: 2020-02-12 | End: 2020-02-12 | Stop reason: HOSPADM

## 2020-02-12 RX ORDER — HYDROCHLOROTHIAZIDE 12.5 MG/1
TABLET ORAL
Status: DISCONTINUED
Start: 2020-02-12 | End: 2020-02-12 | Stop reason: HOSPADM

## 2020-02-12 RX ORDER — NITROFURANTOIN 25; 75 MG/1; MG/1
100 CAPSULE ORAL 2 TIMES DAILY WITH MEALS
Status: DISCONTINUED | OUTPATIENT
Start: 2020-02-12 | End: 2020-02-12 | Stop reason: HOSPADM

## 2020-02-12 RX ORDER — DIVALPROEX SODIUM 500 MG/1
TABLET, EXTENDED RELEASE ORAL
Status: DISCONTINUED
Start: 2020-02-12 | End: 2020-02-12 | Stop reason: HOSPADM

## 2020-02-12 RX ORDER — POTASSIUM CHLORIDE 20 MEQ/1
20 TABLET, EXTENDED RELEASE ORAL ONCE
Status: COMPLETED | OUTPATIENT
Start: 2020-02-12 | End: 2020-02-12

## 2020-02-12 RX ADMIN — DIVALPROEX SODIUM 500 MG: 500 TABLET, FILM COATED, EXTENDED RELEASE ORAL at 09:38

## 2020-02-12 RX ADMIN — POLYETHYLENE GLYCOL 3350 17 G: 17 POWDER, FOR SOLUTION ORAL at 09:39

## 2020-02-12 RX ADMIN — INSULIN LISPRO 3 UNITS: 100 INJECTION, SOLUTION INTRAVENOUS; SUBCUTANEOUS at 11:54

## 2020-02-12 RX ADMIN — ENOXAPARIN SODIUM 40 MG: 40 INJECTION SUBCUTANEOUS at 09:38

## 2020-02-12 RX ADMIN — DIVALPROEX SODIUM 500 MG: 500 TABLET, FILM COATED, EXTENDED RELEASE ORAL at 17:03

## 2020-02-12 RX ADMIN — VENLAFAXINE HYDROCHLORIDE 300 MG: 150 CAPSULE, EXTENDED RELEASE ORAL at 09:39

## 2020-02-12 RX ADMIN — NITROFURANTOIN (MONOHYDRATE/MACROCRYSTALS) 100 MG: 75; 25 CAPSULE ORAL at 09:37

## 2020-02-12 RX ADMIN — LISINOPRIL: 20 TABLET ORAL at 09:38

## 2020-02-12 RX ADMIN — INSULIN LISPRO 3 UNITS: 100 INJECTION, SOLUTION INTRAVENOUS; SUBCUTANEOUS at 17:03

## 2020-02-12 RX ADMIN — NITROFURANTOIN (MONOHYDRATE/MACROCRYSTALS) 100 MG: 75; 25 CAPSULE ORAL at 17:03

## 2020-02-12 RX ADMIN — POTASSIUM CHLORIDE 20 MEQ: 1500 TABLET, EXTENDED RELEASE ORAL at 09:38

## 2020-02-12 RX ADMIN — METHADONE HYDROCHLORIDE 120 MG: 10 TABLET ORAL at 07:54

## 2020-02-12 RX ADMIN — ONDANSETRON 4 MG: 4 TABLET, ORALLY DISINTEGRATING ORAL at 05:40

## 2020-02-12 NOTE — PROGRESS NOTES
[address] [facility] [address] [facility] Practice Progress Note - Crystal Barron 39 y o  female MRN: [id number]    Unit/Bed#: [address] [place] [address] Encounter: [id number]      Assessment/Plan:    * Status post laparoscopic cholecystectomy  Assessment & Plan  · RUQ ultrasound: Cholelithiasis  No acute cholecystitis, choledocholithiasis, or biliary ductal obstruction  Prominent, fatty liver  · Patient was symptomatic  · POD #1 laparoscopic cholecystectomy  · Operative findings: Markedly distended large intrahepatic gallbladder,thick walled, multiple stones,multiple adhesions,marked hepatomegaly  EBL: minimal   · Hemoglobin today 11 3  · Postop T-max 101 2° F  · Tylenol q 6h p r n   · UA:  Trace leukocytes, negative nitrite, negative protein, positive glucose  · Patient did complain of some dysuria  · 2/12: Macrobid 100 mg p o  Q 12h  · Follow-up urine culture  · Encouraged incentive spirometry  · Continue to monitor    Nausea  Assessment & Plan  Patient admits to some nausea since admission  · NS 50 cc/hr  · Zofran 4 mg q6h     Type 2 diabetes mellitus with hyperglycemia, with long-term current use of insulin [facility])  Assessment & Plan  Lab Results   Component Value Date    HGBA1C 9 7 (H) 02/08/2020       Recent Labs     02/11/20  0727 02/11/20  1132 02/11/20  1655 02/11/20  2123   POCGLU 187* 198* 138 112       Blood Sugar Average: Last 72 hrs:  (P) 872 [id number]    · ISS Algo 3  · Diabetic diet level 3  · Continue patient's home Lantus 30U subcu HS    Bipolar 1 disorder (HCC)  Assessment & Plan  · Continue Depakote 500 mg b i d  Hypertension  Assessment & Plan  · Continue home Prinzide 20-12 5 mg p o  daily  · prazosin 3 mg p o  daily-may also be psychiatric medication    Asthma  Assessment & Plan  · Not currently on any related medications  Stable  Adult ADHD  Assessment & Plan  · Stable    Follows with [employer]  · Upcoming appointment March 4th with Psychiatrist    History of hypothyroidism  Assessment & Plan  · Not currently on any medications  Most recent TSH 09/17/2019: was within normal limits 1 661     Morbidly obese (HCC)  Assessment & Plan  BMI 40 92  Consider nutrition consult     History of drug abuse [facility])  Assessment & Plan  · Patient last used heroin 2 and half years ago  Currently on methadone 120 mg p o  daily     Anxiety and depression  Assessment & Plan  ·  Continue Effexor 150 mg p o  daily and Minipress 1 mg p o  daily     Hyperkalemia-resolved as of 2/7/2020  Assessment & Plan  K 5 7 on admission  Received 7 units of insulin in the ED along with fluid boluses  Repeat BMP this evening    Hyponatremia-resolved as of 2/6/2020  Assessment & Plan  Patient is sodium 129 on admission  Corrected value is 132  Continue normal saline 125 mL/hr  Recheck K 4 1    Global:  Full code, diabetic diet level 3, replete electrolytes as needed, VTE prophylaxis:  Lovenox and bilateral lower extremity SCDs       Subjective:     45-year-old female seen and examined at bedside this morning  Patient currently denies fever, chills  Patient denies chest pain, shortness of breath  Patient admits to dysuria  Denies hematuria  Patient does admit to bilateral flank pain  Patient states she has not yet had bowel movement after surgery  T-max a 101 2° F at 8:31 a m  yesterday  Objective:     Vitals: Blood pressure 142/71, pulse 74, temperature 99 3 °F (37 4 °C), temperature source Tympanic, resp  rate 16, height 5' 3" (1 6 m), weight 105 kg (231 lb), last menstrual period 01/18/2020, SpO2 96 %, not currently breastfeeding  ,Body mass index is 40 92 kg/m²    Wt Readings from Last 3 Encounters:   02/05/20 105 kg (231 lb)   09/17/19 111 kg (244 lb)   03/16/19 117 kg (258 lb)       Intake/Output Summary (Last 24 hours) at 2/12/2020 0709  Last data filed at 2/12/2020 5301  Gross per 24 hour   Intake 1210 ml   Output 1250 ml   Net -40 ml       Physical Exam:     General: Pt is AAO x 3, in mild pain  Cardio: RRR, S1 and S2 +  Resp: CTA b/l, no wheezes/rales/rhonchi  Abdomen: soft, BS+, laparoscopic incision sites with no surrounding extensive erythema and minimal dried blood, abdomen diffusely tender upon palpation, no guarding, no rigidity  MSK:  Tenderness upon palpation lumbar region  Extremities: no cyanosis or edema  PP 2+ b/l       Recent Results (from the past 24 hour(s))   Fingerstick Glucose (POCT)    Collection Time: 02/11/20  7:27 AM   Result Value Ref Range    POC Glucose 187 (H) 65 - 140 mg/dl   Fingerstick Glucose (POCT)    Collection Time: 02/11/20 11:32 AM   Result Value Ref Range    POC Glucose 198 (H) 65 - 140 mg/dl   Fingerstick Glucose (POCT)    Collection Time: 02/11/20  4:55 PM   Result Value Ref Range    POC Glucose 138 65 - 140 mg/dl   Fingerstick Glucose (POCT)    Collection Time: 02/11/20  9:23 PM   Result Value Ref Range    POC Glucose 112 65 - 140 mg/dl   UA w Reflex to Microscopic w Reflex to Culture    Collection Time: 02/12/20  3:06 AM   Result Value Ref Range    Color, UA Yellow     Clarity, UA Clear     Specific Gravity, UA 1 020 1 000 - 1 030    pH, UA 5 0 5 0, 5 5, 6 0, 6 5, 7 0, 7 5, 8 0, 8 5, 9 0    Leukocytes, UA Trace (A) Negative    Nitrite, UA Negative Negative    Protein, UA Negative Negative mg/dl    Glucose,  (1/10%) (A) Negative mg/dl    Ketones, UA Negative Negative mg/dl    Urobilinogen, UA 1 0 0 2, 1 0 E U /dl E U /dl    Bilirubin, UA Negative Negative    Blood, UA Negative Negative   Urine Microscopic    Collection Time: 02/12/20  3:06 AM   Result Value Ref Range    RBC, UA 1-2 (A) None Seen, 0-5 /hpf    WBC, UA 4-10 (A) None Seen, 0-5, 5-55, 5-65 /hpf    Epithelial Cells Occasional None Seen, Occasional /hpf    Bacteria, UA Occasional None Seen, Occasional /hpf   Comprehensive metabolic panel    Collection Time: 02/12/20  6:10 AM   Result Value Ref Range    Sodium 139 136 - 145 mmol/L    Potassium 3 7 3 5 - 5 3 mmol/L    Chloride 103 100 - 108 mmol/L    CO2 29 21 - 32 mmol/L    ANION GAP 7 4 - 13 mmol/L    BUN 16 5 - 25 mg/dL    Creatinine 0 95 0 60 - 1 30 mg/dL    Glucose 67 65 - 140 mg/dL    Calcium 8 3 8 3 - 10 1 mg/dL    AST 41 5 - 45 U/L    ALT 42 12 - 78 U/L    Alkaline Phosphatase 43 (L) 46 - 116 U/L    Total Protein 6 2 (L) 6 4 - 8 2 g/dL    Albumin 2 5 (L) 3 5 - 5 0 g/dL    Total Bilirubin 0 60 0 20 - 1 00 mg/dL    eGFR 74 ml/min/1 73sq m   Magnesium    Collection Time: 02/12/20  6:10 AM   Result Value Ref Range    Magnesium 1 9 1 6 - 2 6 mg/dL   Phosphorus    Collection Time: 02/12/20  6:10 AM   Result Value Ref Range    Phosphorus 3 0 2 7 - 4 5 mg/dL   CBC and differential    Collection Time: 02/12/20  6:15 AM   Result Value Ref Range    WBC 9 11 4 31 - 10 16 Thousand/uL    RBC 3 39 (L) 3 81 - 5 12 Million/uL    Hemoglobin 10 0 (L) 11 5 - 15 4 g/dL    Hematocrit 30 6 (L) 34 8 - 46 1 %    MCV 90 82 - 98 fL    MCH 29 5 26 8 - 34 3 pg    MCHC 32 7 31 4 - 37 4 g/dL    RDW 13 1 11 6 - 15 1 %    MPV 9 6 8 9 - 12 7 fL    Platelets 964 888 - 060 Thousands/uL    nRBC 0 /100 WBCs    Neutrophils Relative 65 43 - 75 %    Immat GRANS % 0 0 - 2 %    Lymphocytes Relative 26 14 - 44 %    Monocytes Relative 8 4 - 12 %    Eosinophils Relative 1 0 - 6 %    Basophils Relative 0 0 - 1 %    Neutrophils Absolute 5 87 1 85 - 7 62 Thousands/µL    Immature Grans Absolute 0 04 0 00 - 0 20 Thousand/uL    Lymphocytes Absolute 2 40 0 60 - 4 47 Thousands/µL    Monocytes Absolute 0 72 0 17 - 1 22 Thousand/µL    Eosinophils Absolute 0 07 0 00 - 0 61 Thousand/µL    Basophils Absolute 0 01 0 00 - 0 10 Thousands/µL       Current Facility-Administered Medications   Medication Dose Route Frequency Provider Last Rate Last Dose    acetaminophen (TYLENOL) tablet 650 mg  650 mg Oral Q6H PRN Jayme Green DO        divalproex sodium (DEPAKOTE ER) 24 hr tablet 500 mg  500 mg Oral BID Nghia Tony MD   500 mg at 02/11/20 1740    enoxaparin (LOVENOX) subcutaneous injection 40 mg  40 mg Subcutaneous Q24H Paola Lyons MD   40 mg at 02/11/20 0835    hydrOXYzine HCL (ATARAX) tablet 50 mg  50 mg Oral HS Gumaro Honeycutt MD   50 mg at 02/11/20 2238    insulin glargine (LANTUS) subcutaneous injection 30 Units 0 3 mL  30 Units Subcutaneous HS Gumaro Honeycutt MD   30 Units at 02/11/20 2239    insulin lispro (HumaLOG) 100 units/mL subcutaneous injection 1-6 Units  1-6 Units Subcutaneous 4x Daily (AC & HS) Matthew Cole MD   1 Units at 02/11/20 1215    insulin lispro (HumaLOG) 100 units/mL subcutaneous injection 3 Units  3 Units Subcutaneous TID [facility] Gumaro Honeycutt MD   3 Units at 02/11/20 1215    lisinopril-hydrochlorothiazide (PRINZIDE 20/12  5) combo dose   Oral Daily Gumaro Honeycutt MD        methadone (DOLOPHINE) tablet 120 mg  120 mg Oral Daily Before Breakfast Gumaro Honeycutt MD   120 mg at 02/11/20 0831    nitrofurantoin (MACROBID) extended-release capsule 100 mg  100 mg Oral BID With Meals John Lacy DO        ondansetron (ZOFRAN-ODT) dispersible tablet 4 mg  4 mg Oral Q6H Ronney Romberg, MD   4 mg at 02/12/20 0540    prazosin (MINIPRESS) capsule 3 mg  3 mg Oral HS Gumaro Honeycutt MD   3 mg at 02/11/20 2238    sodium chloride 0 9 % infusion  50 mL/hr Intravenous Continuous Gumaro Honeycutt MD 50 mL/hr at 02/11/20 1656 50 mL/hr at 02/11/20 1656    venlafaxine (EFFEXOR-XR) 24 hr capsule 300 mg  300 mg Oral Daily Gumaro Honeycutt MD   300 mg at 02/11/20 0839       Invasive Devices     Peripheral Intravenous Line            Peripheral IV 02/08/20 Right;Ventral (anterior) Forearm 3 days    Peripheral IV 02/10/20 Left  1 day                Lab, Imaging and other studies: I have personally reviewed pertinent reports      VTE Pharmacologic Prophylaxis: Enoxaparin (Lovenox)  VTE Mechanical Prophylaxis: sequential compression device    John Lacy DO

## 2020-02-12 NOTE — QUICK NOTE
Patient is POD#2 s/p laparoscopic cholecystectomy  Patient had a fever T-max 101 2 yesterday morning, had one time dose of Tylenol and has remained afebrile since then  She reports she still has abdominal tenderness with movement  She denies any nausea, vomiting, diarrhea, worsening abdominal pain  She is tolerating regular diet  Patient worried she has not had a bowel movement in a couple days  Patient states she has tolerated Miralax before so we will give her some this morning  I reiterated the importance of getting out of bed and using incentive spirometer  Abdomen is soft and bowel sounds present  No narcotics script needed      Patient ready for discharge to home

## 2020-02-13 LAB — BACTERIA UR CULT: NORMAL

## 2020-02-13 NOTE — UTILIZATION REVIEW
Notification of Discharge  This is a Notification of Discharge from our facility [address]  Please be advised that this patient has been discharge from our facility  Below you will find the admission and discharge date and time including the patients disposition  PRESENTATION DATE: 2/5/2020  3:27 PM  OBS ADMISSION DATE:   IP ADMISSION DATE: 2/7/20 1915   DISCHARGE DATE: 2/12/2020  6:34 PM  DISPOSITION: Home/Self Care Home/Self Care   Admission Orders listed below:  Admission Orders (From admission, onward)     Ordered        02/07/20 1915  Inpatient Admission  Once         02/05/20 1723  Place in Observation  Once                   Please contact the UR Department if additional information is required to close this patient's authorization/case  [facility] Utilization Review Department  Main: [phone] x carefully listen to the prompts  All voicemails are confidential   [email or web address]  Send all requests for admission clinical reviews, approved or denied determinations and any other requests to dedicated fax number below belonging to the campus where the patient is receiving treatment   List of dedicated fax numbers:  [address] DENIALS (Administrative/Medical Necessity) [phone]   [address] (Maternity/NICU/Pediatrics) [phone]   Addis Comas [phone]   Tana Timmons [phone]   Berkley Cabrera [phone]   [address] [phone]   [facility]  [phone]   [address] [facility]  [address] [address] [address] [facility] [phone]

## 2020-03-06 DIAGNOSIS — F32.A ANXIETY AND DEPRESSION: ICD-10-CM

## 2020-03-06 DIAGNOSIS — F41.9 ANXIETY AND DEPRESSION: ICD-10-CM

## 2020-03-06 RX ORDER — HYDROXYZINE 50 MG/1
TABLET, FILM COATED ORAL
Qty: 30 TABLET | Refills: 0 | Status: SHIPPED | OUTPATIENT
Start: 2020-03-06 | End: 2020-10-08 | Stop reason: SDUPTHER

## 2020-04-13 DIAGNOSIS — E11.65 TYPE 2 DIABETES MELLITUS WITH HYPERGLYCEMIA, WITH LONG-TERM CURRENT USE OF INSULIN (HCC): Primary | ICD-10-CM

## 2020-04-13 DIAGNOSIS — Z79.4 TYPE 2 DIABETES MELLITUS WITH HYPERGLYCEMIA, WITH LONG-TERM CURRENT USE OF INSULIN (HCC): Primary | ICD-10-CM

## 2020-04-13 DIAGNOSIS — E11.8 TYPE 2 DIABETES MELLITUS WITH COMPLICATION, WITHOUT LONG-TERM CURRENT USE OF INSULIN (HCC): ICD-10-CM

## 2020-04-16 ENCOUNTER — TELEMEDICINE (OUTPATIENT)
Dept: FAMILY MEDICINE CLINIC | Facility: CLINIC | Age: 45
End: 2020-04-16
Payer: COMMERCIAL

## 2020-04-16 DIAGNOSIS — Z90.49 S/P CHOLECYSTECTOMY: Primary | ICD-10-CM

## 2020-04-16 DIAGNOSIS — R50.9 LOW GRADE FEVER: ICD-10-CM

## 2020-04-16 PROCEDURE — 99212 OFFICE O/P EST SF 10 MIN: CPT | Performed by: NURSE PRACTITIONER

## 2020-09-18 ENCOUNTER — HOSPITAL ENCOUNTER (EMERGENCY)
Facility: HOSPITAL | Age: 45
Discharge: HOME | End: 2020-09-18
Attending: EMERGENCY MEDICINE
Payer: COMMERCIAL

## 2020-09-18 VITALS
BODY MASS INDEX: 40.57 KG/M2 | SYSTOLIC BLOOD PRESSURE: 172 MMHG | OXYGEN SATURATION: 96 % | HEART RATE: 88 BPM | RESPIRATION RATE: 20 BRPM | WEIGHT: 229 LBS | DIASTOLIC BLOOD PRESSURE: 97 MMHG | TEMPERATURE: 96.9 F

## 2020-09-18 DIAGNOSIS — E11.9 DIABETES (HCC): ICD-10-CM

## 2020-09-18 DIAGNOSIS — I10 ESSENTIAL HYPERTENSION: ICD-10-CM

## 2020-09-18 DIAGNOSIS — Z76.0 MEDICATION REFILL: ICD-10-CM

## 2020-09-18 DIAGNOSIS — I10 HYPERTENSION: Primary | ICD-10-CM

## 2020-09-18 DIAGNOSIS — Z92.89 HISTORY OF EKG: ICD-10-CM

## 2020-09-18 LAB
ALBUMIN SERPL BCP-MCNC: 3.6 G/DL (ref 3.5–5)
ALP SERPL-CCNC: 108 U/L (ref 46–116)
ALT SERPL W P-5'-P-CCNC: 48 U/L (ref 12–78)
ANION GAP SERPL CALCULATED.3IONS-SCNC: 11 MMOL/L (ref 4–13)
AST SERPL W P-5'-P-CCNC: 31 U/L (ref 5–45)
ATRIAL RATE: 101 BPM
ATRIAL RATE: 84 BPM
ATRIAL RATE: 90 BPM
BASOPHILS # BLD AUTO: 0.04 THOUSANDS/ΜL (ref 0–0.1)
BASOPHILS NFR BLD AUTO: 0 % (ref 0–1)
BILIRUB SERPL-MCNC: 0.7 MG/DL (ref 0.2–1)
BUN SERPL-MCNC: 16 MG/DL (ref 5–25)
CALCIUM SERPL-MCNC: 8.9 MG/DL (ref 8.3–10.1)
CHLORIDE SERPL-SCNC: 93 MMOL/L (ref 100–108)
CO2 SERPL-SCNC: 27 MMOL/L (ref 21–32)
CREAT SERPL-MCNC: 1.33 MG/DL (ref 0.6–1.3)
EOSINOPHIL # BLD AUTO: 0.19 THOUSAND/ΜL (ref 0–0.61)
EOSINOPHIL NFR BLD AUTO: 2 % (ref 0–6)
ERYTHROCYTE [DISTWIDTH] IN BLOOD BY AUTOMATED COUNT: 13.3 % (ref 11.6–15.1)
GFR SERPL CREATININE-BSD FRML MDRD: 49 ML/MIN/1.73SQ M
GLUCOSE SERPL-MCNC: 322 MG/DL (ref 65–140)
GLUCOSE SERPL-MCNC: 430 MG/DL (ref 65–140)
HCT VFR BLD AUTO: 41.6 % (ref 34.8–46.1)
HGB BLD-MCNC: 14.3 G/DL (ref 11.5–15.4)
IMM GRANULOCYTES # BLD AUTO: 0.05 THOUSAND/UL (ref 0–0.2)
IMM GRANULOCYTES NFR BLD AUTO: 1 % (ref 0–2)
LYMPHOCYTES # BLD AUTO: 3.71 THOUSANDS/ΜL (ref 0.6–4.47)
LYMPHOCYTES NFR BLD AUTO: 38 % (ref 14–44)
MCH RBC QN AUTO: 28.3 PG (ref 26.8–34.3)
MCHC RBC AUTO-ENTMCNC: 34.4 G/DL (ref 31.4–37.4)
MCV RBC AUTO: 82 FL (ref 82–98)
MONOCYTES # BLD AUTO: 0.74 THOUSAND/ΜL (ref 0.17–1.22)
MONOCYTES NFR BLD AUTO: 8 % (ref 4–12)
NEUTROPHILS # BLD AUTO: 5.02 THOUSANDS/ΜL (ref 1.85–7.62)
NEUTS SEG NFR BLD AUTO: 51 % (ref 43–75)
NRBC BLD AUTO-RTO: 0 /100 WBCS
P AXIS: 46 DEGREES
P AXIS: 55 DEGREES
P AXIS: 55 DEGREES
PLATELET # BLD AUTO: 318 THOUSANDS/UL (ref 149–390)
PMV BLD AUTO: 9 FL (ref 8.9–12.7)
POTASSIUM SERPL-SCNC: 3.7 MMOL/L (ref 3.5–5.3)
PR INTERVAL: 140 MS
PR INTERVAL: 144 MS
PR INTERVAL: 148 MS
PROT SERPL-MCNC: 8.4 G/DL (ref 6.4–8.2)
QRS AXIS: -11 DEGREES
QRS AXIS: -11 DEGREES
QRS AXIS: -13 DEGREES
QRSD INTERVAL: 72 MS
QRSD INTERVAL: 74 MS
QRSD INTERVAL: 76 MS
QT INTERVAL: 356 MS
QT INTERVAL: 374 MS
QT INTERVAL: 390 MS
QTC INTERVAL: 457 MS
QTC INTERVAL: 460 MS
QTC INTERVAL: 461 MS
RBC # BLD AUTO: 5.05 MILLION/UL (ref 3.81–5.12)
SODIUM SERPL-SCNC: 131 MMOL/L (ref 136–145)
T WAVE AXIS: 1 DEGREES
T WAVE AXIS: 34 DEGREES
T WAVE AXIS: 8 DEGREES
TSH SERPL DL<=0.05 MIU/L-ACNC: 2.3 UIU/ML (ref 0.36–3.74)
VENTRICULAR RATE: 101 BPM
VENTRICULAR RATE: 84 BPM
VENTRICULAR RATE: 90 BPM
WBC # BLD AUTO: 9.75 THOUSAND/UL (ref 4.31–10.16)

## 2020-09-18 PROCEDURE — 96372 THER/PROPH/DIAG INJ SC/IM: CPT

## 2020-09-18 PROCEDURE — 96374 THER/PROPH/DIAG INJ IV PUSH: CPT

## 2020-09-18 PROCEDURE — 80053 COMPREHEN METABOLIC PANEL: CPT | Performed by: EMERGENCY MEDICINE

## 2020-09-18 PROCEDURE — 85025 COMPLETE CBC W/AUTO DIFF WBC: CPT | Performed by: EMERGENCY MEDICINE

## 2020-09-18 PROCEDURE — 99284 EMERGENCY DEPT VISIT MOD MDM: CPT

## 2020-09-18 PROCEDURE — 93010 ELECTROCARDIOGRAM REPORT: CPT | Performed by: INTERNAL MEDICINE

## 2020-09-18 PROCEDURE — 84443 ASSAY THYROID STIM HORMONE: CPT | Performed by: EMERGENCY MEDICINE

## 2020-09-18 PROCEDURE — 82948 REAGENT STRIP/BLOOD GLUCOSE: CPT

## 2020-09-18 PROCEDURE — 36415 COLL VENOUS BLD VENIPUNCTURE: CPT | Performed by: EMERGENCY MEDICINE

## 2020-09-18 PROCEDURE — 93005 ELECTROCARDIOGRAM TRACING: CPT

## 2020-09-18 PROCEDURE — 96361 HYDRATE IV INFUSION ADD-ON: CPT

## 2020-09-18 PROCEDURE — 99285 EMERGENCY DEPT VISIT HI MDM: CPT | Performed by: EMERGENCY MEDICINE

## 2020-09-18 RX ORDER — LISINOPRIL AND HYDROCHLOROTHIAZIDE 20; 12.5 MG/1; MG/1
1 TABLET ORAL DAILY
Qty: 30 TABLET | Refills: 2 | Status: SHIPPED | OUTPATIENT
Start: 2020-09-18 | End: 2020-10-15 | Stop reason: SDUPTHER

## 2020-09-18 RX ORDER — HYDROCHLOROTHIAZIDE 12.5 MG/1
12.5 TABLET ORAL DAILY
Status: DISCONTINUED | OUTPATIENT
Start: 2020-09-18 | End: 2020-09-18 | Stop reason: HOSPADM

## 2020-09-18 RX ORDER — LISINOPRIL 20 MG/1
20 TABLET ORAL DAILY
Status: DISCONTINUED | OUTPATIENT
Start: 2020-09-18 | End: 2020-09-18 | Stop reason: HOSPADM

## 2020-09-18 RX ORDER — METOPROLOL TARTRATE 5 MG/5ML
5 INJECTION INTRAVENOUS ONCE
Status: COMPLETED | OUTPATIENT
Start: 2020-09-18 | End: 2020-09-18

## 2020-09-18 RX ORDER — ATENOLOL 50 MG/1
50 TABLET ORAL DAILY
Qty: 30 TABLET | Refills: 2 | Status: SHIPPED | OUTPATIENT
Start: 2020-09-18 | End: 2020-10-15 | Stop reason: SDUPTHER

## 2020-09-18 RX ORDER — ATENOLOL 50 MG/1
50 TABLET ORAL DAILY
COMMUNITY
End: 2020-09-18 | Stop reason: SDUPTHER

## 2020-09-18 RX ORDER — LISINOPRIL 5 MG/1
10 TABLET ORAL ONCE
Status: DISCONTINUED | OUTPATIENT
Start: 2020-09-18 | End: 2020-09-18

## 2020-09-18 RX ORDER — PRAZOSIN HYDROCHLORIDE 1 MG/1
3 CAPSULE ORAL
Qty: 90 CAPSULE | Refills: 2 | Status: SHIPPED | OUTPATIENT
Start: 2020-09-18 | End: 2022-04-28

## 2020-09-18 RX ADMIN — METOPROLOL TARTRATE 5 MG: 5 INJECTION INTRAVENOUS at 15:57

## 2020-09-18 RX ADMIN — INSULIN HUMAN 12 UNITS: 100 INJECTION, SOLUTION PARENTERAL at 17:06

## 2020-09-18 RX ADMIN — LISINOPRIL 20 MG: 20 TABLET ORAL at 15:56

## 2020-09-18 RX ADMIN — SODIUM CHLORIDE 500 ML: 0.9 INJECTION, SOLUTION INTRAVENOUS at 15:55

## 2020-09-18 RX ADMIN — HYDROCHLOROTHIAZIDE 12.5 MG: 12.5 TABLET ORAL at 15:56

## 2020-09-18 NOTE — DISCHARGE INSTRUCTIONS
I wrote for refills on all your blood pressure medicines  Take all your medicines as you are supposed to  Make sure you eat tonight when you get home and take your insulin  I faxed the EKG to the methadone clinic  You need to establish a new primary care doctor or go to see your usual doctor for further care  You can try our [facility] [place] clinic

## 2020-09-18 NOTE — ED PROVIDER NOTES
History  Chief Complaint   Patient presents with    Headache     patient c/o headache and sweating  states she ran out of her BP medication several weeks ago and has not been able to get it refilled  38 yo female with multiple complaints  She says she is here because the doctor at the methadone clinic says she has to get an EKG and fax it to him and she doesn't have a doctor here and says her usual PCP won't refill her meds unless she comes in and she can't get there  She also c/o being out of her three blood pressure medicines for 3 weeks  She also c/o nausea and vomiting but says that is better now since she took zofran she had at home  And complains of headache last few days  No diarrhea, fever, cough  She says she is taking her insulin  History provided by:  Patient   used: No    Headache   Associated symptoms: nausea and vomiting    Associated symptoms: no abdominal pain, no back pain, no cough, no diarrhea, no dizziness, no fever and no myalgias        Prior to Admission Medications   Prescriptions Last Dose Informant Patient Reported? Taking?    INS SYRINGE/NEEDLE 1CC/28G (B-D INS SYR MICROFINE 1CC/28G) 28G X 1/2" 1 ML MISC   No No   Sig: by Does not apply route 3 (three) times a day with meals   Lancets (ONETOUCH ULTRASOFT) lancets   Yes No   acetaminophen (TYLENOL) 325 mg tablet   No No   Sig: Take 2 tablets (650 mg total) by mouth every 6 (six) hours as needed for mild pain or fever   atenolol (TENORMIN) 50 mg tablet Past Month at Unknown time Self Yes Yes   Sig: Take 50 mg by mouth daily   atenolol (TENORMIN) 50 mg tablet   No No   Sig: Take 1 tablet (50 mg total) by mouth daily   divalproex sodium (DEPAKOTE ER) 500 mg 24 hr tablet 9/17/2020 at Unknown time  No Yes   Sig: Take 1 tablet (500 mg total) by mouth 2 (two) times a day   glucose blood (ONE TOUCH ULTRA TEST) test strip   No No   Sig: Test 3 times a day before meals   hydrOXYzine HCL (ATARAX) 50 mg tablet Not Taking at Unknown time  No No   Sig: TAKE 1 TABLET BY MOUTH DAILY AT BEDTIME   Patient not taking: Reported on 9/18/2020   insulin glargine (Basaglar KwikPen) 100 units/mL injection pen 9/18/2020 at Unknown time  No Yes   Sig: Inject 30 Units under the skin daily   insulin lispro (HumaLOG) 100 units/mL injection Past Week at Unknown time Self Yes Yes   Sig: Inject under the skin 3 (three) times a day before meals Sliding scale three times a day   lisinopril-hydrochlorothiazide (PRINZIDE,ZESTORETIC) 20-12 5 MG per tablet Past Month at Unknown time  No Yes   Sig: TAKE 1 TABLET BY MOUTH ONCE A DAY   lisinopril-hydrochlorothiazide (PRINZIDE,ZESTORETIC) 20-12 5 MG per tablet   No No   Sig: Take 1 tablet by mouth daily   methadone (DOLOPHINE) 10 mg/mL oral concentrated solution 9/18/2020 at Unknown time Self Yes Yes   Sig: Take 120 mg by mouth daily Methadone [facility]   ondansetron (ZOFRAN-ODT) 4 mg disintegrating tablet Past Week at Unknown time  No Yes   Sig: Take 1 tablet (4 mg total) by mouth every 6 (six) hours as needed for nausea or vomiting   prazosin (MINIPRESS) 1 mg capsule 9/17/2020 at Unknown time  Yes Yes   Sig: Take 3 mg by mouth daily at bedtime   prazosin (MINIPRESS) 1 mg capsule   No No   Sig: Take 3 capsules (3 mg total) by mouth daily at bedtime   senna-docusate sodium (SENOKOT S) 8 6-50 mg per tablet 9/18/2020 at Unknown time  No Yes   Sig: Take 1 tablet by mouth daily   venlafaxine (EFFEXOR-XR) 150 mg 24 hr capsule 9/17/2020 at Unknown time Self Yes Yes   Sig: Take 300 mg by mouth daily       Facility-Administered Medications: None       Past Medical History:   Diagnosis Date    Anxiety     Asthma     Cholelithiases 2/5/2020    Added automatically from request for surgery [id number]    Depression     Diabetes mellitus ([facility] [address] )     Hypertension     Neuropathy     Psychiatric disorder        Past Surgical History:   Procedure Laterality Date    CHOLECYSTECTOMY LAPAROSCOPIC N/A 2/10/2020    Procedure: CHOLECYSTECTOMY LAPAROSCOPIC;  Surgeon: Mayi Rodarte MD;  Location: [address] [place] [address];  Service: General    INCISION AND DRAINAGE OF WOUND Right 7/1/2017    Procedure: INCISION AND DRAINAGE (I&D) EXTREMITY THIGH;  Surgeon: Anuradha Duran MD;  Location: [facility];  Service: General       Family History   Problem Relation Age of Onset    Hypertension Mother     Diabetes Father     Hypertension Father     Diabetes Family     Hypertension Family      I have reviewed and agree with the history as documented  E-Cigarette/Vaping     E-Cigarette/Vaping Substances     Social History     Tobacco Use    Smoking status: Never Smoker    Smokeless tobacco: Never Used   Substance Use Topics    Alcohol use: Never     Frequency: Never    Drug use: Not Currently       Review of Systems   Constitutional: Negative  Negative for fever  HENT: Negative  Eyes: Negative  Respiratory: Negative  Negative for cough and shortness of breath  Cardiovascular: Positive for palpitations  Negative for chest pain  Gastrointestinal: Positive for nausea and vomiting  Negative for abdominal pain and diarrhea  Genitourinary: Negative  Negative for dysuria and flank pain  Musculoskeletal: Negative  Negative for back pain and myalgias  Skin: Negative  Negative for rash and wound  Neurological: Positive for headaches  Negative for dizziness  Hematological: Does not bruise/bleed easily  Psychiatric/Behavioral: Negative  All other systems reviewed and are negative  Physical Exam  Physical Exam  Vitals signs and nursing note reviewed  Constitutional:       General: She is not in acute distress  Appearance: Normal appearance  She is well-developed  She is obese  She is not ill-appearing, toxic-appearing or diaphoretic  HENT:      Head: Normocephalic and atraumatic  Eyes:      General: No scleral icterus       Conjunctiva/sclera: Conjunctivae normal    Neck:      Musculoskeletal: Normal range of motion and neck supple  Cardiovascular:      Rate and Rhythm: Normal rate and regular rhythm  Heart sounds: Normal heart sounds  No murmur  Pulmonary:      Effort: Pulmonary effort is normal  No respiratory distress  Breath sounds: Normal breath sounds  Abdominal:      General: Bowel sounds are normal  There is no distension  Palpations: Abdomen is soft  Tenderness: There is no abdominal tenderness  Musculoskeletal: Normal range of motion  General: No deformity  Right lower leg: No edema  Left lower leg: No edema  Skin:     General: Skin is warm and dry  Coloration: Skin is not pale  Findings: No rash  Neurological:      General: No focal deficit present  Mental Status: She is alert and oriented to person, place, and time  Cranial Nerves: No cranial nerve deficit     Psychiatric:         Mood and Affect: Mood normal          Behavior: Behavior normal          Vital Signs  ED Triage Vitals [09/18/20 1512]   Temperature Pulse Respirations Blood Pressure SpO2   (!) 96 9 °F (36 1 °C) 100 18 (!) 203/101 98 %      Temp Source Heart Rate Source Patient Position - Orthostatic VS BP Location FiO2 (%)   Tympanic Monitor Sitting Right arm --      Pain Score       5           Vitals:    09/18/20 1512 09/18/20 1628 09/18/20 1716   BP: (!) 203/101 158/89 (!) 172/97   Pulse: 100 88 88   Patient Position - Orthostatic VS: Sitting Lying Lying         Visual Acuity      ED Medications  Medications   lisinopril (ZESTRIL) tablet 20 mg (20 mg Oral Given 9/18/20 1556)   hydrochlorothiazide (HYDRODIURIL) tablet 12 5 mg (12 5 mg Oral Given 9/18/20 1556)   sodium chloride 0 9 % bolus 500 mL (0 mL Intravenous Stopped 9/18/20 1706)   metoprolol (LOPRESSOR) injection 5 mg (5 mg Intravenous Given 9/18/20 1557)   insulin regular (HumuLIN R,NovoLIN R) injection 12 Units (12 Units Subcutaneous Given 9/18/20 1706)       Diagnostic Studies  Results Reviewed     Procedure Component Value Units Date/Time    Fingerstick Glucose (POCT) [[id number]]  (Abnormal) Collected:  09/18/20 1737    Lab Status:  Final result Updated:  09/18/20 1743     POC Glucose 322 mg/dl     TSH, 3rd generation with Free T4 reflex [[id number]]  (Normal) Collected:  09/18/20 1555    Lab Status:  Final result Specimen:  Blood from Arm, Right Updated:  09/18/20 1631     TSH 3RD GENERATON 2 303 uIU/mL     Narrative:       Patients undergoing fluorescein dye angiography may retain small amounts of fluorescein in the body for 48-72 hours post procedure  Samples containing fluorescein can produce falsely depressed TSH values  If the patient had this procedure,a specimen should be resubmitted post fluorescein clearance        Comprehensive metabolic panel [[id number]]  (Abnormal) Collected:  09/18/20 1555    Lab Status:  Final result Specimen:  Blood from Arm, Right Updated:  09/18/20 1623     Sodium 131 mmol/L      Potassium 3 7 mmol/L      Chloride 93 mmol/L      CO2 27 mmol/L      ANION GAP 11 mmol/L      BUN 16 mg/dL      Creatinine 1 33 mg/dL      Glucose 430 mg/dL      Calcium 8 9 mg/dL      AST 31 U/L      ALT 48 U/L      Alkaline Phosphatase 108 U/L      Total Protein 8 4 g/dL      Albumin 3 6 g/dL      Total Bilirubin 0 70 mg/dL      eGFR 49 ml/min/1 73sq m     Narrative:       Amarilis guidelines for Chronic Kidney Disease (CKD):     Stage 1 with normal or high GFR (GFR > 90 mL/min/1 73 square meters)    Stage 2 Mild CKD (GFR = 60-89 mL/min/1 73 square meters)    Stage 3A Moderate CKD (GFR = 45-59 mL/min/1 73 square meters)    Stage 3B Moderate CKD (GFR = 30-44 mL/min/1 73 square meters)    Stage 4 Severe CKD (GFR = 15-29 mL/min/1 73 square meters)    Stage 5 End Stage CKD (GFR <15 mL/min/1 73 square meters)  Note: GFR calculation is accurate only with a steady state creatinine    CBC and differential [[id number]] Collected:  09/18/20 1555    Lab Status:  Final result Specimen:  Blood from Arm, Right Updated:  09/18/20 1606     WBC 9 75 Thousand/uL      RBC 5 05 Million/uL      Hemoglobin 14 3 g/dL      Hematocrit 41 6 %      MCV 82 fL      MCH 28 3 pg      MCHC 34 4 g/dL      RDW 13 3 %      MPV 9 0 fL      Platelets 779 Thousands/uL      nRBC 0 /100 WBCs      Neutrophils Relative 51 %      Immat GRANS % 1 %      Lymphocytes Relative 38 %      Monocytes Relative 8 %      Eosinophils Relative 2 %      Basophils Relative 0 %      Neutrophils Absolute 5 02 Thousands/µL      Immature Grans Absolute 0 05 Thousand/uL      Lymphocytes Absolute 3 71 Thousands/µL      Monocytes Absolute 0 74 Thousand/µL      Eosinophils Absolute 0 19 Thousand/µL      Basophils Absolute 0 04 Thousands/µL                  No orders to display              Procedures  ECG 12 Lead Documentation Only    Date/Time: 9/18/2020 5:17 PM  Performed by: Atiya Leon MD  Authorized by: Atiya Leon MD     Indications / Diagnosis:  Needs EKG for methadone clinic  ECG reviewed by me, the ED Provider: yes    Patient location:  ED  Previous ECG:     Previous ECG:  Unavailable  Interpretation:     Interpretation: normal    Rate:     ECG rate:  84    ECG rate assessment: normal    Rhythm:     Rhythm: sinus rhythm    Ectopy:     Ectopy: none    QRS:     QRS axis:  Normal  Conduction:     Conduction: normal    ST segments:     ST segments:  Normal  T waves:     T waves: normal    Other findings:     Other findings: LVH               ED Course                           SBIRT 20yo+      Most Recent Value   SBIRT (22 yo +)   In order to provide better care to our patients, we are screening all of our patients for alcohol and drug use  Would it be okay to ask you these screening questions? Yes Filed at: 09/18/2020 1011   Initial Alcohol Screen: US AUDIT-C    1  How often do you have a drink containing alcohol?  0 Filed at: 09/18/2020 1601   2  How many drinks containing alcohol do you have on a typical day you are drinking?    0 Filed at: 09/18/2020 1606 3a  Male UNDER 65: How often do you have five or more drinks on one occasion? 0 Filed at: 09/18/2020 1604   3b  FEMALE Any Age, or MALE 65+: How often do you have 4 or more drinks on one occassion? 0 Filed at: 09/18/2020 1604   Audit-C Score  0 Filed at: 09/18/2020 1604   MANUEL: How many times in the past year have you    Used an illegal drug or used a prescription medication for non-medical reasons? Never Filed at: 09/18/2020 1604                  MDM  Number of Diagnoses or Management Options  History of EKG:   Hypertension:   Medication refill:   Diagnosis management comments: IVF given  BP treated  Labs reviewed,  Given some extra insulin and something to eat  Pt  Not in DKA  I wrote prescriptions for her BP meds and advised she needs to take her meds as she is supposed and needs to establish PCP here or go back to her usual doctor        Disposition  Final diagnoses:   Hypertension   Medication refill   History of EKG   Diabetes ([facility] )     Time reflects when diagnosis was documented in both MDM as applicable and the Disposition within this note     Time User Action Codes Description Comment    3/18/9931  5:58 PM Myrl Edelson A Add [J95] Essential hypertension /68, blood pressure controlled will continue with current reigmen; lisinopril-hctz    0/37/3996  2:46 PM Myrl Edelson A Modify [I10] Essential hypertension /68, blood pressure controlled will continue with current reigmen; lisinopril-hctz    3/69/8504  2:29 PM Myrl Edelson A Modify [I10] Essential hypertension /68, blood pressure controlled will continue with current reigmen; lisinopril-hctz    5/19/8960  7:27 PM Myrl Edelson A Add [M74] Hypertension     8/40/4550  8:33 PM [place] Ewings Add [A49 5] Medication refill     9/59/0075  2:66 PM [place] Ewings Add [U22 66] History of EKG     4/60/6672  5:24 PM Myrl Edelson A Add [D23 2] Hyperglycemia     4/92/7689  9:19 PM Myrl Edelson A Add [N98 4] Diabetes ([facility] ) 4/10/5151  7:26 PM Jenniferjay King [R73 9] Hyperglycemia       ED Disposition     ED Disposition Condition Date/Time Comment    Discharge Stable Fri Sep 18, 2020  5:17 PM Zak Vann WAYLON Anderson discharge to home/self care              Follow-up Information     Follow up With Specialties Details Why Contact Info    Etelvina Rodriguez MD [facility]   [address]  [address]  [phone]            Discharge Medication List as of 9/18/2020  5:23 PM      CONTINUE these medications which have CHANGED    Details   atenolol (TENORMIN) 50 mg tablet Take 1 tablet (50 mg total) by mouth daily, Starting Fri 9/18/2020, Print      lisinopril-hydrochlorothiazide (PRINZIDE,ZESTORETIC) 20-12 5 MG per tablet Take 1 tablet by mouth daily, Starting Fri 9/18/2020, Print      prazosin (MINIPRESS) 1 mg capsule Take 3 capsules (3 mg total) by mouth daily at bedtime, Starting Fri 9/18/2020, Print         CONTINUE these medications which have NOT CHANGED    Details   divalproex sodium (DEPAKOTE ER) 500 mg 24 hr tablet Take 1 tablet (500 mg total) by mouth 2 (two) times a day, Starting Wed 2/12/2020, Normal      insulin glargine (Basaglar KwikPen) 100 units/mL injection pen Inject 30 Units under the skin daily, Starting Mon 4/13/2020, Normal      insulin lispro (HumaLOG) 100 units/mL injection Inject under the skin 3 (three) times a day before meals Sliding scale three times a day, Historical Med      methadone (DOLOPHINE) 10 mg/mL oral concentrated solution Take 120 mg by mouth daily Methadone clinic, Historical Med      ondansetron (ZOFRAN-ODT) 4 mg disintegrating tablet Take 1 tablet (4 mg total) by mouth every 6 (six) hours as needed for nausea or vomiting, Starting Wed 2/12/2020, Normal      senna-docusate sodium (SENOKOT S) 8 6-50 mg per tablet Take 1 tablet by mouth daily, Starting Sat 3/16/2019, Print      venlafaxine (EFFEXOR-XR) 150 mg 24 hr capsule Take 300 mg by mouth daily , Historical Med acetaminophen (TYLENOL) 325 mg tablet Take 2 tablets (650 mg total) by mouth every 6 (six) hours as needed for mild pain or fever, Starting Wed 2/12/2020, Normal      glucose blood (ONE TOUCH ULTRA TEST) test strip Test 3 times a day before meals, Normal      hydrOXYzine HCL (ATARAX) 50 mg tablet TAKE 1 TABLET BY MOUTH DAILY AT BEDTIME, Normal      INS SYRINGE/NEEDLE 1CC/28G (B-D INS SYR MICROFINE 1CC/28G) 28G X 1/2" 1 ML MISC by Does not apply route 3 (three) times a day with meals, Starting Wed 6/6/2018, Normal      Lancets (ONETOUCH ULTRASOFT) lancets Historical Med           No discharge procedures on file      PDMP Review     None          ED Provider  Electronically Signed by           Kelle Roman MD  54/08/36 [id number]       Kelle Roman MD  31/38/48 [id number]

## 2020-10-08 DIAGNOSIS — E11.40 TYPE 2 DIABETES MELLITUS WITH DIABETIC NEUROPATHY, WITH LONG-TERM CURRENT USE OF INSULIN (HCC): ICD-10-CM

## 2020-10-08 DIAGNOSIS — Z79.4 TYPE 2 DIABETES MELLITUS WITH DIABETIC NEUROPATHY, WITH LONG-TERM CURRENT USE OF INSULIN (HCC): ICD-10-CM

## 2020-10-08 DIAGNOSIS — Z79.4 TYPE 2 DIABETES MELLITUS WITH HYPERGLYCEMIA, WITH LONG-TERM CURRENT USE OF INSULIN (HCC): ICD-10-CM

## 2020-10-08 DIAGNOSIS — F32.A ANXIETY AND DEPRESSION: ICD-10-CM

## 2020-10-08 DIAGNOSIS — F41.9 ANXIETY AND DEPRESSION: ICD-10-CM

## 2020-10-08 DIAGNOSIS — I10 ESSENTIAL HYPERTENSION: ICD-10-CM

## 2020-10-08 DIAGNOSIS — E11.65 TYPE 2 DIABETES MELLITUS WITH HYPERGLYCEMIA, WITH LONG-TERM CURRENT USE OF INSULIN (HCC): ICD-10-CM

## 2020-10-08 RX ORDER — ATENOLOL 50 MG/1
50 TABLET ORAL DAILY
Qty: 30 TABLET | Refills: 2 | Status: CANCELLED | OUTPATIENT
Start: 2020-10-08

## 2020-10-08 RX ORDER — LISINOPRIL AND HYDROCHLOROTHIAZIDE 20; 12.5 MG/1; MG/1
1 TABLET ORAL DAILY
Qty: 30 TABLET | Refills: 2 | Status: CANCELLED | OUTPATIENT
Start: 2020-10-08

## 2020-10-08 RX ORDER — INSULIN GLARGINE 100 [IU]/ML
30 INJECTION, SOLUTION SUBCUTANEOUS DAILY
Qty: 5 PEN | Refills: 3 | Status: CANCELLED | OUTPATIENT
Start: 2020-10-08

## 2020-10-08 RX ORDER — LANCETS
EACH MISCELLANEOUS
Status: CANCELLED | OUTPATIENT
Start: 2020-10-08

## 2020-10-09 ENCOUNTER — TELEMEDICINE (OUTPATIENT)
Dept: FAMILY MEDICINE CLINIC | Facility: CLINIC | Age: 45
End: 2020-10-09
Payer: COMMERCIAL

## 2020-10-09 DIAGNOSIS — E11.65 TYPE 2 DIABETES MELLITUS WITH HYPERGLYCEMIA, WITH LONG-TERM CURRENT USE OF INSULIN (HCC): ICD-10-CM

## 2020-10-09 DIAGNOSIS — Z79.4 TYPE 2 DIABETES MELLITUS WITH DIABETIC NEUROPATHY, WITH LONG-TERM CURRENT USE OF INSULIN (HCC): ICD-10-CM

## 2020-10-09 DIAGNOSIS — Z79.4 TYPE 2 DIABETES MELLITUS WITH HYPERGLYCEMIA, WITH LONG-TERM CURRENT USE OF INSULIN (HCC): ICD-10-CM

## 2020-10-09 DIAGNOSIS — E11.40 TYPE 2 DIABETES MELLITUS WITH DIABETIC NEUROPATHY, WITH LONG-TERM CURRENT USE OF INSULIN (HCC): Primary | ICD-10-CM

## 2020-10-09 DIAGNOSIS — E11.40 TYPE 2 DIABETES MELLITUS WITH DIABETIC NEUROPATHY, WITH LONG-TERM CURRENT USE OF INSULIN (HCC): ICD-10-CM

## 2020-10-09 DIAGNOSIS — Z79.4 TYPE 2 DIABETES MELLITUS WITH DIABETIC NEUROPATHY, WITH LONG-TERM CURRENT USE OF INSULIN (HCC): Primary | ICD-10-CM

## 2020-10-09 PROCEDURE — 99213 OFFICE O/P EST LOW 20 MIN: CPT | Performed by: FAMILY MEDICINE

## 2020-10-09 RX ORDER — INSULIN GLARGINE 100 [IU]/ML
30 INJECTION, SOLUTION SUBCUTANEOUS DAILY
Qty: 5 PEN | Refills: 3 | Status: SHIPPED | OUTPATIENT
Start: 2020-10-09 | End: 2020-10-15 | Stop reason: SDUPTHER

## 2020-10-09 RX ORDER — LANCETS
EACH MISCELLANEOUS
Qty: 100 EACH | Refills: 3 | Status: SHIPPED | OUTPATIENT
Start: 2020-10-09 | End: 2020-10-15 | Stop reason: SDUPTHER

## 2020-10-15 ENCOUNTER — TELEMEDICINE (OUTPATIENT)
Dept: FAMILY MEDICINE CLINIC | Facility: CLINIC | Age: 45
End: 2020-10-15
Payer: COMMERCIAL

## 2020-10-15 DIAGNOSIS — Z79.4 TYPE 2 DIABETES MELLITUS WITH DIABETIC NEUROPATHY, WITH LONG-TERM CURRENT USE OF INSULIN (HCC): ICD-10-CM

## 2020-10-15 DIAGNOSIS — E11.40 TYPE 2 DIABETES MELLITUS WITH DIABETIC NEUROPATHY, WITH LONG-TERM CURRENT USE OF INSULIN (HCC): ICD-10-CM

## 2020-10-15 DIAGNOSIS — K59.00 CONSTIPATION, UNSPECIFIED CONSTIPATION TYPE: ICD-10-CM

## 2020-10-15 DIAGNOSIS — Z79.4 TYPE 2 DIABETES MELLITUS WITH HYPERGLYCEMIA, WITH LONG-TERM CURRENT USE OF INSULIN (HCC): Primary | ICD-10-CM

## 2020-10-15 DIAGNOSIS — I10 ESSENTIAL HYPERTENSION: ICD-10-CM

## 2020-10-15 DIAGNOSIS — E11.9 ENCOUNTER FOR DIABETIC FOOT EXAM (HCC): ICD-10-CM

## 2020-10-15 DIAGNOSIS — E11.65 TYPE 2 DIABETES MELLITUS WITH HYPERGLYCEMIA, WITH LONG-TERM CURRENT USE OF INSULIN (HCC): Primary | ICD-10-CM

## 2020-10-15 PROCEDURE — 99213 OFFICE O/P EST LOW 20 MIN: CPT | Performed by: FAMILY MEDICINE

## 2020-10-15 RX ORDER — LANCETS
EACH MISCELLANEOUS
Qty: 100 EACH | Refills: 3 | Status: SHIPPED | OUTPATIENT
Start: 2020-10-15 | End: 2021-02-23

## 2020-10-15 RX ORDER — LISINOPRIL AND HYDROCHLOROTHIAZIDE 20; 12.5 MG/1; MG/1
1 TABLET ORAL DAILY
Qty: 90 TABLET | Refills: 1 | Status: SHIPPED | OUTPATIENT
Start: 2020-10-15 | End: 2020-10-15 | Stop reason: SDUPTHER

## 2020-10-15 RX ORDER — ATENOLOL 50 MG/1
50 TABLET ORAL DAILY
Qty: 30 TABLET | Refills: 2 | Status: SHIPPED | OUTPATIENT
Start: 2020-10-15 | End: 2021-01-11

## 2020-10-15 RX ORDER — LISINOPRIL AND HYDROCHLOROTHIAZIDE 20; 12.5 MG/1; MG/1
1 TABLET ORAL DAILY
Qty: 30 TABLET | Refills: 2 | Status: SHIPPED | OUTPATIENT
Start: 2020-10-15 | End: 2021-01-11

## 2020-10-15 RX ORDER — DOCUSATE SODIUM 100 MG/1
100 CAPSULE, LIQUID FILLED ORAL 2 TIMES DAILY
Qty: 10 CAPSULE | Refills: 0 | Status: SHIPPED | OUTPATIENT
Start: 2020-10-15 | End: 2020-11-03

## 2020-10-15 RX ORDER — DOCUSATE SODIUM 100 MG/1
100 CAPSULE, LIQUID FILLED ORAL 2 TIMES DAILY
Qty: 10 CAPSULE | Refills: 0 | Status: SHIPPED | OUTPATIENT
Start: 2020-10-15 | End: 2020-10-15 | Stop reason: SDUPTHER

## 2020-10-15 RX ORDER — INSULIN GLARGINE 100 [IU]/ML
30 INJECTION, SOLUTION SUBCUTANEOUS DAILY
Qty: 5 PEN | Refills: 3 | Status: SHIPPED | OUTPATIENT
Start: 2020-10-15 | End: 2021-04-28

## 2020-10-19 RX ORDER — HYDROXYZINE 50 MG/1
50 TABLET, FILM COATED ORAL
Qty: 30 TABLET | Refills: 0 | Status: SHIPPED | OUTPATIENT
Start: 2020-10-19 | End: 2020-11-03

## 2020-11-02 DIAGNOSIS — F41.9 ANXIETY AND DEPRESSION: ICD-10-CM

## 2020-11-02 DIAGNOSIS — F32.A ANXIETY AND DEPRESSION: ICD-10-CM

## 2020-11-03 DIAGNOSIS — K59.00 CONSTIPATION, UNSPECIFIED CONSTIPATION TYPE: ICD-10-CM

## 2020-11-03 RX ORDER — DOCUSATE SODIUM 100 MG
CAPSULE ORAL
Qty: 10 CAPSULE | Refills: 0 | Status: SHIPPED | OUTPATIENT
Start: 2020-11-03 | End: 2020-11-29

## 2020-11-03 RX ORDER — HYDROXYZINE 50 MG/1
TABLET, FILM COATED ORAL
Qty: 90 TABLET | Refills: 1 | Status: SHIPPED | OUTPATIENT
Start: 2020-11-03 | End: 2021-04-26

## 2020-11-28 DIAGNOSIS — K59.00 CONSTIPATION, UNSPECIFIED CONSTIPATION TYPE: ICD-10-CM

## 2020-11-29 RX ORDER — DOCUSATE SODIUM 100 MG
CAPSULE ORAL
Qty: 10 CAPSULE | Refills: 0 | Status: SHIPPED | OUTPATIENT
Start: 2020-11-29 | End: 2021-05-17

## 2021-01-09 DIAGNOSIS — I10 ESSENTIAL HYPERTENSION: ICD-10-CM

## 2021-01-10 DIAGNOSIS — I10 ESSENTIAL HYPERTENSION: ICD-10-CM

## 2021-01-11 RX ORDER — ATENOLOL 50 MG/1
TABLET ORAL
Qty: 90 TABLET | Refills: 0 | Status: SHIPPED | OUTPATIENT
Start: 2021-01-11 | End: 2021-03-30

## 2021-01-11 RX ORDER — LISINOPRIL AND HYDROCHLOROTHIAZIDE 20; 12.5 MG/1; MG/1
TABLET ORAL
Qty: 90 TABLET | Refills: 0 | Status: SHIPPED | OUTPATIENT
Start: 2021-01-11 | End: 2021-03-30

## 2021-01-22 DIAGNOSIS — E11.40 TYPE 2 DIABETES MELLITUS WITH DIABETIC NEUROPATHY, WITH LONG-TERM CURRENT USE OF INSULIN (HCC): ICD-10-CM

## 2021-01-22 DIAGNOSIS — Z79.4 TYPE 2 DIABETES MELLITUS WITH DIABETIC NEUROPATHY, WITH LONG-TERM CURRENT USE OF INSULIN (HCC): ICD-10-CM

## 2021-01-22 RX ORDER — BLOOD SUGAR DIAGNOSTIC
STRIP MISCELLANEOUS
Qty: 500 EACH | Refills: 2 | Status: SHIPPED | OUTPATIENT
Start: 2021-01-22 | End: 2021-01-22

## 2021-01-22 RX ORDER — BLOOD SUGAR DIAGNOSTIC
STRIP MISCELLANEOUS
Qty: 100 EACH | Refills: 3 | Status: SHIPPED | OUTPATIENT
Start: 2021-01-22 | End: 2021-06-15

## 2021-02-21 DIAGNOSIS — E11.40 TYPE 2 DIABETES MELLITUS WITH DIABETIC NEUROPATHY, WITH LONG-TERM CURRENT USE OF INSULIN (HCC): ICD-10-CM

## 2021-02-21 DIAGNOSIS — E11.65 TYPE 2 DIABETES MELLITUS WITH HYPERGLYCEMIA, WITH LONG-TERM CURRENT USE OF INSULIN (HCC): ICD-10-CM

## 2021-02-21 DIAGNOSIS — Z79.4 TYPE 2 DIABETES MELLITUS WITH DIABETIC NEUROPATHY, WITH LONG-TERM CURRENT USE OF INSULIN (HCC): ICD-10-CM

## 2021-02-21 DIAGNOSIS — Z79.4 TYPE 2 DIABETES MELLITUS WITH HYPERGLYCEMIA, WITH LONG-TERM CURRENT USE OF INSULIN (HCC): ICD-10-CM

## 2021-02-22 ENCOUNTER — TELEPHONE (OUTPATIENT)
Dept: FAMILY MEDICINE CLINIC | Facility: CLINIC | Age: 46
End: 2021-02-22

## 2021-02-23 DIAGNOSIS — Z79.4 TYPE 2 DIABETES MELLITUS WITH HYPERGLYCEMIA, WITH LONG-TERM CURRENT USE OF INSULIN (HCC): Primary | ICD-10-CM

## 2021-02-23 DIAGNOSIS — E11.65 TYPE 2 DIABETES MELLITUS WITH HYPERGLYCEMIA, WITH LONG-TERM CURRENT USE OF INSULIN (HCC): Primary | ICD-10-CM

## 2021-02-23 RX ORDER — PEN NEEDLE, DIABETIC 32 GX 1/4"
NEEDLE, DISPOSABLE MISCELLANEOUS
Qty: 100 EACH | Refills: 3 | Status: SHIPPED | OUTPATIENT
Start: 2021-02-23

## 2021-02-23 RX ORDER — LANCETS 33 GAUGE
EACH MISCELLANEOUS
Qty: 100 EACH | Refills: 3 | Status: SHIPPED | OUTPATIENT
Start: 2021-02-23

## 2021-02-24 ENCOUNTER — TELEPHONE (OUTPATIENT)
Dept: FAMILY MEDICINE CLINIC | Facility: CLINIC | Age: 46
End: 2021-02-24

## 2021-02-24 RX ORDER — PEN NEEDLE, DIABETIC 29 G X1/2"
NEEDLE, DISPOSABLE MISCELLANEOUS
Qty: 100 EACH | Refills: 3 | Status: SHIPPED | OUTPATIENT
Start: 2021-02-24 | End: 2021-10-22 | Stop reason: SDUPTHER

## 2021-02-25 ENCOUNTER — TELEPHONE (OUTPATIENT)
Dept: FAMILY MEDICINE CLINIC | Facility: CLINIC | Age: 46
End: 2021-02-25

## 2021-02-26 ENCOUNTER — TELEPHONE (OUTPATIENT)
Dept: FAMILY MEDICINE CLINIC | Facility: CLINIC | Age: 46
End: 2021-02-26

## 2021-02-26 NOTE — TELEPHONE ENCOUNTER
Ely Ragsdale,  If this is an urgent task I am happy to address, if not, please address at your weekly continuity hours

## 2021-02-26 NOTE — TELEPHONE ENCOUNTER
This form we received looks as though it may be some type of scam  The medications listed on the form are not on the patient's medication list and the prescriber's name with our address is not from our office and does not appear in the patient's chart at all

## 2021-03-01 NOTE — TELEPHONE ENCOUNTER
Hello,     I am not sure how urgent this is  I will not be in clinic for nights  until next week  If it is not urgent, I will address it then       Thank you,  Elder Camacho

## 2021-03-03 ENCOUNTER — TELEPHONE (OUTPATIENT)
Dept: FAMILY MEDICINE CLINIC | Facility: CLINIC | Age: 46
End: 2021-03-03

## 2021-03-03 NOTE — TELEPHONE ENCOUNTER
Patient requires a form to be completed  Patient is aware of 5-7 business day turn around time  Please refer to the following information:       Type of Form: omnipod certificate of medical necessity form    Date of Visit (if applicable): 27/41    Doctor: WAYLON Castaneda    Expected date: How patient would like to receive form: faxed    Fax number: [phone]    Patient phone number:        Copy scanned to encounter  Copy provided to patient  Original in white team folder to be completed          LVM for patient to schedule an appointment in regards to form

## 2021-03-03 NOTE — TELEPHONE ENCOUNTER
Spoke with patient in regards to the refill request that continuously comes to us from [facility]  She has confirmed that she does not use this pharmacy and does not want her prescriptions sent to them  She will continue to use [facility] in [facility]  Papers were put into shred bin

## 2021-03-09 NOTE — TELEPHONE ENCOUNTER
Hi,     This form needs to be filled by the physician who wrote the original  script  Could you please complete and sign  Placed in white folder   Thank you

## 2021-03-12 NOTE — TELEPHONE ENCOUNTER
Hello   Certificate of Medical Necessity Form was completed and faxed  The form is in to be scanned folder  Thank you

## 2021-03-15 NOTE — TELEPHONE ENCOUNTER
Patient states this is a SCAM, she has received another phone call in regards to this but another provider  Patient States there is ongoing investigation with a [facility] DR MARCOS MIGUEL in [facility] with prosecuter office  Patient states she is also getting Phone Calls  States someone has gotten a hold of her information   This has been an ongoing issue for the past 3 months

## 2021-03-16 ENCOUNTER — TELEPHONE (OUTPATIENT)
Dept: FAMILY MEDICINE CLINIC | Facility: CLINIC | Age: 46
End: 2021-03-16

## 2021-03-26 DIAGNOSIS — I10 ESSENTIAL HYPERTENSION: ICD-10-CM

## 2021-03-30 RX ORDER — LISINOPRIL AND HYDROCHLOROTHIAZIDE 20; 12.5 MG/1; MG/1
TABLET ORAL
Qty: 90 TABLET | Refills: 0 | Status: SHIPPED | OUTPATIENT
Start: 2021-03-30 | End: 2021-05-17

## 2021-03-30 RX ORDER — ATENOLOL 50 MG/1
TABLET ORAL
Qty: 90 TABLET | Refills: 0 | Status: SHIPPED | OUTPATIENT
Start: 2021-03-30 | End: 2022-04-28

## 2021-04-20 ENCOUNTER — PATIENT OUTREACH (OUTPATIENT)
Dept: FAMILY MEDICINE CLINIC | Facility: CLINIC | Age: 46
End: 2021-04-20

## 2021-04-20 NOTE — PROGRESS NOTES
Pt called regarding the need for a follow up appointment  Pt is also in need of blood work  In basket message sent to Dr Radha Farmer   Appointment scheduled for 5/7 at 1 pm

## 2021-04-22 DIAGNOSIS — Z79.4 TYPE 2 DIABETES MELLITUS WITH HYPERGLYCEMIA, WITH LONG-TERM CURRENT USE OF INSULIN (HCC): Primary | ICD-10-CM

## 2021-04-22 DIAGNOSIS — E11.65 TYPE 2 DIABETES MELLITUS WITH HYPERGLYCEMIA, WITH LONG-TERM CURRENT USE OF INSULIN (HCC): Primary | ICD-10-CM

## 2021-04-22 DIAGNOSIS — I10 ESSENTIAL HYPERTENSION: ICD-10-CM

## 2021-04-25 DIAGNOSIS — F41.9 ANXIETY AND DEPRESSION: ICD-10-CM

## 2021-04-25 DIAGNOSIS — F32.A ANXIETY AND DEPRESSION: ICD-10-CM

## 2021-04-26 RX ORDER — HYDROXYZINE 50 MG/1
TABLET, FILM COATED ORAL
Qty: 30 TABLET | Refills: 5 | Status: SHIPPED | OUTPATIENT
Start: 2021-04-26 | End: 2022-04-28

## 2021-04-27 ENCOUNTER — PATIENT OUTREACH (OUTPATIENT)
Dept: FAMILY MEDICINE CLINIC | Facility: CLINIC | Age: 46
End: 2021-04-27

## 2021-04-27 NOTE — PROGRESS NOTES
Lab slips printed and mailed to patient  Reminded patient of upcoming appointment  Advised to get lab work one week prior to appointment  CM contact information included

## 2021-04-28 DIAGNOSIS — Z79.4 TYPE 2 DIABETES MELLITUS WITH HYPERGLYCEMIA, WITH LONG-TERM CURRENT USE OF INSULIN (HCC): ICD-10-CM

## 2021-04-28 DIAGNOSIS — E11.65 TYPE 2 DIABETES MELLITUS WITH HYPERGLYCEMIA, WITH LONG-TERM CURRENT USE OF INSULIN (HCC): ICD-10-CM

## 2021-04-28 RX ORDER — INSULIN GLARGINE 100 [IU]/ML
30 INJECTION, SOLUTION SUBCUTANEOUS DAILY
Qty: 15 ML | Refills: 3 | Status: SHIPPED | OUTPATIENT
Start: 2021-04-28 | End: 2021-12-17

## 2021-05-17 DIAGNOSIS — I10 ESSENTIAL HYPERTENSION: ICD-10-CM

## 2021-05-17 DIAGNOSIS — K59.00 CONSTIPATION, UNSPECIFIED CONSTIPATION TYPE: ICD-10-CM

## 2021-05-17 RX ORDER — LISINOPRIL AND HYDROCHLOROTHIAZIDE 20; 12.5 MG/1; MG/1
TABLET ORAL
Qty: 90 TABLET | Refills: 0 | Status: SHIPPED | OUTPATIENT
Start: 2021-05-17 | End: 2021-09-23

## 2021-05-17 RX ORDER — DOCUSATE SODIUM 100 MG
CAPSULE ORAL
Qty: 10 CAPSULE | Refills: 0 | Status: SHIPPED | OUTPATIENT
Start: 2021-05-17 | End: 2021-06-10

## 2021-06-10 DIAGNOSIS — K59.00 CONSTIPATION, UNSPECIFIED CONSTIPATION TYPE: ICD-10-CM

## 2021-06-10 RX ORDER — DOCUSATE SODIUM 100 MG
CAPSULE ORAL
Qty: 10 CAPSULE | Refills: 0 | Status: SHIPPED | OUTPATIENT
Start: 2021-06-10 | End: 2022-03-24 | Stop reason: SDUPTHER

## 2021-06-15 DIAGNOSIS — E11.40 TYPE 2 DIABETES MELLITUS WITH DIABETIC NEUROPATHY, WITH LONG-TERM CURRENT USE OF INSULIN (HCC): ICD-10-CM

## 2021-06-15 DIAGNOSIS — Z79.4 TYPE 2 DIABETES MELLITUS WITH DIABETIC NEUROPATHY, WITH LONG-TERM CURRENT USE OF INSULIN (HCC): ICD-10-CM

## 2021-06-15 RX ORDER — BLOOD SUGAR DIAGNOSTIC
STRIP MISCELLANEOUS
Qty: 100 STRIP | Refills: 3 | Status: SHIPPED | OUTPATIENT
Start: 2021-06-15 | End: 2021-12-17

## 2021-06-19 DIAGNOSIS — Z79.4 TYPE 2 DIABETES MELLITUS WITH HYPERGLYCEMIA, WITH LONG-TERM CURRENT USE OF INSULIN (HCC): ICD-10-CM

## 2021-06-19 DIAGNOSIS — E11.65 TYPE 2 DIABETES MELLITUS WITH HYPERGLYCEMIA, WITH LONG-TERM CURRENT USE OF INSULIN (HCC): ICD-10-CM

## 2021-08-26 ENCOUNTER — TELEPHONE (OUTPATIENT)
Dept: FAMILY MEDICINE CLINIC | Facility: CLINIC | Age: 46
End: 2021-08-26

## 2021-08-27 NOTE — TELEPHONE ENCOUNTER
Placed folder in white team-needs attention folder at   Did not complete form since I am unsure what disability this form is for  I have personally never seen the patient and don't see any disability reason noted in chart  Called pt to clarify but no answer so left VM asking pt to call back  If disability is for any psychiatric reason, pt should contact her psychiatrist to have this form filled out  If it is for non-psychiatric reason, pt needs to be seen in clinic first before form can be completed  Thank you!

## 2021-09-01 NOTE — TELEPHONE ENCOUNTER
Called patient and scheduled an appt for 9/14   Scheduled appt with Dr Kassandra Hernandez, putting form in white folder at nurse station

## 2021-09-02 NOTE — TELEPHONE ENCOUNTER
Dr Garrison Diamond; med-1 form placed in white folder for attention needed pt has  appt  09/14/2021  Thanks

## 2021-09-21 NOTE — TELEPHONE ENCOUNTER
Pt no-showed to appt 9/14  Sticky note attached to form and placed back in white team clinical folder if patient reschedules for examination

## 2021-09-23 NOTE — TELEPHONE ENCOUNTER
Left message on machine for patient to call back to schedule appointment for Med 1 form     Placed in [facility]

## 2021-10-22 DIAGNOSIS — K59.00 CONSTIPATION, UNSPECIFIED CONSTIPATION TYPE: ICD-10-CM

## 2021-10-22 DIAGNOSIS — Z79.4 TYPE 2 DIABETES MELLITUS WITH HYPERGLYCEMIA, WITH LONG-TERM CURRENT USE OF INSULIN (HCC): ICD-10-CM

## 2021-10-22 DIAGNOSIS — E11.65 TYPE 2 DIABETES MELLITUS WITH HYPERGLYCEMIA, WITH LONG-TERM CURRENT USE OF INSULIN (HCC): ICD-10-CM

## 2021-10-22 RX ORDER — BLOOD SUGAR DIAGNOSTIC
STRIP MISCELLANEOUS
Qty: 100 EACH | Refills: 3 | Status: SHIPPED | OUTPATIENT
Start: 2021-10-22 | End: 2021-11-15

## 2021-11-11 NOTE — TELEPHONE ENCOUNTER
Called again to schedule CPE  Reached voicemail  Left msg       Placing form back in "white needs attention folder" at

## 2021-11-13 DIAGNOSIS — E11.65 TYPE 2 DIABETES MELLITUS WITH HYPERGLYCEMIA, WITH LONG-TERM CURRENT USE OF INSULIN (HCC): ICD-10-CM

## 2021-11-13 DIAGNOSIS — Z79.4 TYPE 2 DIABETES MELLITUS WITH HYPERGLYCEMIA, WITH LONG-TERM CURRENT USE OF INSULIN (HCC): ICD-10-CM

## 2021-11-15 RX ORDER — BLOOD SUGAR DIAGNOSTIC
STRIP MISCELLANEOUS
Qty: 100 EACH | Refills: 3 | Status: SHIPPED | OUTPATIENT
Start: 2021-11-15 | End: 2022-03-09

## 2021-11-18 DIAGNOSIS — F31.0 BIPOLAR DISORDER, CURRENT EPISODE HYPOMANIC (HCC): ICD-10-CM

## 2021-11-18 RX ORDER — GABAPENTIN 800 MG/1
TABLET ORAL
Qty: 90 TABLET | Refills: 1 | OUTPATIENT
Start: 2021-11-18

## 2021-11-22 DIAGNOSIS — I10 ESSENTIAL HYPERTENSION: ICD-10-CM

## 2021-11-23 RX ORDER — LISINOPRIL AND HYDROCHLOROTHIAZIDE 20; 12.5 MG/1; MG/1
TABLET ORAL
Qty: 30 TABLET | Refills: 0 | OUTPATIENT
Start: 2021-11-23

## 2021-12-09 ENCOUNTER — HOSPITAL ENCOUNTER (EMERGENCY)
Facility: HOSPITAL | Age: 46
Discharge: HOME | End: 2021-12-09
Attending: EMERGENCY MEDICINE | Admitting: EMERGENCY MEDICINE
Payer: COMMERCIAL

## 2021-12-09 ENCOUNTER — APPOINTMENT (EMERGENCY)
Dept: RADIOLOGY | Facility: HOSPITAL | Age: 46
End: 2021-12-09
Payer: COMMERCIAL

## 2021-12-09 ENCOUNTER — TELEPHONE (OUTPATIENT)
Dept: FAMILY MEDICINE CLINIC | Facility: CLINIC | Age: 46
End: 2021-12-09

## 2021-12-09 VITALS
BODY MASS INDEX: 41.63 KG/M2 | OXYGEN SATURATION: 94 % | HEART RATE: 76 BPM | TEMPERATURE: 98.2 F | WEIGHT: 235 LBS | SYSTOLIC BLOOD PRESSURE: 139 MMHG | DIASTOLIC BLOOD PRESSURE: 71 MMHG | RESPIRATION RATE: 20 BRPM

## 2021-12-09 DIAGNOSIS — R73.9 HYPERGLYCEMIA: ICD-10-CM

## 2021-12-09 DIAGNOSIS — U07.1 COVID-19 VIRUS INFECTION: ICD-10-CM

## 2021-12-09 DIAGNOSIS — R11.10 VOMITING: Primary | ICD-10-CM

## 2021-12-09 DIAGNOSIS — F11.20 OPIATE DEPENDENCE, CONTINUOUS (HCC): ICD-10-CM

## 2021-12-09 LAB
ALBUMIN SERPL BCP-MCNC: 3.1 G/DL (ref 3.5–5)
ALP SERPL-CCNC: 71 U/L (ref 46–116)
ALT SERPL W P-5'-P-CCNC: 34 U/L (ref 12–78)
AMPHETAMINES SERPL QL SCN: NEGATIVE
ANION GAP SERPL CALCULATED.3IONS-SCNC: 2 MMOL/L (ref 4–13)
AST SERPL W P-5'-P-CCNC: 21 U/L (ref 5–45)
BARBITURATES UR QL: NEGATIVE
BASOPHILS # BLD AUTO: 0.02 THOUSANDS/ΜL (ref 0–0.1)
BASOPHILS NFR BLD AUTO: 0 % (ref 0–1)
BENZODIAZ UR QL: NEGATIVE
BILIRUB SERPL-MCNC: 0.44 MG/DL (ref 0.2–1)
BUN SERPL-MCNC: 33 MG/DL (ref 5–25)
CALCIUM ALBUM COR SERPL-MCNC: 8.9 MG/DL (ref 8.3–10.1)
CALCIUM SERPL-MCNC: 8.2 MG/DL (ref 8.3–10.1)
CHLORIDE SERPL-SCNC: 97 MMOL/L (ref 100–108)
CO2 SERPL-SCNC: 31 MMOL/L (ref 21–32)
COCAINE UR QL: NEGATIVE
CREAT SERPL-MCNC: 1.34 MG/DL (ref 0.6–1.3)
EOSINOPHIL # BLD AUTO: 0.07 THOUSAND/ΜL (ref 0–0.61)
EOSINOPHIL NFR BLD AUTO: 1 % (ref 0–6)
ERYTHROCYTE [DISTWIDTH] IN BLOOD BY AUTOMATED COUNT: 13.2 % (ref 11.6–15.1)
EXT PREG TEST URINE: NEGATIVE
EXT. CONTROL ED NAV: NORMAL
FLUAV RNA RESP QL NAA+PROBE: NEGATIVE
FLUBV RNA RESP QL NAA+PROBE: NEGATIVE
GFR SERPL CREATININE-BSD FRML MDRD: 48 ML/MIN/1.73SQ M
GLUCOSE SERPL-MCNC: 179 MG/DL (ref 65–140)
GLUCOSE SERPL-MCNC: 207 MG/DL (ref 65–140)
GLUCOSE SERPL-MCNC: 215 MG/DL (ref 65–140)
HCT VFR BLD AUTO: 42.6 % (ref 34.8–46.1)
HGB BLD-MCNC: 14.2 G/DL (ref 11.5–15.4)
IMM GRANULOCYTES # BLD AUTO: 0.03 THOUSAND/UL (ref 0–0.2)
IMM GRANULOCYTES NFR BLD AUTO: 1 % (ref 0–2)
LIPASE SERPL-CCNC: 36 U/L (ref 73–393)
LYMPHOCYTES # BLD AUTO: 1.87 THOUSANDS/ΜL (ref 0.6–4.47)
LYMPHOCYTES NFR BLD AUTO: 35 % (ref 14–44)
MAGNESIUM SERPL-MCNC: 1.7 MG/DL (ref 1.6–2.6)
MCH RBC QN AUTO: 28.9 PG (ref 26.8–34.3)
MCHC RBC AUTO-ENTMCNC: 33.3 G/DL (ref 31.4–37.4)
MCV RBC AUTO: 87 FL (ref 82–98)
METHADONE UR QL: POSITIVE
MONOCYTES # BLD AUTO: 0.8 THOUSAND/ΜL (ref 0.17–1.22)
MONOCYTES NFR BLD AUTO: 15 % (ref 4–12)
NEUTROPHILS # BLD AUTO: 2.55 THOUSANDS/ΜL (ref 1.85–7.62)
NEUTS SEG NFR BLD AUTO: 48 % (ref 43–75)
NRBC BLD AUTO-RTO: 0 /100 WBCS
OPIATES UR QL SCN: POSITIVE
OXYCODONE+OXYMORPHONE UR QL SCN: NEGATIVE
PCP UR QL: NEGATIVE
PLATELET # BLD AUTO: 160 THOUSANDS/UL (ref 149–390)
PMV BLD AUTO: 11 FL (ref 8.9–12.7)
POTASSIUM SERPL-SCNC: 3.7 MMOL/L (ref 3.5–5.3)
PROT SERPL-MCNC: 7.7 G/DL (ref 6.4–8.2)
RBC # BLD AUTO: 4.91 MILLION/UL (ref 3.81–5.12)
RSV RNA RESP QL NAA+PROBE: NEGATIVE
SARS-COV-2 RNA RESP QL NAA+PROBE: POSITIVE
SODIUM SERPL-SCNC: 130 MMOL/L (ref 136–145)
THC UR QL: NEGATIVE
VALPROATE SERPL-MCNC: 40.2 UG/ML (ref 50–100)
WBC # BLD AUTO: 5.34 THOUSAND/UL (ref 4.31–10.16)

## 2021-12-09 PROCEDURE — 83690 ASSAY OF LIPASE: CPT | Performed by: EMERGENCY MEDICINE

## 2021-12-09 PROCEDURE — 71045 X-RAY EXAM CHEST 1 VIEW: CPT

## 2021-12-09 PROCEDURE — 80164 ASSAY DIPROPYLACETIC ACD TOT: CPT | Performed by: EMERGENCY MEDICINE

## 2021-12-09 PROCEDURE — 96361 HYDRATE IV INFUSION ADD-ON: CPT

## 2021-12-09 PROCEDURE — 96375 TX/PRO/DX INJ NEW DRUG ADDON: CPT

## 2021-12-09 PROCEDURE — 70450 CT HEAD/BRAIN W/O DYE: CPT

## 2021-12-09 PROCEDURE — 99284 EMERGENCY DEPT VISIT MOD MDM: CPT | Performed by: EMERGENCY MEDICINE

## 2021-12-09 PROCEDURE — 81025 URINE PREGNANCY TEST: CPT | Performed by: EMERGENCY MEDICINE

## 2021-12-09 PROCEDURE — 99284 EMERGENCY DEPT VISIT MOD MDM: CPT

## 2021-12-09 PROCEDURE — G1004 CDSM NDSC: HCPCS

## 2021-12-09 PROCEDURE — 82948 REAGENT STRIP/BLOOD GLUCOSE: CPT

## 2021-12-09 PROCEDURE — 85025 COMPLETE CBC W/AUTO DIFF WBC: CPT | Performed by: EMERGENCY MEDICINE

## 2021-12-09 PROCEDURE — 0241U HB NFCT DS VIR RESP RNA 4 TRGT: CPT | Performed by: EMERGENCY MEDICINE

## 2021-12-09 PROCEDURE — 36415 COLL VENOUS BLD VENIPUNCTURE: CPT | Performed by: EMERGENCY MEDICINE

## 2021-12-09 PROCEDURE — 80053 COMPREHEN METABOLIC PANEL: CPT | Performed by: EMERGENCY MEDICINE

## 2021-12-09 PROCEDURE — 96374 THER/PROPH/DIAG INJ IV PUSH: CPT

## 2021-12-09 PROCEDURE — 80307 DRUG TEST PRSMV CHEM ANLYZR: CPT | Performed by: EMERGENCY MEDICINE

## 2021-12-09 PROCEDURE — 83735 ASSAY OF MAGNESIUM: CPT | Performed by: EMERGENCY MEDICINE

## 2021-12-09 RX ORDER — ONDANSETRON 2 MG/ML
4 INJECTION INTRAMUSCULAR; INTRAVENOUS ONCE
Status: COMPLETED | OUTPATIENT
Start: 2021-12-09 | End: 2021-12-09

## 2021-12-09 RX ORDER — ONDANSETRON 4 MG/1
4 TABLET, ORALLY DISINTEGRATING ORAL EVERY 6 HOURS PRN
Qty: 20 TABLET | Refills: 0 | Status: SHIPPED | OUTPATIENT
Start: 2021-12-09 | End: 2022-03-24

## 2021-12-09 RX ADMIN — FAMOTIDINE 20 MG: 10 INJECTION, SOLUTION INTRAVENOUS at 10:26

## 2021-12-09 RX ADMIN — ONDANSETRON 4 MG: 2 INJECTION INTRAMUSCULAR; INTRAVENOUS at 10:28

## 2021-12-09 RX ADMIN — SODIUM CHLORIDE 1000 ML: 0.9 INJECTION, SOLUTION INTRAVENOUS at 10:25

## 2021-12-10 ENCOUNTER — TELEMEDICINE (OUTPATIENT)
Dept: FAMILY MEDICINE CLINIC | Facility: CLINIC | Age: 46
End: 2021-12-10

## 2021-12-10 DIAGNOSIS — R11.2 INTRACTABLE NAUSEA AND VOMITING: ICD-10-CM

## 2021-12-10 DIAGNOSIS — U07.1 COVID-19 VIRUS INFECTION: Primary | ICD-10-CM

## 2021-12-10 DIAGNOSIS — K90.49 INTOLERANCE, FOOD: ICD-10-CM

## 2021-12-10 RX ORDER — SODIUM CHLORIDE 9 MG/ML
20 INJECTION, SOLUTION INTRAVENOUS ONCE
Status: CANCELLED | OUTPATIENT
Start: 2021-12-10

## 2021-12-10 RX ORDER — ALBUTEROL SULFATE 90 UG/1
3 AEROSOL, METERED RESPIRATORY (INHALATION) ONCE AS NEEDED
Status: CANCELLED | OUTPATIENT
Start: 2021-12-10

## 2021-12-10 RX ORDER — ACETAMINOPHEN 325 MG/1
650 TABLET ORAL ONCE AS NEEDED
Status: CANCELLED | OUTPATIENT
Start: 2021-12-10

## 2021-12-10 RX ORDER — ONDANSETRON 2 MG/ML
4 INJECTION INTRAMUSCULAR; INTRAVENOUS ONCE AS NEEDED
Status: CANCELLED | OUTPATIENT
Start: 2021-12-10

## 2021-12-12 DIAGNOSIS — I10 ESSENTIAL HYPERTENSION: ICD-10-CM

## 2021-12-13 RX ORDER — LISINOPRIL AND HYDROCHLOROTHIAZIDE 20; 12.5 MG/1; MG/1
TABLET ORAL
Qty: 30 TABLET | Refills: 0 | Status: SHIPPED | OUTPATIENT
Start: 2021-12-13 | End: 2021-12-17

## 2021-12-17 DIAGNOSIS — I10 ESSENTIAL HYPERTENSION: ICD-10-CM

## 2021-12-17 DIAGNOSIS — Z79.4 TYPE 2 DIABETES MELLITUS WITH HYPERGLYCEMIA, WITH LONG-TERM CURRENT USE OF INSULIN (HCC): ICD-10-CM

## 2021-12-17 DIAGNOSIS — Z79.4 TYPE 2 DIABETES MELLITUS WITH DIABETIC NEUROPATHY, WITH LONG-TERM CURRENT USE OF INSULIN (HCC): ICD-10-CM

## 2021-12-17 DIAGNOSIS — E11.65 TYPE 2 DIABETES MELLITUS WITH HYPERGLYCEMIA, WITH LONG-TERM CURRENT USE OF INSULIN (HCC): ICD-10-CM

## 2021-12-17 DIAGNOSIS — E11.40 TYPE 2 DIABETES MELLITUS WITH DIABETIC NEUROPATHY, WITH LONG-TERM CURRENT USE OF INSULIN (HCC): ICD-10-CM

## 2021-12-17 RX ORDER — INSULIN GLARGINE 100 [IU]/ML
30 INJECTION, SOLUTION SUBCUTANEOUS DAILY
Qty: 15 ML | Refills: 0 | Status: SHIPPED | OUTPATIENT
Start: 2021-12-17 | End: 2022-04-28

## 2021-12-17 RX ORDER — BLOOD SUGAR DIAGNOSTIC
STRIP MISCELLANEOUS
Qty: 100 STRIP | Refills: 3 | Status: SHIPPED | OUTPATIENT
Start: 2021-12-17 | End: 2022-05-11

## 2021-12-17 RX ORDER — LISINOPRIL AND HYDROCHLOROTHIAZIDE 20; 12.5 MG/1; MG/1
TABLET ORAL
Qty: 30 TABLET | Refills: 1 | Status: SHIPPED | OUTPATIENT
Start: 2021-12-17 | End: 2022-03-03

## 2022-03-09 DIAGNOSIS — Z79.4 TYPE 2 DIABETES MELLITUS WITH HYPERGLYCEMIA, WITH LONG-TERM CURRENT USE OF INSULIN (HCC): ICD-10-CM

## 2022-03-09 DIAGNOSIS — E11.65 TYPE 2 DIABETES MELLITUS WITH HYPERGLYCEMIA, WITH LONG-TERM CURRENT USE OF INSULIN (HCC): ICD-10-CM

## 2022-03-09 RX ORDER — BLOOD SUGAR DIAGNOSTIC
STRIP MISCELLANEOUS
Qty: 100 EACH | Refills: 3 | Status: SHIPPED | OUTPATIENT
Start: 2022-03-09 | End: 2022-06-24

## 2022-03-10 RX ORDER — INSULIN GLARGINE-YFGN 100 [IU]/ML
INJECTION, SOLUTION SUBCUTANEOUS
OUTPATIENT
Start: 2022-03-10

## 2022-03-10 NOTE — TELEPHONE ENCOUNTER
Dr Van Al,    Can patient see another doctor or do you want to see her? She can come oon Turesday but the only opening I have is with Dr Mobley Flatten     Please advise
JANET Ch 
Ok I scheduled her with Dr Vel Neal for Tuesday 3/15 at 2:40 and advised patient that mediation cannot be filled until she comes into the office
Patient needs new insulin glargine sent to pharmacy due to insurance coverage 
Yes, she can see anyone since I have never never met the pt  Pls advise pt to not no show since we will not be able to prescribe till she is seen  Thank you!
Urine Pregnancy Test Result: negative
Performed By: Audrey
Detail Level: None

## 2022-03-24 ENCOUNTER — TELEMEDICINE (OUTPATIENT)
Dept: FAMILY MEDICINE CLINIC | Facility: CLINIC | Age: 47
End: 2022-03-24
Payer: COMMERCIAL

## 2022-03-24 DIAGNOSIS — R11.2 INTRACTABLE VOMITING WITH NAUSEA, UNSPECIFIED VOMITING TYPE: ICD-10-CM

## 2022-03-24 DIAGNOSIS — K59.00 CONSTIPATION, UNSPECIFIED CONSTIPATION TYPE: ICD-10-CM

## 2022-03-24 DIAGNOSIS — U07.1 COVID-19 VIRUS INFECTION: Primary | ICD-10-CM

## 2022-03-24 PROCEDURE — 1036F TOBACCO NON-USER: CPT | Performed by: FAMILY MEDICINE

## 2022-03-24 PROCEDURE — 99213 OFFICE O/P EST LOW 20 MIN: CPT | Performed by: FAMILY MEDICINE

## 2022-03-24 RX ORDER — ONDANSETRON 4 MG/1
4 TABLET, FILM COATED ORAL EVERY 8 HOURS PRN
Qty: 6 TABLET | Refills: 0 | Status: SHIPPED | OUTPATIENT
Start: 2022-03-24 | End: 2022-06-14

## 2022-03-24 RX ORDER — DOCUSATE SODIUM 100 MG/1
100 CAPSULE, LIQUID FILLED ORAL 2 TIMES DAILY
Qty: 10 CAPSULE | Refills: 0 | Status: SHIPPED | OUTPATIENT
Start: 2022-03-24 | End: 2022-06-14

## 2022-03-24 NOTE — PROGRESS NOTES
COVID-19 Outpatient Progress Note    Assessment/Plan:    Problem List Items Addressed This Visit        Endocrine    Type 2 diabetes mellitus with hyperglycemia, with long-term current use of insulin ([facility] [address] )      Other Visit Diagnoses     Encounter for immunization    -  Primary    Need for hepatitis C screening test             Disposition:     Patient is not fully vaccinated and I recommended self quarantine for 5 days followed by strict mask use for an additional 5 days  If patient were to develop symptoms, they should immediately self isolate and call our office for further guidance  I have spent 20 minutes directly with the patient  If pt continues to have symptoms, she needs to seek immediate medical care for in person evaluation  Encounter provider Majo Goins DO    Provider located at [address] [place] [id number]    Recent Visits  No visits were found meeting these conditions  Showing recent visits within past 7 days and meeting all other requirements  Future Appointments  No visits were found meeting these conditions  Showing future appointments within next 150 days and meeting all other requirements     This virtual check-in was done via telephone and she agrees to proceed  Patient agrees to participate in a virtual check in via telephone or video visit instead of presenting to the office to address urgent/immediate medical needs  Patient is aware this is a billable service  After connecting through Telephone, the patient was identified by name and date of birth  Oscar Lawson was informed that this was a telemedicine visit and that the exam was being conducted confidentially over secure lines  Oscar Lawson acknowledged consent and understanding of privacy and security of the telemedicine visit   I informed the patient that I have reviewed her record in Epic and presented the opportunity for her to ask any questions regarding the visit today  The patient agreed to participate  It was my intent to perform this visit via video technology but the patient was not able to do a video connection so the visit was completed via audio telephone only  Verification of patient location:  Patient is located in the following state in which I hold an active license: [place]    Subjective:   Andrews Ward is a 41 y o  female who is concerned about COVID-19  Patient's symptoms include fever, nausea, vomiting and diarrhea   Patient denies shortness of breath and chest tightness      - Date of symptom onset: 3/22/2022      COVID-19 vaccination status: Not vaccinated    Exposure:   Contact with a person who is under investigation (PUI) for or who is positive for COVID-19 within the last 14 days?: No    Hospitalized recently for fever and/or lower respiratory symptoms?: No      Currently a healthcare worker that is involved in direct patient care?: No      Works in a special setting where the risk of COVID-19 transmission may be high? (this may include long-term care, correctional and nursing home facilities; homeless shelters; assisted-living facilities and group homes ): No      Resident in a special setting where the risk of COVID-19 transmission may be high? (this may include long-term care, correctional and nursing home facilities; homeless shelters; assisted-living facilities and group homes ): No      Lab Results   Component Value Date    SARSCOV2 Positive (A) 12/09/2021     Past Medical History:   Diagnosis Date    Anxiety     Asthma     Cholelithiases 2/5/2020    Added automatically from request for surgery [id number]    Depression     Diabetes mellitus ([facility] [address] )     Hypertension     Neuropathy     Psychiatric disorder      Past Surgical History:   Procedure Laterality Date    CHOLECYSTECTOMY LAPAROSCOPIC N/A 2/10/2020    Procedure: CHOLECYSTECTOMY LAPAROSCOPIC;  Surgeon: Mel Viera MD;  Location: [address] [place] [address];  Service: General  INCISION AND DRAINAGE OF WOUND Right 7/1/2017    Procedure: INCISION AND DRAINAGE (I&D) EXTREMITY THIGH;  Surgeon: Danica Baumann MD;  Location: [facility];  Service: General     Current Outpatient Medications   Medication Sig Dispense Refill    acetaminophen (TYLENOL) 325 mg tablet Take 2 tablets (650 mg total) by mouth every 6 (six) hours as needed for mild pain or fever 30 tablet 0    Admelog 100 UNIT/ML injection INJECT 3 UNITS UNDER THE SKIN 3 (THREE) TIMES A DAY BEFORE MEALS SLIDING SCALE 10 mL 3    atenolol (TENORMIN) 50 mg tablet TAKE 1 TABLET BY MOUTH EVERY DAY 90 tablet 0    Basaglar KwikPen 100 units/mL injection pen INJECT 30 UNITS UNDER THE SKIN DAILY 15 mL 0    BD Pen Needle Micro U/F 32G X 6 MM MISC USE 3 (THREE) TIMES A DAY BEFORE MEALS 100 each 3    clonazePAM (KlonoPIN) 1 mg tablet       divalproex sodium (DEPAKOTE ER) 500 mg 24 hr tablet Take 1 tablet (500 mg total) by mouth 2 (two) times a day 60 tablet 0    docusate sodium ([employer] Stool Softener) 100 mg capsule Take 1 capsule (100 mg total) by mouth 2 (two) times a day 10 capsule 0    gabapentin (NEURONTIN) 800 mg tablet       hydrOXYzine HCL (ATARAX) 50 mg tablet TAKE 1 TABLET BY MOUTH EVERYDAY AT BEDTIME 30 tablet 5    INS SYRINGE/NEEDLE 1CC/28G (B-D INS SYR MICROFINE 1CC/28G) 28G X 1/2" 1 ML MISC by Does not apply route 3 (three) times a day with meals 100 each 3    Insulin Syringe-Needle U-100 (BD Insulin Syringe U/F) 31G X 5/16" 0 3 ML MISC USE AS DIRECTED 100 each 3    lisinopril-hydrochlorothiazide (PRINZIDE,ZESTORETIC) 20-12 5 MG per tablet TAKE 1 TABLET BY MOUTH EVERY DAY 30 tablet 0    methadone (DOLOPHINE) 10 mg/mL oral concentrated solution Take 120 mg by mouth daily Methadone clinic      ondansetron (Zofran ODT) 4 mg disintegrating tablet Take 1 tablet (4 mg total) by mouth every 6 (six) hours as needed for nausea or vomiting 20 tablet 0    ondansetron (ZOFRAN-ODT) 4 mg disintegrating tablet Take 1 tablet (4 mg total) by mouth every 6 (six) hours as needed for nausea or vomiting 20 tablet 0    OneTouch Delica Lancets 03I MISC CHECK 3 TIMES DAILY PRIOR TO MEALS 100 each 3    OneTouch Ultra test strip TEST 3 TIMES A DAY PRIOR TO MEALS 100 strip 3    prazosin (MINIPRESS) 1 mg capsule Take 3 capsules (3 mg total) by mouth daily at bedtime 90 capsule 2    prazosin (MINIPRESS) 5 mg capsule       senna-docusate sodium (SENOKOT S) 8 6-50 mg per tablet Take 1 tablet by mouth daily 20 tablet 0    venlafaxine (EFFEXOR-XR) 150 mg 24 hr capsule Take 300 mg by mouth daily        No current facility-administered medications for this visit  Allergies   Allergen Reactions    Codeine Other (See Comments)    Other Other (See Comments)    Septra [Sulfamethoxazole-Trimethoprim] Hives       Review of Systems   Constitutional: Positive for fever  Respiratory: Negative for chest tightness and shortness of breath  Gastrointestinal: Positive for diarrhea, nausea and vomiting  VIRTUAL VISIT DISCLAIMER    Marcia Barron verbally agrees to participate in [employer]  Pt is aware that [employer] could be limited without vital signs or the ability to perform a full hands-on physical exam  Marcia Barron understands she or the provider may request at any time to terminate the video visit and request the patient to seek care or treatment in person

## 2022-03-31 DIAGNOSIS — I10 ESSENTIAL HYPERTENSION: ICD-10-CM

## 2022-03-31 RX ORDER — LISINOPRIL AND HYDROCHLOROTHIAZIDE 20; 12.5 MG/1; MG/1
TABLET ORAL
Qty: 30 TABLET | Refills: 0 | OUTPATIENT
Start: 2022-03-31

## 2022-04-28 ENCOUNTER — OFFICE VISIT (OUTPATIENT)
Dept: FAMILY MEDICINE CLINIC | Facility: CLINIC | Age: 47
End: 2022-04-28
Payer: COMMERCIAL

## 2022-04-28 VITALS
DIASTOLIC BLOOD PRESSURE: 96 MMHG | HEART RATE: 103 BPM | TEMPERATURE: 94.8 F | BODY MASS INDEX: 42.52 KG/M2 | WEIGHT: 240 LBS | SYSTOLIC BLOOD PRESSURE: 158 MMHG | OXYGEN SATURATION: 98 % | HEIGHT: 63 IN | RESPIRATION RATE: 18 BRPM

## 2022-04-28 DIAGNOSIS — F11.20 METHADONE MAINTENANCE THERAPY PATIENT (HCC): ICD-10-CM

## 2022-04-28 DIAGNOSIS — M23.91 INTERNAL DERANGEMENT OF RIGHT KNEE: ICD-10-CM

## 2022-04-28 DIAGNOSIS — Z12.11 SCREENING FOR COLORECTAL CANCER: ICD-10-CM

## 2022-04-28 DIAGNOSIS — Z00.01 ANNUAL VISIT FOR GENERAL ADULT MEDICAL EXAMINATION WITH ABNORMAL FINDINGS: Primary | ICD-10-CM

## 2022-04-28 DIAGNOSIS — F19.11 HISTORY OF DRUG ABUSE (HCC): ICD-10-CM

## 2022-04-28 DIAGNOSIS — I10 ESSENTIAL HYPERTENSION: ICD-10-CM

## 2022-04-28 DIAGNOSIS — Z12.31 ENCOUNTER FOR SCREENING MAMMOGRAM FOR BREAST CANCER: ICD-10-CM

## 2022-04-28 DIAGNOSIS — G47.09 OTHER INSOMNIA: ICD-10-CM

## 2022-04-28 DIAGNOSIS — E66.01 MORBID OBESITY WITH BODY MASS INDEX (BMI) OF 40.0 TO 44.9 IN ADULT (HCC): ICD-10-CM

## 2022-04-28 DIAGNOSIS — Z79.4 TYPE 2 DIABETES MELLITUS WITH HYPERGLYCEMIA, WITH LONG-TERM CURRENT USE OF INSULIN (HCC): ICD-10-CM

## 2022-04-28 DIAGNOSIS — M25.561 PATELLAR TENDERNESS, RIGHT: ICD-10-CM

## 2022-04-28 DIAGNOSIS — R20.2 PARESTHESIA: ICD-10-CM

## 2022-04-28 DIAGNOSIS — Z11.59 ENCOUNTER FOR HEPATITIS C SCREENING TEST FOR LOW RISK PATIENT: ICD-10-CM

## 2022-04-28 DIAGNOSIS — J45.20 MILD INTERMITTENT ASTHMA WITHOUT COMPLICATION: ICD-10-CM

## 2022-04-28 DIAGNOSIS — Z13.228 SCREENING FOR METABOLIC DISORDER: ICD-10-CM

## 2022-04-28 DIAGNOSIS — Z11.4 SCREENING FOR HIV (HUMAN IMMUNODEFICIENCY VIRUS): ICD-10-CM

## 2022-04-28 DIAGNOSIS — Z12.12 SCREENING FOR COLORECTAL CANCER: ICD-10-CM

## 2022-04-28 DIAGNOSIS — E11.65 TYPE 2 DIABETES MELLITUS WITH HYPERGLYCEMIA, WITH LONG-TERM CURRENT USE OF INSULIN (HCC): ICD-10-CM

## 2022-04-28 LAB — SL AMB POCT HEMOGLOBIN AIC: 8.8 (ref ?–6.5)

## 2022-04-28 PROCEDURE — 3008F BODY MASS INDEX DOCD: CPT | Performed by: FAMILY MEDICINE

## 2022-04-28 PROCEDURE — 99214 OFFICE O/P EST MOD 30 MIN: CPT | Performed by: FAMILY MEDICINE

## 2022-04-28 PROCEDURE — 99396 PREV VISIT EST AGE 40-64: CPT | Performed by: FAMILY MEDICINE

## 2022-04-28 PROCEDURE — 83036 HEMOGLOBIN GLYCOSYLATED A1C: CPT | Performed by: FAMILY MEDICINE

## 2022-04-28 PROCEDURE — 3052F HG A1C>EQUAL 8.0%<EQUAL 9.0%: CPT | Performed by: FAMILY MEDICINE

## 2022-04-28 RX ORDER — ALBUTEROL SULFATE 90 UG/1
2 AEROSOL, METERED RESPIRATORY (INHALATION) EVERY 6 HOURS PRN
Qty: 18 G | Refills: 2 | Status: SHIPPED | OUTPATIENT
Start: 2022-04-28

## 2022-04-28 RX ORDER — INSULIN GLARGINE-YFGN 100 [IU]/ML
40 INJECTION, SOLUTION SUBCUTANEOUS
Qty: 3 ML | Refills: 5 | Status: SHIPPED | OUTPATIENT
Start: 2022-04-28 | End: 2022-07-27

## 2022-04-28 RX ORDER — ALBUTEROL SULFATE 90 UG/1
2 AEROSOL, METERED RESPIRATORY (INHALATION) EVERY 6 HOURS PRN
COMMUNITY
End: 2022-04-28 | Stop reason: SDUPTHER

## 2022-04-28 RX ORDER — BLOOD-GLUCOSE METER
KIT MISCELLANEOUS
Qty: 1 KIT | Refills: 0 | Status: SHIPPED | OUTPATIENT
Start: 2022-04-28 | End: 2022-05-31

## 2022-04-28 RX ORDER — LISINOPRIL AND HYDROCHLOROTHIAZIDE 20; 12.5 MG/1; MG/1
1 TABLET ORAL DAILY
Qty: 30 TABLET | Refills: 2 | Status: SHIPPED | OUTPATIENT
Start: 2022-04-28 | End: 2022-08-01

## 2022-04-28 NOTE — PROGRESS NOTES
Assessment/Plan:     Diagnoses and all orders for this visit:      Type 2 diabetes mellitus with hyperglycemia, with long-term current use of insulin ([facility] [address] )  Results for orders placed or performed in visit on 04/28/22   POCT hemoglobin A1c   Result Value Ref Range    Hemoglobin A1C 8 8 (A) 6 5   A1c 9 7 2/8/2020, now improved from prior but not at goal  Continue current insulin regimen- will refill medications today  Re-ordered glucometer  Will refer to endocrinology for further management since pt reports she can not be on oral medications per prior doctor's advise and due to noncompliance with treatment  Will check CBC, CMP to rule out electrolyte abnormalities, hepatic function and anemia 2/2 insulin use and diabetes  Will   Addressed ADA diet   Suggested low cholesterol diet   Weight control and daily exercise discussed   Diabetic Foot Exam done today- normal   Annual Diabetic Eye Exam- due, referral provided   Reminded to bring in blood sugar diary at next visit   Long term diabetic complications discussed   Education: Reviewed ABCs of diabetes management (respective goals in parentheses):  A1C (<7), blood pressure (<130/80), and cholesterol (LDL <100)    -     Ambulatory Referral to Endocrinology; Future  -     Ambulatory Referral to Ophthalmology; Future  -     Comprehensive metabolic panel  -     CBC and differential  -     Blood Glucose Monitoring Suppl (OneTouch Verio Reflect) w/Device KIT; Check blood sugars three times daily  Please substitute with appropriate alternative as covered by patient's insurance  Dx: E11 65  -   Refilled Insulin Glargine-yfgn (Semglee, yfgn,) 100 UNIT/ML SOPN; Inject 40 Units under the skin daily at bedtime  -   Refilled Admelog 100 UNIT/ML injection;  Inject 3 Units under the skin 3 (three) times a day with meals As sliding scale    Essential hypertension  BP elevated today 158/96, secondary to noncompliance with meds for last 2 days due to running out of medications  Will refill prinzide  Will check CMP, mag, phosphorous to rule out electrolyte abnormalities and to assess renal function  Orders:  -     lisinopril-hydrochlorothiazide (PRINZIDE,ZESTORETIC) 20-12 5 MG per tablet; Take 1 tablet by mouth daily  -     Comprehensive metabolic panel; Future  -     Magnesium; Future  -     Phosphorus; Future  -     CBC and differential; Future    Mild intermittent asthma without complication  Stable, albuterol use once a month  -    refilled albuterol (PROVENTIL HFA,VENTOLIN HFA) 90 mcg/act inhaler; Inhale 2 puffs every 6 (six) hours as needed for wheezing or shortness of breath    Paresthesia  Reports onset of paresthesias in bilateral fingertips  Likely secondary to hyperglycemia but will check B12, folate, vit D, Cbc to rule out vitamin-mineral deficiencies and anemia  -     Vitamin B12; Future  -     Folate; Future  -     Vitamin D 25 hydroxy; Future  -     CBC and differential; Future    Internal derangement of right knee  Patellar tenderness, right  Exam positive for joint line tenderness and patellar tenderness  Will get XR  PT referral for quadriceps strengthening    -     XR knee 3 vw right non injury; Future  -     Ambulatory Referral to Physical Therapy; Future    Other insomnia  Reports sleep onset and maintenance  Failed prior tx with ambien, melatonin and herbal supplements  Pt can discuss with psychiatrist if trazodone would be appropriate  In the mean time, will try unisom  -     doxylamine (UNISOM) 25 MG tablet; Take 0 5 tablets (12 5 mg total) by mouth daily at bedtime as needed for sleep    History of drug abuse ([facility] )  Methadone maintenance therapy patient ([facility] )  Hx of heroine, IVDU use  On methadone maintenance therapy from [facility]  Stable            Subjective:      Patient ID: Kei Aviles is a 41 y o  female  HPI  Pt is here for chronic conditions follow up   · Insulin regimen- basaglar 40u QHS, admelog used as sliding scale   BS checked TID- fasting, before lunch and before dinner  Not on po medications because pt reports in the past she was told it is not appropriate for her  · Klonopin for PTSD - sees psychiatrist at  every month, therapy every week - prescribed klonopin, depakote, gabapentin, minipres, effexor by them  · Goes to [place] for heroin IVDU  · Uses Albuterol once a month   · Without antihypertensives for 2 days but otherwise compliant   · Reports right knee pain as if the knee is "popping out"  Hurts going up and down the stairs, bending up and down will cause pain  Denies trauma, injury  · Sleeps poorly  Reports insomnia, has not slept for more than 2-3 hours for 2 days, trouble falling asleep and staying asleep  Tried herbal supplements, ambien, melatonin without help  · Reports tingling sensation in fingertips of both hands    The following portions of the patient's history were reviewed and updated as appropriate: allergies, current medications, past family history, past medical history, past social history, past surgical history and problem list     Review of Systems   Constitutional: Negative for chills and fever  Respiratory: Negative for chest tightness and shortness of breath  Cardiovascular: Negative for chest pain and palpitations  Gastrointestinal: Negative for abdominal pain  Genitourinary: Negative for dysuria  Musculoskeletal: Positive for arthralgias  Skin: Negative for rash  Neurological: Negative for light-headedness and headaches  Psychiatric/Behavioral: Positive for sleep disturbance  Negative for dysphoric mood and suicidal ideas  The patient is not nervous/anxious  Objective:      /96 (BP Location: Left arm, Patient Position: Sitting, Cuff Size: Adult)   Pulse 103   Temp (!) 94 8 °F (34 9 °C) (Tympanic)   Resp 18   Ht 5' 3" (1 6 m)   Wt 109 kg (240 lb)   SpO2 98%   BMI 42 51 kg/m²          Physical Exam  Constitutional:       Appearance: Normal appearance     HENT: Head: Normocephalic and atraumatic  Cardiovascular:      Rate and Rhythm: Normal rate and regular rhythm  Pulses: Normal pulses  no weak pulses          Dorsalis pedis pulses are 2+ on the right side and 2+ on the left side  Posterior tibial pulses are 2+ on the right side and 2+ on the left side  Heart sounds: Normal heart sounds  No murmur heard  Pulmonary:      Effort: Pulmonary effort is normal  No respiratory distress  Breath sounds: Normal breath sounds  No wheezing  Abdominal:      General: Bowel sounds are normal  There is no distension  Palpations: Abdomen is soft  Tenderness: There is no abdominal tenderness  Musculoskeletal:      Right knee: Bony tenderness present  No swelling, deformity, effusion, erythema, ecchymosis, lacerations or crepitus  Normal range of motion  Tenderness present over the medial joint line, lateral joint line and patellar tendon  No MCL, LCL, ACL or PCL tenderness  No LCL laxity, MCL laxity, ACL laxity or PCL laxity  Normal alignment, normal meniscus and normal patellar mobility  Normal pulse  Instability Tests: Anterior drawer test negative  Posterior drawer test negative  Anterior Lachman test negative  Left knee: Normal       Right lower leg: No edema  Left lower leg: No edema  Feet:      Right foot:      Skin integrity: No ulcer, skin breakdown, erythema, warmth, callus or dry skin  Left foot:      Skin integrity: No ulcer, skin breakdown, erythema, warmth, callus or dry skin  Skin:     General: Skin is warm and dry  Neurological:      General: No focal deficit present  Mental Status: She is alert and oriented to person, place, and time  Psychiatric:         Mood and Affect: Mood normal          Behavior: Behavior normal        Patient's shoes and socks removed  Right Foot/Ankle   Right Foot Inspection  Skin Exam: skin normal and skin intact   No dry skin, no warmth, no callus, no erythema, no maceration, no abnormal color, no pre-ulcer, no ulcer and no callus  Toe Exam: ROM and strength within normal limits  Sensory   Vibration: intact  Proprioception: intact  Monofilament testing: intact    Vascular  Capillary refills: < 3 seconds  The right DP pulse is 2+  The right PT pulse is 2+  Left Foot/Ankle  Left Foot Inspection  Skin Exam: skin normal and skin intact  No dry skin, no warmth, no erythema, no maceration, normal color, no pre-ulcer, no ulcer and no callus  Toe Exam: ROM and strength within normal limits  Sensory   Vibration: intact  Proprioception: intact  Monofilament testing: intact    Vascular  Capillary refills: < 3 seconds  The left DP pulse is 2+  The left PT pulse is 2+       Assign Risk Category  No deformity present  No loss of protective sensation  No weak pulses  Risk: 0

## 2022-04-28 NOTE — PROGRESS NOTES
[address] [facility] PRACTICE    NAME: Didi Barron  AGE: 41 y o  SEX: female  : 1975     DATE: 2022     Assessment and Plan:     1  Annual visit for general adult medical examination with abnormal findings    Preventive Services   Colorectal Cancer Screening: colonoscopy ordered    Breast Cancer Screening: due for mammogram   Cervical Cancer Screening: not done since  due to difficulty completing procedure 2/2 PTSD  Reports in the past she has been given medication just prior to procedure to help get through procedure  Will schedule appt today  Pt should call a few days prior to appt to ask for rx pre-procedure- will likely give low dose ativan to be taken at the clinic prior to PAP   Lung Cancer Screening: not indicated   Osteoporosis Screening: not indicated   AAA Screening: not indicated   STD Screening: agreed to HIV and Hep C   Cardiovascular Screening: due    Vaccination indicated: up to date     2  Encounter for hepatitis C screening test for low risk patient  - Hepatitis C antibody; Future  - Hepatitis C antibody    3  Screening for HIV (human immunodeficiency virus)  - HIV 1/2 Antigen/Antibody (4th Generation) w Reflex [facility]; Future    4  Morbid obesity with body mass index (BMI) of 40 0 to 44 9 in adult [facility])  See counseling below  5  Screening for metabolic disorder  - Lipid Panel with Direct LDL reflex; Future  - Lipid Panel with Direct LDL reflex      6  Encounter for screening mammogram for breast cancer  - Mammo screening bilateral w 3d & cad; Future    7  Screening for colorectal cancer  - Ambulatory referral to Colonoscopy       Immunizations and preventive care screenings were discussed with patient today  Appropriate education was printed on patient's after visit summary      Counseling:  Alcohol/drug use: discussed moderation in alcohol intake, the recommendations for healthy alcohol use, and avoidance of illicit drug use  Dental Health: discussed importance of regular tooth brushing, flossing, and dental visits  Injury prevention: discussed safety/seat belts, safety helmets, smoke detectors, carbon dioxide detectors, and smoking near bedding or upholstery  Sexual health: discussed sexually transmitted diseases, partner selection, use of condoms, avoidance of unintended pregnancy, and contraceptive alternatives  · Exercise: the importance of regular exercise/physical activity was discussed  Recommend exercise 3-5 times per week for at least 30 minutes  BMI Counseling: Body mass index is 42 51 kg/m²  The BMI is above normal  Nutrition recommendations include decreasing portion sizes, encouraging healthy choices of fruits and vegetables, decreasing fast food intake, consuming healthier snacks, limiting drinks that contain sugar, moderation in carbohydrate intake, increasing intake of lean protein, reducing intake of saturated and trans fat and reducing intake of cholesterol  Exercise recommendations include moderate physical activity 150 minutes/week  No pharmacotherapy was ordered  Rationale for BMI follow-up plan is due to patient being overweight or obese  Return for Annual GYN with PAP  Chief Complaint:     Chief Complaint   Patient presents with    Physical Exam      History of Present Illness:     Adult Annual Physical   Patient here for a comprehensive physical exam      Diet and Physical Activity  · Diet/Nutrition: limited junk food and consuming 3-5 servings of fruits/vegetables daily  · Exercise: walking  General Health  · Sleep: sleeps poorly  · Hearing: normal - bilateral   · Vision: floaters, color distortion, used to wear glasses  · Last eye exam 4 years ago   · Dental: edentulous, wears dentures  sees dentist yearly         /GYN Health  · Patient is: premenopausal  · Menarche age 18  · Irregular menses, once a year  · Reports hx of PCOS  · Contraceptive method: none      Health  · Symptoms include: No masses, erythema, lacerations, fluctation, ulcerations, none     Review of Systems:     Review of Systems   Constitutional: Negative for chills and fever  HENT: Negative for ear pain and sore throat  Eyes: Negative for pain and visual disturbance  Respiratory: Negative for cough, chest tightness and shortness of breath  Cardiovascular: Negative for chest pain and palpitations  Gastrointestinal: Negative for abdominal pain, constipation, diarrhea, nausea and vomiting  Genitourinary: Negative for dysuria, hematuria and menstrual problem  Musculoskeletal: Negative for arthralgias and back pain  Skin: Negative for color change and rash  Neurological: Positive for numbness  Negative for seizures and syncope  Psychiatric/Behavioral: Positive for sleep disturbance  Negative for dysphoric mood and suicidal ideas  All other systems reviewed and are negative       Past Medical History:     Past Medical History:   Diagnosis Date    Anxiety     Asthma     Cholelithiases 2/5/2020    Added automatically from request for surgery [id number]    Depression     Diabetes mellitus ([facility] [address] )     Hypertension     Neuropathy     Psychiatric disorder       Past Surgical History:     Past Surgical History:   Procedure Laterality Date    CHOLECYSTECTOMY LAPAROSCOPIC N/A 2/10/2020    Procedure: CHOLECYSTECTOMY LAPAROSCOPIC;  Surgeon: Geremias Moraes MD;  Location: [address] [place] [address];  Service: General    INCISION AND DRAINAGE OF WOUND Right 7/1/2017    Procedure: INCISION AND DRAINAGE (I&D) EXTREMITY THIGH;  Surgeon: Sudheer Rodriguez MD;  Location: [address] [place] [address];  Service: General      Social History:     E-Cigarette/Vaping    E-Cigarette Use Never User      E-Cigarette/Vaping Substances    Nicotine No     THC No     CBD No     Flavoring No     Other No     Unknown No      Social History     Socioeconomic History    Marital status: /Civil Union     Spouse name: None    Number of children: None    Years of education: None    Highest education level: None   Occupational History    None   Tobacco Use    Smoking status: Never Smoker    Smokeless tobacco: Never Used   Vaping Use    Vaping Use: Never used   Substance and Sexual Activity    Alcohol use: Not Currently    Drug use: Not Currently     Types: Heroin     Comment: hx of IVDU    Sexual activity: Yes     Partners: Male     Birth control/protection: None   Other Topics Concern    None   Social History Narrative    Always uses seat belt     Social Determinants of Health     Financial Resource Strain: Not on file   Food Insecurity: Not on file   Transportation Needs: Not on file   Physical Activity: Not on file   Stress: Not on file   Social Connections: Not on file   Intimate Partner Violence: Not on file   Housing Stability: Not on file      Family History:     Family History   Problem Relation Age of Onset    Hypertension Mother     Diabetes Father     Hypertension Father     Kidney cancer Maternal Grandmother     Lung cancer Paternal Uncle     Diabetes Family     Hypertension Family     Aneurysm Family         PDF, paternal aunt, other paternal family members      Current Medications:     Current Outpatient Medications   Medication Sig Dispense Refill    albuterol (PROVENTIL HFA,VENTOLIN HFA) 90 mcg/act inhaler Inhale 2 puffs every 6 (six) hours as needed for wheezing or shortness of breath 18 g 2    BD Pen Needle Micro U/F 32G X 6 MM MISC USE 3 (THREE) TIMES A DAY BEFORE MEALS 100 each 3    clonazePAM (KlonoPIN) 1 mg tablet 3 (three) times a day        divalproex sodium (DEPAKOTE ER) 500 mg 24 hr tablet Take 1 tablet (500 mg total) by mouth 2 (two) times a day (Patient taking differently: Take 500 mg by mouth 3 (three) times a day  ) 60 tablet 0    docusate sodium ([employer] Stool Softener) 100 mg capsule Take 1 capsule (100 mg total) by mouth 2 (two) times a day 10 capsule 0    gabapentin (NEURONTIN) 800 mg tablet 800 mg 3 (three) times a day        INS SYRINGE/NEEDLE 1CC/28G (B-D INS SYR MICROFINE 1CC/28G) 28G X 1/2" 1 ML MISC by Does not apply route 3 (three) times a day with meals 100 each 3    Insulin Syringe-Needle U-100 (BD Insulin Syringe U/F) 31G X 5/16" 0 3 ML MISC USE AS DIRECTED 100 each 3    lisinopril-hydrochlorothiazide (PRINZIDE,ZESTORETIC) 20-12 5 MG per tablet Take 1 tablet by mouth daily 30 tablet 2    methadone (DOLOPHINE) 10 mg/mL oral concentrated solution Take 60 mg by mouth daily Methadone clinic       OneTouch Delica Lancets 92P MISC CHECK 3 TIMES DAILY PRIOR TO MEALS 100 each 3    OneTouch Ultra test strip TEST 3 TIMES A DAY PRIOR TO MEALS 100 strip 3    prazosin (MINIPRESS) 5 mg capsule Take 5 mg by mouth in the morning        venlafaxine (EFFEXOR-XR) 150 mg 24 hr capsule Take 300 mg by mouth daily       Admelog 100 UNIT/ML injection Inject 3 Units under the skin 3 (three) times a day with meals As sliding scale 3 mL 2    Blood Glucose Monitoring Suppl (OneTouch Verio Reflect) w/Device KIT Check blood sugars three times daily  Please substitute with appropriate alternative as covered by patient's insurance  Dx: E11 65 1 kit 0    doxylamine (UNISOM) 25 MG tablet Take 0 5 tablets (12 5 mg total) by mouth daily at bedtime as needed for sleep 30 tablet 1    Insulin Glargine-yfgn (Semglee, yfgn,) 100 UNIT/ML SOPN Inject 40 Units under the skin daily at bedtime 3 mL 5    ondansetron (ZOFRAN) 4 mg tablet Take 1 tablet (4 mg total) by mouth every 8 (eight) hours as needed for nausea or vomiting for up to 2 days 6 tablet 0     No current facility-administered medications for this visit  Allergies:      Allergies   Allergen Reactions    Codeine Other (See Comments)    Septra [Sulfamethoxazole-Trimethoprim] Hives      Physical Exam:     /96 (BP Location: Left arm, Patient Position: Sitting, Cuff Size: Adult)   Pulse 103   Temp (!) 94 8 °F (34 9 °C) (Tympanic)   Resp 18   Ht 5' 3" (1 6 m)   Wt 109 kg (240 lb)   SpO2 98%   BMI 42 51 kg/m²     Physical Exam  Vitals and nursing note reviewed  Constitutional:       General: She is not in acute distress  Appearance: She is well-developed  HENT:      Head: Normocephalic and atraumatic  Right Ear: Tympanic membrane, ear canal and external ear normal  There is no impacted cerumen  Left Ear: Tympanic membrane, ear canal and external ear normal  There is no impacted cerumen  Nose: Nose normal  No rhinorrhea  Mouth/Throat:      Mouth: Mucous membranes are moist       Pharynx: Oropharynx is clear  No oropharyngeal exudate  Eyes:      Extraocular Movements: Extraocular movements intact  Conjunctiva/sclera: Conjunctivae normal       Pupils: Pupils are equal, round, and reactive to light  Cardiovascular:      Rate and Rhythm: Normal rate and regular rhythm  Heart sounds: No murmur heard  Pulmonary:      Effort: Pulmonary effort is normal  No respiratory distress  Breath sounds: Normal breath sounds  No wheezing  Abdominal:      General: Bowel sounds are normal  There is no distension  Palpations: Abdomen is soft  There is no mass  Tenderness: There is no abdominal tenderness  Hernia: No hernia is present  Musculoskeletal:      Cervical back: Normal range of motion and neck supple  Right lower leg: No edema  Left lower leg: No edema  Lymphadenopathy:      Cervical: No cervical adenopathy  Skin:     General: Skin is warm and dry  Capillary Refill: Capillary refill takes less than 2 seconds  Neurological:      General: No focal deficit present  Mental Status: She is alert and oriented to person, place, and time     Psychiatric:         Mood and Affect: Mood normal          Behavior: Behavior normal           Annie Morton DO  [address] [place] [address]

## 2022-04-29 PROBLEM — J45.20 MILD INTERMITTENT ASTHMA WITHOUT COMPLICATION: Status: ACTIVE | Noted: 2022-04-29

## 2022-04-29 PROBLEM — F11.20 METHADONE MAINTENANCE THERAPY PATIENT (HCC): Status: ACTIVE | Noted: 2022-04-29

## 2022-05-10 DIAGNOSIS — E11.40 TYPE 2 DIABETES MELLITUS WITH DIABETIC NEUROPATHY, WITH LONG-TERM CURRENT USE OF INSULIN (HCC): ICD-10-CM

## 2022-05-10 DIAGNOSIS — Z79.4 TYPE 2 DIABETES MELLITUS WITH DIABETIC NEUROPATHY, WITH LONG-TERM CURRENT USE OF INSULIN (HCC): ICD-10-CM

## 2022-05-11 RX ORDER — BLOOD SUGAR DIAGNOSTIC
STRIP MISCELLANEOUS
Qty: 100 STRIP | Refills: 3 | Status: SHIPPED | OUTPATIENT
Start: 2022-05-11

## 2022-05-27 LAB
25(OH)D3+25(OH)D2 SERPL-MCNC: 13 NG/ML (ref 30–100)
ALBUMIN SERPL-MCNC: 3.8 G/DL (ref 3.8–4.8)
ALBUMIN/GLOB SERPL: 1 {RATIO} (ref 1.2–2.2)
ALP SERPL-CCNC: 71 IU/L (ref 44–121)
ALT SERPL-CCNC: 48 IU/L (ref 0–32)
AST SERPL-CCNC: 44 IU/L (ref 0–40)
BASOPHILS # BLD AUTO: 0 X10E3/UL (ref 0–0.2)
BASOPHILS NFR BLD AUTO: 0 %
BILIRUB SERPL-MCNC: 0.4 MG/DL (ref 0–1.2)
BUN SERPL-MCNC: 34 MG/DL (ref 6–24)
BUN/CREAT SERPL: 19 (ref 9–23)
CALCIUM SERPL-MCNC: 8.6 MG/DL (ref 8.7–10.2)
CHLORIDE SERPL-SCNC: 93 MMOL/L (ref 96–106)
CHOLEST SERPL-MCNC: 173 MG/DL (ref 100–199)
CO2 SERPL-SCNC: 21 MMOL/L (ref 20–29)
CREAT SERPL-MCNC: 1.83 MG/DL (ref 0.57–1)
EGFR: 34 ML/MIN/1.73
EOSINOPHIL # BLD AUTO: 0.2 X10E3/UL (ref 0–0.4)
EOSINOPHIL NFR BLD AUTO: 2 %
ERYTHROCYTE [DISTWIDTH] IN BLOOD BY AUTOMATED COUNT: 13.5 % (ref 11.7–15.4)
FOLATE SERPL-MCNC: 5.8 NG/ML
GLOBULIN SER-MCNC: 3.7 G/DL (ref 1.5–4.5)
GLUCOSE SERPL-MCNC: 405 MG/DL (ref 65–99)
HCT VFR BLD AUTO: 36.8 % (ref 34–46.6)
HCV AB S/CO SERPL IA: >11 S/CO RATIO (ref 0–0.9)
HDLC SERPL-MCNC: 28 MG/DL
HGB BLD-MCNC: 12.5 G/DL (ref 11.1–15.9)
HIV 1+2 AB+HIV1 P24 AG SERPL QL IA: NON REACTIVE
IMM GRANULOCYTES # BLD: 0.1 X10E3/UL (ref 0–0.1)
IMM GRANULOCYTES NFR BLD: 1 %
LDLC SERPL CALC-MCNC: 106 MG/DL (ref 0–99)
LDLC/HDLC SERPL: 3.8 RATIO (ref 0–3.2)
LYMPHOCYTES # BLD AUTO: 3 X10E3/UL (ref 0.7–3.1)
LYMPHOCYTES NFR BLD AUTO: 33 %
MAGNESIUM SERPL-MCNC: 1.7 MG/DL (ref 1.6–2.3)
MCH RBC QN AUTO: 28.9 PG (ref 26.6–33)
MCHC RBC AUTO-ENTMCNC: 34 G/DL (ref 31.5–35.7)
MCV RBC AUTO: 85 FL (ref 79–97)
MONOCYTES # BLD AUTO: 0.8 X10E3/UL (ref 0.1–0.9)
MONOCYTES NFR BLD AUTO: 9 %
NEUTROPHILS # BLD AUTO: 5 X10E3/UL (ref 1.4–7)
NEUTROPHILS NFR BLD AUTO: 55 %
PHOSPHATE SERPL-MCNC: 4.3 MG/DL (ref 3–4.3)
PLATELET # BLD AUTO: 284 X10E3/UL (ref 150–450)
POTASSIUM SERPL-SCNC: 4.5 MMOL/L (ref 3.5–5.2)
PROT SERPL-MCNC: 7.5 G/DL (ref 6–8.5)
RBC # BLD AUTO: 4.32 X10E6/UL (ref 3.77–5.28)
SL AMB VLDL CHOLESTEROL CALC: 39 MG/DL (ref 5–40)
SODIUM SERPL-SCNC: 134 MMOL/L (ref 134–144)
TRIGL SERPL-MCNC: 223 MG/DL (ref 0–149)
VIT B12 SERPL-MCNC: 732 PG/ML (ref 232–1245)
WBC # BLD AUTO: 9.1 X10E3/UL (ref 3.4–10.8)

## 2022-05-31 DIAGNOSIS — E11.65 TYPE 2 DIABETES MELLITUS WITH HYPERGLYCEMIA, WITH LONG-TERM CURRENT USE OF INSULIN (HCC): ICD-10-CM

## 2022-05-31 DIAGNOSIS — Z79.4 TYPE 2 DIABETES MELLITUS WITH HYPERGLYCEMIA, WITH LONG-TERM CURRENT USE OF INSULIN (HCC): ICD-10-CM

## 2022-05-31 RX ORDER — BLOOD-GLUCOSE METER
EACH MISCELLANEOUS
Qty: 1 KIT | Refills: 0 | Status: SHIPPED | OUTPATIENT
Start: 2022-05-31

## 2022-06-14 DIAGNOSIS — R11.2 INTRACTABLE VOMITING WITH NAUSEA: ICD-10-CM

## 2022-06-14 DIAGNOSIS — K59.00 CONSTIPATION, UNSPECIFIED CONSTIPATION TYPE: ICD-10-CM

## 2022-06-14 RX ORDER — ONDANSETRON 4 MG/1
TABLET, FILM COATED ORAL
Qty: 6 TABLET | Refills: 0 | Status: SHIPPED | OUTPATIENT
Start: 2022-06-14

## 2022-06-14 RX ORDER — DOCUSATE SODIUM 100 MG/1
CAPSULE, LIQUID FILLED ORAL
Qty: 10 CAPSULE | Refills: 0 | Status: SHIPPED | OUTPATIENT
Start: 2022-06-14

## 2022-06-17 ENCOUNTER — TELEPHONE (OUTPATIENT)
Dept: FAMILY MEDICINE CLINIC | Facility: CLINIC | Age: 47
End: 2022-06-17

## 2022-06-17 NOTE — TELEPHONE ENCOUNTER
----- Message from Markos Nielson DO sent at 6/17/2022 10:16 AM EDT -----  Hello! Pls call pt to discuss labs and chronic conditions with myself in 4-6 weeks  Thank you!

## 2022-06-22 DIAGNOSIS — J45.20 MILD INTERMITTENT ASTHMA WITHOUT COMPLICATION: ICD-10-CM

## 2022-06-23 RX ORDER — ALBUTEROL SULFATE 90 UG/1
AEROSOL, METERED RESPIRATORY (INHALATION)
Refills: 2 | OUTPATIENT
Start: 2022-06-23

## 2022-06-24 DIAGNOSIS — Z79.4 TYPE 2 DIABETES MELLITUS WITH HYPERGLYCEMIA, WITH LONG-TERM CURRENT USE OF INSULIN (HCC): ICD-10-CM

## 2022-06-24 DIAGNOSIS — J45.20 MILD INTERMITTENT ASTHMA WITHOUT COMPLICATION: ICD-10-CM

## 2022-06-24 DIAGNOSIS — E11.65 TYPE 2 DIABETES MELLITUS WITH HYPERGLYCEMIA, WITH LONG-TERM CURRENT USE OF INSULIN (HCC): ICD-10-CM

## 2022-06-24 RX ORDER — ALBUTEROL SULFATE 90 UG/1
AEROSOL, METERED RESPIRATORY (INHALATION)
Refills: 2 | OUTPATIENT
Start: 2022-06-24

## 2022-06-24 RX ORDER — BLOOD SUGAR DIAGNOSTIC
STRIP MISCELLANEOUS
Qty: 100 EACH | Refills: 3 | Status: SHIPPED | OUTPATIENT
Start: 2022-06-24

## 2022-07-23 DIAGNOSIS — I10 ESSENTIAL HYPERTENSION: ICD-10-CM

## 2022-07-28 ENCOUNTER — TELEPHONE (OUTPATIENT)
Dept: FAMILY MEDICINE CLINIC | Facility: CLINIC | Age: 47
End: 2022-07-28

## 2022-08-01 RX ORDER — LISINOPRIL AND HYDROCHLOROTHIAZIDE 20; 12.5 MG/1; MG/1
TABLET ORAL
Qty: 30 TABLET | Refills: 1 | Status: SHIPPED | OUTPATIENT
Start: 2022-08-01 | End: 2022-10-11

## 2022-09-13 DIAGNOSIS — J45.20 MILD INTERMITTENT ASTHMA WITHOUT COMPLICATION: ICD-10-CM

## 2022-09-13 DIAGNOSIS — E11.65 TYPE 2 DIABETES MELLITUS WITH HYPERGLYCEMIA, WITH LONG-TERM CURRENT USE OF INSULIN (HCC): ICD-10-CM

## 2022-09-13 DIAGNOSIS — Z79.4 TYPE 2 DIABETES MELLITUS WITH HYPERGLYCEMIA, WITH LONG-TERM CURRENT USE OF INSULIN (HCC): ICD-10-CM

## 2022-09-13 RX ORDER — INSULIN GLARGINE-YFGN 100 [IU]/ML
40 INJECTION, SOLUTION SUBCUTANEOUS
Qty: 15 ML | Refills: 0 | Status: SHIPPED | OUTPATIENT
Start: 2022-09-13

## 2022-09-13 RX ORDER — ALBUTEROL SULFATE 90 UG/1
AEROSOL, METERED RESPIRATORY (INHALATION)
Refills: 2 | OUTPATIENT
Start: 2022-09-13

## 2022-09-13 NOTE — TELEPHONE ENCOUNTER
Prescribed albuterol 18g with 2 refills in April which should be adequate for 9 months and prescribed 15ml of insulin glargine on 8/1/22 which should last

## 2022-09-22 DIAGNOSIS — J45.20 MILD INTERMITTENT ASTHMA WITHOUT COMPLICATION: ICD-10-CM

## 2022-09-23 RX ORDER — ALBUTEROL SULFATE 90 UG/1
2 AEROSOL, METERED RESPIRATORY (INHALATION) EVERY 6 HOURS PRN
Qty: 18 G | Refills: 0 | Status: SHIPPED | OUTPATIENT
Start: 2022-09-23

## 2022-09-27 RX ORDER — INSULIN LISPRO 100 U/ML
INJECTION, SOLUTION INTRAVENOUS; SUBCUTANEOUS
COMMUNITY
Start: 2022-09-13 | End: 2022-12-16

## 2022-09-29 DIAGNOSIS — N18.32 STAGE 3B CHRONIC KIDNEY DISEASE (HCC): ICD-10-CM

## 2022-09-29 DIAGNOSIS — R76.8 POSITIVE HEPATITIS C ANTIBODY TEST: Primary | ICD-10-CM

## 2022-09-29 NOTE — LETTER
September 29, 2022     Dodie Hager WAYLON Barron    Patient: Dano Sanders   YOB: 1975   Date of Visit: 9/29/2022       Ely Ms Barron:     I was hoping to discuss your labs during your office visit today but it seems you were unable to make it to your appointment  I do want to discuss these labs since they are concerning and need follow up  I tried calling in the past but did not get an answer and this discussion is very extensive to have on phone or via [address] but at this time, we can not delay this any further       Your Hepatitis C antibody is positive  Did you have a history of hepatitis C? Were you treated? We will need to check Hepatitis C quantitative RNA and genotype to check if you have an active infection  I have ordered both labs       HIV is negative      Vitamin B12 and folate levels are normal so the numbness in your hands is most likely secondary to your diabetes  I see you had an appointment with endocrinology but had to cancel it  I strongly urge you to schedule an appointment with them ASAP so you can get your blood sugar in control       Magnesium and phosphorous levels are normal  Completed blood count is normal       GFR is lower than before and creatinine is higher which are signs of chronic kidney disease  I am placing a nephrology referral for you as well  Please follow up ASAP  Untreated, this can lead to end stage renal disease which will require dialysis       Your cholesterol labs are elevated from before  Due to this and your diabetes, you should be on a statin medication such as lipitor 40mg daily to prevent heart disease, heart attack and stroke  The medication can cause muscle aches and affect your liver but we monitor this to ensure you don't have the side effects   I typically dont start medications without discussing with the patient but again, because I have not been able to do this, please let me know if you do want to start the medication and I will order it for you       If you have any questions or concerns, please reach out         Take care,       Mariah Laguna, DO

## 2022-09-29 NOTE — PROGRESS NOTES
Sent the below message to patient via [employer] regarding recent labs since unable to discuss with patient in person due to the importance and concerning findings  Placing all orders mentioned in the message  --------------------------------------------------------------------------------------------------------------------------------------------------------    Ely Ms Barron,      I was hoping to discuss your labs during your office visit today but it seems you were unable to make it to your appointment  I do want to discuss these labs since they are concerning and need follow up  I tried calling in the past but did not get an answer and this discussion is very extensive to have on phone or via [address] but at this time, we can not delay this any further       Your Hepatitis C antibody positive  Did you have a history of hepatitis C? Were you treated? We will need to check Hepatitis C quantitative RNA and genotype to check if you have an active infection  I have ordered both labs       HIV is negative      Vitamin B12 and folate levels are normal so the numbness in your hands is most likely secondary to your diabetes  I see you had an appointment with endocrinology but had to cancel it  I strongly urge you to schedule an appointment with them ASAP so you can get your blood sugar in control       Magnesium and phosphorous levels are normal  Completed blood count is normal       GFR is lower than before and creatinine is higher which are signs of chronic kidney disease  I am placing a nephrology referral for you as well  Please follow up ASAP  Untreated, this can lead to end stage renal disease which will require dialysis       Your cholesterol labs are elevated from before  Due to this and your diabetes, you should be on a statin medication such as lipitor 40mg daily to prevent heart disease, heart attack and stroke   The medication can cause muscle aches and affect your liver but we monitor this to ensure you don't have the side effects   I typically dont start medications without discussing with the patient but again, because I have not been able to do this, please let me know if you do want to start the medication and I will order it for you        Take care,  Dr Julienne Gray

## 2022-09-30 ENCOUNTER — TELEPHONE (OUTPATIENT)
Dept: NEPHROLOGY | Facility: CLINIC | Age: 47
End: 2022-09-30

## 2022-09-30 NOTE — TELEPHONE ENCOUNTER
Darnell Tristan is doing well, except for round ligament on the right  Denies LOF/Bleeding/Cramping  +FM    Anatomy scan scheduled for next Friday  RTO in 4 weeks  Left message to schedule consult from referral  Patient needs to be scheduled in [facility]  This is the first attempt

## 2022-10-03 NOTE — TELEPHONE ENCOUNTER
cNew Patient Intake Form   Patient Details   Bud Vargas     1975     [id number]     Insurance Information   Name of Reyna Yoder   Does the patient need an insurance referral? no   If patient has Pitleanne Donna, please ask if they will be using their [employer]  Appointment Information   Who is calling to schedule? If not patient, what is callers name? Patient   Referring Provider Mery Rodriguez, DO    Referring Provider Number [phone]   Reason for Appt (Diagnosis) Stage 3b CKD   Is patient aware of why they are being referred? yes   Does Patient have labs done at [place] [address]? If not, where do they go?  no  [facility]   Has patient had labs / urine work done? List date of most recent lab / urine work  yes  2/12/2020   Has patient had a BMP & CBC done in the past 2 years? If so, list the date Yes  5/26/2022   Has patient been hospitalized recently? If yes, list name and location of hospital they were in  no   Has patient been seen by a Nephrologist before? If yes, list name, location and phone number no    Has patient been see by another Speciality before (ex  Neurology, urology, cardiology)? If yes, please list name, and speciality  no   Has the patient had imaging done? If so, list the most recent date and type of imaging no   Does the patient has a stone analysis report if history of kidney stones? no    Appointment Details   Is there a referral on file?  yes    Appointment Date  10/24/2022   Location [facility]

## 2022-10-07 NOTE — PLAN OF CARE
INFECTION - ADULT     Absence or prevention of progression during hospitalization Progressing        PAIN - ADULT     Verbalizes/displays adequate comfort level or baseline comfort level Progressing Size Of Lesion: 3 mm Detail Level: Detailed Instructions (Optional): No change when compared to measurement at last visit.  Discussed removal since pt thinks there has been some change, but pt preferred to continue monitoring.  We took photo today.  Recommend check up in 6 mos.  Sooner if pt notices changes.

## 2022-10-11 PROBLEM — N18.32 STAGE 3B CHRONIC KIDNEY DISEASE (HCC): Status: ACTIVE | Noted: 2022-10-11

## 2022-10-14 ENCOUNTER — APPOINTMENT (EMERGENCY)
Dept: RADIOLOGY | Facility: HOSPITAL | Age: 47
End: 2022-10-14
Payer: COMMERCIAL

## 2022-10-14 ENCOUNTER — HOSPITAL ENCOUNTER (EMERGENCY)
Facility: HOSPITAL | Age: 47
Discharge: HOME | End: 2022-10-14
Attending: EMERGENCY MEDICINE
Payer: COMMERCIAL

## 2022-10-14 VITALS
HEART RATE: 86 BPM | SYSTOLIC BLOOD PRESSURE: 152 MMHG | BODY MASS INDEX: 39.47 KG/M2 | WEIGHT: 222.8 LBS | TEMPERATURE: 96.6 F | DIASTOLIC BLOOD PRESSURE: 83 MMHG | OXYGEN SATURATION: 96 % | RESPIRATION RATE: 18 BRPM

## 2022-10-14 DIAGNOSIS — T07.XXXA MULTIPLE CONTUSIONS: ICD-10-CM

## 2022-10-14 DIAGNOSIS — S09.90XA INJURY OF HEAD, INITIAL ENCOUNTER: ICD-10-CM
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
## 2022-10-14 PROCEDURE — 99284 EMERGENCY DEPT VISIT MOD MDM: CPT

## 2022-10-14 PROCEDURE — 70450 CT HEAD/BRAIN W/O DYE: CPT

## 2022-10-14 PROCEDURE — G1004 CDSM NDSC: HCPCS

## 2022-10-14 PROCEDURE — 99284 EMERGENCY DEPT VISIT MOD MDM: CPT | Performed by: EMERGENCY MEDICINE

## 2022-10-14 RX ORDER — ACETAMINOPHEN 325 MG/1
650 TABLET ORAL ONCE
Status: COMPLETED | OUTPATIENT
Start: 2022-10-14 | End: 2022-10-14

## 2022-10-14 RX ADMIN — ACETAMINOPHEN 650 MG: 325 TABLET ORAL at 21:36

## 2022-10-15 NOTE — DISCHARGE INSTRUCTIONS
Your CT scan is ok  The bruises will be sore and you may have pain and headache  Ice to any swelling  Take tylenol and/or advil for pain  Rest as needed
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
## 2022-10-24 ENCOUNTER — TELEPHONE (OUTPATIENT)
Dept: NEPHROLOGY | Facility: CLINIC | Age: 47
End: 2022-10-24

## 2022-10-24 PROBLEM — E11.22 TYPE 2 DIABETES MELLITUS WITH STAGE 3 CHRONIC KIDNEY DISEASE AND HYPERTENSION (HCC): Status: ACTIVE | Noted: 2017-04-18

## 2022-10-24 PROBLEM — I12.9 TYPE 2 DIABETES MELLITUS WITH STAGE 3 CHRONIC KIDNEY DISEASE AND HYPERTENSION (HCC): Status: ACTIVE | Noted: 2017-04-18

## 2022-10-24 PROBLEM — N18.30 TYPE 2 DIABETES MELLITUS WITH STAGE 3 CHRONIC KIDNEY DISEASE AND HYPERTENSION (HCC): Status: ACTIVE | Noted: 2017-04-18

## 2022-10-24 PROBLEM — E11.22 CKD STAGE 3 DUE TO TYPE 2 DIABETES MELLITUS (HCC): Status: RESOLVED | Noted: 2022-10-24 | Resolved: 2022-10-24

## 2022-10-24 PROBLEM — N18.30 CKD STAGE 3 DUE TO TYPE 2 DIABETES MELLITUS (HCC): Status: ACTIVE | Noted: 2022-10-24

## 2022-10-24 PROBLEM — N18.30 CKD STAGE 3 DUE TO TYPE 2 DIABETES MELLITUS (HCC): Status: RESOLVED | Noted: 2022-10-24 | Resolved: 2022-10-24

## 2022-10-24 PROBLEM — R79.89 ELEVATED SERUM CREATININE: Status: ACTIVE | Noted: 2022-10-24

## 2022-10-24 PROBLEM — E11.22 CKD STAGE 3 DUE TO TYPE 2 DIABETES MELLITUS (HCC): Status: ACTIVE | Noted: 2022-10-24

## 2022-11-04 DIAGNOSIS — K59.00 CONSTIPATION, UNSPECIFIED CONSTIPATION TYPE: ICD-10-CM

## 2022-11-04 RX ORDER — DOCUSATE SODIUM 100 MG/1
CAPSULE, LIQUID FILLED ORAL
Qty: 10 CAPSULE | Refills: 0 | Status: SHIPPED | OUTPATIENT
Start: 2022-11-04

## 2022-11-15 DIAGNOSIS — E11.65 TYPE 2 DIABETES MELLITUS WITH HYPERGLYCEMIA, WITH LONG-TERM CURRENT USE OF INSULIN (HCC): ICD-10-CM

## 2022-11-15 DIAGNOSIS — I10 ESSENTIAL HYPERTENSION: ICD-10-CM

## 2022-11-15 DIAGNOSIS — R11.2 INTRACTABLE VOMITING WITH NAUSEA: ICD-10-CM

## 2022-11-15 DIAGNOSIS — Z79.4 TYPE 2 DIABETES MELLITUS WITH HYPERGLYCEMIA, WITH LONG-TERM CURRENT USE OF INSULIN (HCC): ICD-10-CM

## 2022-11-17 DIAGNOSIS — Z79.4 TYPE 2 DIABETES MELLITUS WITH HYPERGLYCEMIA, WITH LONG-TERM CURRENT USE OF INSULIN (HCC): ICD-10-CM

## 2022-11-17 DIAGNOSIS — E11.65 TYPE 2 DIABETES MELLITUS WITH HYPERGLYCEMIA, WITH LONG-TERM CURRENT USE OF INSULIN (HCC): ICD-10-CM

## 2022-11-18 RX ORDER — LISINOPRIL AND HYDROCHLOROTHIAZIDE 20; 12.5 MG/1; MG/1
1 TABLET ORAL DAILY
Qty: 15 TABLET | Refills: 0 | Status: SHIPPED | OUTPATIENT
Start: 2022-11-18 | End: 2022-12-01

## 2022-11-18 RX ORDER — INSULIN GLARGINE-YFGN 100 [IU]/ML
40 INJECTION, SOLUTION SUBCUTANEOUS
Qty: 6 ML | Refills: 0 | Status: SHIPPED | OUTPATIENT
Start: 2022-11-18 | End: 2022-11-18 | Stop reason: SDUPTHER

## 2022-11-18 RX ORDER — BLOOD SUGAR DIAGNOSTIC
STRIP MISCELLANEOUS
Qty: 100 EACH | Refills: 2 | Status: SHIPPED | OUTPATIENT
Start: 2022-11-18

## 2022-11-18 RX ORDER — INSULIN GLARGINE-YFGN 100 [IU]/ML
40 INJECTION, SOLUTION SUBCUTANEOUS
Qty: 15 ML | Refills: 0 | Status: SHIPPED | OUTPATIENT
Start: 2022-11-18 | End: 2022-12-02

## 2022-11-18 RX ORDER — ONDANSETRON 4 MG/1
4 TABLET, FILM COATED ORAL EVERY 8 HOURS PRN
Qty: 6 TABLET | Refills: 0 | Status: SHIPPED | OUTPATIENT
Start: 2022-11-18

## 2022-11-18 NOTE — TELEPHONE ENCOUNTER
Pt continues to be noncompliant with poor clinic follow up  Multiple courtesy refills have been sent with no follow through from pt  Will send 2 week courtesy refill  Discussed with  Yessenia López  Pt needs to confirm appt on 1/6 to continue receiving refills  Pt will need to follow up every 6 weeks to establish compliance with tx and will only be provided refills during office visits to encourage compliance  Diabetes management needs to be transferred to endocrinology and pt needs to have this appt scheduled to continue receiving insulin refills from this clinic in the mean time

## 2022-12-15 DIAGNOSIS — I10 ESSENTIAL HYPERTENSION: ICD-10-CM

## 2022-12-15 DIAGNOSIS — E11.65 TYPE 2 DIABETES MELLITUS WITH HYPERGLYCEMIA (HCC): ICD-10-CM

## 2022-12-16 RX ORDER — LISINOPRIL AND HYDROCHLOROTHIAZIDE 20; 12.5 MG/1; MG/1
1 TABLET ORAL DAILY
Qty: 34 TABLET | Refills: 0 | OUTPATIENT
Start: 2022-12-16 | End: 2023-01-19

## 2022-12-16 RX ORDER — INSULIN LISPRO 100 U/ML
INJECTION, SOLUTION INTRAVENOUS; SUBCUTANEOUS
Qty: 10 ML | Refills: 0 | Status: SHIPPED | OUTPATIENT
Start: 2022-12-16

## 2023-01-06 ENCOUNTER — OFFICE VISIT (OUTPATIENT)
Dept: FAMILY MEDICINE CLINIC | Facility: CLINIC | Age: 48
End: 2023-01-06

## 2023-01-06 VITALS
HEIGHT: 63 IN | BODY MASS INDEX: 40.8 KG/M2 | TEMPERATURE: 95.6 F | DIASTOLIC BLOOD PRESSURE: 77 MMHG | OXYGEN SATURATION: 98 % | WEIGHT: 230.3 LBS | HEART RATE: 84 BPM | SYSTOLIC BLOOD PRESSURE: 140 MMHG

## 2023-01-06 DIAGNOSIS — J06.9 UPPER RESPIRATORY TRACT INFECTION, UNSPECIFIED TYPE: Primary | ICD-10-CM

## 2023-01-06 DIAGNOSIS — I10 ESSENTIAL HYPERTENSION: ICD-10-CM

## 2023-01-06 DIAGNOSIS — R76.8 HEPATITIS C ANTIBODY TEST POSITIVE: ICD-10-CM

## 2023-01-06 DIAGNOSIS — N18.32 STAGE 3B CHRONIC KIDNEY DISEASE (HCC): ICD-10-CM

## 2023-01-06 DIAGNOSIS — I12.9 TYPE 2 DIABETES MELLITUS WITH STAGE 3 CHRONIC KIDNEY DISEASE AND HYPERTENSION (HCC): ICD-10-CM

## 2023-01-06 DIAGNOSIS — R11.2 INTRACTABLE VOMITING WITH NAUSEA: ICD-10-CM

## 2023-01-06 DIAGNOSIS — N18.30 TYPE 2 DIABETES MELLITUS WITH STAGE 3 CHRONIC KIDNEY DISEASE AND HYPERTENSION (HCC): ICD-10-CM

## 2023-01-06 DIAGNOSIS — F11.20 METHADONE MAINTENANCE THERAPY PATIENT (HCC): ICD-10-CM

## 2023-01-06 DIAGNOSIS — E11.22 TYPE 2 DIABETES MELLITUS WITH STAGE 3 CHRONIC KIDNEY DISEASE AND HYPERTENSION (HCC): ICD-10-CM

## 2023-01-06 DIAGNOSIS — F19.90 SUBSTANCE USE DISORDER: ICD-10-CM

## 2023-01-06 DIAGNOSIS — E78.2 MIXED HYPERLIPIDEMIA: ICD-10-CM

## 2023-01-06 DIAGNOSIS — Z23 ENCOUNTER FOR IMMUNIZATION: ICD-10-CM

## 2023-01-06 DIAGNOSIS — Z91.199 NONCOMPLIANCE: ICD-10-CM

## 2023-01-06 DIAGNOSIS — R20.2 PARESTHESIA: ICD-10-CM

## 2023-01-06 PROBLEM — E55.9 VITAMIN D DEFICIENCY: Status: ACTIVE | Noted: 2023-01-06

## 2023-01-06 PROBLEM — G40.909 SEIZURE DISORDER (HCC): Status: ACTIVE | Noted: 2023-01-06

## 2023-01-06 LAB — SL AMB POCT HEMOGLOBIN AIC: 10.8 (ref ?–6.5)

## 2023-01-06 RX ORDER — LANCETS 33 GAUGE
EACH MISCELLANEOUS
Qty: 400 EACH | Refills: 3 | Status: SHIPPED | OUTPATIENT
Start: 2023-01-06

## 2023-01-06 RX ORDER — BENZONATATE 100 MG/1
100 CAPSULE ORAL 3 TIMES DAILY PRN
Qty: 20 CAPSULE | Refills: 0 | Status: SHIPPED | OUTPATIENT
Start: 2023-01-06

## 2023-01-06 RX ORDER — ONDANSETRON 4 MG/1
4 TABLET, FILM COATED ORAL EVERY 8 HOURS PRN
Qty: 30 TABLET | Refills: 1 | Status: SHIPPED | OUTPATIENT
Start: 2023-01-06

## 2023-01-06 RX ORDER — INSULIN GLARGINE-YFGN 100 [IU]/ML
40 INJECTION, SOLUTION SUBCUTANEOUS
Start: 2023-01-06

## 2023-01-06 RX ORDER — BLOOD-GLUCOSE METER
KIT MISCELLANEOUS
Qty: 1 KIT | Refills: 0 | Status: SHIPPED | OUTPATIENT
Start: 2023-01-06

## 2023-01-06 RX ORDER — BLOOD SUGAR DIAGNOSTIC
STRIP MISCELLANEOUS
Qty: 400 EACH | Refills: 3 | Status: SHIPPED | OUTPATIENT
Start: 2023-01-06 | End: 2023-01-12

## 2023-01-06 RX ORDER — ATORVASTATIN CALCIUM 40 MG/1
40 TABLET, FILM COATED ORAL DAILY
Status: CANCELLED | OUTPATIENT
Start: 2023-01-06

## 2023-01-06 RX ORDER — LISINOPRIL AND HYDROCHLOROTHIAZIDE 20; 12.5 MG/1; MG/1
1 TABLET ORAL DAILY
Qty: 45 TABLET | Refills: 0 | Status: SHIPPED | OUTPATIENT
Start: 2023-01-06 | End: 2023-02-20

## 2023-01-06 NOTE — PROGRESS NOTES
Assessment/Plan:     Diagnoses and all orders for this visit:    Upper respiratory tract infection, unspecified type  -     benzonatate (TESSALON PERLES) 100 mg capsule; Take 1 capsule (100 mg total) by mouth 3 (three) times a day as needed for cough  Likely viral URI  rx tessalon perles for cough and start using OTC robitussin and mucinex for cough and congestion management  FU in 1 week virtually to assess for resolution or improvement  If continues with current symptoms, can consider Zpak at follow up  Type 2 diabetes mellitus with stage 3 chronic kidney disease and hypertension ([facility] [address] )  Results for orders placed or performed in visit on 01/06/23   POCT hemoglobin A1c   Result Value Ref Range    Hemoglobin A1C 10 8 (A) 6 5   hgb A1c increased from 8 8 to 10 8, likely secondary to inadequate prandial insulin coverage and lack of lifestyle modifications and noncompliance  Pt has not established with endocrinology  Discussed with pt that she has to establish with endocrinology for further management as DM is very uncontrolled and she may need CGM or more aggressive management in setting of comorbidities of renal dysfunction, possible hep C, HTN, noncompliance  Re-printed endocrinology referral for pt  Instructed pt to start logging BS and follow up in 1 week with logs virtually for re-assessment of insulin regimen  Instructed to switch insulin glargine to bedtime from morning to better assess control  Pt already took 40u this morning so instructed pt to take 15u tonight at bedtime, skip morning dose tomorrow and then start taking at bedtime  Collected urine MACR to rule out proteinuria  Follows with ophthalmology and has visual changes that are being worked up further    -     Blood Glucose Monitoring Suppl (OneTouch Verio Reflect) w/Device KIT; Check blood sugars four times daily  Please substitute with appropriate alternative as covered by patient's insurance   Dx: E11 65  -     glucose blood (OneTouch Verio) test strip; Check blood sugars four times daily  Please substitute with appropriate alternative as covered by patient's insurance  Dx: E11 65  -     OneTouch Delica Lancets 06J MISC; Check blood sugars four times daily  Please substitute with appropriate alternative as covered by patient's insurance  Dx: E11 65  -     Microalbumin / creatinine urine ratio    Essential hypertension  Orders:  -     lisinopril-hydrochlorothiazide (PRINZIDE,ZESTORETIC) 20-12 5 MG per tablet; Take 1 tablet by mouth daily  /77, at goal  Continue current antihypertensive regimen  Needs nephrology follow up for re-assessment of regimen in setting of renal dysfunction  Will re-assess renal function with CMP  Mixed hyperlipidemia  Lipid panel elevated, meets criteria for statin therapy  CMP in may 2022 showed mildly elevated transaminases  In setting of pos hep C antibody which is being work up for active hepatitis infection and mild transaminitis, will recheck CMP to reassess hepatic function before starting statin  -     Comprehensive metabolic panel; Future  -     Comprehensive metabolic panel    Stage 3b chronic kidney disease ([place] [address] )  Needs to establish care with nephrology  Had appts scheduled but no-showed  Explained to pt that worsening renal function can progress to ESRD which will require dialysis which carries high mortality  Re-printed referral and provided to pt  Will check urine MACR to assess for proteinuria  -     Microalbumin / creatinine urine ratio    Substance use disorder ([facility] )  Methadone maintenance therapy patient ([facility] )  Follows with methadone clinic but continues using drugs  Recent history of heroine/fentanyl use 3-4 weeks ago  Discussed risks of drug usage and street drugs such as contamination, death, interactions with methadone  Explained to pt importance of discontinuing drug use and that if she has active hep c she will need to document abstinence from drugs to even be able to receive tx  Hepatitis C antibody test positive  Re-printed hep V rna and genotype labs  Re-printed GI referral to establish care  Will check CMP to reassess hepatic function  Discussed with pt importance of abstinence from drugs to avoid infection and to be eligible for tx if active hep C noted  -     Comprehensive metabolic panel; Future  -     Comprehensive metabolic panel    Paresthesia 2/2 diabetic neuropathy   B12 and folate normal so likely secondary to uncontrolled DM  Continue gabapentin  Intractable vomiting with nausea  Refilled ondansetron (ZOFRAN) 4 mg tablet; Take 1 tablet (4 mg total) by mouth every 8 (eight) hours as needed for nausea or vomiting    Noncompliance  Had very extensive discussion with pt regarding importance of compliance with PCP follow up, medications, provider instructions, specialist follow ups  Pt was seen 9 months ago and has since scheduled appts and always no-showed  She has not responded to phone calls, [employer] msgs or letters from PCP office or specialists  She has not called specialists herself to establish care  Confirmed pt's phone number and address are accurate so pt should have received phone calls and letters  Discussed with pt that due to her history of poor follow up, I can not continue medical care indefinitely without establishing compliance and close follow up- this would mean pt has to follow up every 6 weeks and I can only refill 6 weeks of medications at a time till pt is seen in clinic for next follow up  Will place referral for CM to help with transportation and establishing care with specialists and coordinate overall medical care  Discussed with pt that continued noncompliance and poor monitoring of medications, their side effects and their effectiveness can be fatal so follow up is imperative  Pt understands and reports she will follow up every 6 weeks as instructed and get the labs provided today and establish with specialists     -     Ambulatory Referral to Complex Care Management Program; Future    Follow up in 6 weeks for chronic conditions follow up  Follow up in 1 week virtually to review BS log and follow up for URI  Subjective:      Patient ID: Jesse Hines is a 41 y o  female  HPI   Pt is here for chronic conditions follow up:  · Onset of illness right before robert, vomiting, no diarrhea, reports rhinorrhea, congestion, no eye discharge, productive cough  Has only tried dayquil and nyquil without significant relief  · Has not followed with endocrinologist or established care  Reports taking glargine 40u QAM and lispro sliding scale  Did not bring BS logs but reports BS always in 200-300 range  · Reports ophthalmologist told her she's "blind" so she is going for further testing  · Compliant with antihypertensives  · Has not followed up with nephrology for worsening CKD  · Compliant with methadone but reports heroine/fentanyl use 3-4 weeks ago    · Has not followed up with GI for colonoscopy and has not done follow up hepatitis C labs  Reports no past history of hepatitis C infection or treatment   · Continues to have paresthesias in BL fingers   · Requesting zofran refill  · Pt reports not receiving any phone calls or letters from our office or the many specialists she was referred to  She said she was unable to come to all her scheduled appts with this office and specialists office but her transport kept canceling last minute  Reports using [employer]    The following portions of the patient's history were reviewed and updated as appropriate: allergies, current medications, past family history, past medical history, past social history, past surgical history, and problem list     Review of Systems   Constitutional: Negative for chills and fever  HENT: Positive for congestion and rhinorrhea  Negative for ear pain and sore throat  Eyes: Positive for visual disturbance  Negative for pain  Respiratory: Positive for cough   Negative for chest tightness and shortness of breath  Cardiovascular: Negative for chest pain and palpitations  Gastrointestinal: Positive for vomiting  Negative for abdominal pain, constipation, diarrhea and nausea  Genitourinary: Negative for dysuria, hematuria and menstrual problem  Musculoskeletal: Negative for arthralgias and back pain  Skin: Negative for color change and rash  Neurological: Positive for numbness  Negative for seizures and syncope  Psychiatric/Behavioral: Negative for dysphoric mood and suicidal ideas  All other systems reviewed and are negative  Objective:      /77 (BP Location: Left arm, Patient Position: Sitting, Cuff Size: Large)   Pulse 84   Temp (!) 95 6 °F (35 3 °C) (Tympanic)   Ht 5' 3" (1 6 m)   Wt 104 kg (230 lb 4 8 oz)   SpO2 98%   BMI 40 80 kg/m²          Physical Exam  Constitutional:       Appearance: Normal appearance  HENT:      Head: Normocephalic and atraumatic  Right Ear: Tympanic membrane, ear canal and external ear normal  There is no impacted cerumen  Left Ear: Tympanic membrane, ear canal and external ear normal  There is no impacted cerumen  Nose: Congestion present  Mouth/Throat:      Mouth: Mucous membranes are moist       Pharynx: Oropharynx is clear  Posterior oropharyngeal erythema (mild, no exudates) present  No oropharyngeal exudate  Eyes:      General: No scleral icterus  Right eye: No discharge  Left eye: No discharge  Extraocular Movements: Extraocular movements intact  Conjunctiva/sclera: Conjunctivae normal       Pupils: Pupils are equal, round, and reactive to light  Cardiovascular:      Rate and Rhythm: Normal rate and regular rhythm  Pulses: Normal pulses  Heart sounds: Normal heart sounds  No murmur heard  Pulmonary:      Effort: Pulmonary effort is normal  No respiratory distress  Breath sounds: Normal breath sounds  No wheezing  Skin:     General: Skin is warm and dry  Neurological:      General: No focal deficit present  Mental Status: She is alert and oriented to person, place, and time     Psychiatric:         Mood and Affect: Mood normal          Behavior: Behavior normal

## 2023-01-06 NOTE — PATIENT INSTRUCTIONS
Use over the counter robitussin and mucinex     Take 15units insulin glargine tonight on 1/6/2023 and then start 40 units at bedtime (NOT IN THE MORNING) starting tomorrow 1/7/2023

## 2023-01-07 LAB
ALBUMIN/CREAT UR: 5 MG/G CREAT (ref 0–29)
CREAT UR-MCNC: 114.3 MG/DL
MICROALBUMIN UR-MCNC: 5.2 UG/ML

## 2023-01-09 ENCOUNTER — TELEPHONE (OUTPATIENT)
Dept: NEPHROLOGY | Facility: CLINIC | Age: 48
End: 2023-01-09

## 2023-01-09 DIAGNOSIS — E11.22 TYPE 2 DIABETES MELLITUS WITH STAGE 3 CHRONIC KIDNEY DISEASE AND HYPERTENSION (HCC): ICD-10-CM

## 2023-01-09 DIAGNOSIS — I12.9 TYPE 2 DIABETES MELLITUS WITH STAGE 3 CHRONIC KIDNEY DISEASE AND HYPERTENSION (HCC): ICD-10-CM

## 2023-01-09 DIAGNOSIS — N18.30 TYPE 2 DIABETES MELLITUS WITH STAGE 3 CHRONIC KIDNEY DISEASE AND HYPERTENSION (HCC): ICD-10-CM

## 2023-01-09 NOTE — TELEPHONE ENCOUNTER
New Patient Intake Form   Patient Details   Antonio Severin     1975     [id number]     Insurance Information   Name of Guillermo Cordova [address] gabi   Does the patient need an insurance referral? yes   If patient has Pitleanne Donna, please ask if they will be using their [employer]  Appointment Information   Who is calling to schedule? If not patient, what is callers name? Patient   Referring Provider  Dr Yasir Rachel   Reason for Appt (Diagnosis) Stage 3b ckd    Does Patient have labs/urine done at [facility]? If not, where do they go? List the date of last lab / urine No- [facility]  December 2022   Has patient been hospitalized recently? If yes, list name and location of hospital they were in no   Has patient been seen by a Nephrologist before? If yes, list name, location and phone number no   Has the patient had renal imaging done? If so, list the most recent date and type of imaging no    Does patient have a history of Kidney Stones? yes   Appointment Details   Is there a referral on file?  No- patient told me she has a referral    Appointment Date 2/14/2023   Location [place]   Miscellaneous   labs from [facility] being faxed from 1/6/23

## 2023-01-09 NOTE — TELEPHONE ENCOUNTER
Dr Amber Frias, please tell me why patient needs to test 4 times a day  We need to complete a prior authorization for the testing quantity

## 2023-01-09 NOTE — TELEPHONE ENCOUNTER
She requires insulin 4 times daily so needs to check blood sugar 4 times daily  Pls let me know if you need anything more  Thank you!

## 2023-01-12 RX ORDER — BLOOD SUGAR DIAGNOSTIC
STRIP MISCELLANEOUS
Qty: 400 STRIP | Refills: 3 | Status: SHIPPED | OUTPATIENT
Start: 2023-01-12

## 2023-01-13 ENCOUNTER — TELEPHONE (OUTPATIENT)
Dept: OBGYN CLINIC | Facility: HOSPITAL | Age: 48
End: 2023-01-13

## 2023-01-13 ENCOUNTER — TELEMEDICINE (OUTPATIENT)
Dept: FAMILY MEDICINE CLINIC | Facility: CLINIC | Age: 48
End: 2023-01-13

## 2023-01-13 DIAGNOSIS — E11.65 TYPE 2 DIABETES MELLITUS WITH HYPERGLYCEMIA, WITH LONG-TERM CURRENT USE OF INSULIN (HCC): Primary | ICD-10-CM

## 2023-01-13 DIAGNOSIS — Z91.199 NONCOMPLIANCE: ICD-10-CM

## 2023-01-13 DIAGNOSIS — I10 ESSENTIAL HYPERTENSION: ICD-10-CM

## 2023-01-13 DIAGNOSIS — Z79.4 TYPE 2 DIABETES MELLITUS WITH HYPERGLYCEMIA, WITH LONG-TERM CURRENT USE OF INSULIN (HCC): Primary | ICD-10-CM

## 2023-01-13 DIAGNOSIS — J45.20 MILD INTERMITTENT ASTHMA WITHOUT COMPLICATION: ICD-10-CM

## 2023-01-13 RX ORDER — FLASH GLUCOSE SCANNING READER
1 EACH MISCELLANEOUS DAILY
Qty: 1 EACH | Refills: 0 | Status: SHIPPED | OUTPATIENT
Start: 2023-01-13

## 2023-01-13 RX ORDER — ALBUTEROL SULFATE 90 UG/1
2 AEROSOL, METERED RESPIRATORY (INHALATION) EVERY 6 HOURS PRN
OUTPATIENT
Start: 2023-01-13

## 2023-01-13 RX ORDER — LISINOPRIL AND HYDROCHLOROTHIAZIDE 20; 12.5 MG/1; MG/1
TABLET ORAL
Qty: 45 TABLET | Refills: 0 | OUTPATIENT
Start: 2023-01-13

## 2023-01-13 RX ORDER — FLASH GLUCOSE SENSOR
1 KIT MISCELLANEOUS
Qty: 2 EACH | Refills: 1 | Status: SHIPPED | OUTPATIENT
Start: 2023-01-13

## 2023-01-13 NOTE — PROGRESS NOTES
Virtual Brief Visit    Patient is located in the following state in which I hold an active license [place]      Assessment/Plan:    1  Type 2 diabetes mellitus with hyperglycemia, with long-term current use of insulin ([facility])  Hgb A1c 10 8%, not at goal  Pt needs significant adjustments to insulin regimen  She uses insulin 4x daily  Will rx freestyle diamond for continuous glucose monitoring to be able to adjust insulin appropriately  Instructed pt again to start taking insulin glargine 40u at bedtime instead of AM    Continue checking BS 4x daily for now-fasting and 3 x postprandial   Notify PCP if diamond not covered  - Continuous Blood Gluc  (FreeStyle [place] 2 [place]) DANNA; Use 1 kit in the morning  Dispense: 1 each; Refill: 0  - Continuous Blood Gluc Sensor (FreeStyle Diamond 14 Day Sensor) MISC; Use 1 each every 14 (fourteen) days  Dispense: 2 each; Refill: 1    2  Noncompliance  Pt is checking BS and has scheduled multiple specialist follow ups as recommended  Compliance is improving and I commended pt on her efforts  Recent Visits  Date Type Provider Dept   01/06/23 Office Visit Sivakumar Stokes DO [facility]   Showing recent visits within past 7 days and meeting all other requirements  Today's Visits  Date Type Provider Dept   01/13/23 Telemedicine Sivakumar Stokes DO [facility]   Showing today's visits and meeting all other requirements  Future Appointments  No visits were found meeting these conditions  Showing future appointments within next 150 days and meeting all other requirements       Virtual visit today to discuss BS log  Pt reports checking BS 3-4x daily     Reports BS as follows and unable to specify if fasting or postprandial  - Monday 200s  - Tuesday 200s  - Wednesday 300s  - Thursday 170s  - Friday fasting 171, post bfast 165  Reports using lispro 5-10u TID AC on sliding scale with meals   Pt is taking insulin glargine 40u QAM despite instructing last visit to take at bedtime  Denies any BS <100    Getting labs done next week   Has scheduled specialist follow up: Endo 4/20/23, GI 3/7/23, nephro 2/14/2023      Visit Time    Visit Start Time: 2:38pm  Visit Stop Time: 2:54pm  Total Visit Duration: 16 minutes

## 2023-01-15 PROBLEM — Z79.4 TYPE 2 DIABETES MELLITUS WITH HYPERGLYCEMIA, WITH LONG-TERM CURRENT USE OF INSULIN (HCC): Status: ACTIVE | Noted: 2023-01-15

## 2023-01-15 PROBLEM — E11.65 TYPE 2 DIABETES MELLITUS WITH HYPERGLYCEMIA, WITH LONG-TERM CURRENT USE OF INSULIN (HCC): Status: ACTIVE | Noted: 2023-01-15

## 2023-01-15 PROBLEM — Z91.199 NONCOMPLIANCE: Status: ACTIVE | Noted: 2023-01-15

## 2023-01-17 LAB
DME PARACHUTE DELIVERY DATE REQUESTED: NORMAL
DME PARACHUTE ITEM DESCRIPTION: NORMAL
DME PARACHUTE ITEM DESCRIPTION: NORMAL
DME PARACHUTE ORDER STATUS: NORMAL
DME PARACHUTE SUPPLIER NAME: NORMAL
DME PARACHUTE SUPPLIER PHONE: NORMAL

## 2023-01-20 ENCOUNTER — PATIENT OUTREACH (OUTPATIENT)
Dept: FAMILY MEDICINE CLINIC | Facility: CLINIC | Age: 48
End: 2023-01-20

## 2023-01-20 NOTE — PROGRESS NOTES
Outreach to patient  Discussed Freestyle Diamond order  Pt states she received call from [place], however device will not be mailed out until April  RN CM will investigate and reach out to [place]  Discussed  appointment with endocrinology  Pt agreeable to RN HILDA reaching out to [facility] to inquire about a sooner appointment  RN CM contact information provided

## 2023-01-23 ENCOUNTER — PATIENT OUTREACH (OUTPATIENT)
Dept: FAMILY MEDICINE CLINIC | Facility: CLINIC | Age: 48
End: 2023-01-23

## 2023-01-23 NOTE — PROGRESS NOTES
Outreach to [employer] [facility] supplies  S/W Reginald Zaragoza to confirm order and to discuss deliver date of supplies  Conference call with patient to review processing of freestyle nestor  Reginald Zaragoza provided instructions to patient  Order should be shipped within 2 weeks  Outreach to [facility]  [facility] requesting return call to schedule an appointment for patient

## 2023-02-01 DIAGNOSIS — E11.65 TYPE 2 DIABETES MELLITUS WITH HYPERGLYCEMIA, WITH LONG-TERM CURRENT USE OF INSULIN (HCC): ICD-10-CM

## 2023-02-01 DIAGNOSIS — K59.00 CONSTIPATION, UNSPECIFIED CONSTIPATION TYPE: ICD-10-CM

## 2023-02-01 DIAGNOSIS — J45.20 MILD INTERMITTENT ASTHMA WITHOUT COMPLICATION: ICD-10-CM

## 2023-02-01 DIAGNOSIS — Z79.4 TYPE 2 DIABETES MELLITUS WITH HYPERGLYCEMIA, WITH LONG-TERM CURRENT USE OF INSULIN (HCC): ICD-10-CM

## 2023-02-01 RX ORDER — DOCUSATE SODIUM 100 MG/1
CAPSULE, LIQUID FILLED ORAL
Qty: 30 CAPSULE | Refills: 0 | Status: SHIPPED | OUTPATIENT
Start: 2023-02-01 | End: 2023-03-07

## 2023-02-01 RX ORDER — ALBUTEROL SULFATE 90 UG/1
2 AEROSOL, METERED RESPIRATORY (INHALATION) EVERY 6 HOURS PRN
Qty: 18 G | Refills: 1 | Status: SHIPPED | OUTPATIENT
Start: 2023-02-01

## 2023-02-09 ENCOUNTER — PATIENT OUTREACH (OUTPATIENT)
Dept: FAMILY MEDICINE CLINIC | Facility: CLINIC | Age: 48
End: 2023-02-09

## 2023-02-09 DIAGNOSIS — N18.30 TYPE 2 DIABETES MELLITUS WITH STAGE 3 CHRONIC KIDNEY DISEASE AND HYPERTENSION (HCC): ICD-10-CM

## 2023-02-09 DIAGNOSIS — Z79.4 TYPE 2 DIABETES MELLITUS WITH HYPERGLYCEMIA, WITH LONG-TERM CURRENT USE OF INSULIN (HCC): ICD-10-CM

## 2023-02-09 DIAGNOSIS — I12.9 TYPE 2 DIABETES MELLITUS WITH STAGE 3 CHRONIC KIDNEY DISEASE AND HYPERTENSION (HCC): ICD-10-CM

## 2023-02-09 DIAGNOSIS — G47.09 OTHER INSOMNIA: ICD-10-CM

## 2023-02-09 DIAGNOSIS — E11.65 TYPE 2 DIABETES MELLITUS WITH HYPERGLYCEMIA, WITH LONG-TERM CURRENT USE OF INSULIN (HCC): ICD-10-CM

## 2023-02-09 DIAGNOSIS — E11.22 TYPE 2 DIABETES MELLITUS WITH STAGE 3 CHRONIC KIDNEY DISEASE AND HYPERTENSION (HCC): ICD-10-CM

## 2023-02-09 RX ORDER — INSULIN GLARGINE-YFGN 100 [IU]/ML
40 INJECTION, SOLUTION SUBCUTANEOUS
Qty: 12 ML | Refills: 6 | Status: SHIPPED | OUTPATIENT
Start: 2023-02-09 | End: 2023-03-11

## 2023-02-09 RX ORDER — DOXYLAMINE SUCCINATE 25 MG
TABLET ORAL
Qty: 15 TABLET | Refills: 1 | Status: SHIPPED | OUTPATIENT
Start: 2023-02-09

## 2023-02-09 RX ORDER — BLOOD SUGAR DIAGNOSTIC
STRIP MISCELLANEOUS
Qty: 100 EACH | Refills: 0 | Status: SHIPPED | OUTPATIENT
Start: 2023-02-09

## 2023-02-09 NOTE — PROGRESS NOTES
Outreach to patient  Left voice message requesting return call  RN CM contact information provided  RN CM will follow up at next PCP visit

## 2023-02-17 ENCOUNTER — PATIENT OUTREACH (OUTPATIENT)
Dept: FAMILY MEDICINE CLINIC | Facility: CLINIC | Age: 48
End: 2023-02-17

## 2023-02-17 NOTE — PROGRESS NOTES
Received VM from patient  Pt left message stating that she was having trouble getting her [place]  Outreach to [employer]  S/W Nellie Garcia  Pts account reviewed  Pt will need to call with updated insurance information and 28 consecutive days of BS logs before order can be processed  Outreach to patient  LVM requesting return call  RN CM contact information provided

## 2023-02-17 NOTE — PROGRESS NOTES
Outreach to patient  Pt rescheduled her appointment for today  Second VM left for patient  Requested return call  RN CM contact number provided  Episode to be closed at this time

## 2023-02-20 ENCOUNTER — TELEPHONE (OUTPATIENT)
Dept: NEPHROLOGY | Facility: CLINIC | Age: 48
End: 2023-02-20

## 2023-03-06 DIAGNOSIS — K59.00 CONSTIPATION, UNSPECIFIED CONSTIPATION TYPE: ICD-10-CM

## 2023-03-06 DIAGNOSIS — Z79.4 TYPE 2 DIABETES MELLITUS WITH HYPERGLYCEMIA, WITH LONG-TERM CURRENT USE OF INSULIN (HCC): ICD-10-CM

## 2023-03-06 DIAGNOSIS — E11.65 TYPE 2 DIABETES MELLITUS WITH HYPERGLYCEMIA, WITH LONG-TERM CURRENT USE OF INSULIN (HCC): ICD-10-CM

## 2023-03-07 RX ORDER — DOCUSATE SODIUM 100 MG/1
CAPSULE, LIQUID FILLED ORAL
Qty: 30 CAPSULE | Refills: 0 | Status: SHIPPED | OUTPATIENT
Start: 2023-03-07

## 2023-03-13 DIAGNOSIS — I10 ESSENTIAL HYPERTENSION: ICD-10-CM

## 2023-03-13 RX ORDER — LISINOPRIL AND HYDROCHLOROTHIAZIDE 20; 12.5 MG/1; MG/1
1 TABLET ORAL DAILY
Qty: 14 TABLET | Refills: 0 | Status: SHIPPED | OUTPATIENT
Start: 2023-03-13 | End: 2023-03-27

## 2023-03-13 RX ORDER — INSULIN GLARGINE-YFGN 100 [IU]/ML
40 INJECTION, SOLUTION SUBCUTANEOUS
Qty: 15 ML | Refills: 0 | Status: SHIPPED | OUTPATIENT
Start: 2023-03-13

## 2023-03-13 RX ORDER — INSULIN GLARGINE-YFGN 100 [IU]/ML
40 INJECTION, SOLUTION SUBCUTANEOUS
Qty: 15 ML | Refills: 0 | Status: SHIPPED | OUTPATIENT
Start: 2023-03-13 | End: 2023-03-13 | Stop reason: SDUPTHER

## 2023-03-13 NOTE — TELEPHONE ENCOUNTER
Initial meds were not refilled as they were automatic requests but per last coversation with pt on 1/6/23, pt is to be seen in office every 6 weeks to be able to get refills- this is due to pt's history of significant noncompliance  Pt's next appt is on 3/27/23 which is 10 weeks from the last one  I am sending a courtesy refill for now and will re-discuss this with pt during our next appt

## 2023-03-28 DIAGNOSIS — J45.20 MILD INTERMITTENT ASTHMA WITHOUT COMPLICATION: ICD-10-CM

## 2023-03-28 RX ORDER — ALBUTEROL SULFATE 90 UG/1
AEROSOL, METERED RESPIRATORY (INHALATION)
Refills: 1 | OUTPATIENT
Start: 2023-03-28

## 2023-03-28 NOTE — TELEPHONE ENCOUNTER
Called pt as she no-showed to appt on 3/27 at 4:20pm  No answer so left Vm asking for pt to call back asap and reschedule appt  Pt continues to be noncompliant with follow up despite multiple discussions during past visits

## 2023-03-29 ENCOUNTER — TELEPHONE (OUTPATIENT)
Dept: FAMILY MEDICINE CLINIC | Facility: CLINIC | Age: 48
End: 2023-03-29

## 2023-03-29 ENCOUNTER — PATIENT OUTREACH (OUTPATIENT)
Dept: FAMILY MEDICINE CLINIC | Facility: CLINIC | Age: 48
End: 2023-03-29

## 2023-03-29 NOTE — PROGRESS NOTES
Per request of provider RN CM reached out to patient  Left detailed voice message requesting that she either return RN CM phone call or reach out to the clinic to schedule an appointment  RN CM contact information provided

## 2023-03-29 NOTE — TELEPHONE ENCOUNTER
Hi Dr Dee Vides-  Just wanted to inform you that I did speak w/patient and scheduled for 4/4/23 @ 3pm

## 2023-04-03 DIAGNOSIS — G47.09 OTHER INSOMNIA: ICD-10-CM

## 2023-04-05 DIAGNOSIS — E11.65 TYPE 2 DIABETES MELLITUS WITH HYPERGLYCEMIA (HCC): ICD-10-CM

## 2023-04-05 RX ORDER — INSULIN LISPRO 100 U/ML
INJECTION, SOLUTION INTRAVENOUS; SUBCUTANEOUS
Qty: 10 ML | Refills: 1 | Status: SHIPPED | OUTPATIENT
Start: 2023-04-05

## 2023-04-07 RX ORDER — DOXYLAMINE SUCCINATE 25 MG
TABLET ORAL
Qty: 15 TABLET | Refills: 1 | OUTPATIENT
Start: 2023-04-07

## 2023-05-30 DIAGNOSIS — I10 ESSENTIAL HYPERTENSION: ICD-10-CM

## 2023-05-30 DIAGNOSIS — R11.2 INTRACTABLE VOMITING WITH NAUSEA: ICD-10-CM

## 2023-05-30 DIAGNOSIS — Z79.4 TYPE 2 DIABETES MELLITUS WITH DIABETIC NEUROPATHY, WITH LONG-TERM CURRENT USE OF INSULIN (HCC): ICD-10-CM

## 2023-05-30 DIAGNOSIS — E11.40 TYPE 2 DIABETES MELLITUS WITH DIABETIC NEUROPATHY, WITH LONG-TERM CURRENT USE OF INSULIN (HCC): ICD-10-CM

## 2023-05-31 RX ORDER — ONDANSETRON 4 MG/1
4 TABLET, FILM COATED ORAL EVERY 8 HOURS PRN
Qty: 9 TABLET | Refills: 0 | Status: SHIPPED | OUTPATIENT
Start: 2023-05-31

## 2023-05-31 RX ORDER — SYRINGE AND NEEDLE,INSULIN,1ML 30 G X1/2"
SYRINGE, EMPTY DISPOSABLE MISCELLANEOUS
Qty: 100 EACH | Refills: 0 | Status: SHIPPED | OUTPATIENT
Start: 2023-05-31 | End: 2023-06-30

## 2023-05-31 RX ORDER — LISINOPRIL AND HYDROCHLOROTHIAZIDE 20; 12.5 MG/1; MG/1
1 TABLET ORAL DAILY
Qty: 9 TABLET | Refills: 0 | Status: SHIPPED | OUTPATIENT
Start: 2023-05-31 | End: 2023-06-14

## 2023-06-15 DIAGNOSIS — E11.65 TYPE 2 DIABETES MELLITUS WITH HYPERGLYCEMIA (HCC): ICD-10-CM

## 2023-06-15 RX ORDER — INSULIN LISPRO 100 U/ML
INJECTION, SOLUTION INTRAVENOUS; SUBCUTANEOUS
Qty: 10 ML | Refills: 1 | Status: SHIPPED | OUTPATIENT
Start: 2023-06-15

## 2023-06-21 DIAGNOSIS — J45.20 MILD INTERMITTENT ASTHMA WITHOUT COMPLICATION: ICD-10-CM

## 2023-06-21 RX ORDER — ALBUTEROL SULFATE 90 UG/1
AEROSOL, METERED RESPIRATORY (INHALATION)
Qty: 8.5 G | Refills: 1 | Status: SHIPPED | OUTPATIENT
Start: 2023-06-21 | End: 2023-09-19

## 2023-08-16 DIAGNOSIS — E11.40 TYPE 2 DIABETES MELLITUS WITH DIABETIC NEUROPATHY, WITH LONG-TERM CURRENT USE OF INSULIN (HCC): ICD-10-CM

## 2023-08-16 DIAGNOSIS — R11.2 INTRACTABLE VOMITING WITH NAUSEA: ICD-10-CM

## 2023-08-16 DIAGNOSIS — I10 ESSENTIAL HYPERTENSION: ICD-10-CM

## 2023-08-16 DIAGNOSIS — E11.65 TYPE 2 DIABETES MELLITUS WITH HYPERGLYCEMIA (HCC): ICD-10-CM

## 2023-08-16 DIAGNOSIS — Z79.4 TYPE 2 DIABETES MELLITUS WITH DIABETIC NEUROPATHY, WITH LONG-TERM CURRENT USE OF INSULIN (HCC): ICD-10-CM

## 2023-08-16 RX ORDER — ONDANSETRON 4 MG/1
4 TABLET, FILM COATED ORAL EVERY 8 HOURS PRN
Qty: 10 TABLET | Refills: 0 | Status: SHIPPED | OUTPATIENT
Start: 2023-08-16

## 2023-08-16 RX ORDER — SYRINGE AND NEEDLE,INSULIN,1ML 30 G X1/2"
SYRINGE, EMPTY DISPOSABLE MISCELLANEOUS
Qty: 100 EACH | Refills: 0 | Status: SHIPPED | OUTPATIENT
Start: 2023-08-16 | End: 2023-09-15

## 2023-08-16 RX ORDER — INSULIN LISPRO 100 [IU]/ML
INJECTION, SOLUTION INTRAVENOUS; SUBCUTANEOUS
Qty: 10 ML | Refills: 0 | Status: SHIPPED | OUTPATIENT
Start: 2023-08-16

## 2023-08-16 RX ORDER — INSULIN LISPRO 100 U/ML
INJECTION, SOLUTION INTRAVENOUS; SUBCUTANEOUS
Qty: 10 ML | Refills: 1 | OUTPATIENT
Start: 2023-08-16

## 2023-08-16 RX ORDER — LISINOPRIL AND HYDROCHLOROTHIAZIDE 20; 12.5 MG/1; MG/1
1 TABLET ORAL DAILY
Qty: 28 TABLET | Refills: 0 | Status: SHIPPED | OUTPATIENT
Start: 2023-08-16 | End: 2023-09-13

## 2023-08-16 NOTE — TELEPHONE ENCOUNTER
We need to have an appointment to check your HbA1c to adjust your insulin. Please make an appointment with us and will refill enough once you make an appointment with us.

## 2023-08-16 NOTE — TELEPHONE ENCOUNTER
Patient called back and scheduled an appt for 9/11/23 at 2pm.     She has no medication left of the ones that she is requesting.     Preferred pharmacy: [address] [place] [address]

## 2023-08-17 NOTE — TELEPHONE ENCOUNTER
Refilled enough medications until the appointment. If you do not make it to the next appointment, this would be the last refill. You already had one no show appointment with me. Please reschedule if you cannot make it to that appointment. Try to make the appointment earlier than later appointment because we really need to adjust your medications according to your labs and your BP. It is dangerous to keep giving you same dosage without any lab work or vitals.

## 2023-09-07 DIAGNOSIS — I10 ESSENTIAL HYPERTENSION: ICD-10-CM

## 2023-09-07 RX ORDER — LISINOPRIL AND HYDROCHLOROTHIAZIDE 20; 12.5 MG/1; MG/1
1 TABLET ORAL DAILY
Qty: 28 TABLET | Refills: 0 | OUTPATIENT
Start: 2023-09-07 | End: 2023-10-05

## 2023-09-07 NOTE — TELEPHONE ENCOUNTER
Dr. Tanya Montesinos    Patient need refills of Lisinopril-hctz 20-12-.5 mg tables needs 90 days please.   [facility] [address] [facility] [address], [place]

## 2023-09-07 NOTE — TELEPHONE ENCOUNTER
Pt already has enough refill until her appointment. I will refill more when she come to the appointment after checking her BP.

## 2023-09-11 ENCOUNTER — TELEPHONE (OUTPATIENT)
Dept: FAMILY MEDICINE CLINIC | Facility: CLINIC | Age: 48
End: 2023-09-11

## 2023-09-11 NOTE — TELEPHONE ENCOUNTER
VM on appt line:    Hi my name is Sonya Lewis. I have an appointment at 2:00 today. I need to reschedule that appointment and also I have a list that's supposed to be picking me up at 1:00 that needs to be cancelled also. Thank you so much. Bye bye. That needs to be cancelled. Spoke with pt - rescheduled appt and cancelled lyft.

## 2023-09-12 DIAGNOSIS — I10 ESSENTIAL HYPERTENSION: ICD-10-CM

## 2023-09-12 RX ORDER — LISINOPRIL AND HYDROCHLOROTHIAZIDE 20; 12.5 MG/1; MG/1
1 TABLET ORAL DAILY
Qty: 46 TABLET | Refills: 0 | Status: SHIPPED | OUTPATIENT
Start: 2023-09-12 | End: 2023-10-28

## 2023-09-12 NOTE — TELEPHONE ENCOUNTER
I am only giving enough refill until next appointment. You already no showed one appointment with me and cancelled an appointment with me yesterday. This would be the last refill if you don't come to next appointment. Please try to make it to the next appointment. Thank you.

## 2023-09-13 DIAGNOSIS — Z79.4 TYPE 2 DIABETES MELLITUS WITH HYPERGLYCEMIA, WITH LONG-TERM CURRENT USE OF INSULIN (HCC): ICD-10-CM

## 2023-09-13 DIAGNOSIS — E11.65 TYPE 2 DIABETES MELLITUS WITH HYPERGLYCEMIA, WITH LONG-TERM CURRENT USE OF INSULIN (HCC): ICD-10-CM

## 2023-09-13 RX ORDER — INSULIN GLARGINE-YFGN 100 [IU]/ML
40 INJECTION, SOLUTION SUBCUTANEOUS
Qty: 22 ML | Refills: 0 | Status: SHIPPED | OUTPATIENT
Start: 2023-09-13

## 2023-09-14 DIAGNOSIS — E11.65 TYPE 2 DIABETES MELLITUS WITH HYPERGLYCEMIA (HCC): ICD-10-CM

## 2023-09-14 RX ORDER — INSULIN LISPRO 100 [IU]/ML
INJECTION, SOLUTION INTRAVENOUS; SUBCUTANEOUS
Qty: 10 ML | Refills: 1 | Status: SHIPPED | OUTPATIENT
Start: 2023-09-14

## 2023-09-18 DIAGNOSIS — E11.40 TYPE 2 DIABETES MELLITUS WITH DIABETIC NEUROPATHY, WITH LONG-TERM CURRENT USE OF INSULIN (HCC): ICD-10-CM

## 2023-09-18 DIAGNOSIS — Z79.4 TYPE 2 DIABETES MELLITUS WITH DIABETIC NEUROPATHY, WITH LONG-TERM CURRENT USE OF INSULIN (HCC): ICD-10-CM

## 2023-09-18 DIAGNOSIS — R11.2 INTRACTABLE VOMITING WITH NAUSEA: ICD-10-CM

## 2023-09-18 RX ORDER — ONDANSETRON 4 MG/1
4 TABLET, FILM COATED ORAL EVERY 8 HOURS PRN
Qty: 20 TABLET | Refills: 0 | Status: SHIPPED | OUTPATIENT
Start: 2023-09-18

## 2023-09-18 RX ORDER — SYRINGE AND NEEDLE,INSULIN,1ML 30 G X1/2"
SYRINGE, EMPTY DISPOSABLE MISCELLANEOUS
Qty: 100 EACH | Refills: 2 | Status: SHIPPED | OUTPATIENT
Start: 2023-09-18 | End: 2023-10-18

## 2023-10-13 DIAGNOSIS — E11.65 TYPE 2 DIABETES MELLITUS WITH HYPERGLYCEMIA, WITH LONG-TERM CURRENT USE OF INSULIN (HCC): ICD-10-CM

## 2023-10-13 DIAGNOSIS — Z79.4 TYPE 2 DIABETES MELLITUS WITH HYPERGLYCEMIA, WITH LONG-TERM CURRENT USE OF INSULIN (HCC): ICD-10-CM

## 2023-10-13 RX ORDER — INSULIN GLARGINE-YFGN 100 [IU]/ML
40 INJECTION, SOLUTION SUBCUTANEOUS
Qty: 6 ML | Refills: 0 | Status: SHIPPED | OUTPATIENT
Start: 2023-10-13 | End: 2023-10-28

## 2023-10-13 NOTE — TELEPHONE ENCOUNTER
Refill of Insulin Glargine to send to [facility] pharmacy on [facility]

## 2023-10-30 ENCOUNTER — TELEPHONE (OUTPATIENT)
Age: 48
End: 2023-10-30

## 2023-11-02 ENCOUNTER — TELEPHONE (OUTPATIENT)
Dept: FAMILY MEDICINE CLINIC | Facility: CLINIC | Age: 48
End: 2023-11-02

## 2023-11-06 ENCOUNTER — TELEPHONE (OUTPATIENT)
Dept: NEPHROLOGY | Facility: CLINIC | Age: 48
End: 2023-11-06

## 2023-11-06 NOTE — TELEPHONE ENCOUNTER
Patient called and said her Motor [employer] ride which is for Medicaid patients never showed. She asked to reschedule this is her 4th no show. I explained that we currently have no schedule to reschedule her at [facility] and did let her know this was her 5th time cancelling/ no show with out 24 hour notice with  Nephrology. I would need to speak to my manager to see what our options are. Patient understood.

## 2023-11-13 ENCOUNTER — TELEPHONE (OUTPATIENT)
Dept: FAMILY MEDICINE CLINIC | Facility: CLINIC | Age: 48
End: 2023-11-13

## 2023-11-13 NOTE — TELEPHONE ENCOUNTER
Received 2 refill request faxed from [address] [place] [address] asking for Ondansetron & Lisinopril. Called pt to inform pt we will not be filling. She understood. She is aware of Septs message when we told her she needed to be seen for refills but then cancelled her appt. Pt also cancelled first appt with [facility]. Confirmed she will be going to them- she just needs to call to make an appt. Shredding refills requests.

## 2023-11-22 ENCOUNTER — TELEPHONE (OUTPATIENT)
Dept: FAMILY MEDICINE CLINIC | Facility: CLINIC | Age: 48
End: 2023-11-22

## 2023-11-22 NOTE — TELEPHONE ENCOUNTER
EMILIO left on clinical line:    Hello, this is [place] Boom calling from [employer]. I'm calling regarding a clinical note we requested for Marcia Barron BEBETOTatianna TONEY's YOB: 1975. We requested it through [employer] portal, either the clinical note or a face to face within six months related to diabetic care to be pushed through. If you prefer, you can fax to [phone] if you have any questions. Our number is [phone]. Please reference patient account [de-identified] Zero. Thank you.

## 2023-11-22 NOTE — TELEPHONE ENCOUNTER
I called [facility] to explain that Pt did not have any diabetic visits past 6 months. Last visit with our office was January of 2023 and Pt has done multiple no-shows and cancellation of appointments that we are no longer are refilling her medication. Pt is in a process of seeking another primary care according to the last conversation Pt had with our office staff. [facility] understood the situation and will no longer request clinical notes from us.

## 2024-01-23 DIAGNOSIS — I12.9 TYPE 2 DIABETES MELLITUS WITH STAGE 3 CHRONIC KIDNEY DISEASE AND HYPERTENSION (HCC): ICD-10-CM

## 2024-01-23 DIAGNOSIS — E11.22 TYPE 2 DIABETES MELLITUS WITH STAGE 3 CHRONIC KIDNEY DISEASE AND HYPERTENSION (HCC): ICD-10-CM

## 2024-01-23 DIAGNOSIS — N18.30 TYPE 2 DIABETES MELLITUS WITH STAGE 3 CHRONIC KIDNEY DISEASE AND HYPERTENSION (HCC): ICD-10-CM

## 2024-01-23 RX ORDER — BLOOD SUGAR DIAGNOSTIC
STRIP MISCELLANEOUS
Qty: 400 STRIP | Refills: 3 | Status: SHIPPED | OUTPATIENT
Start: 2024-01-23

## 2024-03-18 ENCOUNTER — TELEPHONE (OUTPATIENT)
Age: 49
End: 2024-03-18

## 2024-03-18 NOTE — TELEPHONE ENCOUNTER
EMILIO on appt line:    Hi, my name is Marcia. My last name is Plunk PLUN as in Martine K as in "[employer] phone number [phone].    Attempted to contact - left a EMILIO

## 2024-04-10 ENCOUNTER — TELEPHONE (OUTPATIENT)
Age: 49
End: 2024-04-10

## 2024-04-10 NOTE — TELEPHONE ENCOUNTER
Patient:   ROMEL LI            MRN: [id number]            FIN: [id number]               Age:   33 years     Sex:  FEMALE     :  [birth year]   Associated Diagnoses:   Previous  delivery, delivered; Delivered by  section; Gestational hypertension; Postpartum care following  delivery; Abscess of breast, left   Author:   MARY ZHOU      Discharge Information        The patient is 3  day(s) postpartum.  Delivery date was 10/22/2018 by  section.  Breast feeding of the infant is effective.  Discharge Summary Information:  Admitted  10/22/2018, Discharged  10/25/2018.         Discharge diagnosis: Previous  delivery, delivered (LJK17-GO O34.219, Diagnosis, Hospitalized, Medical), Delivered by  section ([id number]-GR O82, Diagnosis, Hospitalized, Medical), Gestational hypertension (YYV08-MJ O13.9, Diagnosis, Hospitalized, Medical), Postpartum care following  delivery (AAY60-SY Z39.2, Diagnosis, Hospitalized, Medical), Abscess of breast, left (WAB91-NF N61.1, Diagnosis, Hospitalized, Medical).       Hospital Course   Hospital Course   Admitted from: from home.     Length of stay: days  3.     Delivery: Type of delivery scheduled  section.        Discharge Plan   Discharge Summary Plan   Discharge Status: stable.     Discharge disposition: discharge to home self care.     Prescriptions: reviewed with patient.     Discharge instructions given: to patient.     Course   Progressing as expected.     Follow-up   Return to office: in  4  days.              Electronically Signed On 10/25/2018 09:19  __________________________________________________   MARY ZHOU     EMILIO on appt line:    My phone number is [phone]. Birth date is [birth year]. I've been having a lot of problems or something and breathing and I usually give it doctor's appointment to come in soon as possible morning to find taking your call back. Then again the number is [phone]. Thank you. porfirio Pickens.  You received a voice mail from [employer].    Attempted to contact pt - left blanca JHAVERI

## 2024-04-10 NOTE — TELEPHONE ENCOUNTER
VM on appt line:    I'm calling and should be about getting an appointment. I'm having some issues. My number is [phone]. My phone number is [phone], my birthday.  You received a voice mail from [employer].    Attempted to contact pt - left a EMILIO

## 2024-04-15 ENCOUNTER — TELEPHONE (OUTPATIENT)
Age: 49
End: 2024-04-15

## 2024-04-15 RX ORDER — PREDNISOLONE ACETATE 10 MG/ML
SUSPENSION/ DROPS OPHTHALMIC
COMMUNITY
Start: 2024-03-04

## 2024-04-15 RX ORDER — OFLOXACIN 3 MG/ML
SOLUTION/ DROPS OPHTHALMIC
COMMUNITY
Start: 2024-03-04

## 2024-04-15 RX ORDER — KETOROLAC TROMETHAMINE 5 MG/ML
SOLUTION OPHTHALMIC
COMMUNITY
Start: 2024-03-05

## 2024-06-18 ENCOUNTER — OFFICE VISIT (OUTPATIENT)
Dept: FAMILY MEDICINE CLINIC | Facility: CLINIC | Age: 49
End: 2024-06-18
Payer: COMMERCIAL

## 2024-06-18 VITALS
RESPIRATION RATE: 18 BRPM | BODY MASS INDEX: 35.83 KG/M2 | DIASTOLIC BLOOD PRESSURE: 100 MMHG | SYSTOLIC BLOOD PRESSURE: 162 MMHG | HEART RATE: 96 BPM | WEIGHT: 202.2 LBS | TEMPERATURE: 96.1 F | HEIGHT: 63 IN | OXYGEN SATURATION: 96 %

## 2024-06-18 DIAGNOSIS — I10 ESSENTIAL HYPERTENSION: ICD-10-CM

## 2024-06-18 DIAGNOSIS — Z13.0 SCREENING, IRON DEFICIENCY ANEMIA: ICD-10-CM

## 2024-06-18 DIAGNOSIS — E55.9 VITAMIN D DEFICIENCY: ICD-10-CM

## 2024-06-18 DIAGNOSIS — F31.9 BIPOLAR 1 DISORDER (HCC): ICD-10-CM

## 2024-06-18 DIAGNOSIS — Z13.220 SCREENING CHOLESTEROL LEVEL: ICD-10-CM

## 2024-06-18 DIAGNOSIS — I12.9 TYPE 2 DIABETES MELLITUS WITH STAGE 3 CHRONIC KIDNEY DISEASE AND HYPERTENSION (HCC): ICD-10-CM

## 2024-06-18 DIAGNOSIS — Z79.4 TYPE 2 DIABETES MELLITUS WITH HYPERGLYCEMIA, WITH LONG-TERM CURRENT USE OF INSULIN (HCC): ICD-10-CM

## 2024-06-18 DIAGNOSIS — E11.65 TYPE 2 DIABETES MELLITUS WITH HYPERGLYCEMIA, WITH LONG-TERM CURRENT USE OF INSULIN (HCC): ICD-10-CM

## 2024-06-18 DIAGNOSIS — N18.30 TYPE 2 DIABETES MELLITUS WITH STAGE 3 CHRONIC KIDNEY DISEASE AND HYPERTENSION (HCC): ICD-10-CM

## 2024-06-18 DIAGNOSIS — Z01.818 PREOP EXAMINATION: Primary | ICD-10-CM

## 2024-06-18 DIAGNOSIS — B37.9 YEAST INFECTION: ICD-10-CM

## 2024-06-18 DIAGNOSIS — Z13.29 THYROID DISORDER SCREENING: ICD-10-CM

## 2024-06-18 DIAGNOSIS — N18.32 STAGE 3B CHRONIC KIDNEY DISEASE (HCC): ICD-10-CM

## 2024-06-18 DIAGNOSIS — E78.2 MIXED HYPERLIPIDEMIA: ICD-10-CM

## 2024-06-18 DIAGNOSIS — F90.9 ADULT ADHD: ICD-10-CM

## 2024-06-18 DIAGNOSIS — E11.22 TYPE 2 DIABETES MELLITUS WITH STAGE 3 CHRONIC KIDNEY DISEASE AND HYPERTENSION (HCC): ICD-10-CM

## 2024-06-18 DIAGNOSIS — Z91.199 NONCOMPLIANCE: ICD-10-CM

## 2024-06-18 DIAGNOSIS — F11.20 METHADONE MAINTENANCE THERAPY PATIENT (HCC): ICD-10-CM

## 2024-06-18 DIAGNOSIS — F41.9 ANXIETY AND DEPRESSION: ICD-10-CM

## 2024-06-18 DIAGNOSIS — J45.20 MILD INTERMITTENT ASTHMA WITHOUT COMPLICATION: ICD-10-CM

## 2024-06-18 DIAGNOSIS — R76.8 HEPATITIS C ANTIBODY TEST POSITIVE: ICD-10-CM

## 2024-06-18 DIAGNOSIS — F32.A ANXIETY AND DEPRESSION: ICD-10-CM

## 2024-06-18 DIAGNOSIS — Z12.31 ENCOUNTER FOR SCREENING MAMMOGRAM FOR BREAST CANCER: ICD-10-CM

## 2024-06-18 DIAGNOSIS — G40.909 SEIZURE DISORDER (HCC): ICD-10-CM

## 2024-06-18 PROBLEM — R11.0 NAUSEA: Status: RESOLVED | Noted: 2018-11-26 | Resolved: 2024-06-18

## 2024-06-18 PROBLEM — E66.01 MORBIDLY OBESE (HCC): Status: RESOLVED | Noted: 2017-06-30 | Resolved: 2024-06-18

## 2024-06-18 PROBLEM — E66.01 MORBID OBESITY WITH BODY MASS INDEX (BMI) OF 40.0 TO 44.9 IN ADULT (HCC): Status: RESOLVED | Noted: 2020-02-10 | Resolved: 2024-06-18

## 2024-06-18 PROBLEM — U07.1 COVID-19 VIRUS INFECTION: Status: RESOLVED | Noted: 2021-12-10 | Resolved: 2024-06-18

## 2024-06-18 PROBLEM — R20.2 PARESTHESIA: Status: RESOLVED | Noted: 2023-01-06 | Resolved: 2024-06-18

## 2024-06-18 PROBLEM — F19.90 SUBSTANCE USE DISORDER: Status: RESOLVED | Noted: 2023-01-06 | Resolved: 2024-06-18

## 2024-06-18 PROBLEM — R11.2 INTRACTABLE VOMITING WITH NAUSEA: Status: RESOLVED | Noted: 2023-01-06 | Resolved: 2024-06-18

## 2024-06-18 PROBLEM — Z86.39 HISTORY OF HYPOTHYROIDISM: Status: RESOLVED | Noted: 2017-06-30 | Resolved: 2024-06-18

## 2024-06-18 LAB — SL AMB POCT HEMOGLOBIN AIC: 12.8 (ref ?–6.5)

## 2024-06-18 PROCEDURE — 83036 HEMOGLOBIN GLYCOSYLATED A1C: CPT | Performed by: STUDENT IN AN ORGANIZED HEALTH CARE EDUCATION/TRAINING PROGRAM

## 2024-06-18 PROCEDURE — 99244 OFF/OP CNSLTJ NEW/EST MOD 40: CPT | Performed by: STUDENT IN AN ORGANIZED HEALTH CARE EDUCATION/TRAINING PROGRAM

## 2024-06-18 RX ORDER — LISINOPRIL AND HYDROCHLOROTHIAZIDE 20; 12.5 MG/1; MG/1
1 TABLET ORAL DAILY
Qty: 90 TABLET | Refills: 3 | Status: SHIPPED | OUTPATIENT
Start: 2024-06-18 | End: 2025-06-13

## 2024-06-18 RX ORDER — FLUCONAZOLE 150 MG/1
150 TABLET ORAL DAILY
Qty: 2 TABLET | Refills: 0 | Status: SHIPPED | OUTPATIENT
Start: 2024-06-18 | End: 2024-06-20

## 2024-06-18 RX ORDER — TRAZODONE HYDROCHLORIDE 50 MG/1
50 TABLET ORAL
COMMUNITY
Start: 2024-05-31

## 2024-06-18 RX ORDER — ALBUTEROL SULFATE 90 UG/1
2 AEROSOL, METERED RESPIRATORY (INHALATION) EVERY 6 HOURS PRN
Qty: 6.7 G | Refills: 5 | Status: SHIPPED | OUTPATIENT
Start: 2024-06-18

## 2024-06-18 RX ORDER — INSULIN GLARGINE 100 [IU]/ML
40 INJECTION, SOLUTION SUBCUTANEOUS DAILY
COMMUNITY

## 2024-06-18 NOTE — PROGRESS NOTES
"Presurgical Evaluation    Subjective:      Patient ID: Marcia Barron is a 49 y.o. female.    Chief Complaint   Patient presents with   • Establish Care     Needs you to prescribe her everything except pysch meds//has a yeast infection, would like to have something for that//needs albuterol inhaler   • Pre-op Exam       Prior anesthesia: No prior complications    CAD History: None    Pulmonary History: None    Renal history: None    Diabetes History:  Type 2  Uncontrolled     Neurological History: None     On Immunosuppressant meds/biologics: No      Review of Systems   Constitutional:  Negative for chills and fever.   HENT:  Negative for ear pain and sore throat.    Eyes:  Negative for pain and visual disturbance.   Respiratory:  Negative for cough and shortness of breath.    Cardiovascular:  Negative for chest pain and palpitations.   Gastrointestinal:  Negative for abdominal pain and vomiting.   Genitourinary:  Negative for dysuria and hematuria.   Musculoskeletal:  Negative for arthralgias and back pain.   Skin:  Negative for color change and rash.   Neurological:  Negative for seizures and syncope.   All other systems reviewed and are negative.        Current Outpatient Medications   Medication Sig Dispense Refill   • albuterol (Proventil HFA) 90 mcg/act inhaler Inhale 2 puffs every 6 (six) hours as needed for wheezing 6.7 g 5   • BD Pen Needle Micro U/F 32G X 6 MM MISC USE 3 (THREE) TIMES A DAY BEFORE MEALS 100 each 3   • fluconazole (DIFLUCAN) 150 mg tablet Take 1 tablet (150 mg total) by mouth daily for 2 doses 2 tablet 0   • insulin glargine (LANTUS) 100 units/mL subcutaneous injection Inject 40 Units under the skin daily     • insulin lispro (Admelog) 100 units/mL injection INJECT 5-10 UNITS UNDER THE SKIN 3 (THREE) TIMES A DAY WITH MEALS 10 mL 1   • Insulin Syringe-Needle U-100 (BD Insulin Syringe U/F) 31G X 5/16\" 0.3 ML MISC 3 TIMES DAILY BEFORE MEALS 100 each 0   • lisinopril-hydrochlorothiazide " (PRINZIDE,ZESTORETIC) 20-12.5 MG per tablet Take 1 tablet by mouth daily 90 tablet 3   • methadone (DOLOPHINE) 10 mg/mL oral concentrated solution Take 140 mg by mouth daily Methadone clinic     • traZODone (DESYREL) 50 mg tablet Take 50 mg by mouth daily at bedtime     • venlafaxine (EFFEXOR-XR) 150 mg 24 hr capsule Take 300 mg by mouth daily        No current facility-administered medications for this visit.       Allergies on file:   Cat hair extract, Codeine, and Sulfamethoxazole-trimethoprim    Patient Active Problem List   Diagnosis   • Essential hypertension   • Type 2 diabetes mellitus with stage 3 chronic kidney disease and hypertension ([facility])   • Anxiety and depression   • Bipolar 1 disorder (HCC)   • Adult ADHD   • Other insomnia   • Status post laparoscopic cholecystectomy   • Mild intermittent asthma without complication   • Methadone maintenance therapy patient ([facility])   • Stage 3b chronic kidney disease (HCC)   • Hepatitis C antibody test positive   • Seizure disorder ([facility])   • Mixed hyperlipidemia   • Type 2 diabetes mellitus with hyperglycemia, with long-term current use of insulin ([facility])   • Noncompliance        Past Medical History:   Diagnosis Date   • Anxiety    • Asthma    • Cholelithiases 2/5/2020    Added automatically from request for surgery [id number]   • Depression    • Diabetes mellitus (HCC)    • Hypertension    • Mixed hyperlipidemia 1/6/2023   • Neuropathy    • Psychiatric disorder        Past Surgical History:   Procedure Laterality Date   • CHOLECYSTECTOMY  2020   • CHOLECYSTECTOMY LAPAROSCOPIC N/A 02/10/2020    Procedure: CHOLECYSTECTOMY LAPAROSCOPIC;  Surgeon: Shaheed Nolan MD;  Location: [facility];  Service: General   • INCISION AND DRAINAGE OF WOUND Right 07/01/2017    Procedure: INCISION AND DRAINAGE (I&D) EXTREMITY THIGH;  Surgeon: Veto Reyes MD;  Location: [facility];  Service: General       Family History   Problem Relation Age of Onset   • Hypertension Mother    • Drug abuse  "Mother    • Diabetes Father    • Hypertension Father    • COPD Father    • ADD / ADHD Father    • Anxiety disorder Father    • Bipolar disorder Father    • Kidney cancer Maternal Grandmother    • Cancer Maternal Grandmother    • Lung cancer Paternal Uncle    • Diabetes Family    • Hypertension Family    • Aneurysm Family         PDF, paternal aunt, other paternal family members   • Drug abuse Maternal Uncle    • Schizophrenia Maternal Uncle    • Suicide Attempts Maternal Uncle        Social History     Tobacco Use   • Smoking status: Never   • Smokeless tobacco: Never   • Tobacco comments:     never   Vaping Use   • Vaping status: Never Used   Substance Use Topics   • Alcohol use: Never   • Drug use: Not Currently     Types: Heroin     Comment: hx of IVDU       Objective:    Vitals:    06/18/24 1201   BP: 162/100   BP Location: Left arm   Patient Position: Sitting   Cuff Size: Standard   Pulse: 96   Resp: 18   Temp: (!) 96.1 °F (35.6 °C)   TempSrc: Temporal   SpO2: 96%   Weight: 91.7 kg (202 lb 3.2 oz)   Height: 5' 3\" (1.6 m)        Physical Exam  Constitutional:       General: She is not in acute distress.  HENT:      Head: Normocephalic and atraumatic.   Eyes:      General: No scleral icterus.     Conjunctiva/sclera: Conjunctivae normal.   Cardiovascular:      Rate and Rhythm: Normal rate and regular rhythm.      Pulses: Normal pulses.      Heart sounds: No murmur heard.  Pulmonary:      Effort: Pulmonary effort is normal. No respiratory distress.      Breath sounds: Normal breath sounds. No wheezing or rales.   Abdominal:      General: Bowel sounds are normal. There is no distension.      Palpations: Abdomen is soft.      Tenderness: There is no abdominal tenderness. There is no guarding.   Musculoskeletal:         General: No swelling. Normal range of motion.   Skin:     General: Skin is warm and dry.      Capillary Refill: Capillary refill takes less than 2 seconds.      Coloration: Skin is not jaundiced. "   Neurological:      General: No focal deficit present.      Mental Status: She is alert and oriented to person, place, and time. Mental status is at baseline.   Psychiatric:         Mood and Affect: Mood normal.         Behavior: Behavior normal.           Preop labs/testing available and reviewed: no      EKG yes, NSR, QTc 430    Echo no    Stress test/cath no    PFT/[place] no    Functional capacity: Walking , 4-5 MPH               4 Mets   Pick the highest level patient can comfortably perform   4 mets or greater for surgery    RCRI  High Risk surgery?         1 Point  CAD History:         1 Point   MI; Positive Stress Test; CP due to Mi;  Nitrate Usage to control Angina; Pathologic Q wave on EKG  CHF Active:         1 Point   Pulm Edema; Paroxysmal Nocturnal Dyspnea;  Bibasilar Rales (crackles);S3; CHF on CXR  Cerebrovascular Disease (TIA or CVA):     1 Point  DM on Insulin:        1 Point  Serum Creat >2.0 mg/dl:       1 Point          Total Points: 1     Scorin: Class I, Very Low Risk (0.4%)     1: Class II, Low risk (0.9%)     2: Class III Moderate (6.6%)     3: Class IV High (>11%)      SEYMOUR Risk:  GFR:        For PCP:  If GFR>60, Hold ACE/ARB/Diuretic on the day of surgery, and NSAIDS 10 days before.    If GFR<45, Consider PRE and POST op Nephrology Consult.    If 46 <GFR> 59 : Has Patient had SEYMOUR in last 6 Months? no   If YES: Preop Nephrology consult   If No:  Post Op Nephrology consult.           Assessment/Plan:    Patient is medically optimized for the planned procedure.    Further testing/evaluation is required.  Follow-up yearly blood work.    Postop concerns: no    Problem List Items Addressed This Visit        Cardiovascular and Mediastinum    Essential hypertension    Relevant Medications    lisinopril-hydrochlorothiazide (PRINZIDE,ZESTORETIC) 20-12.5 MG per tablet    Type 2 diabetes mellitus with stage 3 chronic kidney disease and hypertension (HCC)    Relevant Medications    insulin glargine  (LANTUS) 100 units/mL subcutaneous injection    lisinopril-hydrochlorothiazide (PRINZIDE,ZESTORETIC) 20-12.5 MG per tablet       Respiratory    Mild intermittent asthma without complication    Relevant Medications    albuterol (Proventil HFA) 90 mcg/act inhaler       Endocrine    Type 2 diabetes mellitus with hyperglycemia, with long-term current use of insulin (HCC)    Relevant Medications    insulin glargine (LANTUS) 100 units/mL subcutaneous injection    Other Relevant Orders    POCT hemoglobin A1c (Completed)    Albumin / creatinine urine ratio       Nervous and Auditory    Seizure disorder (HCC)       Genitourinary    Stage 3b chronic kidney disease (HCC)    Relevant Orders    Comprehensive metabolic panel       Behavioral Health    Anxiety and depression    Relevant Medications    traZODone (DESYREL) 50 mg tablet    Bipolar 1 disorder (HCC)    Relevant Medications    traZODone (DESYREL) 50 mg tablet    Adult ADHD    Relevant Medications    traZODone (DESYREL) 50 mg tablet    Methadone maintenance therapy patient (HCC)    Noncompliance       Other    Hepatitis C antibody test positive    Relevant Orders    Hepatitis C RNA, Quantitative PCR, [facility]    Mixed hyperlipidemia   Other Visit Diagnoses     Preop examination    -  Primary    Encounter for screening mammogram for breast cancer        Relevant Orders    Mammo screening bilateral w 3d & cad    Thyroid disorder screening        Relevant Orders    TSH, 3rd generation with Free T4 reflex    Screening, iron deficiency anemia        Relevant Orders    CBC and differential    Screening cholesterol level        Relevant Orders    Lipid panel    Vitamin D deficiency        Relevant Orders    Vitamin D 25 hydroxy    Yeast infection        Relevant Medications    fluconazole (DIFLUCAN) 150 mg tablet        Patient is a 49-year-old female, new patient to office, presenting for preoperative exam for bilateral cataract procedure with ophthalmology.  Benefits outweigh  "risks for planned procedure, hemoglobin A1c of 12.8%, patient is currently on basal/bolus regimen and sugars have been controlled under 300, Lantus increased today.      On internal medicine standpoint if patient is taking appropriate medications she is medically optimized for planned procedure with close follow-up primary care physician.      Follow-up blood work to optimize prior to planned procedure, close follow-up in the office for further evaluation.    NOTE: Please use the above to review important meds for your specialty, the remainder \"per anesthesia Guidelines.\"    NOTE: Please place an Inbasket message for \"SOC\" pool for complicated patients.     "

## 2024-06-20 ENCOUNTER — TELEPHONE (OUTPATIENT)
Age: 49
End: 2024-06-20

## 2024-06-20 NOTE — TELEPHONE ENCOUNTER
Patient called in questioning the status of her medical clearance form as the office had not received it yet. Patient was warm transferred to [facility] to be further assisted.

## 2024-06-22 LAB
25(OH)D3+25(OH)D2 SERPL-MCNC: 23.8 NG/ML (ref 30–100)
ALBUMIN SERPL-MCNC: 4 G/DL (ref 3.9–4.9)
ALBUMIN/CREAT UR: 338 MG/G CREAT (ref 0–29)
ALP SERPL-CCNC: 92 IU/L (ref 44–121)
ALT SERPL-CCNC: 53 IU/L (ref 0–32)
AST SERPL-CCNC: 38 IU/L (ref 0–40)
BASOPHILS # BLD AUTO: 0 X10E3/UL (ref 0–0.2)
BASOPHILS NFR BLD AUTO: 0 %
BILIRUB SERPL-MCNC: 0.5 MG/DL (ref 0–1.2)
BUN SERPL-MCNC: 19 MG/DL (ref 6–24)
BUN/CREAT SERPL: 16 (ref 9–23)
CALCIUM SERPL-MCNC: 9.6 MG/DL (ref 8.7–10.2)
CHLORIDE SERPL-SCNC: 92 MMOL/L (ref 96–106)
CHOLEST SERPL-MCNC: 168 MG/DL (ref 100–199)
CHOLEST/HDLC SERPL: 4.4 RATIO (ref 0–4.4)
CO2 SERPL-SCNC: 24 MMOL/L (ref 20–29)
CREAT SERPL-MCNC: 1.22 MG/DL (ref 0.57–1)
CREAT UR-MCNC: 107.8 MG/DL
EGFR: 55 ML/MIN/1.73
EOSINOPHIL # BLD AUTO: 0.1 X10E3/UL (ref 0–0.4)
EOSINOPHIL NFR BLD AUTO: 1 %
ERYTHROCYTE [DISTWIDTH] IN BLOOD BY AUTOMATED COUNT: 14.4 % (ref 11.7–15.4)
GLOBULIN SER-MCNC: 3.5 G/DL (ref 1.5–4.5)
GLUCOSE SERPL-MCNC: 462 MG/DL (ref 70–99)
HCT VFR BLD AUTO: 45.4 % (ref 34–46.6)
HDLC SERPL-MCNC: 38 MG/DL
HGB BLD-MCNC: 14.6 G/DL (ref 11.1–15.9)
IMM GRANULOCYTES # BLD: 0 X10E3/UL (ref 0–0.1)
IMM GRANULOCYTES NFR BLD: 0 %
LDLC SERPL CALC-MCNC: 97 MG/DL (ref 0–99)
LYMPHOCYTES # BLD AUTO: 2.9 X10E3/UL (ref 0.7–3.1)
LYMPHOCYTES NFR BLD AUTO: 36 %
MCH RBC QN AUTO: 27.4 PG (ref 26.6–33)
MCHC RBC AUTO-ENTMCNC: 32.2 G/DL (ref 31.5–35.7)
MCV RBC AUTO: 85 FL (ref 79–97)
MICROALBUMIN UR-MCNC: 364 UG/ML
MICRODELETION SYND BLD/T FISH: NORMAL
MICRODELETION SYND BLD/T FISH: NORMAL
MONOCYTES # BLD AUTO: 0.7 X10E3/UL (ref 0.1–0.9)
MONOCYTES NFR BLD AUTO: 9 %
NEUTROPHILS # BLD AUTO: 4.4 X10E3/UL (ref 1.4–7)
NEUTROPHILS NFR BLD AUTO: 54 %
PLATELET # BLD AUTO: 250 X10E3/UL (ref 150–450)
POTASSIUM SERPL-SCNC: 4.9 MMOL/L (ref 3.5–5.2)
PROT SERPL-MCNC: 7.5 G/DL (ref 6–8.5)
RBC # BLD AUTO: 5.33 X10E6/UL (ref 3.77–5.28)
SL AMB VLDL CHOLESTEROL CALC: 33 MG/DL (ref 5–40)
SODIUM SERPL-SCNC: 132 MMOL/L (ref 134–144)
TRIGL SERPL-MCNC: 194 MG/DL (ref 0–149)
TSH SERPL DL<=0.005 MIU/L-ACNC: 2.45 UIU/ML (ref 0.45–4.5)
WBC # BLD AUTO: 8.1 X10E3/UL (ref 3.4–10.8)

## 2024-06-25 LAB
DIAGNOSTIC IMP SPEC-IMP: NORMAL
HCV AB S/CO SERPL IA: REACTIVE
HCV RNA # SERPL NAA+PROBE: NORMAL IU/ML
HCV RNA SERPL NAA+PROBE-LOG IU: 5.47 LOG10 IU/ML
TEST INFORMATION: NORMAL

## 2024-06-27 DIAGNOSIS — B18.2 CHRONIC HEPATITIS C WITHOUT HEPATIC COMA (HCC): Primary | ICD-10-CM

## 2024-07-07 ENCOUNTER — APPOINTMENT (EMERGENCY)
Dept: RADIOLOGY | Facility: HOSPITAL | Age: 49
DRG: 720 | End: 2024-07-07
Payer: COMMERCIAL

## 2024-07-07 ENCOUNTER — HOSPITAL ENCOUNTER (INPATIENT)
Facility: HOSPITAL | Age: 49
LOS: 6 days | Discharge: HOME | DRG: 720 | End: 2024-07-13
Attending: EMERGENCY MEDICINE | Admitting: STUDENT IN AN ORGANIZED HEALTH CARE EDUCATION/TRAINING PROGRAM
Payer: COMMERCIAL

## 2024-07-07 DIAGNOSIS — E11.65 TYPE 2 DIABETES MELLITUS WITH HYPERGLYCEMIA, WITH LONG-TERM CURRENT USE OF INSULIN (HCC): Chronic | ICD-10-CM

## 2024-07-07 DIAGNOSIS — F19.90 SUBSTANCE USE DISORDER: Primary | Chronic | ICD-10-CM

## 2024-07-07 DIAGNOSIS — F11.20 METHADONE MAINTENANCE THERAPY PATIENT (HCC): Chronic | ICD-10-CM

## 2024-07-07 DIAGNOSIS — N39.0 UTI (URINARY TRACT INFECTION): ICD-10-CM

## 2024-07-07 DIAGNOSIS — A41.9 SEPTIC SHOCK (HCC): ICD-10-CM

## 2024-07-07 DIAGNOSIS — E11.10 DKA (DIABETIC KETOACIDOSIS) (HCC): ICD-10-CM

## 2024-07-07 DIAGNOSIS — R78.81 BACTEREMIA: ICD-10-CM

## 2024-07-07 DIAGNOSIS — R65.21 SEPTIC SHOCK (HCC): ICD-10-CM

## 2024-07-07 DIAGNOSIS — E86.0 DEHYDRATION: ICD-10-CM

## 2024-07-07 DIAGNOSIS — R21 RASH: ICD-10-CM

## 2024-07-07 DIAGNOSIS — R76.8 HEPATITIS C ANTIBODY TEST POSITIVE: ICD-10-CM

## 2024-07-07 DIAGNOSIS — Z79.4 TYPE 2 DIABETES MELLITUS WITH HYPERGLYCEMIA, WITH LONG-TERM CURRENT USE OF INSULIN (HCC): Chronic | ICD-10-CM

## 2024-07-07 PROBLEM — F32.A ANXIETY AND DEPRESSION: Chronic | Status: ACTIVE | Noted: 2017-04-18

## 2024-07-07 PROBLEM — E11.22 TYPE 2 DIABETES MELLITUS WITH STAGE 3 CHRONIC KIDNEY DISEASE AND HYPERTENSION (HCC): Chronic | Status: ACTIVE | Noted: 2017-04-18

## 2024-07-07 PROBLEM — I12.9 TYPE 2 DIABETES MELLITUS WITH STAGE 3 CHRONIC KIDNEY DISEASE AND HYPERTENSION (HCC): Chronic | Status: ACTIVE | Noted: 2017-04-18

## 2024-07-07 PROBLEM — F41.9 ANXIETY AND DEPRESSION: Chronic | Status: ACTIVE | Noted: 2017-04-18

## 2024-07-07 PROBLEM — Z91.199 NONCOMPLIANCE: Chronic | Status: ACTIVE | Noted: 2023-01-15

## 2024-07-07 PROBLEM — G92.8 TOXIC METABOLIC ENCEPHALOPATHY: Status: ACTIVE | Noted: 2024-07-07

## 2024-07-07 PROBLEM — N18.32 STAGE 3B CHRONIC KIDNEY DISEASE (HCC): Chronic | Status: ACTIVE | Noted: 2022-10-11

## 2024-07-07 PROBLEM — I10 ESSENTIAL HYPERTENSION: Chronic | Status: ACTIVE | Noted: 2017-04-18

## 2024-07-07 PROBLEM — N17.9 AKI (ACUTE KIDNEY INJURY) (HCC): Status: ACTIVE | Noted: 2024-07-07

## 2024-07-07 PROBLEM — E78.2 MIXED HYPERLIPIDEMIA: Chronic | Status: ACTIVE | Noted: 2023-01-06

## 2024-07-07 PROBLEM — J45.20 MILD INTERMITTENT ASTHMA WITHOUT COMPLICATION: Chronic | Status: ACTIVE | Noted: 2022-04-29

## 2024-07-07 PROBLEM — G40.909 SEIZURE DISORDER (HCC): Chronic | Status: ACTIVE | Noted: 2023-01-06

## 2024-07-07 PROBLEM — N18.30 TYPE 2 DIABETES MELLITUS WITH STAGE 3 CHRONIC KIDNEY DISEASE AND HYPERTENSION (HCC): Chronic | Status: ACTIVE | Noted: 2017-04-18

## 2024-07-07 LAB
ALBUMIN SERPL BCG-MCNC: 4 G/DL (ref 3.5–5)
ALP SERPL-CCNC: 59 U/L (ref 34–104)
ALT SERPL W P-5'-P-CCNC: 32 U/L (ref 7–52)
AMPHETAMINES SERPL QL SCN: POSITIVE
ANION GAP SERPL CALCULATED.3IONS-SCNC: 10 MMOL/L (ref 4–13)
ANION GAP SERPL CALCULATED.3IONS-SCNC: 12 MMOL/L (ref 4–13)
ANION GAP SERPL CALCULATED.3IONS-SCNC: 17 MMOL/L (ref 4–13)
ANION GAP SERPL CALCULATED.3IONS-SCNC: 17 MMOL/L (ref 4–13)
ANION GAP SERPL CALCULATED.3IONS-SCNC: 26 MMOL/L (ref 4–13)
APAP SERPL-MCNC: <2 UG/ML (ref 10–20)
AST SERPL W P-5'-P-CCNC: 59 U/L (ref 13–39)
B-OH-BUTYR SERPL-MCNC: 0.9 MMOL/L (ref 0.02–0.27)
BACTERIA UR QL AUTO: ABNORMAL /HPF
BARBITURATES UR QL: NEGATIVE
BASE EX.OXY STD BLDV CALC-SCNC: 83.2 % (ref 60–80)
BASE EXCESS BLDV CALC-SCNC: -12.6 MMOL/L
BASOPHILS # BLD AUTO: 0.04 THOUSANDS/ÂΜL (ref 0–0.1)
BASOPHILS NFR BLD AUTO: 0 % (ref 0–1)
BENZODIAZ UR QL: NEGATIVE
BILIRUB SERPL-MCNC: 1.05 MG/DL (ref 0.2–1)
BILIRUB UR QL STRIP: NEGATIVE
BUN SERPL-MCNC: 53 MG/DL (ref 5–25)
BUN SERPL-MCNC: 56 MG/DL (ref 5–25)
BUN SERPL-MCNC: 58 MG/DL (ref 5–25)
BUN SERPL-MCNC: 60 MG/DL (ref 5–25)
BUN SERPL-MCNC: 61 MG/DL (ref 5–25)
CALCIUM SERPL-MCNC: 10 MG/DL (ref 8.4–10.2)
CALCIUM SERPL-MCNC: 11.6 MG/DL (ref 8.4–10.2)
CALCIUM SERPL-MCNC: 8.3 MG/DL (ref 8.4–10.2)
CALCIUM SERPL-MCNC: 9.1 MG/DL (ref 8.4–10.2)
CALCIUM SERPL-MCNC: 9.7 MG/DL (ref 8.4–10.2)
CAOX CRY URNS QL MICRO: ABNORMAL /HPF
CHLORIDE SERPL-SCNC: 102 MMOL/L (ref 96–108)
CHLORIDE SERPL-SCNC: 103 MMOL/L (ref 96–108)
CHLORIDE SERPL-SCNC: 105 MMOL/L (ref 96–108)
CHLORIDE SERPL-SCNC: 108 MMOL/L (ref 96–108)
CHLORIDE SERPL-SCNC: 91 MMOL/L (ref 96–108)
CLARITY UR: CLEAR
CO2 SERPL-SCNC: 14 MMOL/L (ref 21–32)
CO2 SERPL-SCNC: 19 MMOL/L (ref 21–32)
CO2 SERPL-SCNC: 19 MMOL/L (ref 21–32)
CO2 SERPL-SCNC: 23 MMOL/L (ref 21–32)
CO2 SERPL-SCNC: 25 MMOL/L (ref 21–32)
COCAINE UR QL: NEGATIVE
COLOR UR: YELLOW
CREAT SERPL-MCNC: 3.06 MG/DL (ref 0.6–1.3)
CREAT SERPL-MCNC: 3.53 MG/DL (ref 0.6–1.3)
CREAT SERPL-MCNC: 4.24 MG/DL (ref 0.6–1.3)
CREAT SERPL-MCNC: 4.44 MG/DL (ref 0.6–1.3)
CREAT SERPL-MCNC: 4.7 MG/DL (ref 0.6–1.3)
EOSINOPHIL # BLD AUTO: 0.01 THOUSAND/ÂΜL (ref 0–0.61)
EOSINOPHIL NFR BLD AUTO: 0 % (ref 0–6)
ERYTHROCYTE [DISTWIDTH] IN BLOOD BY AUTOMATED COUNT: 13.5 % (ref 11.6–15.1)
ETHANOL SERPL-MCNC: <10 MG/DL
FENTANYL UR QL SCN: POSITIVE
GFR SERPL CREATININE-BSD FRML MDRD: 10 ML/MIN/1.73SQ M
GFR SERPL CREATININE-BSD FRML MDRD: 11 ML/MIN/1.73SQ M
GFR SERPL CREATININE-BSD FRML MDRD: 11 ML/MIN/1.73SQ M
GFR SERPL CREATININE-BSD FRML MDRD: 14 ML/MIN/1.73SQ M
GFR SERPL CREATININE-BSD FRML MDRD: 17 ML/MIN/1.73SQ M
GLUCOSE SERPL-MCNC: 108 MG/DL (ref 65–140)
GLUCOSE SERPL-MCNC: 115 MG/DL (ref 65–140)
GLUCOSE SERPL-MCNC: 120 MG/DL (ref 65–140)
GLUCOSE SERPL-MCNC: 121 MG/DL (ref 65–140)
GLUCOSE SERPL-MCNC: 132 MG/DL (ref 65–140)
GLUCOSE SERPL-MCNC: 132 MG/DL (ref 65–140)
GLUCOSE SERPL-MCNC: 149 MG/DL (ref 65–140)
GLUCOSE SERPL-MCNC: 161 MG/DL (ref 65–140)
GLUCOSE SERPL-MCNC: 171 MG/DL (ref 65–140)
GLUCOSE SERPL-MCNC: 233 MG/DL (ref 65–140)
GLUCOSE SERPL-MCNC: 287 MG/DL (ref 65–140)
GLUCOSE SERPL-MCNC: 340 MG/DL (ref 65–140)
GLUCOSE SERPL-MCNC: 407 MG/DL (ref 65–140)
GLUCOSE SERPL-MCNC: 484 MG/DL (ref 65–140)
GLUCOSE SERPL-MCNC: 503 MG/DL (ref 65–140)
GLUCOSE SERPL-MCNC: 63 MG/DL (ref 65–140)
GLUCOSE SERPL-MCNC: 64 MG/DL (ref 65–140)
GLUCOSE SERPL-MCNC: 650 MG/DL (ref 65–140)
GLUCOSE SERPL-MCNC: 73 MG/DL (ref 65–140)
GLUCOSE SERPL-MCNC: 839 MG/DL (ref 65–140)
GLUCOSE SERPL-MCNC: >600 MG/DL (ref 65–140)
GLUCOSE UR STRIP-MCNC: ABNORMAL MG/DL
HCG SERPL QL: NEGATIVE
HCO3 BLDV-SCNC: 13.5 MMOL/L (ref 24–30)
HCT VFR BLD AUTO: 40.7 % (ref 34.8–46.1)
HGB BLD-MCNC: 13.6 G/DL (ref 11.5–15.4)
HGB UR QL STRIP.AUTO: ABNORMAL
HYALINE CASTS #/AREA URNS LPF: ABNORMAL /LPF
HYDROCODONE UR QL SCN: NEGATIVE
IMM GRANULOCYTES # BLD AUTO: 0.34 THOUSAND/UL (ref 0–0.2)
IMM GRANULOCYTES NFR BLD AUTO: 2 % (ref 0–2)
INR PPP: 1.56 (ref 0.84–1.19)
KETONES UR STRIP-MCNC: NEGATIVE MG/DL
LACTATE SERPL-SCNC: 2.2 MMOL/L (ref 0.5–2)
LACTATE SERPL-SCNC: 3.9 MMOL/L (ref 0.5–2)
LACTATE SERPL-SCNC: 4.6 MMOL/L (ref 0.5–2)
LACTATE SERPL-SCNC: 8.3 MMOL/L (ref 0.5–2)
LEUKOCYTE ESTERASE UR QL STRIP: ABNORMAL
LYMPHOCYTES # BLD AUTO: 1.66 THOUSANDS/ÂΜL (ref 0.6–4.47)
LYMPHOCYTES NFR BLD AUTO: 9 % (ref 14–44)
MAGNESIUM SERPL-MCNC: 1.8 MG/DL (ref 1.9–2.7)
MAGNESIUM SERPL-MCNC: 2.3 MG/DL (ref 1.9–2.7)
MAGNESIUM SERPL-MCNC: 2.6 MG/DL (ref 1.9–2.7)
MCH RBC QN AUTO: 27.8 PG (ref 26.8–34.3)
MCHC RBC AUTO-ENTMCNC: 33.4 G/DL (ref 31.4–37.4)
MCV RBC AUTO: 83 FL (ref 82–98)
METHADONE UR QL: POSITIVE
MONOCYTES # BLD AUTO: 1.13 THOUSAND/ÂΜL (ref 0.17–1.22)
MONOCYTES NFR BLD AUTO: 6 % (ref 4–12)
MUCOUS THREADS UR QL AUTO: ABNORMAL
NEUTROPHILS # BLD AUTO: 15.54 THOUSANDS/ÂΜL (ref 1.85–7.62)
NEUTS SEG NFR BLD AUTO: 83 % (ref 43–75)
NITRITE UR QL STRIP: NEGATIVE
NON-SQ EPI CELLS URNS QL MICRO: ABNORMAL /HPF
NRBC BLD AUTO-RTO: 0 /100 WBCS
O2 CT BLDV-SCNC: 17 ML/DL
OPIATES UR QL SCN: NEGATIVE
OXYCODONE+OXYMORPHONE UR QL SCN: NEGATIVE
PCO2 BLDV: 32.4 MM HG (ref 42–50)
PCP UR QL: NEGATIVE
PH BLDV: 7.24 [PH] (ref 7.3–7.4)
PH UR STRIP.AUTO: 5 [PH]
PHOSPHATE SERPL-MCNC: 3.5 MG/DL (ref 2.7–4.5)
PHOSPHATE SERPL-MCNC: 3.7 MG/DL (ref 2.7–4.5)
PHOSPHATE SERPL-MCNC: 4.2 MG/DL (ref 2.7–4.5)
PLATELET # BLD AUTO: 269 THOUSANDS/UL (ref 149–390)
PMV BLD AUTO: 10.6 FL (ref 8.9–12.7)
PO2 BLDV: 58.1 MM HG (ref 35–45)
POTASSIUM SERPL-SCNC: 3.3 MMOL/L (ref 3.5–5.3)
POTASSIUM SERPL-SCNC: 3.4 MMOL/L (ref 3.5–5.3)
POTASSIUM SERPL-SCNC: 4 MMOL/L (ref 3.5–5.3)
POTASSIUM SERPL-SCNC: 4.4 MMOL/L (ref 3.5–5.3)
POTASSIUM SERPL-SCNC: 4.7 MMOL/L (ref 3.5–5.3)
PROCALCITONIN SERPL-MCNC: 3.73 NG/ML
PROT SERPL-MCNC: 7.4 G/DL (ref 6.4–8.4)
PROT UR STRIP-MCNC: ABNORMAL MG/DL
PROTHROMBIN TIME: 18.9 SECONDS (ref 11.6–14.5)
RBC # BLD AUTO: 4.9 MILLION/UL (ref 3.81–5.12)
RBC #/AREA URNS AUTO: ABNORMAL /HPF
SALICYLATES SERPL-MCNC: <5 MG/DL (ref 3–20)
SODIUM SERPL-SCNC: 131 MMOL/L (ref 135–147)
SODIUM SERPL-SCNC: 138 MMOL/L (ref 135–147)
SODIUM SERPL-SCNC: 139 MMOL/L (ref 135–147)
SODIUM SERPL-SCNC: 141 MMOL/L (ref 135–147)
SODIUM SERPL-SCNC: 142 MMOL/L (ref 135–147)
SP GR UR STRIP.AUTO: 1.02 (ref 1–1.03)
THC UR QL: NEGATIVE
UROBILINOGEN UR STRIP-ACNC: <2 MG/DL
WBC # BLD AUTO: 18.72 THOUSAND/UL (ref 4.31–10.16)
WBC #/AREA URNS AUTO: ABNORMAL /HPF
WBC CLUMPS # UR AUTO: PRESENT /UL

## 2024-07-07 PROCEDURE — 87081 CULTURE SCREEN ONLY: CPT | Performed by: INTERNAL MEDICINE

## 2024-07-07 PROCEDURE — 85025 COMPLETE CBC W/AUTO DIFF WBC: CPT | Performed by: EMERGENCY MEDICINE

## 2024-07-07 PROCEDURE — 81001 URINALYSIS AUTO W/SCOPE: CPT | Performed by: EMERGENCY MEDICINE

## 2024-07-07 PROCEDURE — 82010 KETONE BODYS QUAN: CPT | Performed by: EMERGENCY MEDICINE

## 2024-07-07 PROCEDURE — 82805 BLOOD GASES W/O2 SATURATION: CPT | Performed by: EMERGENCY MEDICINE

## 2024-07-07 PROCEDURE — 80179 DRUG ASSAY SALICYLATE: CPT | Performed by: EMERGENCY MEDICINE

## 2024-07-07 PROCEDURE — 99223 1ST HOSP IP/OBS HIGH 75: CPT | Performed by: INTERNAL MEDICINE

## 2024-07-07 PROCEDURE — 84145 PROCALCITONIN (PCT): CPT | Performed by: EMERGENCY MEDICINE

## 2024-07-07 PROCEDURE — 70450 CT HEAD/BRAIN W/O DYE: CPT

## 2024-07-07 PROCEDURE — 84100 ASSAY OF PHOSPHORUS: CPT | Performed by: NURSE PRACTITIONER

## 2024-07-07 PROCEDURE — 99291 CRITICAL CARE FIRST HOUR: CPT | Performed by: EMERGENCY MEDICINE

## 2024-07-07 PROCEDURE — 85610 PROTHROMBIN TIME: CPT | Performed by: EMERGENCY MEDICINE

## 2024-07-07 PROCEDURE — 83735 ASSAY OF MAGNESIUM: CPT | Performed by: NURSE PRACTITIONER

## 2024-07-07 PROCEDURE — 83605 ASSAY OF LACTIC ACID: CPT | Performed by: NURSE PRACTITIONER

## 2024-07-07 PROCEDURE — 80048 BASIC METABOLIC PNL TOTAL CA: CPT | Performed by: NURSE PRACTITIONER

## 2024-07-07 PROCEDURE — 87086 URINE CULTURE/COLONY COUNT: CPT | Performed by: NURSE PRACTITIONER

## 2024-07-07 PROCEDURE — 93005 ELECTROCARDIOGRAM TRACING: CPT

## 2024-07-07 PROCEDURE — 96367 TX/PROPH/DG ADDL SEQ IV INF: CPT

## 2024-07-07 PROCEDURE — 83605 ASSAY OF LACTIC ACID: CPT | Performed by: EMERGENCY MEDICINE

## 2024-07-07 PROCEDURE — 74176 CT ABD & PELVIS W/O CONTRAST: CPT

## 2024-07-07 PROCEDURE — 82948 REAGENT STRIP/BLOOD GLUCOSE: CPT

## 2024-07-07 PROCEDURE — 80048 BASIC METABOLIC PNL TOTAL CA: CPT | Performed by: EMERGENCY MEDICINE

## 2024-07-07 PROCEDURE — 80307 DRUG TEST PRSMV CHEM ANLYZR: CPT | Performed by: EMERGENCY MEDICINE

## 2024-07-07 PROCEDURE — 87181 SC STD AGAR DILUTION PER AGT: CPT | Performed by: EMERGENCY MEDICINE

## 2024-07-07 PROCEDURE — 82077 ASSAY SPEC XCP UR&BREATH IA: CPT | Performed by: EMERGENCY MEDICINE

## 2024-07-07 PROCEDURE — 80143 DRUG ASSAY ACETAMINOPHEN: CPT | Performed by: EMERGENCY MEDICINE

## 2024-07-07 PROCEDURE — 84703 CHORIONIC GONADOTROPIN ASSAY: CPT | Performed by: EMERGENCY MEDICINE

## 2024-07-07 PROCEDURE — 80053 COMPREHEN METABOLIC PANEL: CPT | Performed by: EMERGENCY MEDICINE

## 2024-07-07 PROCEDURE — 87040 BLOOD CULTURE FOR BACTERIA: CPT | Performed by: EMERGENCY MEDICINE

## 2024-07-07 PROCEDURE — 96375 TX/PRO/DX INJ NEW DRUG ADDON: CPT

## 2024-07-07 PROCEDURE — 96366 THER/PROPH/DIAG IV INF ADDON: CPT

## 2024-07-07 PROCEDURE — 99285 EMERGENCY DEPT VISIT HI MDM: CPT

## 2024-07-07 PROCEDURE — 87154 CUL TYP ID BLD PTHGN 6+ TRGT: CPT | Performed by: EMERGENCY MEDICINE

## 2024-07-07 PROCEDURE — 96365 THER/PROPH/DIAG IV INF INIT: CPT

## 2024-07-07 PROCEDURE — 71250 CT THORAX DX C-: CPT

## 2024-07-07 PROCEDURE — 36415 COLL VENOUS BLD VENIPUNCTURE: CPT | Performed by: EMERGENCY MEDICINE

## 2024-07-07 RX ORDER — DEXTROSE MONOHYDRATE 25 G/50ML
25 INJECTION, SOLUTION INTRAVENOUS ONCE
Status: COMPLETED | OUTPATIENT
Start: 2024-07-07 | End: 2024-07-07

## 2024-07-07 RX ORDER — ONDANSETRON 2 MG/ML
INJECTION INTRAMUSCULAR; INTRAVENOUS
Status: COMPLETED
Start: 2024-07-07 | End: 2024-07-07

## 2024-07-07 RX ORDER — MIDAZOLAM HYDROCHLORIDE 2 MG/2ML
2 INJECTION, SOLUTION INTRAMUSCULAR; INTRAVENOUS ONCE
Status: COMPLETED | OUTPATIENT
Start: 2024-07-07 | End: 2024-07-07

## 2024-07-07 RX ORDER — CEFTRIAXONE 1 G/50ML
1000 INJECTION, SOLUTION INTRAVENOUS EVERY 24 HOURS
Status: DISCONTINUED | OUTPATIENT
Start: 2024-07-08 | End: 2024-07-09

## 2024-07-07 RX ORDER — MAGNESIUM SULFATE 1 G/100ML
1 INJECTION INTRAVENOUS ONCE
Status: COMPLETED | OUTPATIENT
Start: 2024-07-07 | End: 2024-07-07

## 2024-07-07 RX ORDER — CEFTRIAXONE 2 G/50ML
2000 INJECTION, SOLUTION INTRAVENOUS ONCE
Status: COMPLETED | OUTPATIENT
Start: 2024-07-07 | End: 2024-07-07

## 2024-07-07 RX ORDER — POTASSIUM CHLORIDE 14.9 MG/ML
20 INJECTION INTRAVENOUS ONCE
Status: DISCONTINUED | OUTPATIENT
Start: 2024-07-07 | End: 2024-07-07

## 2024-07-07 RX ORDER — DEXTROSE, SODIUM CHLORIDE, SODIUM LACTATE, POTASSIUM CHLORIDE, AND CALCIUM CHLORIDE 5; .6; .31; .03; .02 G/100ML; G/100ML; G/100ML; G/100ML; G/100ML
125 INJECTION, SOLUTION INTRAVENOUS CONTINUOUS
Status: DISCONTINUED | OUTPATIENT
Start: 2024-07-07 | End: 2024-07-08

## 2024-07-07 RX ORDER — CHLORHEXIDINE GLUCONATE ORAL RINSE 1.2 MG/ML
15 SOLUTION DENTAL EVERY 12 HOURS SCHEDULED
Status: DISCONTINUED | OUTPATIENT
Start: 2024-07-07 | End: 2024-07-13 | Stop reason: HOSPADM

## 2024-07-07 RX ORDER — DEXTROSE MONOHYDRATE 25 G/50ML
INJECTION, SOLUTION INTRAVENOUS
Status: COMPLETED
Start: 2024-07-07 | End: 2024-07-07

## 2024-07-07 RX ORDER — POTASSIUM CHLORIDE 14.9 MG/ML
20 INJECTION INTRAVENOUS ONCE
Status: COMPLETED | OUTPATIENT
Start: 2024-07-07 | End: 2024-07-07

## 2024-07-07 RX ORDER — POTASSIUM CHLORIDE 20 MEQ/1
40 TABLET, EXTENDED RELEASE ORAL ONCE
Status: COMPLETED | OUTPATIENT
Start: 2024-07-07 | End: 2024-07-07

## 2024-07-07 RX ORDER — CALCIUM CHLORIDE 100 MG/ML
1 INJECTION INTRAVENOUS; INTRAVENTRICULAR ONCE
Status: COMPLETED | OUTPATIENT
Start: 2024-07-07 | End: 2024-07-07

## 2024-07-07 RX ORDER — NALOXONE HYDROCHLORIDE 1 MG/ML
INJECTION INTRAMUSCULAR; INTRAVENOUS; SUBCUTANEOUS
Status: DISCONTINUED
Start: 2024-07-07 | End: 2024-07-07 | Stop reason: WASHOUT

## 2024-07-07 RX ORDER — SODIUM CHLORIDE 9 MG/ML
3 INJECTION INTRAVENOUS
Status: DISCONTINUED | OUTPATIENT
Start: 2024-07-07 | End: 2024-07-13 | Stop reason: HOSPADM

## 2024-07-07 RX ORDER — MAGNESIUM SULFATE HEPTAHYDRATE 40 MG/ML
2 INJECTION, SOLUTION INTRAVENOUS ONCE
Status: COMPLETED | OUTPATIENT
Start: 2024-07-07 | End: 2024-07-07

## 2024-07-07 RX ORDER — AMOXICILLIN 250 MG
1 CAPSULE ORAL 2 TIMES DAILY
Status: DISCONTINUED | OUTPATIENT
Start: 2024-07-07 | End: 2024-07-13 | Stop reason: HOSPADM

## 2024-07-07 RX ORDER — SODIUM CHLORIDE, SODIUM GLUCONATE, SODIUM ACETATE, POTASSIUM CHLORIDE, MAGNESIUM CHLORIDE, SODIUM PHOSPHATE, DIBASIC, AND POTASSIUM PHOSPHATE .53; .5; .37; .037; .03; .012; .00082 G/100ML; G/100ML; G/100ML; G/100ML; G/100ML; G/100ML; G/100ML
500 INJECTION, SOLUTION INTRAVENOUS CONTINUOUS
Status: DISCONTINUED | OUTPATIENT
Start: 2024-07-07 | End: 2024-07-07

## 2024-07-07 RX ORDER — ONDANSETRON 2 MG/ML
4 INJECTION INTRAMUSCULAR; INTRAVENOUS EVERY 8 HOURS PRN
Status: DISCONTINUED | OUTPATIENT
Start: 2024-07-07 | End: 2024-07-13 | Stop reason: HOSPADM

## 2024-07-07 RX ORDER — SODIUM CHLORIDE, SODIUM GLUCONATE, SODIUM ACETATE, POTASSIUM CHLORIDE, MAGNESIUM CHLORIDE, SODIUM PHOSPHATE, DIBASIC, AND POTASSIUM PHOSPHATE .53; .5; .37; .037; .03; .012; .00082 G/100ML; G/100ML; G/100ML; G/100ML; G/100ML; G/100ML; G/100ML
250 INJECTION, SOLUTION INTRAVENOUS CONTINUOUS
Status: DISCONTINUED | OUTPATIENT
Start: 2024-07-07 | End: 2024-07-07

## 2024-07-07 RX ORDER — HEPARIN SODIUM 5000 [USP'U]/ML
5000 INJECTION, SOLUTION INTRAVENOUS; SUBCUTANEOUS EVERY 8 HOURS SCHEDULED
Status: DISCONTINUED | OUTPATIENT
Start: 2024-07-07 | End: 2024-07-13 | Stop reason: HOSPADM

## 2024-07-07 RX ORDER — BISACODYL 10 MG
10 SUPPOSITORY, RECTAL RECTAL DAILY PRN
Status: DISCONTINUED | OUTPATIENT
Start: 2024-07-07 | End: 2024-07-13 | Stop reason: HOSPADM

## 2024-07-07 RX ORDER — DEXTROSE MONOHYDRATE AND SODIUM CHLORIDE 5; .9 G/100ML; G/100ML
250 INJECTION, SOLUTION INTRAVENOUS CONTINUOUS PRN
Status: DISCONTINUED | OUTPATIENT
Start: 2024-07-07 | End: 2024-07-07

## 2024-07-07 RX ADMIN — SODIUM CHLORIDE, SODIUM GLUCONATE, SODIUM ACETATE, POTASSIUM CHLORIDE, MAGNESIUM CHLORIDE, SODIUM PHOSPHATE, DIBASIC, AND POTASSIUM PHOSPHATE 500 ML/HR: .53; .5; .37; .037; .03; .012; .00082 INJECTION, SOLUTION INTRAVENOUS at 07:19

## 2024-07-07 RX ADMIN — DEXTROSE MONOHYDRATE 25 ML: 25 INJECTION, SOLUTION INTRAVENOUS at 16:39

## 2024-07-07 RX ADMIN — SODIUM CHLORIDE 1000 ML: 0.9 INJECTION, SOLUTION INTRAVENOUS at 03:44

## 2024-07-07 RX ADMIN — INSULIN HUMAN 10 UNITS: 100 INJECTION, SOLUTION PARENTERAL at 04:09

## 2024-07-07 RX ADMIN — CALCIUM CHLORIDE 1 G: 100 INJECTION PARENTERAL at 02:33

## 2024-07-07 RX ADMIN — SODIUM CHLORIDE, SODIUM GLUCONATE, SODIUM ACETATE, POTASSIUM CHLORIDE, MAGNESIUM CHLORIDE, SODIUM PHOSPHATE, DIBASIC, AND POTASSIUM PHOSPHATE 500 ML/HR: .53; .5; .37; .037; .03; .012; .00082 INJECTION, SOLUTION INTRAVENOUS at 08:36

## 2024-07-07 RX ADMIN — ONDANSETRON 4 MG: 2 INJECTION INTRAMUSCULAR; INTRAVENOUS at 15:59

## 2024-07-07 RX ADMIN — MIDAZOLAM 2 MG: 1 INJECTION INTRAMUSCULAR; INTRAVENOUS at 02:54

## 2024-07-07 RX ADMIN — CHLORHEXIDINE GLUCONATE 15 ML: 1.2 RINSE ORAL at 21:54

## 2024-07-07 RX ADMIN — DEXTROSE, SODIUM CHLORIDE, SODIUM LACTATE, POTASSIUM CHLORIDE, AND CALCIUM CHLORIDE 250 ML/HR: 5; .6; .31; .03; .02 INJECTION, SOLUTION INTRAVENOUS at 11:13

## 2024-07-07 RX ADMIN — POTASSIUM CHLORIDE 20 MEQ: 14.9 INJECTION, SOLUTION INTRAVENOUS at 09:18

## 2024-07-07 RX ADMIN — SENNOSIDES AND DOCUSATE SODIUM 1 TABLET: 50; 8.6 TABLET ORAL at 17:58

## 2024-07-07 RX ADMIN — MAGNESIUM SULFATE HEPTAHYDRATE 2 G: 40 INJECTION, SOLUTION INTRAVENOUS at 07:06

## 2024-07-07 RX ADMIN — HEPARIN SODIUM 5000 UNITS: 5000 INJECTION INTRAVENOUS; SUBCUTANEOUS at 13:56

## 2024-07-07 RX ADMIN — SODIUM CHLORIDE 9.2 UNITS/HR: 9 INJECTION, SOLUTION INTRAVENOUS at 04:05

## 2024-07-07 RX ADMIN — MAGNESIUM SULFATE HEPTAHYDRATE 1 G: 1 INJECTION, SOLUTION INTRAVENOUS at 02:36

## 2024-07-07 RX ADMIN — HEPARIN SODIUM 5000 UNITS: 5000 INJECTION INTRAVENOUS; SUBCUTANEOUS at 07:07

## 2024-07-07 RX ADMIN — POTASSIUM CHLORIDE 20 MEQ: 14.9 INJECTION, SOLUTION INTRAVENOUS at 07:03

## 2024-07-07 RX ADMIN — DEXTROSE, SODIUM CHLORIDE, SODIUM LACTATE, POTASSIUM CHLORIDE, AND CALCIUM CHLORIDE 125 ML/HR: 5; .6; .31; .03; .02 INJECTION, SOLUTION INTRAVENOUS at 15:43

## 2024-07-07 RX ADMIN — DEXTROSE, SODIUM CHLORIDE, SODIUM LACTATE, POTASSIUM CHLORIDE, AND CALCIUM CHLORIDE 125 ML/HR: 5; .6; .31; .03; .02 INJECTION, SOLUTION INTRAVENOUS at 23:23

## 2024-07-07 RX ADMIN — CHLORHEXIDINE GLUCONATE 15 ML: 1.2 RINSE ORAL at 09:18

## 2024-07-07 RX ADMIN — SODIUM CHLORIDE 1000 ML: 0.9 INJECTION, SOLUTION INTRAVENOUS at 02:35

## 2024-07-07 RX ADMIN — CEFTRIAXONE 2000 MG: 2 INJECTION, SOLUTION INTRAVENOUS at 02:53

## 2024-07-07 RX ADMIN — HEPARIN SODIUM 5000 UNITS: 5000 INJECTION INTRAVENOUS; SUBCUTANEOUS at 21:54

## 2024-07-07 RX ADMIN — POTASSIUM CHLORIDE 40 MEQ: 1500 TABLET, EXTENDED RELEASE ORAL at 12:37

## 2024-07-07 NOTE — ED PROCEDURE NOTE
PROCEDURE  CriticalCare Time    Date/Time: 7/7/2024 7:01 AM    Performed by: Henrique Crandall MD  Authorized by: Henrique Crandall MD    Critical care provider statement:     Critical care time (minutes):  80    Critical care end time:  7/7/2024 7:01 AM    Critical care time was exclusive of:  Separately billable procedures and treating other patients and teaching time    Critical care was necessary to treat or prevent imminent or life-threatening deterioration of the following conditions:  Sepsis, shock, metabolic crisis and endocrine crisis    Critical care was time spent personally by me on the following activities:  Blood draw for specimens, discussions with consultants, evaluation of patient's response to treatment, examination of patient, review of old charts, re-evaluation of patient's condition, ordering and review of radiographic studies, ordering and review of laboratory studies and ordering and performing treatments and interventions       Henrique Crandall MD  07/07/24 0702

## 2024-07-07 NOTE — ASSESSMENT & PLAN NOTE
UDS positive for Fentanyl and Methamphetamine  Will discuss usage once more awake  Unable to answer questions appropriately at this time

## 2024-07-07 NOTE — ASSESSMENT & PLAN NOTE
Lab Results   Component Value Date    HGBA1C 12.8 (A) 06/18/2024       Recent Labs     07/07/24  0402 07/07/24  0502 07/07/24  0615 07/07/24  0713   POCGLU >600* >600* 503* 407*     Blood Sugar Average: Last 72 hrs:  (P) [id number]    Unclear etiology but suspect UTI versus noncompliance, patient unable to answer questions due to encephalopathy.  Will discuss further when more awake  Started on DKA insulin infusion protocol  Follow electrolyte protocol  Received 2 L NSS in ED, will continue aggressive fluid resuscitation w/ Isolyte  Keep n.p.o. for now  Follow BMP, Mag and Phos Q4H

## 2024-07-07 NOTE — ASSESSMENT & PLAN NOTE
Lab Results   Component Value Date    EGFR 11 07/07/2024    EGFR 11 07/07/2024    EGFR 10 07/07/2024    CREATININE 4.24 (H) 07/07/2024    CREATININE 4.44 (H) 07/07/2024    CREATININE 4.70 (H) 07/07/2024     Baseline GFR around 55; Cr about 1.2-1.3  Trend BMP  Encourage compliance w/ meds and cessation of substance abuse

## 2024-07-07 NOTE — ED PROVIDER NOTES
Final Diagnosis:  1. Dehydration    2. Septic shock (HCC)    3. UTI (urinary tract infection)    4. DKA (diabetic ketoacidosis) (HCC)        Chief Complaint   Patient presents with    Hyperglycemia - Symptomatic     Per medics, pt found unresponsive in home by signinficant other.        HPI  Pt w dm, depression, and on methadone     Pt found altered by sig other  No other history provided    EMS additionally reports: see above, note they couldn't get any further hx from sig other. They report desats in field but here wean immediately off NRB to RA. Fingerstick > 500    - Previous charting underwent limited review with attention to last ED visits, labs, ekgs, and prior imaging.  Chart review reveals :     Orders Only on 06/21/2024   Component Date Value Ref Range Status    HEP C AB 06/21/2024 Reactive (A)  Non Reactive Final    Hepatitis C Quantitation 06/21/2024 295,000  IU/mL Final    HCV Quantitative Log 06/21/2024 5.470  log10 IU/mL Final    Test Information 06/21/2024 Comment   Final    The quantitative range of this assay is 15 IU/mL to 100 million IU/mL.    INTERPRETATION 06/21/2024 Comment   Final    Comment: Positive HCV antibody screen with the presence of HCV RNA is  consistent with active infection.         - No language barrier.   - History obtained from patient 's chart  - Discuss patient's care, with patient permission or by chart review, with      PMH:   has a past medical history of Anxiety, Asthma, Cholelithiases (2/5/2020), Depression, Diabetes mellitus (HCC), Hypertension, Mixed hyperlipidemia (1/6/2023), Neuropathy, and Psychiatric disorder.    PSH:   has a past surgical history that includes Incision and drainage of wound (Right, 07/01/2017); CHOLECYSTECTOMY LAPAROSCOPIC (N/A, 02/10/2020); and Cholecystectomy (2020).     Social History:  Tobacco Use: Low Risk  (6/18/2024)    Patient History     Smoking Tobacco Use: Never     Smokeless Tobacco Use: Never     Passive Exposure: Not on file      Alcohol Use: Not At Risk (9/18/2020)    AUDIT-C     Frequency of Alcohol Consumption: Never     Average Number of Drinks: Not on file     Frequency of Binge Drinking: Not on file     No illicit use       ROS:  Pertinent positives/negatives: .     Some ROS may be present in the HPI and would take precedent over these standard questions asked below.   Review of Systems   Unable to perform ROS: Mental status change            PE:     Physical exam highlights:   Physical Exam       Vitals:    07/07/24 0600 07/07/24 0630 07/07/24 0645 07/07/24 0700   BP: 124/80 121/80 117/83 124/76   BP Location: Right arm Right arm Right arm Right arm   Pulse: 104 (!) 107 101 100   Resp: (!) 27 19 21 20   Temp:  98.9 °F (37.2 °C)     TempSrc:       SpO2: 100% 99% 98% 98%   Weight:         Vitals reviewed by me.   Nursing note reviewed  Chaperone present for all sensitive exam.  Const: toxic appearing.  Not diaphoretic.  Encephalopathic.   HEENT: External ears normal. No protrusion drainage swelling. Nose normal. No drainage/traumatic deformity. MM dry. Mouth with baseline/symmetric movement. No trismus.   Eyes: pupils 2mm equal reactive. Pt w/ ping pong gaze  Neck: ROM normal. No rigidity. No meningismus.  Cards: Rate as per vitals tachy. On monitor wide like vtach but on EKG looks like BBB w/ sinus tach.   Pulm: Effort and excursion normal. No distress. No audible wheezing/no stridor. Normal resp rate without retraction or change in work of breathing.  Abd: No distension beyond baseline. No fluctuant wave. Patient without peritoneal pain with shifting/bumping the bed.   MSK: ROM normal baseline. No deformity. No contractures from baseline.   Skin: pale. Mottled. No traumatic injuries.   Neuro: localizing pain w/ uppers and withdraws with lowers. Has myoclonus in feet/ankles. Reflexes brisk.   Psych: encephalopathic, uncooperative.         A:  - Nursing note reviewed.    Ddx and MDM  Considered diagnoses    On arrival pt looks like  Vtach. Know in metabolic crisis. Looks like widening sinus tach. Given CaCl incase is hyperK. Mag started as well. [place] to sinus tach.     DKA likely  Start fluids  Check lytes  Beta hydrox and vbg  Acidotic, beta hydrox up, glucose > 800.   Continue fluid resusc.   Potassium ok  Given insulin by DKA protocol  AG > 20.     Sepsis? Meeting sirs. Hypotensive.   Blood cultures  Lactic - > 8, shock  Procal  Chest imaging  Not source    Straight cath urine  UA  Urine culture  Source  Ca ox in urine  CT ab pelv r/o ureterolithiasis w/ sepsis and UTI  None    Tox?  No hypopnea holding narcan  Ping pong eyes, hyperreflexia and myoclonus  On venlafaxine at home and hx of depression  Send ASA tylenol levels  Check INR CMP  VBG  EKG - intervals normal (qrs < 100)  Serotonin syndrome on list        Febrile       Versed        Manage fever w/ ice packs and thin gown.         Monitor fever curve                                Procedure Note - Ultrasound Guided Peripheral IV    Ultrasound dynamic guidance was used for peripheral line insertion.    Risks and benefits of the procedure were discussed with the patient.  Discussed risks included pain with the procedure, bleeding, and risk of infection.    Indication: Difficult non-ultrasound guided peripheral intravenous line insertion.    Location: Laterally: left upper extremity.    Procedure: The patient was prepped using standard ultrasound guided IV procedures.  Using direct visualization of the intravenous catheter/needle, the vessel was successfully cannulated with return of blood and advancement of the catheter.  The catheter was secured in the standard technique.    Complications: None.    Interpretation: Successful ultrasound guided peripheral IV.    This is a billable ultrasound guided procedure.    Ultrasound images stored on the [facility] ultrasound image , or as an attachment to this chart.          Dispo decision ICU given enceph, septic shock, and DKA      My  conversation with consultant reveals:        Decision rules:                    ED Course as of 07/07/24 0718   Sun Jul 07, 2024   0340 IBW for 30cc/kg   0340 LACTIC ACID(!!): 8.3  Septic shock         My read of the XR/CT scan reveals:     CT chest abdomen pelvis wo contrast   Final Result      Limited evaluation due to streak artifact and lack of intravenous contrast.      Mild interstitial prominence predominantly in the lung bases. Correlate for mild interstitial edema.      Tiny pulmonary nodules. Based on current Fleischner Society 2017 Guidelines on incidental pulmonary nodule, no routine follow-up is needed if the patient is low risk. If the patient is high risk, optional follow-up chest CT at 12 months can be    considered.      Mild splenomegaly.            Workstation performed: [id number]         CT head without contrast   Final Result      No acute intracranial abnormality.                  Workstation performed: [id number]             Orders Placed This Encounter   Procedures    Critical Care    Blood culture    Blood culture    CT head without contrast    CT chest abdomen pelvis wo contrast    CBC and differential    Comprehensive metabolic panel    Lactic acid, plasma (w/reflex if result > 2.0)    Procalcitonin    Beta Hydroxybutyrate    Blood gas, venous    Protime-INR    Ethanol    Salicylate level    Acetaminophen level-If concentration is detectable, please discuss with medical  on call.    hCG, qualitative pregnancy    UA (URINE) with reflex to Scope    Rapid drug screen, urine    Urine Microscopic    Lactic acid 2 Hours    Basic metabolic panel    Magnesium    Phosphorus    Basic metabolic panel    Lactic acid, plasma (w/reflex if result > 2.0)    Basic metabolic panel    Magnesium    Phosphorus    Lactic acid, plasma (w/reflex if result > 2.0)    Diet NPO; Ice chips    Straight cath    Accurate height and weight    Continuous cardiac monitoring    Insert peripheral IV    Fingerstick  Glucose (POCT) every hour.  If glucometer reads Glucose >500, send STAT random glucose level per protocol. Notify provider to follow up on measured glucose level.    Cardio-Pulmonary Monitoring (Critical Care & Step Down Only)    Vital Signs    Out of bed to chair    Monitor Blood Pressure Via Cuff    Must be up for meals    Fingerstick Glucose (POCT)    Notify Provider when anion gap < 12    Order Random Glucose if Accucheck > 500    Daily weights    I/O    CAM (ICU) Assessment    Nursing dysphagia Screen    Hypoglycemia Protocol    Nasal Antiseptic Swab    Turn patient    Urinary retention protocol    Apply SCD only    Initiate DKA Electrolyte Replacement (Potassium)    Initiate DKA Electrolyte Replacement (Magnesium)    Initiate DKA Electrolyte Replacement (Phosphorus)    Level 1-Full Code: all life saving measures are indicated    Consult to Case Management    INPATIENT ADMISSION    Restraints non-violent    Fall precautions     Labs Reviewed   CBC AND DIFFERENTIAL - Abnormal       Result Value Ref Range Status    WBC 18.72 (*) 4.31 - 10.16 Thousand/uL Final    RBC 4.90  3.81 - 5.12 Million/uL Final    Hemoglobin 13.6  11.5 - 15.4 g/dL Final    Hematocrit 40.7  34.8 - 46.1 % Final    MCV 83  82 - 98 fL Final    MCH 27.8  26.8 - 34.3 pg Final    MCHC 33.4  31.4 - 37.4 g/dL Final    RDW 13.5  11.6 - 15.1 % Final    MPV 10.6  8.9 - 12.7 fL Final    Platelets 269  149 - 390 Thousands/uL Final    nRBC 0  /100 WBCs Final    Segmented % 83 (*) 43 - 75 % Final    Immature Grans % 2  0 - 2 % Final    Lymphocytes % 9 (*) 14 - 44 % Final    Monocytes % 6  4 - 12 % Final    Eosinophils Relative 0  0 - 6 % Final    Basophils Relative 0  0 - 1 % Final    Absolute Neutrophils 15.54 (*) 1.85 - 7.62 Thousands/µL Final    Absolute Immature Grans 0.34 (*) 0.00 - 0.20 Thousand/uL Final    Absolute Lymphocytes 1.66  0.60 - 4.47 Thousands/µL Final    Absolute Monocytes 1.13  0.17 - 1.22 Thousand/µL Final    Eosinophils Absolute 0.01   0.00 - 0.61 Thousand/µL Final    Basophils Absolute 0.04  0.00 - 0.10 Thousands/µL Final   COMPREHENSIVE METABOLIC PANEL - Abnormal    Sodium 131 (*) 135 - 147 mmol/L Final    Potassium 4.7  3.5 - 5.3 mmol/L Final    Chloride 91 (*) 96 - 108 mmol/L Final    CO2 14 (*) 21 - 32 mmol/L Final    ANION GAP 26 (*) 4 - 13 mmol/L Final    BUN 58 (*) 5 - 25 mg/dL Final    Creatinine 4.70 (*) 0.60 - 1.30 mg/dL Final    Comment: Standardized to IDMS reference method    Glucose 839 (*) 65 - 140 mg/dL Final    Comment: If the patient is fasting, the ADA then defines impaired fasting glucose as > 100 mg/dL and diabetes as > or equal to 123 mg/dL.    Calcium 11.6 (*) 8.4 - 10.2 mg/dL Final    AST 59 (*) 13 - 39 U/L Final    ALT 32  7 - 52 U/L Final    Comment: Specimen collection should occur prior to Sulfasalazine administration due to the potential for falsely depressed results.     Alkaline Phosphatase 59  34 - 104 U/L Final    Total Protein 7.4  6.4 - 8.4 g/dL Final    Albumin 4.0  3.5 - 5.0 g/dL Final    Total Bilirubin 1.05 (*) 0.20 - 1.00 mg/dL Final    Comment: Use of this assay is not recommended for patients undergoing treatment with eltrombopag due to the potential for falsely elevated results.  N-acetyl-p-benzoquinone imine (metabolite of Acetaminophen) will generate erroneously low results in samples for patients that have taken an overdose of Acetaminophen.    eGFR 10  ml/min/1.73sq m Final    Narrative:     National Kidney Disease Foundation guidelines for Chronic Kidney Disease (CKD):     Stage 1 with normal or high GFR (GFR > 90 mL/min/1.73 square meters)    Stage 2 Mild CKD (GFR = 60-89 mL/min/1.73 square meters)    Stage 3A Moderate CKD (GFR = 45-59 mL/min/1.73 square meters)    Stage 3B Moderate CKD (GFR = 30-44 mL/min/1.73 square meters)    Stage 4 Severe CKD (GFR = 15-29 mL/min/1.73 square meters)    Stage 5 End Stage CKD (GFR <15 mL/min/1.73 square meters)  Note: GFR calculation is accurate only with a  steady state creatinine   LACTIC ACID, PLASMA (W/REFLEX IF RESULT > 2.0) - Abnormal    LACTIC ACID 8.3 (*) 0.5 - 2.0 mmol/L Final    Narrative:     Result may be elevated if tourniquet was used during collection.   PROCALCITONIN TEST - Abnormal    Procalcitonin 3.73 (*) <=0.25 ng/ml Final    Comment: Suspected Lower Respiratory Tract Infection (LRTI):  - LESS than or EQUAL to 0.25 ng/mL:   low likelihood for bacterial LRTI; antibiotics DISCOURAGED.  - GREATER than 0.25 ng/mL:   increased likelihood for bacterial LRTI; antibiotics ENCOURAGED.    Suspected Sepsis:  - Strongly consider initiating antibiotics in ALL UNSTABLE patients.  - LESS than or EQUAL to 0.5 ng/mL:   low likelihood for bacterial sepsis; antibiotics DISCOURAGED.  - GREATER than 0.5 ng/mL:   increased likelihood for bacterial sepsis; antibiotics ENCOURAGED.  - GREATER than 2 ng/mL:   high risk for severe sepsis / septic shock; antibiotics strongly ENCOURAGED.    Decisions on antibiotic use should not be based solely on Procalcitonin (PCT) levels. If PCT is low but uncertainty exists with stopping antibiotics, repeat PCT in 6-24 hours to confirm the low level. If antibiotics are administered (regardless if initial PCT was high or low), repeat PCT every 1-2 days to consider early antibiotic cessation (when GREATER than 80% decrease from the peak OR when PCT drops below designated cutoffs, whichever comes first), so long as the infection is NOT one that typically requires prolonged treatment durations (e.g., bone/joint infections, endocarditis, Staph. aureus bacteremia).    Situations of FALSE-POSITIVE Procalcitonin values:  1) Newborns < 72 hours old  2) Massive stress from severe trauma / burns, major surgery, acute pancreatitis, cardiogenic / hemorrhagic shock, sickle cell crisis, or other organ perfusion abnormalities  3) Malaria and some Candidal infections  4) Treatment with agents that stimulate cytokines (e.g., OKT3, anti-lymphocyte globulins,  alemtuzumab, IL-2, granulocyte transfusion [NOT GCSFs])  5) Chronic renal disease causes elevated baseline levels (consider GREATER than 0.75 ng/mL as an abnormal cut-off); initiating HD/CRRT may cause transient decreases  6) Paraneoplastic syndromes from medullary thyroid or SCLC, some forms of vasculitis, and acute udldh-lw-wkta disease    Situations of FALSE-NEGATIVE Procalcitonin values:  1) Too early in clinical course for PCT to have reached its peak (may repeat in 6-24 hours to confirm low level)  2) Localized infection WITHOUT systemic (SIRS / sepsis) response (e.g., an abscess, osteomyelitis, cystitis)  3) Mycobacteria (e.g., Tuberculosis, MAC)  4) Cystic fibrosis exacerbations     BETA HYDROXYBUTYRATE - Abnormal    Beta- Hydroxybutyrate 0.90 (*) 0.02 - 0.27 mmol/L Final   BLOOD GAS, VENOUS - Abnormal    pH, [place] 7.239 (*) 7.300 - 7.400 Final    pCO2, [place]inder 32.4 (*) 42.0 - 50.0 mm Hg Final    pO2, [place] 58.1 (*) 35.0 - 45.0 mm Hg Final    HCO3, [place]inder 13.5 (*) 24 - 30 mmol/L Final    Base Excess, [place]inder -12.6  mmol/L Final    O2 Content, [place]inder 17.0  ml/dL Final    O2 HGB, VENOUS 83.2 (*) 60.0 - 80.0 % Final   PROTIME-INR - Abnormal    Protime 18.9 (*) 11.6 - 14.5 seconds Final    INR 1.56 (*) 0.84 - 1.19 Final   ACETAMINOPHEN LEVEL - Abnormal    Acetaminophen Level <2 (*) 10 - 20 ug/mL Final   URINALYSIS WITH REFLEX TO SCOPE - Abnormal    Color, UA Yellow   Final    Clarity, UA Clear   Final    Specific Gravity, UA 1.020  1.005 - 1.030 Final    pH, UA 5.0  4.5, 5.0, 5.5, 6.0, 6.5, 7.0, 7.5, 8.0 Final    Leukocytes, UA Large (*) Negative Final    Nitrite, UA Negative  Negative Final    Protein, UA 30 (1+) (*) Negative mg/dl Final    Glucose, UA 1000 (1%) (*) Negative mg/dl Final    Comment: Elevated glucose may cause false negative leukocyte esterase    Ketones, UA Negative  Negative mg/dl Final    Urobilinogen, UA <2.0  <2.0 mg/dl mg/dl Final    Bilirubin, UA Negative  Negative Final    Occult Blood, UA Moderate (*)  Negative Final   RAPID DRUG SCREEN, URINE - Abnormal    Amph/Meth UR Positive (*) Negative Final    Barbiturate Ur Negative  Negative Final    Benzodiazepine Urine Negative  Negative Final    Cocaine Urine Negative  Negative Final    Methadone Urine Positive (*) Negative Final    Opiate Urine Negative  Negative Final    PCP Ur Negative  Negative Final    THC Urine Negative  Negative Final    Oxycodone Urine Negative  Negative Final    Fentanyl Urine Positive (*) Negative Final    HYDROCODONE URINE Negative  Negative Final    Narrative:     Presumptive report. If requested, specimen will be sent to reference lab for confirmation.  FOR MEDICAL PURPOSES ONLY.   IF CONFIRMATION NEEDED PLEASE CONTACT THE LAB WITHIN 5 DAYS.    Drug Screen Cutoff Levels:  AMPHETAMINE/METHAMPHETAMINES  1000 ng/mL  BARBITURATES     200 ng/mL  BENZODIAZEPINES     200 ng/mL  COCAINE      300 ng/mL  METHADONE      300 ng/mL  OPIATES      300 ng/mL  PHENCYCLIDINE     25 ng/mL  THC       50 ng/mL  OXYCODONE      100 ng/mL  FENTANYL      5 ng/mL  HYDROCODONE     300 ng/mL   URINE MICROSCOPIC - Abnormal    RBC, UA 10-20 (*) None Seen, 0-1, 1-2, 2-4, 0-5 /hpf Final    WBC, UA Innumerable (*) None Seen, 0-1, 1-2, 0-5, 2-4 /hpf Final    Epithelial Cells Occasional  None Seen, Occasional /hpf Final    Bacteria, UA Occasional  None Seen, Occasional /hpf Final    MUCUS THREADS Occasional (*) None Seen Final    Hyaline Casts, UA 4-10 (*) (none) /lpf Final    Ca Oxalate [place], UA Occasional (*) None Seen /hpf Final    WBC Clumps Present   Final   LACTIC ACID 2 HOUR - Abnormal    LACTIC ACID 3.9 (*) 0.5 - 2.0 mmol/L Final    Narrative:     Result may be elevated if tourniquet was used during collection.   BASIC METABOLIC PANEL - Abnormal    Sodium 138  135 - 147 mmol/L Final    Potassium 3.4 (*) 3.5 - 5.3 mmol/L Final    Chloride 102  96 - 108 mmol/L Final    CO2 19 (*) 21 - 32 mmol/L Final    ANION GAP 17 (*) 4 - 13 mmol/L Final    BUN 61 (*) 5 - 25 mg/dL  Final    Creatinine 4.44 (*) 0.60 - 1.30 mg/dL Final    Comment: Standardized to IDMS reference method    Glucose 650 (*) 65 - 140 mg/dL Final    Comment: If the patient is fasting, the ADA then defines impaired fasting glucose as > 100 mg/dL and diabetes as > or equal to 123 mg/dL.    Calcium 9.7  8.4 - 10.2 mg/dL Final    eGFR 11  ml/min/1.73sq m Final    Narrative:     National Kidney Disease Foundation guidelines for Chronic Kidney Disease (CKD):     Stage 1 with normal or high GFR (GFR > 90 mL/min/1.73 square meters)    Stage 2 Mild CKD (GFR = 60-89 mL/min/1.73 square meters)    Stage 3A Moderate CKD (GFR = 45-59 mL/min/1.73 square meters)    Stage 3B Moderate CKD (GFR = 30-44 mL/min/1.73 square meters)    Stage 4 Severe CKD (GFR = 15-29 mL/min/1.73 square meters)    Stage 5 End Stage CKD (GFR <15 mL/min/1.73 square meters)  Note: GFR calculation is accurate only with a steady state creatinine   MAGNESIUM - Abnormal    Magnesium 1.8 (*) 1.9 - 2.7 mg/dL Final   POCT GLUCOSE - Abnormal    POC Glucose >600 (*) 65 - 140 mg/dl Final    Comment: NURSE NOTIFIED-   POCT GLUCOSE - Abnormal    POC Glucose >600 (*) 65 - 140 mg/dl Final    Comment: CRITICAL VALUE NOTED-NURSE NOTIFIED-   POCT GLUCOSE - Abnormal    POC Glucose >600 (*) 65 - 140 mg/dl Final    Comment: CRITICAL VALUE NOTED-NURSE NOTIFIED-   POCT GLUCOSE - Abnormal    POC Glucose >600 (*) 65 - 140 mg/dl Final    Comment: NURSE NOTIFIED-   POCT GLUCOSE - Abnormal    POC Glucose 503 (*) 65 - 140 mg/dl Final   POCT GLUCOSE - Abnormal    POC Glucose 407 (*) 65 - 140 mg/dl Final   MEDICAL ALCOHOL - Normal    Ethanol Lvl <10  <10 mg/dL Final   SALICYLATE LEVEL - Normal    Salicylate Lvl <5  3 - 20 mg/dL Final   PREGNANCY TEST (HCG QUALITATIVE) - Normal    Preg, Serum Negative  Negative Final   PHOSPHORUS - Normal    Phosphorus 3.5  2.7 - 4.5 mg/dL Final   BLOOD CULTURE   BLOOD CULTURE   BASIC METABOLIC PANEL   LACTIC ACID, PLASMA (W/REFLEX IF RESULT > 2.0)   COMA  PANEL    Narrative:     The following orders were created for panel order Coma Panel.  Procedure                               Abnormality         Status                     ---------                               -----------         ------                     Ethanol[[id number]]                      Normal              Final result               Salicylate level[[id number]]             Normal              Final result               Acetaminophen level-If c...[[id number]]  Abnormal            Final result                 Please view results for these tests on the individual orders.       *Each of these labs was reviewed. Particular standout labs will be noted in the ED Course above     Final Diagnosis:  1. Dehydration    2. Septic shock (HCC)    3. UTI (urinary tract infection)    4. DKA (diabetic ketoacidosis) ([facility])          P:  - hospital tx includes   Medications   sodium chloride (PF) 0.9 % injection 3 mL (has no administration in time range)   dextrose 5 % and sodium chloride 0.9 % infusion (has no administration in time range)   insulin regular (HumuLIN R,NovoLIN R) 1 Units/mL in sodium chloride 0.9 % 100 mL infusion (4.6 Units/hr Intravenous Rate/Dose Change 7/7/24 0618)   potassium chloride 20 mEq IVPB (premix) (20 mEq Intravenous New Bag 7/7/24 0703)     Followed by   potassium chloride 20 mEq IVPB (premix) (has no administration in time range)     Followed by   potassium chloride 20 mEq IVPB (premix) (has no administration in time range)   chlorhexidine (PERIDEX) 0.12 % oral rinse 15 mL (has no administration in time range)   multi-electrolyte (ISOLYTE-S PH 7.4 equivalent) IV solution (has no administration in time range)     Followed by   multi-electrolyte (ISOLYTE-S PH 7.4 equivalent) IV solution (has no administration in time range)   heparin (porcine) subcutaneous injection 5,000 Units (5,000 Units Subcutaneous Given 7/7/24 0707)   cefTRIAXone (ROCEPHIN) IVPB (premix in dextrose) 1,000 mg 50 mL (has no  "administration in time range)   magnesium sulfate 2 g/50 mL IVPB (premix) 2 g (2 g Intravenous New Bag 7/7/24 0706)   cefTRIAXone (ROCEPHIN) IVPB (premix in dextrose) 2,000 mg 50 mL (0 mg Intravenous Stopped 7/7/24 0330)   sodium chloride 0.9 % bolus 1,000 mL (0 mL Intravenous Stopped 7/7/24 0344)   calcium chloride 10 % injection 1 g (1 g Intravenous Given 7/7/24 0233)   magnesium sulfate IVPB (premix) SOLN 1 g (0 g Intravenous Stopped 7/7/24 0253)   midazolam (VERSED) injection 2 mg (2 mg Intravenous Given 7/7/24 0254)   sodium chloride 0.9 % bolus 1,000 mL (0 mL Intravenous Stopped 7/7/24 0553)   insulin regular (HumuLIN R,NovoLIN R) injection 10 Units (10 Units Intravenous Given 7/7/24 0409)         - disposition  Time reflects when diagnosis was documented in both MDM as applicable and the Disposition within this note       Time User Action Codes Description Comment    7/7/2024  6:36 AM Henrique Crandall [E86.0] Dehydration     7/7/2024  6:36 AM Henrique Crandall [A41.9,  R65.21] Septic shock (HCC)     7/7/2024  6:37 AM Henrique Crandall [N39.0] UTI (urinary tract infection)     7/7/2024  6:37 AM Henrique Crandall [E11.10] DKA (diabetic ketoacidosis) ([facility])           ED Disposition       None          Follow-up Information    None         - patient will call their PCP to let them know they were in the emergency department. We discuss return precautions and patient is agreeable with plan and aformentioned disposition.       - additional treatment intended, if consistent with primary provider:  - patient to follow with :      Patient's Medications   Discharge Prescriptions    No medications on file     No discharge procedures on file.  Prior to Admission Medications   Prescriptions Last Dose Informant Patient Reported? Taking?   BD Pen Needle Micro U/F 32G X 6 MM MISC   No No   Sig: USE 3 (THREE) TIMES A DAY BEFORE MEALS   Insulin Syringe-Needle U-100 (BD Insulin Syringe U/F) 31G X 5/16\" 0.3 ML MISC   " "No No   Sig: 3 TIMES DAILY BEFORE MEALS   albuterol (Proventil HFA) 90 mcg/act inhaler   No No   Sig: Inhale 2 puffs every 6 (six) hours as needed for wheezing   insulin glargine (LANTUS) 100 units/mL subcutaneous injection   Yes No   Sig: Inject 40 Units under the skin daily   insulin lispro (Admelog) 100 units/mL injection   No No   Sig: INJECT 5-10 UNITS UNDER THE SKIN 3 (THREE) TIMES A DAY WITH MEALS   lisinopril-hydrochlorothiazide (PRINZIDE,ZESTORETIC) 20-12.5 MG per tablet   No No   Sig: Take 1 tablet by mouth daily   methadone (DOLOPHINE) 10 mg/mL oral concentrated solution  Self Yes No   Sig: Take 140 mg by mouth daily Methadone clinic   traZODone (DESYREL) 50 mg tablet   Yes No   Sig: Take 50 mg by mouth daily at bedtime   venlafaxine (EFFEXOR-XR) 150 mg 24 hr capsule  Self Yes No   Sig: Take 300 mg by mouth daily       Facility-Administered Medications: None       Portions of the record may have been created with voice recognition software. Occasional wrong word or \"sound a like\" substitutions may have occurred due to the inherent limitations of voice recognition software. Read the chart carefully and recognize, using context, where substitutions have occurred.    Electronically signed by:  MD Henrique Perez MD  07/07/24 0718    "

## 2024-07-07 NOTE — ASSESSMENT & PLAN NOTE
Lab Results   Component Value Date    HGBA1C 12.8 (A) 06/18/2024       Recent Labs     07/07/24  0402 07/07/24  0502 07/07/24  0615 07/07/24  0713   POCGLU >600* >600* 503* 407*       Blood Sugar Average: Last 72 hrs:  (P) 151.1997954509738818    See above plan for DKA  Will need to confirm compliance with home regimen  Resume home therapy vs adjust based on complance

## 2024-07-07 NOTE — H&P
[facility]  H&P  Name: Marcia Barron 49 y.o. female I MRN: [id number]  Unit/Bed#: ED CT1 I Date of Admission: 7/7/2024   Date of Service: 7/7/2024 I Hospital Day: 0      Assessment & Plan   DKA (diabetic ketoacidosis) ([facility])  Assessment & Plan  Lab Results   Component Value Date    HGBA1C 12.8 (A) 06/18/2024       Recent Labs     07/07/24  0402 07/07/24  0502 07/07/24  0615 07/07/24  0713   POCGLU >600* >600* 503* 407*     Blood Sugar Average: Last 72 hrs:  (P) 151.2065952557032742    Unclear etiology but suspect UTI versus noncompliance, patient unable to answer questions due to encephalopathy.  Will discuss further when more awake  Started on DKA insulin infusion protocol  Follow electrolyte protocol  Received 2 L NSS in ED, will continue aggressive fluid resuscitation w/ Isolyte  Keep n.p.o. for now  Follow BMP, Mag and Phos Q4H      SEYMOUR (acute kidney injury) ([facility])  Assessment & Plan  Secondary to DKA  Continue aggressive fluid resuscitation  Following BMP    Toxic metabolic encephalopathy  Assessment & Plan  Unclear if secondary to UTI vs DKA vs substance abuse  Follow neuro checks Q2H    Type 2 diabetes mellitus with hyperglycemia, with long-term current use of insulin ([facility])  Assessment & Plan  Lab Results   Component Value Date    HGBA1C 12.8 (A) 06/18/2024       Recent Labs     07/07/24  0402 07/07/24  0502 07/07/24  0615 07/07/24  0713   POCGLU >600* >600* 503* 407*       Blood Sugar Average: Last 72 hrs:  (P) 151.5718459072597017    See above plan for DKA  Will need to confirm compliance with home regimen  Resume home therapy vs adjust based on complance    Methadone maintenance therapy patient ([facility])  Assessment & Plan  Will need to call clinic to confirm dosing  Med Rec shows 140 mg QD  UDS positive for Methadone    Noncompliance  Assessment & Plan  Prior history will discuss with patient when more awake    Substance use disorder  Assessment & Plan  UDS positive for Fentanyl and  Methamphetamine  Will discuss usage once more awake  Unable to answer questions appropriately at this time    Stage 3b chronic kidney disease (HCC)  Assessment & Plan  Lab Results   Component Value Date    EGFR 11 07/07/2024    EGFR 11 07/07/2024    EGFR 10 07/07/2024    CREATININE 4.24 (H) 07/07/2024    CREATININE 4.44 (H) 07/07/2024    CREATININE 4.70 (H) 07/07/2024     Baseline GFR around 55; Cr about 1.2-1.3  Trend BMP  Encourage compliance w/ meds and cessation of substance abuse    Anxiety and depression  Assessment & Plan  Hold home Effexor and Trazadone due to encephalopathy  Resume once more awake and appropriate    Essential hypertension  Assessment & Plan  Hold Lisinopril/ HCTZ combo for now  Resume when Cr improved  BP stable at this time       History of Present Illness     HPI: Marcia Barron is a 49 y.o. w/ pmhx polysubstance abuse, HTN, anxiety and depression, methadone use, DM2, CKD 3, history of noncompliance who presents after being found unresponsive at home per ED physician.  Patient arrived w/ AMS and unable to answer questions appropriately.  ED workup found the patient to be in DKA with a blood sugar > 800 and a pH 7.24.  Other labs significant for Cr 4.7, LA 8.3, WBC 18.72, + UA, procal 3.73. Received 2L NSS, ceftriaxone and started on DKA insulin drip.     History obtained from chart review.    Review of Systems: Review of Systems   Unable to perform ROS: Mental status change       Disposition: Stepdown Level 1    Historical Information   Past Medical History:  No date: Anxiety  No date: Asthma  2/5/2020: Cholelithiases      Comment:  Added automatically from request for surgery [id number]  No date: Depression  No date: Diabetes mellitus (HCC)  No date: Hypertension  1/6/2023: Mixed hyperlipidemia  No date: Neuropathy  No date: Psychiatric disorder Past Surgical History:  2020: CHOLECYSTECTOMY  02/10/2020: CHOLECYSTECTOMY LAPAROSCOPIC; N/A      Comment:  Procedure: CHOLECYSTECTOMY LAPAROSCOPIC;   "Surgeon: Shaheed Nolan MD;  Location: [facility];  Service: General  07/01/2017: INCISION AND DRAINAGE OF WOUND; Right      Comment:  Procedure: INCISION AND DRAINAGE (I&D) EXTREMITY THIGH;                Surgeon: Veto Reyes MD;  Location: [facility];                 Service: General   Current Outpatient Medications   Medication Instructions    albuterol (Proventil HFA) 90 mcg/act inhaler 2 puffs, Inhalation, Every 6 hours PRN    BD Pen Needle Micro U/F 32G X 6 MM MISC USE 3 (THREE) TIMES A DAY BEFORE MEALS    insulin glargine (LANTUS) 40 Units, Subcutaneous, Daily    insulin lispro (Admelog) 100 units/mL injection INJECT 5-10 UNITS UNDER THE SKIN 3 (THREE) TIMES A DAY WITH MEALS    Insulin Syringe-Needle U-100 (BD Insulin Syringe U/F) 31G X 5/16\" 0.3 ML MISC 3 TIMES DAILY BEFORE MEALS    lisinopril-hydrochlorothiazide (PRINZIDE,ZESTORETIC) 20-12.5 MG per tablet 1 tablet, Oral, Daily    methadone (DOLOPHINE) 140 mg, Oral, Daily, Methadone clinic    traZODone (DESYREL) 50 mg, Oral, Daily at bedtime    venlafaxine (EFFEXOR-XR) 300 mg, Oral, Daily    Allergies   Allergen Reactions    Cat Hair Extract Other (See Comments)    Codeine Other (See Comments)    Sulfamethoxazole-Trimethoprim Hives and Other (See Comments)      Social History     Tobacco Use    Smoking status: Never    Smokeless tobacco: Never    Tobacco comments:     never   Vaping Use    Vaping status: Never Used   Substance Use Topics    Alcohol use: Never    Drug use: Not Currently     Types: Heroin     Comment: hx of IVDU    Family History   Problem Relation Age of Onset    Hypertension Mother     Drug abuse Mother     Diabetes Father     Hypertension Father     COPD Father     ADD / ADHD Father     Anxiety disorder Father     Bipolar disorder Father     Kidney cancer Maternal Grandmother     Cancer Maternal Grandmother     Lung cancer Paternal Uncle     Diabetes Family     Hypertension Family     Aneurysm Family         PDF, paternal " aunt, other paternal family members    Drug abuse Maternal Uncle     Schizophrenia Maternal Uncle     Suicide Attempts Maternal Uncle           Objective                            Vitals I/O      Most Recent Min/Max in 24hrs   Temp 98.9 °F (37.2 °C) Temp  Min: 98.9 °F (37.2 °C)  Max: 102.9 °F (39.4 °C)   Pulse 94 Pulse  Min: 94  Max: 135   Resp 15 Resp  Min: 15  Max: 27   /68 BP  Min: 114/68  Max: 160/100   O2 Sat 96 % SpO2  Min: 93 %  Max: 100 %      Intake/Output Summary (Last 24 hours) at 7/7/2024 0752  Last data filed at 7/7/2024 0250  Gross per 24 hour   Intake --   Output 180 ml   Net -180 ml       Diet NPO; Ice chips    Invasive Monitoring           Physical Exam   Physical Exam  Vitals and nursing note reviewed.   Eyes:      Extraocular Movements: Extraocular movements intact.      Pupils: Pupils are equal, round, and reactive to light.   Skin:     General: Skin is warm and dry.   HENT:      Head: Normocephalic and atraumatic.      Mouth/Throat:      Mouth: Mucous membranes are dry.   Cardiovascular:      Rate and Rhythm: Regular rhythm. Tachycardia present.      Pulses: Normal pulses.      Heart sounds: Normal heart sounds.   Musculoskeletal:         General: Normal range of motion.   Abdominal: General: Bowel sounds are normal. There is no distension.      Palpations: Abdomen is soft.      Tenderness: There is no abdominal tenderness.   Constitutional:       General: She is not in acute distress.     Appearance: Normal appearance. She is well-developed. She is obese. She is ill-appearing.      Interventions: She is restrained.   Pulmonary:      Effort: Pulmonary effort is normal.      Breath sounds: Normal breath sounds.   Psychiatric:         Behavior: Behavior is cooperative.   Neurological:      Mental Status: She is easily aroused. She is lethargic, confused, disoriented to time and disoriented to situation.      Motor: Strength full and intact in all extremities.            Diagnostic Studies       EKG: Sinus tachycardia  Imaging: I have personally reviewed pertinent reports.       Medications:  Scheduled PRN   [START ON 7/8/2024] cefTRIAXone, 1,000 mg, Q24H  chlorhexidine, 15 mL, Q12H DENEEN  heparin (porcine), 5,000 Units, Q8H DENEEN  magnesium sulfate, 2 g, Once  potassium chloride, 20 mEq, Once   Followed by  potassium chloride, 20 mEq, Once   Followed by  potassium chloride, 20 mEq, Once      dextrose 5 % and sodium chloride 0.9 %, 250 mL/hr, Continuous PRN  sodium chloride (PF), 3 mL, Q1H PRN       Continuous    dextrose 5 % and sodium chloride 0.9 %, 250 mL/hr  insulin regular (HumuLIN R,NovoLIN R) 1 Units/mL in sodium chloride 0.9 % 100 mL infusion, 0.1-30 Units/hr, Last Rate: 4.6 Units/hr (07/07/24 0618)  multi-electrolyte, 500 mL/hr, Last Rate: 500 mL/hr (07/07/24 0719)   Followed by  multi-electrolyte, 250 mL/hr         Labs:    CBC    Recent Labs     07/07/24 0249   WBC 18.72*   HGB 13.6   HCT 40.7        BMP    Recent Labs     07/07/24  0510 07/07/24  0629   SODIUM 138 139   K 3.4* 3.3*    103   CO2 19* 19*   AGAP 17* 17*   BUN 61* 60*   CREATININE 4.44* 4.24*   CALCIUM 9.7 10.0       Coags    Recent Labs     07/07/24  0249   INR 1.56*        Additional Electrolytes  Recent Labs     07/07/24  0510   MG 1.8*   PHOS 3.5          Blood Gas    No recent results  Recent Labs     07/07/24 0249   PHVEN 7.239*   QMV4DMJ 32.4*   PO2VEN 58.1*   CDF6KXJ 13.5*   BEVEN -12.6   G0AIDVZ 83.2*    LFTs  Recent Labs     07/07/24 0249   ALT 32   AST 59*   ALKPHOS 59   ALB 4.0   TBILI 1.05*       Infectious  Recent Labs     07/07/24  0249   PROCALCITONI 3.73*     Glucose  Recent Labs     07/07/24  0249 07/07/24  0510 07/07/24  0629   GLUC 839* 650* 484*             Anticipated Length of Stay is > 2 midnights  SANTANA Padilla

## 2024-07-08 PROBLEM — R65.10 SIRS (SYSTEMIC INFLAMMATORY RESPONSE SYNDROME) (HCC): Status: ACTIVE | Noted: 2024-07-08

## 2024-07-08 PROBLEM — N39.0 UTI (URINARY TRACT INFECTION): Status: ACTIVE | Noted: 2024-07-08

## 2024-07-08 PROBLEM — R78.81 POSITIVE BLOOD CULTURE: Status: ACTIVE | Noted: 2024-07-08

## 2024-07-08 LAB
ANION GAP SERPL CALCULATED.3IONS-SCNC: 8 MMOL/L (ref 4–13)
ATRIAL RATE: 118 BPM
ATRIAL RATE: 138 BPM
BASOPHILS # BLD AUTO: 0.03 THOUSANDS/ÂΜL (ref 0–0.1)
BASOPHILS NFR BLD AUTO: 0 % (ref 0–1)
BUN SERPL-MCNC: 50 MG/DL (ref 5–25)
CALCIUM SERPL-MCNC: 8.5 MG/DL (ref 8.4–10.2)
CHLORIDE SERPL-SCNC: 104 MMOL/L (ref 96–108)
CO2 SERPL-SCNC: 25 MMOL/L (ref 21–32)
CREAT SERPL-MCNC: 2.37 MG/DL (ref 0.6–1.3)
EOSINOPHIL # BLD AUTO: 0.05 THOUSAND/ÂΜL (ref 0–0.61)
EOSINOPHIL NFR BLD AUTO: 0 % (ref 0–6)
ERYTHROCYTE [DISTWIDTH] IN BLOOD BY AUTOMATED COUNT: 13.7 % (ref 11.6–15.1)
GFR SERPL CREATININE-BSD FRML MDRD: 23 ML/MIN/1.73SQ M
GLUCOSE SERPL-MCNC: 111 MG/DL (ref 65–140)
GLUCOSE SERPL-MCNC: 123 MG/DL (ref 65–140)
GLUCOSE SERPL-MCNC: 166 MG/DL (ref 65–140)
GLUCOSE SERPL-MCNC: 186 MG/DL (ref 65–140)
GLUCOSE SERPL-MCNC: 207 MG/DL (ref 65–140)
GLUCOSE SERPL-MCNC: 230 MG/DL (ref 65–140)
GLUCOSE SERPL-MCNC: 74 MG/DL (ref 65–140)
GLUCOSE SERPL-MCNC: 81 MG/DL (ref 65–140)
GLUCOSE SERPL-MCNC: 95 MG/DL (ref 65–140)
HCT VFR BLD AUTO: 33.8 % (ref 34.8–46.1)
HGB BLD-MCNC: 11.5 G/DL (ref 11.5–15.4)
IMM GRANULOCYTES # BLD AUTO: 0.1 THOUSAND/UL (ref 0–0.2)
IMM GRANULOCYTES NFR BLD AUTO: 1 % (ref 0–2)
LYMPHOCYTES # BLD AUTO: 3.89 THOUSANDS/ÂΜL (ref 0.6–4.47)
LYMPHOCYTES NFR BLD AUTO: 20 % (ref 14–44)
MAGNESIUM SERPL-MCNC: 2 MG/DL (ref 1.9–2.7)
MCH RBC QN AUTO: 28.2 PG (ref 26.8–34.3)
MCHC RBC AUTO-ENTMCNC: 34 G/DL (ref 31.4–37.4)
MCV RBC AUTO: 83 FL (ref 82–98)
MONOCYTES # BLD AUTO: 1.13 THOUSAND/ÂΜL (ref 0.17–1.22)
MONOCYTES NFR BLD AUTO: 6 % (ref 4–12)
MRSA NOSE QL CULT: NORMAL
NEUTROPHILS # BLD AUTO: 13.95 THOUSANDS/ÂΜL (ref 1.85–7.62)
NEUTS SEG NFR BLD AUTO: 73 % (ref 43–75)
NRBC BLD AUTO-RTO: 0 /100 WBCS
P AXIS: -20 DEGREES
P AXIS: 47 DEGREES
PHOSPHATE SERPL-MCNC: 2.9 MG/DL (ref 2.7–4.5)
PLATELET # BLD AUTO: 133 THOUSANDS/UL (ref 149–390)
PLATELET BLD QL SMEAR: ABNORMAL
PMV BLD AUTO: 10.2 FL (ref 8.9–12.7)
POTASSIUM SERPL-SCNC: 4.1 MMOL/L (ref 3.5–5.3)
PR INTERVAL: 120 MS
PR INTERVAL: 142 MS
QRS AXIS: -19 DEGREES
QRS AXIS: -21 DEGREES
QRSD INTERVAL: 148 MS
QRSD INTERVAL: 76 MS
QT INTERVAL: 308 MS
QT INTERVAL: 350 MS
QTC INTERVAL: 431 MS
QTC INTERVAL: 530 MS
RBC # BLD AUTO: 4.08 MILLION/UL (ref 3.81–5.12)
RBC MORPH BLD: NORMAL
SODIUM SERPL-SCNC: 137 MMOL/L (ref 135–147)
T WAVE AXIS: 133 DEGREES
T WAVE AXIS: 83 DEGREES
VENTRICULAR RATE: 118 BPM
VENTRICULAR RATE: 138 BPM
WBC # BLD AUTO: 19.15 THOUSAND/UL (ref 4.31–10.16)

## 2024-07-08 PROCEDURE — 80048 BASIC METABOLIC PNL TOTAL CA: CPT | Performed by: NURSE PRACTITIONER

## 2024-07-08 PROCEDURE — 82948 REAGENT STRIP/BLOOD GLUCOSE: CPT

## 2024-07-08 PROCEDURE — 84100 ASSAY OF PHOSPHORUS: CPT | Performed by: NURSE PRACTITIONER

## 2024-07-08 PROCEDURE — 85025 COMPLETE CBC W/AUTO DIFF WBC: CPT | Performed by: NURSE PRACTITIONER

## 2024-07-08 PROCEDURE — 99233 SBSQ HOSP IP/OBS HIGH 50: CPT | Performed by: ANESTHESIOLOGY

## 2024-07-08 PROCEDURE — 93010 ELECTROCARDIOGRAM REPORT: CPT | Performed by: INTERNAL MEDICINE

## 2024-07-08 PROCEDURE — NC001 PR NO CHARGE: Performed by: ANESTHESIOLOGY

## 2024-07-08 PROCEDURE — 83735 ASSAY OF MAGNESIUM: CPT | Performed by: NURSE PRACTITIONER

## 2024-07-08 RX ORDER — INSULIN LISPRO 100 [IU]/ML
1-6 INJECTION, SOLUTION INTRAVENOUS; SUBCUTANEOUS
Status: DISCONTINUED | OUTPATIENT
Start: 2024-07-08 | End: 2024-07-13 | Stop reason: HOSPADM

## 2024-07-08 RX ORDER — INSULIN GLARGINE 100 [IU]/ML
30 INJECTION, SOLUTION SUBCUTANEOUS EVERY MORNING
Status: DISCONTINUED | OUTPATIENT
Start: 2024-07-09 | End: 2024-07-08

## 2024-07-08 RX ORDER — VENLAFAXINE HYDROCHLORIDE 150 MG/1
300 CAPSULE, EXTENDED RELEASE ORAL DAILY
Status: DISCONTINUED | OUTPATIENT
Start: 2024-07-08 | End: 2024-07-13 | Stop reason: HOSPADM

## 2024-07-08 RX ORDER — INSULIN GLARGINE 100 [IU]/ML
20 INJECTION, SOLUTION SUBCUTANEOUS EVERY MORNING
Status: DISCONTINUED | OUTPATIENT
Start: 2024-07-09 | End: 2024-07-11

## 2024-07-08 RX ORDER — INSULIN GLARGINE 100 [IU]/ML
10 INJECTION, SOLUTION SUBCUTANEOUS EVERY MORNING
Status: DISCONTINUED | OUTPATIENT
Start: 2024-07-08 | End: 2024-07-08

## 2024-07-08 RX ORDER — NYSTATIN 100000 [USP'U]/G
POWDER TOPICAL 2 TIMES DAILY
Status: DISCONTINUED | OUTPATIENT
Start: 2024-07-08 | End: 2024-07-13 | Stop reason: HOSPADM

## 2024-07-08 RX ORDER — METHADONE HYDROCHLORIDE 10 MG/1
140 TABLET ORAL DAILY
Status: DISCONTINUED | OUTPATIENT
Start: 2024-07-08 | End: 2024-07-13 | Stop reason: HOSPADM

## 2024-07-08 RX ADMIN — METHADONE HYDROCHLORIDE 140 MG: 10 TABLET ORAL at 09:05

## 2024-07-08 RX ADMIN — SENNOSIDES AND DOCUSATE SODIUM 1 TABLET: 50; 8.6 TABLET ORAL at 17:34

## 2024-07-08 RX ADMIN — HEPARIN SODIUM 5000 UNITS: 5000 INJECTION INTRAVENOUS; SUBCUTANEOUS at 14:18

## 2024-07-08 RX ADMIN — HEPARIN SODIUM 5000 UNITS: 5000 INJECTION INTRAVENOUS; SUBCUTANEOUS at 21:09

## 2024-07-08 RX ADMIN — VENLAFAXINE HYDROCHLORIDE 300 MG: 150 CAPSULE, EXTENDED RELEASE ORAL at 14:18

## 2024-07-08 RX ADMIN — CEFTRIAXONE 1000 MG: 1 INJECTION, SOLUTION INTRAVENOUS at 03:51

## 2024-07-08 RX ADMIN — INSULIN LISPRO 3 UNITS: 100 INJECTION, SOLUTION INTRAVENOUS; SUBCUTANEOUS at 16:22

## 2024-07-08 RX ADMIN — INSULIN LISPRO 2 UNITS: 100 INJECTION, SOLUTION INTRAVENOUS; SUBCUTANEOUS at 21:09

## 2024-07-08 RX ADMIN — INSULIN GLARGINE 10 UNITS: 100 INJECTION, SOLUTION SUBCUTANEOUS at 09:05

## 2024-07-08 RX ADMIN — CHLORHEXIDINE GLUCONATE 15 ML: 1.2 RINSE ORAL at 21:07

## 2024-07-08 RX ADMIN — SENNOSIDES AND DOCUSATE SODIUM 1 TABLET: 50; 8.6 TABLET ORAL at 09:05

## 2024-07-08 RX ADMIN — ONDANSETRON 4 MG: 2 INJECTION INTRAMUSCULAR; INTRAVENOUS at 10:03

## 2024-07-08 RX ADMIN — HEPARIN SODIUM 5000 UNITS: 5000 INJECTION INTRAVENOUS; SUBCUTANEOUS at 06:21

## 2024-07-08 RX ADMIN — ONDANSETRON 4 MG: 2 INJECTION INTRAMUSCULAR; INTRAVENOUS at 06:32

## 2024-07-08 RX ADMIN — ONDANSETRON 4 MG: 2 INJECTION INTRAMUSCULAR; INTRAVENOUS at 22:09

## 2024-07-08 RX ADMIN — NYSTATIN: 100000 POWDER TOPICAL at 20:33

## 2024-07-08 RX ADMIN — CHLORHEXIDINE GLUCONATE 15 ML: 1.2 RINSE ORAL at 09:05

## 2024-07-08 RX ADMIN — INSULIN LISPRO 1 UNITS: 100 INJECTION, SOLUTION INTRAVENOUS; SUBCUTANEOUS at 12:10

## 2024-07-08 NOTE — WOUND OSTOMY CARE
"Progress Note - Wound   Marcia Barron 49 y.o. female MRN: [id number]  Unit/Bed#: ICU 11 Encounter: [id number]      Assessment:   This is a 49 year old female patient admitted on 7/7/24 with diabetic ketoacidosis. Wound consult requested for \"wounds/MASD/POA\". This consult done remotely based on photos taken on admission. As per Primary RN, no open areas present and she will obtain order for Nystatin powder from Provider.    Assessment Findings:  1-Intact moisture associated skin damage with superimposed fungal rash to mid-abdominal fold and intergluteal fold - orders in place for skin care.  2-As per primary RN, bilateral heels intact - orders in place for skin care and for prevention.    Plan:  Skin care plans:  1-Cleanse areas of MASD/fungal rash to mid-abdominal fold and intergluteal fold with foaming cleanser and pat dry. Apply LIGHT dusting of Nystatin powder 2x/day and gently remove excess to prevent caking.  2-Hydraguard (Prevent barrier cream) to bilateral sacrobuttocks (avoid intergluteal fold) and heels BID and PRN  3-Float heels on 2 pillows to offload pressure so heels are not in contact with mattress or pillows.  4-Ehob pressure redistribution cushion in chair when out of bed. Avoid prolonged sitting.  5-Moisturize skin daily with skin nourishing cream.  6-Turn/reposition q2h or when medically stable for pressure re-distribution on skin.     Wound 07/07/24 Rectum (Active)   Wound Image   07/07/24 0809   Wound 07/07/24 Pelvis Anterior (Active)   Wound Image   07/07/24 0903     Discussed assessment findings, and plan of care/recommendations with Jackie COTO.    Wound care signing off, please call or tiger text with questions and concerns.    Recommendations written as orders.  Ines Ramey MSN, RN, CWON                   "

## 2024-07-08 NOTE — ASSESSMENT & PLAN NOTE
Lab Results   Component Value Date    HGBA1C 12.8 (A) 06/18/2024       Recent Labs     07/08/24  0202 07/08/24  0351 07/08/24  0604 07/08/24  0851   POCGLU 81 95 123 111         Blood Sugar Average: Last 72 hrs:  (P) 144.6830822196686508    See above plan for DKA  Will need to confirm compliance with home regimen  Initiated on lantus 10u this AM with sliding scale  Home regimen is 40u lantus HS but unclear compliance  Patient continues to have low appetite so will uptitrate lantus as needed

## 2024-07-08 NOTE — QUICK NOTE
After a medical release form was sent, I called and spoke with [facility] and spoke with Sona Yost RN who confirmed methadone dose of 140mg. Home methadone re-ordered.

## 2024-07-08 NOTE — PROGRESS NOTES
[facility]  Progress Note  Name: Marcia Barron I  MRN: [id number]  Unit/Bed#: ICU 11 I Date of Admission: 7/7/2024   Date of Service: 7/8/2024 I Hospital Day: 1    Assessment & Plan   * DKA (diabetic ketoacidosis) (HCC)  Assessment & Plan  Lab Results   Component Value Date    HGBA1C 12.8 (A) 06/18/2024       Recent Labs     07/08/24  0202 07/08/24  0351 07/08/24  0604 07/08/24  0851   POCGLU 81 95 123 111       Blood Sugar Average: Last 72 hrs:  (P) 144.1379868307912769    Unclear etiology but suspect UTI versus noncompliance, patient unable to answer questions due to encephalopathy.  Will discuss further when more awake  Started on DKA insulin infusion protocol  Follow electrolyte protocol  Received 2 L NSS in ED, will continue aggressive fluid resuscitation w/ Isolyte  AG normalized overnight, DKA now resolved  Transitioned to lantus this AM and sliding scale  Will need to uptitrate lantus pending PO intake      SEYMOUR (acute kidney injury) (HCC)  Assessment & Plan  Secondary to DKA  SEYMOUR now improving with creatinine down to 2.3 from 4.7 on admission  Continue fluid resuscitation  Following BMP    Toxic metabolic encephalopathy  Assessment & Plan  Unclear if secondary to UTI vs DKA vs substance abuse  Mentation now improved, more alert and oriented this AM  Resume methadone  CAM ICU  Sleep hygiene     Type 2 diabetes mellitus with hyperglycemia, with long-term current use of insulin (HCC)  Assessment & Plan  Lab Results   Component Value Date    HGBA1C 12.8 (A) 06/18/2024       Recent Labs     07/08/24  0202 07/08/24  0351 07/08/24  0604 07/08/24  0851   POCGLU 81 95 123 111         Blood Sugar Average: Last 72 hrs:  (P) 144.9712941502486205    See above plan for DKA  Will need to confirm compliance with home regimen  Initiated on lantus 10u this AM with sliding scale  Home regimen is 40u lantus HS but unclear compliance  Patient continues to have low appetite so will uptitrate lantus as  needed     Methadone maintenance therapy patient ([facility])  Assessment & Plan  Called [place] 7/8 and confirmed dose, dose reordered  Med Rec shows 140 mg QD  UDS positive for Methadone    Noncompliance  Assessment & Plan  Prior history will discuss with patient when more awake    Substance use disorder  Assessment & Plan  UDS positive for Fentanyl and Methamphetamine  Will discuss usage once more awake  Unable to answer questions appropriately at this time    Stage 3b chronic kidney disease ([facility])  Assessment & Plan  Lab Results   Component Value Date    EGFR 23 07/08/2024    EGFR 17 07/07/2024    EGFR 14 07/07/2024    CREATININE 2.37 (H) 07/08/2024    CREATININE 3.06 (H) 07/07/2024    CREATININE 3.53 (H) 07/07/2024     Baseline GFR around 55; Cr about 1.2-1.3  Creatinine now improving   Close I&Os  Daily weights  Trend BMPs    Anxiety and depression  Assessment & Plan  Hold home Effexor and Trazadone due to encephalopathy  Resume when able, likely today     Essential hypertension  Assessment & Plan  Hold Lisinopril/ HCTZ combo for now  Resume when Cr improved  BP stable at this time    SIRS (systemic inflammatory response syndrome) ([facility])  Assessment & Plan  SIRS criteria met on admission given tachycardia and tachypnea: unclear if related to DKA vs infection or both. Patient does report dysuria   Blood cultures pending  UA pending, suggestive of possible UTI  CT without obvious source of infection  Now s/p aggressive IV hydration    Plan:  Continue ceftriaxone for now  Closely follow culture date  Trend WBC and fever curve      Positive blood culture  Assessment & Plan  BCIP showing staph epidermis and Streptococcus agalactiae (Group B)   Both initial BC still in process  Continue to follow BC results  Will discuss with patient if she is using any IV drugs which would increase risk of bacteremia  Closely trend WBC, fever curve and procal     UTI (urinary tract infection)  Assessment & Plan  UA with innumerable WBC,  occasional bacteria, large leuks, negative nitrites  This AM complaining of dysuria  Urine culture pending  Continue ceftriaxone for now  Trend WBC, fever curve             Disposition: Med Surg    ICU Core Measures     A: Assess, Prevent, and Manage Pain Has pain been assessed? Yes  Need for changes to pain regimen? No   B: Both SAT/SAT  N/A   C: Choice of Sedation RASS Goal: N/A patient not on sedation  Need for changes to sedation or analgesia regimen? NA   D: Delirium CAM-ICU: Negative   E: Early Mobility  Plan for early mobility? Yes   F: Family Engagement Plan for family engagement today? Yes       Antibiotic Review: Awaiting culture results.       Prophylaxis:  VTE VTE covered by:  heparin (porcine), Subcutaneous, 5,000 Units at 07/08/24 0621       Stress Ulcer  not ordered         Significant 24hr Events     24hr events: AG closed last evening. Patient initiated on a PO diet. Transitioned to lantus 10u this AM with sliding scale. Awake, alert and oriented this AM. Complains of dysuria.      Subjective   Review of Systems: Review of Systems   Constitutional:  Positive for appetite change.   Respiratory:  Negative for shortness of breath.    Gastrointestinal:  Positive for nausea.   Genitourinary:  Positive for dysuria.        Objective                            Vitals I/O      Most Recent Min/Max in 24hrs   Temp 98 °F (36.7 °C) Temp  Min: 97.3 °F (36.3 °C)  Max: 98.1 °F (36.7 °C)   Pulse 75 Pulse  Min: 59  Max: 87   Resp 17 Resp  Min: 14  Max: 36   /81 BP  Min: 107/65  Max: 167/81   O2 Sat 100 % SpO2  Min: 94 %  Max: 100 %      Intake/Output Summary (Last 24 hours) at 7/8/2024 0945  Last data filed at 7/8/2024 0615  Gross per 24 hour   Intake 5441.44 ml   Output 1050 ml   Net 4391.44 ml       Diet Cardiovascular; Cardiac; Consistent Carbohydrate Diet Level 2 (5 carb servings/75 grams CHO/meal)    Invasive Monitoring           Physical Exam   Physical Exam  Eyes:      Extraocular Movements: Extraocular  movements intact.      Pupils: Pupils are equal, round, and reactive to light.   Skin:     General: Skin is warm and dry.      Coloration: Skin is not jaundiced.   HENT:      Head: Normocephalic and atraumatic.      Mouth/Throat:      Mouth: Mucous membranes are moist.   Cardiovascular:      Rate and Rhythm: Normal rate and regular rhythm.   Musculoskeletal:      Right lower leg: Trace Edema present.      Left lower leg: Trace Edema present.   Abdominal: General: Bowel sounds are normal.      Palpations: Abdomen is soft.      Tenderness: There is no abdominal tenderness.   Constitutional:       Appearance: She is well-developed and well-nourished. She is obese. She is ill-appearing.      Comments: Chronically ill appearing, appears older than stated age   Pulmonary:      Effort: Tachypnea present.      Breath sounds: Normal breath sounds.   Neurological:      General: No focal deficit present.      Mental Status: She is alert and oriented to person, place and time.      Motor: gross motor function is at baseline for patient.            Diagnostic Studies           Medications:  Scheduled PRN   cefTRIAXone, 1,000 mg, Q24H  chlorhexidine, 15 mL, Q12H DENEEN  heparin (porcine), 5,000 Units, Q8H DENEEN  insulin glargine, 10 Units, QAM  insulin lispro, 1-6 Units, 4x Daily (AC & HS)  methadone, 140 mg, Daily  senna-docusate sodium, 1 tablet, BID      bisacodyl, 10 mg, Daily PRN  ondansetron, 4 mg, Q8H PRN  sodium chloride (PF), 3 mL, Q1H PRN       Continuous          Labs:    CBC    Recent Labs     07/07/24  0249 07/08/24  0630   WBC 18.72* 19.15*   HGB 13.6 11.5   HCT 40.7 33.8*    133*     BMP    Recent Labs     07/07/24  1358 07/08/24  0630   SODIUM 141 137   K 4.4 4.1    104   CO2 23 25   AGAP 10 8   BUN 53* 50*   CREATININE 3.06* 2.37*   CALCIUM 8.3* 8.5       Coags    Recent Labs     07/07/24  0249   INR 1.56*        Additional Electrolytes  Recent Labs     07/07/24  1358 07/08/24  0630   MG 2.3 2.0   PHOS  3.7 2.9          Blood Gas    No recent results  Recent Labs     07/07/24  0249   PHVEN 7.239*   BLR7LED 32.4*   PO2VEN 58.1*   UQP0ENY 13.5*   BEVEN -12.6   Q6FVPXZ 83.2*    LFTs  Recent Labs     07/07/24  0249   ALT 32   AST 59*   ALKPHOS 59   ALB 4.0   TBILI 1.05*       Infectious  Recent Labs     07/07/24  0249   PROCALCITONI 3.73*     Glucose  Recent Labs     07/07/24  0629 07/07/24  1107 07/07/24  1358 07/08/24  0630   GLUC 484* 120 73 166*               SANTANA Brewer

## 2024-07-08 NOTE — ASSESSMENT & PLAN NOTE
Secondary to DKA  SEYMOUR now improving with creatinine down to 2.3 from 4.7 on admission  Continue fluid resuscitation  Following BMP

## 2024-07-08 NOTE — ASSESSMENT & PLAN NOTE
Lab Results   Component Value Date    EGFR 23 07/08/2024    EGFR 17 07/07/2024    EGFR 14 07/07/2024    CREATININE 2.37 (H) 07/08/2024    CREATININE 3.06 (H) 07/07/2024    CREATININE 3.53 (H) 07/07/2024     Baseline GFR around 55; Cr about 1.2-1.3  Creatinine now improving   Close I&Os  Daily weights  Trend BMPs

## 2024-07-08 NOTE — ASSESSMENT & PLAN NOTE
Unclear if secondary to UTI vs DKA vs substance abuse  Mentation now improved, more alert and oriented this AM  Resume methadone  CAM ICU  Sleep hygiene

## 2024-07-08 NOTE — ASSESSMENT & PLAN NOTE
SIRS criteria met on admission given tachycardia and tachypnea: unclear if related to DKA vs infection or both. Patient does report dysuria   Blood cultures pending  UA pending, suggestive of possible UTI  CT without obvious source of infection  Now s/p aggressive IV hydration    Plan:  Continue ceftriaxone for now  Closely follow culture date  Trend WBC and fever curve

## 2024-07-08 NOTE — UTILIZATION REVIEW
Initial Clinical Review    Admission: Date/Time/Statement:   Admission Orders (From admission, onward)       Ordered        07/07/24 0636  INPATIENT ADMISSION  Once                          Orders Placed This Encounter   Procedures    INPATIENT ADMISSION     Standing Status:   Standing     Number of Occurrences:   1     Order Specific Question:   Level of Care     Answer:   Level 1 Stepdown [13]     Order Specific Question:   Estimated length of stay     Answer:   More than 2 Midnights     Order Specific Question:   Certification     Answer:   I certify that inpatient services are medically necessary for this patient for a duration of greater than two midnights. See H&P and MD Progress Notes for additional information about the patient's course of treatment.     ED Arrival Information       Expected   -    Arrival   7/7/2024 02:25    Acuity   Emergent              Means of arrival   Ambulance    Escorted by   [facility]   Critical Care/ICU    Admission type   Emergency              Arrival complaint   Hyperglycemia             Chief Complaint   Patient presents with    Hyperglycemia - Symptomatic     Per medics, pt found unresponsive in home by signinficant other.        Initial Presentation: 49 y.o. female to ED via EMS from home.    Admitted to inpatient with Dx: DKA / SEYMOUR / Toxic Metabolic Encephalopathy.   Presented to ED with found unresponsive at home.   PMHx:polysubstance abuse, HTN, anxiety and depression, methadone use, DM2, CKD 3, history of noncompliance .   Date: 07/07/2024   On exam: Ill-appearing, obeses   Lethargic, confused, disoriented to time and disoriented to situation.      Motor: Strength full and intact in all extremities.    Imaging shows CT head: NAD.  CT CAP: Limited evaluation due to streak artifact and lack of intravenous contrast. Mild interstitial prominence predominantly in the lung bases. Correlate for mild interstitial edema.    Tiny pulmonary nodules. Based on current  Fleischner Society 2017 Guidelines on incidental pulmonary nodule, no routine follow-up is needed if the patient is low risk. If the patient is high risk, optional follow-up chest CT at 12 months can be considered.     Mild splenomegaly.  . ED treatment  DKA with a blood sugar > 800 and a pH 7.24.  Other labs significant for Cr 4.7, LA 8.3, WBC 18.72, + UA, procal 3.73. Received 2L NSS, ceftriaxone and started on DKA insulin drip.   ECG: S.Tachycardia.    Plan includes Aggressive IV flds.  NPO.  Follow up BMP, Mg, Phos q4h.  Neuro checks.  UDS positive for Methadone.      Anticipated Length of Stay/Certification Statement: Anticipated Length of Stay is > 2 midnights     Day 2: 07/08/2024  AG closed last evening. Patient initiated on a PO diet. Transitioned to lantus 10u this AM with sliding scale. Awake, alert and oriented this AM. Complains of dysuria.  Cardio-Pulmonary monitoring.  Continue home methadone & venlafaxine.  Hold Lisinopril-HCTZ in setting SEYMOUR.  IV Abx: ceftriaxone,  Follow up on cultures. Zofran PRN.  OOB to chair.      VTE PPX:  SQH / SCDs    ED Triage Vitals   Temperature Pulse Respirations Blood Pressure SpO2 Pain Score   07/07/24 0247 07/07/24 0247 07/07/24 0247 07/07/24 0247 07/07/24 0247 07/07/24 1200   (!) 102.9 °F (39.4 °C) (!) 135 (!) 24 160/100 97 % No Pain     Weight (last 2 days)       Date/Time Weight    07/08/24 0600 93 (205.03)    07/07/24 0820 89.8 (197.97)    07/07/24 0347 88.5 (195.11)            Vital Signs (last 3 days)       Date/Time Temp Pulse Resp BP MAP (mmHg) SpO2 Calculated FIO2 (%) - Nasal Cannula Nasal Cannula O2 Flow Rate (L/min) O2 Device Patient Position - Orthostatic VS [place] Coma Scale Score Pain    07/08/24 0905 -- -- -- -- -- -- -- -- -- -- -- 5    07/08/24 0600 -- 64 16 132/69 92 98 % -- -- -- -- -- --    07/08/24 0405 97.8 °F (36.6 °C) 72 18 134/97 111 100 % -- -- -- -- -- --    07/08/24 0400 -- -- -- -- -- -- -- -- -- -- 15 --    07/08/24 0200 -- 59 14 108/63  81 96 % -- -- -- -- -- --    07/08/24 0000 98.1 °F (36.7 °C) 63 20 125/72 85 98 % -- -- -- -- 15 --    07/07/24 2300 -- 67 20 119/74 91 96 % -- -- -- -- -- --    07/07/24 2100 -- 80 19 136/83 104 99 % -- -- -- -- -- --    07/07/24 2000 97.3 °F (36.3 °C) 79 17 136/95 110 97 % -- -- -- -- 15 No Pain    07/07/24 1900 -- 83 -- 160/87 116 94 % -- -- -- -- -- --    07/07/24 1800 -- 83 -- 136/80 102 98 % -- -- -- -- -- --    07/07/24 1700 -- 79 -- 148/76 106 97 % -- -- -- -- -- --    07/07/24 1600 97.8 °F (36.6 °C) 87 20 157/83 110 95 % -- -- None (Room air) Lying 12 No Pain    07/07/24 1500 -- 80 18 150/89 113 98 % -- -- -- -- -- --    07/07/24 1400 -- 87 30 125/72 93 99 % -- -- -- -- -- --    07/07/24 1300 -- 74 26 145/90 113 98 % -- -- -- -- -- --    07/07/24 1200 97.6 °F (36.4 °C) 76 24 107/65 82 98 % -- -- None (Room air) Lying 12 No Pain    07/07/24 1100 -- 81 28 113/70 88 98 % -- -- -- -- -- --    07/07/24 1000 -- 84 36 107/63 73 94 % -- -- -- -- -- --    07/07/24 0900 -- 81 18 105/62 78 98 % -- -- -- -- -- --    07/07/24 0820 97.8 °F (36.6 °C) 89 28 101/59 76 98 % -- -- None (Room air) Lying 12 --    07/07/24 0730 -- 94 15 114/68 86 96 % -- -- None (Room air) Lying -- --    07/07/24 0700 -- 100 20 124/76 95 98 % 24 1 L/min Nasal cannula Lying -- --    07/07/24 0645 -- 101 21 117/83 94 98 % 24 1 L/min Nasal cannula Lying -- --    07/07/24 0630 98.9 °F (37.2 °C) 107 19 121/80 98 99 % 24 1 L/min Nasal cannula Lying -- --    07/07/24 0600 -- 104 27 124/80 97 100 % 28 2 L/min Nasal cannula Lying -- --    07/07/24 0534 100 °F (37.8 °C) -- -- -- -- -- -- -- -- -- -- --    07/07/24 0530 -- 105 22 115/72 86 100 % 28 2 L/min Nasal cannula Lying -- --    07/07/24 0515 -- 106 22 118/76 91 100 % 28 2 L/min Nasal cannula Lying -- --    07/07/24 0445 -- 110 18 131/81 100 100 % 28 2 L/min Nasal cannula -- -- --    07/07/24 0430 100.2 °F (37.9 °C) 109 22 134/69 94 100 % 28 2 L/min Nasal cannula Lying -- --    07/07/24 0415 -- 109  21 145/73 102 100 % 28 2 L/min Nasal cannula -- -- --    07/07/24 0400 -- 114 26 152/77 108 93 % -- -- None (Room air) Lying -- --    07/07/24 0330 101.3 °F (38.5 °C) 116 25 122/59 85 93 % -- -- None (Room air) Lying -- --    07/07/24 0327 -- -- -- -- -- -- -- -- None (Room air) -- 10 --    07/07/24 0315 -- 118 26 116/78 93 93 % -- -- None (Room air) Lying -- --    07/07/24 0247 102.9 °F (39.4 °C) 135 24 160/100 -- 97 % -- -- None (Room air) Lying -- --            Date and Time Eye Opening Best Verbal Response Best Motor Response [place] Coma Scale Score   07/08/24 0400 4 5 6 15   07/08/24 0000 4 5 6 15   07/07/24 2000 4 5 6 15   07/07/24 1600 3 4 5 12   07/07/24 1200 3 4 5 12   07/07/24 0820 3 4 5 12   07/07/24 0327 3 3 4 10       Pertinent Labs/Diagnostic Test Results:   Radiology:  CT chest abdomen pelvis wo contrast   Final Interpretation by Sury Julien MD (07/07 0532)   Limited evaluation due to streak artifact and lack of intravenous contrast.      Mild interstitial prominence predominantly in the lung bases. Correlate for mild interstitial edema.      Tiny pulmonary nodules. Based on current Fleischner Society 2017 Guidelines on incidental pulmonary nodule, no routine follow-up is needed if the patient is low risk. If the patient is high risk, optional follow-up chest CT at 12 months can be    considered.      Mild splenomegaly.      CT head without contrast   Final Interpretation by Sury Julien MD (07/07 0429)   No acute intracranial abnormality.        Cardiology:  No orders to display     GI:  No orders to display     Results from last 7 days   Lab Units 07/08/24  0630 07/07/24  0249   WBC Thousand/uL 19.15* 18.72*   HEMOGLOBIN g/dL 11.5 13.6   HEMATOCRIT % 33.8* 40.7   PLATELETS Thousands/uL 133* 269   TOTAL NEUT ABS Thousands/µL 13.95* 15.54*     Results from last 7 days   Lab Units 07/08/24  0630 07/07/24  1358 07/07/24  1107 07/07/24  0629 07/07/24  0510   SODIUM mmol/L 137 141 142 139  138   POTASSIUM mmol/L 4.1 4.4 4.0 3.3* 3.4*   CHLORIDE mmol/L 104 108 105 103 102   CO2 mmol/L 25 23 25 19* 19*   ANION GAP mmol/L 8 10 12 17* 17*   BUN mg/dL 50* 53* 56* 60* 61*   CREATININE mg/dL 2.37* 3.06* 3.53* 4.24* 4.44*   EGFR ml/min/1.73sq m 23 17 14 11 11   CALCIUM mg/dL 8.5 8.3* 9.1 10.0 9.7   MAGNESIUM mg/dL 2.0 2.3 2.6  --  1.8*   PHOSPHORUS mg/dL 2.9 3.7 4.2  --  3.5     Results from last 7 days   Lab Units 07/07/24  0249   AST U/L 59*   ALT U/L 32   ALK PHOS U/L 59   TOTAL PROTEIN g/dL 7.4   ALBUMIN g/dL 4.0   TOTAL BILIRUBIN mg/dL 1.05*     Results from last 7 days   Lab Units 07/08/24  0851 07/08/24  0604 07/08/24  0351 07/08/24  0202 07/08/24  0000 07/07/24  2200 07/07/24  2009 07/07/24  1758 07/07/24  1703 07/07/24  1634 07/07/24  1605 07/07/24  1406   POC GLUCOSE mg/dl 111 123 95 81 74 115 149* 132 108 63* 64* 121     Results from last 7 days   Lab Units 07/08/24  0630 07/07/24  1358 07/07/24  1107 07/07/24  0629 07/07/24  0510 07/07/24  0249   GLUCOSE RANDOM mg/dL 166* 73 120 484* 650* 839*     Beta- Hydroxybutyrate   Date Value Ref Range Status   07/07/2024 0.90 (H) 0.02 - 0.27 mmol/L Final     BETA-HYDROXYBUTYRATE   Date Value Ref Range Status   02/05/2020 0.1 <0.6 mmol/L Final   09/17/2019 0.2 <0.6 mmol/L Final      Results from last 7 days   Lab Units 07/07/24  0249   PH MAG  7.239*   PCO2 MAG mm Hg 32.4*   PO2 MAG mm Hg 58.1*   HCO3 MAG mmol/L 13.5*   BASE EXC MAG mmol/L -12.6   O2 CONTENT MAG ml/dL 17.0   O2 HGB, VENOUS % 83.2*     Results from last 7 days   Lab Units 07/07/24  0249   PROTIME seconds 18.9*   INR  1.56*     Results from last 7 days   Lab Units 07/07/24  0249   PROCALCITONIN ng/ml 3.73*     Results from last 7 days   Lab Units 07/07/24  1107 07/07/24  0717 07/07/24  0451 07/07/24  0249   LACTIC ACID mmol/L 2.2* 4.6* 3.9* 8.3*     Results from last 7 days   Lab Units 07/07/24  0300   CLARITY UA  Clear   COLOR UA  Yellow   SPEC GRAV UA  1.020   PH UA  5.0   GLUCOSE UA mg/dl  1000 (1%)*   KETONES UA mg/dl Negative   BLOOD UA  Moderate*   PROTEIN UA mg/dl 30 (1+)*   NITRITE UA  Negative   BILIRUBIN UA  Negative   UROBILINOGEN UA (BE) mg/dl <2.0   LEUKOCYTES UA  Large*   WBC UA /hpf Innumerable*   RBC UA /hpf 10-20*   BACTERIA UA /hpf Occasional   EPITHELIAL CELLS WET PREP /hpf Occasional   MUCUS THREADS  Occasional*     Results from last 7 days   Lab Units 07/07/24  0300   AMPH/METH  Positive*   BARBITURATE UR  Negative   BENZODIAZEPINE UR  Negative   COCAINE UR  Negative   METHADONE URINE  Positive*   OPIATE UR  Negative   PCP UR  Negative   THC UR  Negative     Results from last 7 days   Lab Units 07/07/24  0249   ETHANOL LVL mg/dL <10   ACETAMINOPHEN LVL ug/mL <2*   SALICYLATE LVL mg/dL <5     Results from last 7 days   Lab Units 07/07/24  0249   BLOOD CULTURE  Received in Microbiology Lab. Culture in Progress.  Received in Microbiology Lab. Culture in Progress.     ED Treatment-Medication Administration from 07/07/2024 0225 to 07/07/2024 0818         Date/Time Order Dose Route Action     07/07/2024 0253 cefTRIAXone (ROCEPHIN) IVPB (premix in dextrose) 2,000 mg 50 mL 2,000 mg Intravenous New Bag     07/07/2024 0235 sodium chloride 0.9 % bolus 1,000 mL 1,000 mL Intravenous New Bag     07/07/2024 0233 calcium chloride 10 % injection 1 g 1 g Intravenous Given     07/07/2024 0236 magnesium sulfate IVPB (premix) SOLN 1 g 1 g Intravenous New Bag     07/07/2024 0254 midazolam (VERSED) injection 2 mg 2 mg Intravenous Given     07/07/2024 0344 sodium chloride 0.9 % bolus 1,000 mL 1,000 mL Intravenous New Bag     07/07/2024 0409 insulin regular (HumuLIN R,NovoLIN R) injection 10 Units 10 Units Intravenous Given     07/07/2024 0405 insulin regular (HumuLIN R,NovoLIN R) 1 Units/mL in sodium chloride 0.9 % 100 mL infusion 9.2 Units/hr Intravenous New Bag     07/07/2024 0703 potassium chloride 20 mEq IVPB (premix) 20 mEq Intravenous New Bag     07/07/2024 0719 multi-electrolyte (ISOLYTE-S PH 7.4  equivalent) IV solution 500 mL/hr Intravenous New Bag     07/07/2024 0707 heparin (porcine) subcutaneous injection 5,000 Units 5,000 Units Subcutaneous Given     07/07/2024 0706 magnesium sulfate 2 g/50 mL IVPB (premix) 2 g 2 g Intravenous New Bag            Past Medical History:   Diagnosis Date    Anxiety     Asthma     Cholelithiases 2/5/2020    Added automatically from request for surgery [id number]    Depression     Diabetes mellitus (HCC)     Hypertension     Mixed hyperlipidemia 1/6/2023    Neuropathy     Psychiatric disorder      Present on Admission:   DKA (diabetic ketoacidosis) ([facility])   Methadone maintenance therapy patient ([facility])   Essential hypertension   Anxiety and depression   Stage 3b chronic kidney disease ([facility])   SEYMOUR (acute kidney injury) ([facility])   Substance use disorder   Toxic metabolic encephalopathy      Admitting Diagnosis: Dehydration [E86.0]  UTI (urinary tract infection) [N39.0]  DKA (diabetic ketoacidosis) ([facility]) [E11.10]  Hyperglycemia [R73.9]  Septic shock ([facility]) [A41.9, R65.21]  Age/Sex: 49 y.o. female  Admission Orders:  Dysphagia Screen > CV diet  Must be up for meals  OOB to Chair / fall precautions  Cardio-Pulmonary monitoring  Daily weight  I&O q8h  Neuro checks q2h  Mj SCDs      Scheduled Medications:  cefTRIAXone, 1,000 mg, Intravenous, Q24H  chlorhexidine, 15 mL, Mouth/Throat, Q12H DENEEN  heparin (porcine), 5,000 Units, Subcutaneous, Q8H DENEEN  insulin glargine, 10 Units, Subcutaneous, QAM  insulin lispro, 1-6 Units, Subcutaneous, 4x Daily (AC & HS)  methadone, 140 mg, Oral, Daily  senna-docusate sodium, 1 tablet, Oral, BID      Continuous IV Infusions:  dextrose 5 % and sodium chloride 0.9 %, 250 mL/hr  insulin regular (HumuLIN R,NovoLIN R) 1 Units/mL in sodium chloride 0.9 % 100 mL infusion, 0.1-30 Units/hr, Last Rate: 4.6 Units/hr (07/07/24 0618)  multi-electrolyte, 500 mL/hr, Last Rate: 500 mL/hr (07/07/24 0719)   Followed by  multi-electrolyte, 250 mL/hr      PRN Meds:  bisacodyl, 10  mg, Rectal, Daily PRN  ondansetron, 4 mg, Intravenous, Q8H PRN  x 1 dose 7/7';  x 1 dose 7/8  sodium chloride (PF), 3 mL, Intravenous, Q1H PRN        IP CONSULT TO CASE MANAGEMENT  IP CONSULT TO ALCOHOL BRIEF INTERVENTION TRAUMA    Network Utilization Review Department  ATTENTION: Please call with any questions or concerns to [phone] and carefully listen to the prompts so that you are directed to the right person. All voicemails are confidential.   For Discharge needs, contact Care Management DC Support Team at [phone] opt. 2  Send all requests for admission clinical reviews, approved or denied determinations and any other requests to dedicated fax number below belonging to the campus where the patient is receiving treatment. List of dedicated fax numbers for the Facilities:  FACILITY NAME UR FAX NUMBER   ADMISSION DENIALS (Administrative/Medical Necessity) [phone]   DISCHARGE SUPPORT TEAM (NETWORK) [phone]   PARENT CHILD HEALTH (Maternity/NICU/Pediatrics) [phone]   [facility] [phone]   [facility] [phone]   Critical access hospital [phone]   [facility] [phone]   [facility] [phone]   [facility] [phone]   [facility] [phone]   [facility] [phone]   [facility] [phone]   [facility] [phone]   [facility] [phone]   [facility] [phone]

## 2024-07-08 NOTE — PROGRESS NOTES
Critical Care Interval Transfer Note:    Brief Hospital Summary:   Marcia presented after being found unresponsive. Found to be in DKA and + for meth/amphetamine, fentanyl. DKA treated per protocol and AG closed last night. She is being treated for a UTI with ceftriaxone. Early identification blood culture panel showing staph epi and group b strep. Preliminary blood cultures still pending. She is hemodynamically stable. Transitioned to basal bolus dosing of insulin (however will likely need uptitrating of lantus to home dose when PO intake improves).       Barriers to discharge:   Follow up culture data  Monitor transition of basal bolus insulin dosing     Consults: IP CONSULT TO CASE MANAGEMENT  IP CONSULT TO ALCOHOL BRIEF INTERVENTION TRAUMA      Discharge Plan: Anticipate discharge in 48-72 hrs to home.          Patient seen and evaluated by Critical Care today and deemed to be appropriate for transfer to Med Surg. Spoke to Dr. Boykin from [facility] to accept transfer. Critical care can be contacted via [employer] with any questions or concerns.

## 2024-07-08 NOTE — PROGRESS NOTES
07/08/24 0900   Clinical Encounter Type   Visited With Patient   Routine Visit Introduction     [facility] visit 7/7.  remains available.

## 2024-07-08 NOTE — ASSESSMENT & PLAN NOTE
BCIP showing staph epidermis and Streptococcus agalactiae (Group B)   Both initial BC still in process  Continue to follow BC results  Will discuss with patient if she is using any IV drugs which would increase risk of bacteremia  Closely trend WBC, fever curve and procal

## 2024-07-08 NOTE — ASSESSMENT & PLAN NOTE
Lab Results   Component Value Date    HGBA1C 12.8 (A) 06/18/2024       Recent Labs     07/08/24  0202 07/08/24  0351 07/08/24  0604 07/08/24  0851   POCGLU 81 95 123 111       Blood Sugar Average: Last 72 hrs:  (P) [id number]    Unclear etiology but suspect UTI versus noncompliance, patient unable to answer questions due to encephalopathy.  Will discuss further when more awake  Started on DKA insulin infusion protocol  Follow electrolyte protocol  Received 2 L NSS in ED, will continue aggressive fluid resuscitation w/ Isolyte  AG normalized overnight, DKA now resolved  Transitioned to lantus this AM and sliding scale  Will need to uptitrate lantus pending PO intake

## 2024-07-08 NOTE — ASSESSMENT & PLAN NOTE
Called [place] [facility] 7/8 and confirmed dose, dose reordered  Med Rec shows 140 mg QD  UDS positive for Methadone

## 2024-07-08 NOTE — ASSESSMENT & PLAN NOTE
UA with innumerable WBC, occasional bacteria, large leuks, negative nitrites  This AM complaining of dysuria  Urine culture pending  Continue ceftriaxone for now  Trend WBC, fever curve

## 2024-07-09 ENCOUNTER — APPOINTMENT (INPATIENT)
Dept: NON INVASIVE DIAGNOSTICS | Facility: HOSPITAL | Age: 49
DRG: 720 | End: 2024-07-09
Payer: COMMERCIAL

## 2024-07-09 PROBLEM — A41.9 SEPSIS (HCC): Status: ACTIVE | Noted: 2024-07-08

## 2024-07-09 LAB
ANION GAP SERPL CALCULATED.3IONS-SCNC: 7 MMOL/L (ref 4–13)
AORTIC ROOT: 2.9 CM
APICAL FOUR CHAMBER EJECTION FRACTION: 66 %
AV LVOT PEAK GRADIENT: 3 MMHG
AV PEAK GRADIENT: 5 MMHG
BACTERIA UR CULT: ABNORMAL
BSA FOR ECHO PROCEDURE: 1.94 M2
BUN SERPL-MCNC: 40 MG/DL (ref 5–25)
CA-I BLD-SCNC: 1.11 MMOL/L (ref 1.12–1.32)
CALCIUM SERPL-MCNC: 8.4 MG/DL (ref 8.4–10.2)
CHLORIDE SERPL-SCNC: 103 MMOL/L (ref 96–108)
CO2 SERPL-SCNC: 27 MMOL/L (ref 21–32)
CREAT SERPL-MCNC: 1.75 MG/DL (ref 0.6–1.3)
DOP CALC LVOT AREA: 2.83 CM2
DOP CALC LVOT DIAMETER: 1.9 CM
E WAVE DECELERATION TIME: 186 MS
E/A RATIO: 0.99
ERYTHROCYTE [DISTWIDTH] IN BLOOD BY AUTOMATED COUNT: 13.6 % (ref 11.6–15.1)
FRACTIONAL SHORTENING: 37 (ref 28–44)
GFR SERPL CREATININE-BSD FRML MDRD: 34 ML/MIN/1.73SQ M
GLUCOSE SERPL-MCNC: 123 MG/DL (ref 65–140)
GLUCOSE SERPL-MCNC: 124 MG/DL (ref 65–140)
GLUCOSE SERPL-MCNC: 166 MG/DL (ref 65–140)
GLUCOSE SERPL-MCNC: 172 MG/DL (ref 65–140)
GLUCOSE SERPL-MCNC: 208 MG/DL (ref 65–140)
HCT VFR BLD AUTO: 35.2 % (ref 34.8–46.1)
HGB BLD-MCNC: 11.7 G/DL (ref 11.5–15.4)
INTERVENTRICULAR SEPTUM IN DIASTOLE (PARASTERNAL SHORT AXIS VIEW): 1 CM
INTERVENTRICULAR SEPTUM: 1 CM (ref 0.6–1.1)
LAAS-AP2: 20.8 CM2
LAAS-AP4: 17 CM2
LEFT ATRIUM SIZE: 3.2 CM
LEFT ATRIUM VOLUME (MOD BIPLANE): 57 ML
LEFT ATRIUM VOLUME INDEX (MOD BIPLANE): 29.4 ML/M2
LEFT INTERNAL DIMENSION IN SYSTOLE: 2.4 CM (ref 2.1–4)
LEFT VENTRICULAR INTERNAL DIMENSION IN DIASTOLE: 3.8 CM (ref 3.5–6)
LEFT VENTRICULAR POSTERIOR WALL IN END DIASTOLE: 0.8 CM
LEFT VENTRICULAR STROKE VOLUME: 40 ML
LVSV (TEICH): 40 ML
MAGNESIUM SERPL-MCNC: 1.9 MG/DL (ref 1.9–2.7)
MCH RBC QN AUTO: 28 PG (ref 26.8–34.3)
MCHC RBC AUTO-ENTMCNC: 33.2 G/DL (ref 31.4–37.4)
MCV RBC AUTO: 84 FL (ref 82–98)
MV E'TISSUE VEL-SEP: 8 CM/S
MV PEAK A VEL: 0.7 M/S
MV PEAK E VEL: 69 CM/S
MV STENOSIS PRESSURE HALF TIME: 54 MS
MV VALVE AREA P 1/2 METHOD: 4.07
PHOSPHATE SERPL-MCNC: 2.7 MG/DL (ref 2.7–4.5)
PLATELET # BLD AUTO: 134 THOUSANDS/UL (ref 149–390)
PMV BLD AUTO: 10.1 FL (ref 8.9–12.7)
POTASSIUM SERPL-SCNC: 4.2 MMOL/L (ref 3.5–5.3)
PROCALCITONIN SERPL-MCNC: 16.74 NG/ML
RBC # BLD AUTO: 4.18 MILLION/UL (ref 3.81–5.12)
RIGHT ATRIUM AREA SYSTOLE A4C: 10.9 CM2
RIGHT VENTRICLE ID DIMENSION: 2.8 CM
SL CV LEFT ATRIUM LENGTH A2C: 5.2 CM
SL CV PED ECHO LEFT VENTRICLE DIASTOLIC VOLUME (MOD BIPLANE) 2D: 61 ML
SL CV PED ECHO LEFT VENTRICLE SYSTOLIC VOLUME (MOD BIPLANE) 2D: 21 ML
SODIUM SERPL-SCNC: 137 MMOL/L (ref 135–147)
TR MAX PG: 30 MMHG
TR PEAK VELOCITY: 2.7 M/S
TRICUSPID ANNULAR PLANE SYSTOLIC EXCURSION: 1.7 CM
TRICUSPID VALVE PEAK REGURGITATION VELOCITY: 2.72 M/S
WBC # BLD AUTO: 11.63 THOUSAND/UL (ref 4.31–10.16)

## 2024-07-09 PROCEDURE — 82948 REAGENT STRIP/BLOOD GLUCOSE: CPT

## 2024-07-09 PROCEDURE — 83735 ASSAY OF MAGNESIUM: CPT | Performed by: NURSE PRACTITIONER

## 2024-07-09 PROCEDURE — 93306 TTE W/DOPPLER COMPLETE: CPT

## 2024-07-09 PROCEDURE — 80048 BASIC METABOLIC PNL TOTAL CA: CPT | Performed by: NURSE PRACTITIONER

## 2024-07-09 PROCEDURE — 87040 BLOOD CULTURE FOR BACTERIA: CPT

## 2024-07-09 PROCEDURE — 99233 SBSQ HOSP IP/OBS HIGH 50: CPT

## 2024-07-09 PROCEDURE — 82330 ASSAY OF CALCIUM: CPT | Performed by: NURSE PRACTITIONER

## 2024-07-09 PROCEDURE — 93306 TTE W/DOPPLER COMPLETE: CPT | Performed by: INTERNAL MEDICINE

## 2024-07-09 PROCEDURE — 84100 ASSAY OF PHOSPHORUS: CPT | Performed by: NURSE PRACTITIONER

## 2024-07-09 PROCEDURE — 99255 IP/OBS CONSLTJ NEW/EST HI 80: CPT | Performed by: INTERNAL MEDICINE

## 2024-07-09 PROCEDURE — 84145 PROCALCITONIN (PCT): CPT | Performed by: NURSE PRACTITIONER

## 2024-07-09 PROCEDURE — 85027 COMPLETE CBC AUTOMATED: CPT | Performed by: NURSE PRACTITIONER

## 2024-07-09 RX ORDER — VALACYCLOVIR HYDROCHLORIDE 500 MG/1
1000 TABLET, FILM COATED ORAL ONCE
Status: COMPLETED | OUTPATIENT
Start: 2024-07-09 | End: 2024-07-09

## 2024-07-09 RX ORDER — CEFAZOLIN SODIUM 2 G/50ML
2000 SOLUTION INTRAVENOUS EVERY 8 HOURS
Status: COMPLETED | OUTPATIENT
Start: 2024-07-10 | End: 2024-07-12

## 2024-07-09 RX ORDER — METOCLOPRAMIDE HYDROCHLORIDE 5 MG/ML
10 INJECTION INTRAMUSCULAR; INTRAVENOUS EVERY 6 HOURS PRN
Status: DISCONTINUED | OUTPATIENT
Start: 2024-07-09 | End: 2024-07-10

## 2024-07-09 RX ADMIN — CEFTRIAXONE 1000 MG: 1 INJECTION, SOLUTION INTRAVENOUS at 02:32

## 2024-07-09 RX ADMIN — CHLORHEXIDINE GLUCONATE 15 ML: 1.2 RINSE ORAL at 21:58

## 2024-07-09 RX ADMIN — METOCLOPRAMIDE 10 MG: 5 INJECTION, SOLUTION INTRAMUSCULAR; INTRAVENOUS at 09:37

## 2024-07-09 RX ADMIN — METHADONE HYDROCHLORIDE 140 MG: 10 TABLET ORAL at 08:51

## 2024-07-09 RX ADMIN — HEPARIN SODIUM 5000 UNITS: 5000 INJECTION INTRAVENOUS; SUBCUTANEOUS at 05:16

## 2024-07-09 RX ADMIN — SENNOSIDES AND DOCUSATE SODIUM 1 TABLET: 50; 8.6 TABLET ORAL at 08:51

## 2024-07-09 RX ADMIN — VALACYCLOVIR HYDROCHLORIDE 1000 MG: 500 TABLET, FILM COATED ORAL at 18:20

## 2024-07-09 RX ADMIN — INSULIN GLARGINE 20 UNITS: 100 INJECTION, SOLUTION SUBCUTANEOUS at 08:48

## 2024-07-09 RX ADMIN — SENNOSIDES AND DOCUSATE SODIUM 1 TABLET: 50; 8.6 TABLET ORAL at 18:20

## 2024-07-09 RX ADMIN — HEPARIN SODIUM 5000 UNITS: 5000 INJECTION INTRAVENOUS; SUBCUTANEOUS at 21:58

## 2024-07-09 RX ADMIN — NYSTATIN: 100000 POWDER TOPICAL at 09:39

## 2024-07-09 RX ADMIN — CHLORHEXIDINE GLUCONATE 15 ML: 1.2 RINSE ORAL at 08:50

## 2024-07-09 RX ADMIN — NYSTATIN: 100000 POWDER TOPICAL at 18:21

## 2024-07-09 RX ADMIN — INSULIN LISPRO 1 UNITS: 100 INJECTION, SOLUTION INTRAVENOUS; SUBCUTANEOUS at 11:55

## 2024-07-09 RX ADMIN — INSULIN LISPRO 1 UNITS: 100 INJECTION, SOLUTION INTRAVENOUS; SUBCUTANEOUS at 18:20

## 2024-07-09 RX ADMIN — HEPARIN SODIUM 5000 UNITS: 5000 INJECTION INTRAVENOUS; SUBCUTANEOUS at 14:54

## 2024-07-09 RX ADMIN — ONDANSETRON 4 MG: 2 INJECTION INTRAMUSCULAR; INTRAVENOUS at 08:46

## 2024-07-09 RX ADMIN — INSULIN LISPRO 2 UNITS: 100 INJECTION, SOLUTION INTRAVENOUS; SUBCUTANEOUS at 21:58

## 2024-07-09 NOTE — ASSESSMENT & PLAN NOTE
Likely multifactorial in setting of DKA, substance abuse, infection  CT head negative  Mentation improving  Methadone resumed  Continue to monitor

## 2024-07-09 NOTE — ASSESSMENT & PLAN NOTE
Patient presented to ED after spouse found her unresponsive at home. Work up revealed glucose 839, pH 7.24 on VBG, AG 26  Started on DKA insulin gtt and fluid resuscitation and admitted to ICU  Suspect in setting of noncompliance vs acute infection  Now transitioned to lantus and sliding scale  Home regimen includes lantus 40 units hs  Currently on lantus 20 units hs + SSI  Monitor glucose and adjust as needed pending PO intake

## 2024-07-09 NOTE — DISCHARGE INSTR - OTHER ORDERS
Plan:  Skin care plans:    1-Cleanse areas of fungal rash to mid-abdominal fold and intergluteal fold (in-between buttocks) with foaming cleanser and pat dry. Apply LIGHT dusting of Nystatin powder 2x/day and gently remove excess to prevent caking.  2-Use Prevent barrier cream to bilateral sacrobuttocks (avoid intergluteal fold) and heels 2x/day and as needed.  3-Float heels on 2 pillows to offload pressure so heels are not in contact with mattress or pillows.  4-Use pressure redistribution cushion in chair when out of bed. Avoid prolonged sitting.  5-Moisturize skin daily with skin nourishing cream.  6-Turn/reposition every 2 hrs for pressure re-distribution on skin.

## 2024-07-09 NOTE — CONSULTS
Consultation - Infectious Disease   Marcia Barron 49 y.o. female MRN: [id number]  Unit/Bed#: [address] [place] [address] Encounter: [id number]      IMPRESSION & RECOMMENDATIONS:   1. Sepsis, with fever, tachycardia, tachypnea and leukocytosis. In setting of #2  -continue antibiotic as below  -follow-up cultures and adjust antibiotics as needed  -monitor temperature and hemodynamics  -serial exam  -monitoring serial CBC and BMP for treatment response or any developing      2.  GBStrep Bacteremia. 1 of 2 blood cultures + GBS and staph epi. Consider skin source of GBS given polysubstance use, scratches and intertrigo. Staph epi likely represents skin contaminant. 7/9/24 TTE negative for vegetation  -continues Cefazolin 2g IV q 8 hours  -follow up final and repeat blood culture results  -upon discharge, anticipate transition to high dose amoxicillin 1 g PO q 8 hours to complete 2 week antibiotic course      3. SEYMOUR. Peak creatine 4.7 > 1.75; Cr Cl 42%  -renal dose adjust antibiotic as needed  -volume management   -recheck BMP     4. Uncontrolled Diabetes Mellitus with hyperglycemia. Treated for DKA on admission. 6/18/24 HgbA1c 12.8  -glycemic management per primary care team    5. Polysubstance Use/+ Hep C. 2022 HIV screen negative  -symptomatic management per primary  -update HIV screen    6. Herpes labialis. Valtrex 1000 mg PO x 1, renal dose adjusted.    Antibiotics:  Cefazolin - abx D3    I have discussed the above management plan in detail with patient, RN, and Danni of the primary service. They concur with ID treatment plan and ongoing follow up.    Extensive review of the medical records in epic including review of the notes, radiographs, and laboratory results     HISTORY OF PRESENT ILLNESS:  Reason for Consult: bacteremia.    HPI: Marcia Barron is a 49 y.o. year old female with polysubstance abuse, HTN, anxiety and depression, methadone use, DM2, CKD 3, medical noncompliance who presented to [facility] ER after being found  unresponsive at home. Patient found to be in DKA with a blood sugar > 800, pH 7.24, Cr 4.7, LA 8.3, WBC 18.72, + UA, procal 3.73. Blood and urine cultures obtained, no acute infectious findings on CT head, CT C/A/P. Patient received IVFs, ceftriaxone and started on DKA insulin drip and admitted to ICU SD. Now 1 of 2 blood cultures + GBS and Staph epi. Infectious Disease being consulted regarding evaluation and management of bacteremia.  Patient reports drenching sweats at night here, + vomiting, + BMs no diarrhea, no CP, SOB, cough, dysuria. Patient has cats and a couple arms cat scratches.    REVIEW OF SYSTEMS:  A complete review of systems is negative other than that noted in the HPI.    PAST MEDICAL HISTORY:  Past Medical History:   Diagnosis Date    Anxiety     Asthma     Cholelithiases 2/5/2020    Added automatically from request for surgery [id number]    Depression     Diabetes mellitus (HCC)     Hypertension     Mixed hyperlipidemia 1/6/2023    Neuropathy     Psychiatric disorder      Past Surgical History:   Procedure Laterality Date    CHOLECYSTECTOMY  2020    CHOLECYSTECTOMY LAPAROSCOPIC N/A 02/10/2020    Procedure: CHOLECYSTECTOMY LAPAROSCOPIC;  Surgeon: Shaheed Nolan MD;  Location: [facility];  Service: General    INCISION AND DRAINAGE OF WOUND Right 07/01/2017    Procedure: INCISION AND DRAINAGE (I&D) EXTREMITY THIGH;  Surgeon: Veto Reyes MD;  Location: [facility];  Service: General       FAMILY HISTORY:  Non-contributory    SOCIAL HISTORY:  Social History   Social History     Substance and Sexual Activity   Alcohol Use Never     Social History     Substance and Sexual Activity   Drug Use Not Currently    Types: Heroin    Comment: hx of IVDU     Social History     Tobacco Use   Smoking Status Never   Smokeless Tobacco Never   Tobacco Comments    never       ALLERGIES:  Allergies   Allergen Reactions    Cat Hair Extract Other (See Comments)    Codeine Other (See Comments)     Sulfamethoxazole-Trimethoprim Hives and Other (See Comments)       MEDICATIONS:  All current active medications have been reviewed.    PHYSICAL EXAM:  Temp:  [97 °F (36.1 °C)-98 °F (36.7 °C)] 97.8 °F (36.6 °C)  HR:  [52-74] 65  Resp:  [14-37] 16  BP: (117-165)/(66-89) 160/77  SpO2:  [96 %-100 %] 98 %  Temp (24hrs), Av.6 °F (36.4 °C), Min:97 °F (36.1 °C), Max:98 °F (36.7 °C)  Current: Temperature: 97.8 °F (36.6 °C)    Intake/Output Summary (Last 24 hours) at 2024 1538  Last data filed at 2024 0601  Gross per 24 hour   Intake 410 ml   Output 1550 ml   Net -1140 ml       General Appearance:  49 year old female, appearing propped comfortably in bed, in no distress   Head:  Normocephalic, without obvious abnormality, atraumatic, fever blisters of lips   Eyes:  Conjunctiva pink and sclera anicteric, both eyes   Nose: Nares normal, mucosa normal, no drainage   Throat: Oropharynx moist without lesions   Neck: Supple, symmetrical, no adenopathy, no tenderness/mass/nodules   Back:   Symmetric, no curvature, ROM normal, no CVA tenderness   Lungs:   Clear to auscultation bilaterally, respirations unlabored   Chest Wall:  No tenderness or deformity   Heart:  RRR; no murmur, rub or gallop   Abdomen:   Soft, non-tender, non-distended, positive bowel sounds    Extremities: No cyanosis, clubbing or edema   Skin: Intertrigo of abd pannus and anal region in media photos much improved on exam with powder applied. Few arm scratches dry without surrounding erythema. No draining wounds noted. IV site nontender   Lymph nodes: Cervical, supraclavicular nodes normal   Neurologic: Alert and oriented times 3, extremity strength 5/5 and symmetric.        LABS, IMAGING, & OTHER STUDIES:  Lab Results:  I have personally reviewed pertinent labs.  Results from last 7 days   Lab Units 24  0525 24  0630 24  0249   WBC Thousand/uL 11.63* 19.15* 18.72*   HEMOGLOBIN g/dL 11.7 11.5 13.6   PLATELETS Thousands/uL 134* 133* 269      Results from last 7 days   Lab Units 07/09/24  0525 07/08/24  0630 07/07/24  1358 07/07/24  0510 07/07/24  0249   SODIUM mmol/L 137 137 141   < > 131*   POTASSIUM mmol/L 4.2 4.1 4.4   < > 4.7   CHLORIDE mmol/L 103 104 108   < > 91*   CO2 mmol/L 27 25 23   < > 14*   BUN mg/dL 40* 50* 53*   < > 58*   CREATININE mg/dL 1.75* 2.37* 3.06*   < > 4.70*   EGFR ml/min/1.73sq m 34 23 17   < > 10   CALCIUM mg/dL 8.4 8.5 8.3*   < > 11.6*   AST U/L  --   --   --   --  59*   ALT U/L  --   --   --   --  32   ALK PHOS U/L  --   --   --   --  59    < > = values in this interval not displayed.     Results from last 7 days   Lab Units 07/07/24  0822 07/07/24  0300 07/07/24  0249   BLOOD CULTURE   --   --  Streptococcus agalactiae (Group B)*  Staphylococcus epidermidis*  No Growth at 24 hrs.   GRAM STAIN RESULT   --   --  Gram positive cocci in chains*  Gram positive cocci in clusters*   URINE CULTURE   --  10,000-19,000 cfu/ml Lactobacillus species*  --    MRSA CULTURE ONLY  No Methicillin Resistant Staphlyococcus aureus (MRSA) isolated  --   --      Results from last 7 days   Lab Units 07/09/24  0525 07/07/24  0249   PROCALCITONIN ng/ml 16.74* 3.73*                   Imaging Studies:   Imaging study reports and images in PACS reviewed personally.   7/7/24 CT head, CT C/A/P no acute infections findings  Other Studies:   I have personally reviewed pertinent reports.
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
## 2024-07-09 NOTE — ASSESSMENT & PLAN NOTE
Lab Results   Component Value Date    EGFR 34 07/09/2024    EGFR 23 07/08/2024    EGFR 17 07/07/2024    CREATININE 1.75 (H) 07/09/2024    CREATININE 2.37 (H) 07/08/2024    CREATININE 3.06 (H) 07/07/2024   Baseline Cr around 1.2 - 1.3  Improving  Lisinopril-HCTZ on hold
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
## 2024-07-09 NOTE — ASSESSMENT & PLAN NOTE
Evidenced by fever, tachycardia, leukocytosis, tachypnea in setting of bacteremia  Leukocytosis improving, procal 3.73 > 16.74  1/2 blood cultures growing group B strep and staph epidermidis  Urine culture grew 10,000-19,000 lactobacillus, likely contaminant  No infectious source on CT c/a/p  Change rocephin to cefazolin  Repeat blood cultures  ECHO ordered  ID consulted
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
## 2024-07-09 NOTE — ASSESSMENT & PLAN NOTE
1/2 blood cultures from admission growing group B strep and staph epidermidis  Leukocytosis improving, procal 3.73 > 16.74  Transition rocephin to cefazolin  Repeat blood cultures  ECHO ordered  ID consulted

## 2024-07-09 NOTE — PLAN OF CARE
Problem: PAIN - ADULT  Goal: Verbalizes/displays adequate comfort level or baseline comfort level  Description: Interventions:  - Encourage patient to monitor pain and request assistance  - Assess pain using appropriate pain scale  - Administer analgesics based on type and severity of pain and evaluate response  - Implement non-pharmacological measures as appropriate and evaluate response  - Consider cultural and social influences on pain and pain management  - Notify physician/advanced practitioner if interventions unsuccessful or patient reports new pain  Outcome: Progressing     Problem: INFECTION - ADULT  Goal: Absence or prevention of progression during hospitalization  Description: INTERVENTIONS:  - Assess and monitor for signs and symptoms of infection  - Monitor lab/diagnostic results  - Monitor all insertion sites, i.e. indwelling lines, tubes, and drains  - Monitor endotracheal if appropriate and nasal secretions for changes in amount and color  - [place] appropriate cooling/warming therapies per order  - Administer medications as ordered  - Instruct and encourage patient and family to use good hand hygiene technique  - Identify and instruct in appropriate isolation precautions for identified infection/condition  Outcome: Progressing     Problem: Knowledge Deficit  Goal: Patient/family/caregiver demonstrates understanding of disease process, treatment plan, medications, and discharge instructions  Description: Complete learning assessment and assess knowledge base.  Interventions:  - Provide teaching at level of understanding  - Provide teaching via preferred learning methods  Outcome: Progressing     Problem: DISCHARGE PLANNING  Goal: Discharge to home or other facility with appropriate resources  Description: INTERVENTIONS:  - Identify barriers to discharge w/patient and caregiver  - Arrange for needed discharge resources and transportation as appropriate  - Identify discharge learning needs (meds,  wound care, etc.)  - Arrange for interpretive services to assist at discharge as needed  - Refer to Case Management Department for coordinating discharge planning if the patient needs post-hospital services based on physician/advanced practitioner order or complex needs related to functional status, cognitive ability, or social support system  Outcome: Progressing     Problem: NEUROSENSORY - ADULT  Goal: Achieves stable or improved neurological status  Description: INTERVENTIONS  - Monitor and report changes in neurological status  - Monitor vital signs such as temperature, blood pressure, glucose, and any other labs ordered   - Initiate measures to prevent increased intracranial pressure  - Monitor for seizure activity and implement precautions if appropriate      Outcome: Progressing     Problem: CARDIOVASCULAR - ADULT  Goal: Absence of cardiac dysrhythmias or at baseline rhythm  Description: INTERVENTIONS:  - Continuous cardiac monitoring, vital signs, obtain 12 lead EKG if ordered  - Administer antiarrhythmic and heart rate control medications as ordered  - Monitor electrolytes and administer replacement therapy as ordered  Outcome: Progressing       Problem: MUSCULOSKELETAL - ADULT  Goal: Maintain or return mobility to safest level of function  Description: INTERVENTIONS:  - Assess patient's ability to carry out ADLs; assess patient's baseline for ADL function and identify physical deficits which impact ability to perform ADLs (bathing, care of mouth/teeth, toileting, grooming, dressing, etc.)  - Assess/evaluate cause of self-care deficits   - Assess range of motion  - Assess patient's mobility  - Assess patient's need for assistive devices and provide as appropriate  - Encourage maximum independence but intervene and supervise when necessary  - Involve family in performance of ADLs  - Assess for home care needs following discharge   - Consider OT consult to assist with ADL evaluation and planning for  discharge  - Provide patient education as appropriate  Outcome: Progressing

## 2024-07-09 NOTE — PROGRESS NOTES
Report called to 3N CHICA Johansen; will transfer to [facility] via wheelchair. All belongings to be sent with patient, skin check completed prior to transfer.

## 2024-07-09 NOTE — ASSESSMENT & PLAN NOTE
Lab Results   Component Value Date    HGBA1C 12.8 (A) 06/18/2024       Recent Labs     07/08/24  1603 07/08/24 2057 07/09/24  0638 07/09/24  1112   POCGLU 230* 207* 123 166*       Blood Sugar Average: Last 72 hrs:  (P) [id number]  Questionable compliance with home insulin regimen  Home regimen is 40 units lantus hs  Currently on 20 units lantus hs with sliding scale  Continue to monitor and adjust as needed  Diabetic diet

## 2024-07-10 PROBLEM — N17.9 AKI (ACUTE KIDNEY INJURY) (HCC): Status: RESOLVED | Noted: 2024-07-07 | Resolved: 2024-07-10

## 2024-07-10 LAB
ANION GAP SERPL CALCULATED.3IONS-SCNC: 5 MMOL/L (ref 4–13)
BACTERIA BLD CULT: ABNORMAL
BACTERIA BLD CULT: ABNORMAL
BUN SERPL-MCNC: 26 MG/DL (ref 5–25)
CALCIUM SERPL-MCNC: 8.4 MG/DL (ref 8.4–10.2)
CHLORIDE SERPL-SCNC: 104 MMOL/L (ref 96–108)
CO2 SERPL-SCNC: 29 MMOL/L (ref 21–32)
CREAT SERPL-MCNC: 1.32 MG/DL (ref 0.6–1.3)
ERYTHROCYTE [DISTWIDTH] IN BLOOD BY AUTOMATED COUNT: 13.6 % (ref 11.6–15.1)
GFR SERPL CREATININE-BSD FRML MDRD: 48 ML/MIN/1.73SQ M
GLUCOSE SERPL-MCNC: 127 MG/DL (ref 65–140)
GLUCOSE SERPL-MCNC: 152 MG/DL (ref 65–140)
GLUCOSE SERPL-MCNC: 165 MG/DL (ref 65–140)
GLUCOSE SERPL-MCNC: 226 MG/DL (ref 65–140)
GLUCOSE SERPL-MCNC: 242 MG/DL (ref 65–140)
GP B STREP DNA BLD POS QL NAA+NON-PROBE: DETECTED
GRAM STN SPEC: ABNORMAL
GRAM STN SPEC: ABNORMAL
HCT VFR BLD AUTO: 33.8 % (ref 34.8–46.1)
HGB BLD-MCNC: 11.3 G/DL (ref 11.5–15.4)
HIV 1+2 AB+HIV1 P24 AG SERPL QL IA: NORMAL
HIV 2 AB SERPL QL IA: NORMAL
HIV1 AB SERPL QL IA: NORMAL
HIV1 P24 AG SERPL QL IA: NORMAL
MCH RBC QN AUTO: 28.2 PG (ref 26.8–34.3)
MCHC RBC AUTO-ENTMCNC: 33.4 G/DL (ref 31.4–37.4)
MCV RBC AUTO: 84 FL (ref 82–98)
MECA+MECC ISLT/SPM QL: DETECTED
PLATELET # BLD AUTO: 134 THOUSANDS/UL (ref 149–390)
PMV BLD AUTO: 10.5 FL (ref 8.9–12.7)
POTASSIUM SERPL-SCNC: 4 MMOL/L (ref 3.5–5.3)
RBC # BLD AUTO: 4.01 MILLION/UL (ref 3.81–5.12)
S EPIDERMIDIS DNA BLD POS QL NAA+NON-PRB: DETECTED
SODIUM SERPL-SCNC: 138 MMOL/L (ref 135–147)
WBC # BLD AUTO: 6.55 THOUSAND/UL (ref 4.31–10.16)

## 2024-07-10 PROCEDURE — 80048 BASIC METABOLIC PNL TOTAL CA: CPT

## 2024-07-10 PROCEDURE — 82948 REAGENT STRIP/BLOOD GLUCOSE: CPT

## 2024-07-10 PROCEDURE — 99233 SBSQ HOSP IP/OBS HIGH 50: CPT | Performed by: INTERNAL MEDICINE

## 2024-07-10 PROCEDURE — 99232 SBSQ HOSP IP/OBS MODERATE 35: CPT

## 2024-07-10 PROCEDURE — 87389 HIV-1 AG W/HIV-1&-2 AB AG IA: CPT | Performed by: STUDENT IN AN ORGANIZED HEALTH CARE EDUCATION/TRAINING PROGRAM

## 2024-07-10 PROCEDURE — 85027 COMPLETE CBC AUTOMATED: CPT

## 2024-07-10 RX ORDER — METOCLOPRAMIDE HYDROCHLORIDE 5 MG/ML
10 INJECTION INTRAMUSCULAR; INTRAVENOUS EVERY 6 HOURS PRN
Status: DISCONTINUED | OUTPATIENT
Start: 2024-07-10 | End: 2024-07-13 | Stop reason: HOSPADM

## 2024-07-10 RX ADMIN — SENNOSIDES AND DOCUSATE SODIUM 1 TABLET: 50; 8.6 TABLET ORAL at 09:25

## 2024-07-10 RX ADMIN — CHLORHEXIDINE GLUCONATE 15 ML: 1.2 RINSE ORAL at 09:24

## 2024-07-10 RX ADMIN — VENLAFAXINE HYDROCHLORIDE 300 MG: 150 CAPSULE, EXTENDED RELEASE ORAL at 09:25

## 2024-07-10 RX ADMIN — SENNOSIDES AND DOCUSATE SODIUM 1 TABLET: 50; 8.6 TABLET ORAL at 17:58

## 2024-07-10 RX ADMIN — CEFAZOLIN SODIUM 2000 MG: 2 SOLUTION INTRAVENOUS at 02:52

## 2024-07-10 RX ADMIN — METHADONE HYDROCHLORIDE 140 MG: 10 TABLET ORAL at 09:25

## 2024-07-10 RX ADMIN — INSULIN LISPRO 1 UNITS: 100 INJECTION, SOLUTION INTRAVENOUS; SUBCUTANEOUS at 21:41

## 2024-07-10 RX ADMIN — CHLORHEXIDINE GLUCONATE 15 ML: 1.2 RINSE ORAL at 21:35

## 2024-07-10 RX ADMIN — CEFAZOLIN SODIUM 2000 MG: 2 SOLUTION INTRAVENOUS at 12:26

## 2024-07-10 RX ADMIN — CEFAZOLIN SODIUM 2000 MG: 2 SOLUTION INTRAVENOUS at 19:50

## 2024-07-10 RX ADMIN — INSULIN LISPRO 3 UNITS: 100 INJECTION, SOLUTION INTRAVENOUS; SUBCUTANEOUS at 17:58

## 2024-07-10 RX ADMIN — HEPARIN SODIUM 5000 UNITS: 5000 INJECTION INTRAVENOUS; SUBCUTANEOUS at 05:49

## 2024-07-10 RX ADMIN — HEPARIN SODIUM 5000 UNITS: 5000 INJECTION INTRAVENOUS; SUBCUTANEOUS at 21:35

## 2024-07-10 RX ADMIN — NYSTATIN: 100000 POWDER TOPICAL at 09:26

## 2024-07-10 RX ADMIN — INSULIN LISPRO 2 UNITS: 100 INJECTION, SOLUTION INTRAVENOUS; SUBCUTANEOUS at 12:27

## 2024-07-10 RX ADMIN — INSULIN GLARGINE 20 UNITS: 100 INJECTION, SOLUTION SUBCUTANEOUS at 09:25

## 2024-07-10 RX ADMIN — NYSTATIN: 100000 POWDER TOPICAL at 17:58
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
## 2024-07-10 NOTE — ASSESSMENT & PLAN NOTE
Lab Results   Component Value Date    EGFR 48 07/10/2024    EGFR 34 07/09/2024    EGFR 23 07/08/2024    CREATININE 1.32 (H) 07/10/2024    CREATININE 1.75 (H) 07/09/2024    CREATININE 2.37 (H) 07/08/2024   Baseline Cr around 1.2 - 1.3  Improving  Lisinopril-HCTZ on hold, consider resuming tomorrow  Monitor BMP

## 2024-07-10 NOTE — ASSESSMENT & PLAN NOTE
History of noncompliance, reports she missed a few doses of her insulin prior to admission  Encourage compliance with home insulin regimen

## 2024-07-10 NOTE — ASSESSMENT & PLAN NOTE
Likely multifactorial in setting of DKA, substance abuse, infection  CT head negative  Mentation improved, AOX3  Methadone resumed  Continue to monitor

## 2024-07-10 NOTE — ASSESSMENT & PLAN NOTE
Evidenced by fever, tachycardia, leukocytosis, tachypnea in setting of bacteremia  Leukocytosis resolved, procal 3.73 > 16.74  Afebrile now for 48 hours  1/2 blood cultures growing group B strep and staph epidermidis  Urine culture grew 10,000-19,000 lactobacillus, likely contaminant  No infectious source on CT c/a/p  Change rocephin to cefazolin  Repeat blood cultures pending  No concern for vegetations on ECHO  ID consulted  Continue cefazolin  If cultures remain negative, can transition to PO antibiotic to complete treatment x 14 days, through 7/20/2024

## 2024-07-10 NOTE — PROGRESS NOTES
Progress Note - Infectious Disease   Marcia Barron 49 y.o. female MRN: [id number]  Unit/Bed#: [address] [place] [address] Encounter: [id number]      Impression/Plan:  1. Sepsis, with fever, tachycardia, tachypnea and leukocytosis. In setting of #2  -continue antibiotic as below  -follow-up cultures and adjust antibiotics as needed  -monitor temperature and hemodynamics  -serial exam  -monitoring serial CBC and BMP for treatment response or any developing      2.  GBStrep Bacteremia. 1 of 2 blood cultures + GBS sensitive to PCN and staph epi. Consider skin source of GBS given polysubstance use, scratches and intertrigo. Staph epi likely represents skin contaminant. 24 TTE negative for vegetation 24 blood cultures negative  -continues Cefazolin 2g IV q 8 hours  -follow up final repeat blood culture results  -upon discharge, anticipate transition to high dose amoxicillin 1 g PO q 8 hours to complete 2 week antibiotic course      3. SEYMOUR. Peak creatine 4.7 > 1.75; Cr Cl 42%  -renal dose adjust antibiotic as needed  -volume management   -recheck BMP     4. Uncontrolled Diabetes Mellitus with hyperglycemia. Treated for DKA on admission. 24 HgbA1c 12.8  -glycemic management per primary care team     5. Polysubstance Use/+ Hep C. 7/10/24 HIV screen negative  -symptomatic management per primary    6. Herpes labialis. Valtrex 1000 mg PO x 1, renal dose adjusted given 24     Antibiotics:  Cefazolin - abx D4     I have discussed the above management plan in detail with patient, RN, and Danni of the primary service. They concur with ID treatment plan and ongoing follow up.     Subjective:  Patient has no fever, chills, sweats; no nausea, vomiting, diarrhea today; no cough, shortness of breath; no pain. No new symptoms.    Objective:  Vitals:  Temp:  [98 °F (36.7 °C)-98.4 °F (36.9 °C)] 98 °F (36.7 °C)  HR:  [58-65] 62  Resp:  [19-20] 20  BP: (128-160)/(59-77) 130/65  SpO2:  [95 %-98 %] 98 %  Temp (24hrs), Av.3 °F (36.8  °C), Min:98 °F (36.7 °C), Max:98.4 °F (36.9 °C)  Current: Temperature: 98 °F (36.7 °C)    Physical Exam:   General Appearance:  49 year old female, resting in bed, nontoxic appearance, no acute distress.   HEENT: Atraumatic normocephalic. Fading lip blisters   Throat: Oropharynx moist.    Pulmonary:   Normal respiratory excursion without accessory muscle use   Cardiac:  RRR   Abdomen:   Soft, non-tender, non-distended   Extremities: No edema   : No lowery, no SPT   Psychiatric: Awake, cooperative   Skin: No new rashes. IV site nontender.        Labs, Imaging, & Other studies:   All pertinent labs and imaging studies were personally reviewed  Results from last 7 days   Lab Units 07/10/24  0548 07/09/24  0525 07/08/24  0630   WBC Thousand/uL 6.55 11.63* 19.15*   HEMOGLOBIN g/dL 11.3* 11.7 11.5   PLATELETS Thousands/uL 134* 134* 133*     Results from last 7 days   Lab Units 07/10/24  0548 07/09/24  0525 07/08/24  0630 07/07/24  0510 07/07/24  0249   SODIUM mmol/L 138 137 137   < > 131*   POTASSIUM mmol/L 4.0 4.2 4.1   < > 4.7   CHLORIDE mmol/L 104 103 104   < > 91*   CO2 mmol/L 29 27 25   < > 14*   BUN mg/dL 26* 40* 50*   < > 58*   CREATININE mg/dL 1.32* 1.75* 2.37*   < > 4.70*   EGFR ml/min/1.73sq m 48 34 23   < > 10   CALCIUM mg/dL 8.4 8.4 8.5   < > 11.6*   AST U/L  --   --   --   --  59*   ALT U/L  --   --   --   --  32   ALK PHOS U/L  --   --   --   --  59    < > = values in this interval not displayed.     Results from last 7 days   Lab Units 07/09/24  1448 07/07/24  0822 07/07/24  0300 07/07/24  0249   BLOOD CULTURE  Received in Microbiology Lab. Culture in Progress.  Received in Microbiology Lab. Culture in Progress.  --   --  Streptococcus agalactiae (Group B)*  Staphylococcus epidermidis*  No Growth at 48 hrs.   GRAM STAIN RESULT   --   --   --  Gram positive cocci in chains*  Gram positive cocci in clusters*   URINE CULTURE   --   --  10,000-19,000 cfu/ml Lactobacillus species*  --    MRSA CULTURE ONLY    --  No Methicillin Resistant Staphlyococcus aureus (MRSA) isolated  --   --      Results from last 7 days   Lab Units 07/09/24  0525 07/07/24  0249   PROCALCITONIN ng/ml 16.74* 3.73*

## 2024-07-10 NOTE — ASSESSMENT & PLAN NOTE
1/2 blood cultures from admission growing group B strep and staph epidermidis  Leukocytosis resolved, procal 3.73 > 16.74  Repeat blood cultures pending  ECHO without concern for vegetation  ID consulted  Continue cefazolin  If cultures remain negative, can transition to PO antibiotic to complete treatment x 14 days, through 7/20/2024
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
## 2024-07-10 NOTE — ASSESSMENT & PLAN NOTE
Lab Results   Component Value Date    HGBA1C 12.8 (A) 06/18/2024       Recent Labs     07/09/24  1112 07/09/24  1715 07/09/24  2155 07/10/24  0732   POCGLU 166* 172* 208* 152*         Blood Sugar Average: Last 72 hrs:  (P) 153.5625  Questionable compliance with home insulin regimen, reports she missed a few doses  Home regimen is 40 units lantus hs  Currently on 20 units lantus hs with sliding scale  Continue to monitor and adjust as needed  Diabetic diet

## 2024-07-11 PROBLEM — H61.92 SKIN LESION OF LEFT EAR: Status: ACTIVE | Noted: 2024-07-11

## 2024-07-11 PROBLEM — G92.8 TOXIC METABOLIC ENCEPHALOPATHY: Status: RESOLVED | Noted: 2024-07-07 | Resolved: 2024-07-11

## 2024-07-11 LAB
ANION GAP SERPL CALCULATED.3IONS-SCNC: 4 MMOL/L (ref 4–13)
BUN SERPL-MCNC: 17 MG/DL (ref 5–25)
CALCIUM SERPL-MCNC: 8.8 MG/DL (ref 8.4–10.2)
CHLORIDE SERPL-SCNC: 98 MMOL/L (ref 96–108)
CO2 SERPL-SCNC: 30 MMOL/L (ref 21–32)
CREAT SERPL-MCNC: 1.07 MG/DL (ref 0.6–1.3)
ERYTHROCYTE [DISTWIDTH] IN BLOOD BY AUTOMATED COUNT: 13.5 % (ref 11.6–15.1)
GFR SERPL CREATININE-BSD FRML MDRD: 61 ML/MIN/1.73SQ M
GLUCOSE SERPL-MCNC: 148 MG/DL (ref 65–140)
GLUCOSE SERPL-MCNC: 201 MG/DL (ref 65–140)
GLUCOSE SERPL-MCNC: 218 MG/DL (ref 65–140)
GLUCOSE SERPL-MCNC: 273 MG/DL (ref 65–140)
GLUCOSE SERPL-MCNC: 280 MG/DL (ref 65–140)
HCT VFR BLD AUTO: 36.1 % (ref 34.8–46.1)
HGB BLD-MCNC: 12 G/DL (ref 11.5–15.4)
MAGNESIUM SERPL-MCNC: 1.5 MG/DL (ref 1.9–2.7)
MCH RBC QN AUTO: 27.6 PG (ref 26.8–34.3)
MCHC RBC AUTO-ENTMCNC: 33.2 G/DL (ref 31.4–37.4)
MCV RBC AUTO: 83 FL (ref 82–98)
PLATELET # BLD AUTO: 153 THOUSANDS/UL (ref 149–390)
PMV BLD AUTO: 9.7 FL (ref 8.9–12.7)
POTASSIUM SERPL-SCNC: 4.4 MMOL/L (ref 3.5–5.3)
RBC # BLD AUTO: 4.34 MILLION/UL (ref 3.81–5.12)
SODIUM SERPL-SCNC: 132 MMOL/L (ref 135–147)
WBC # BLD AUTO: 7.58 THOUSAND/UL (ref 4.31–10.16)

## 2024-07-11 PROCEDURE — 99233 SBSQ HOSP IP/OBS HIGH 50: CPT | Performed by: INTERNAL MEDICINE

## 2024-07-11 PROCEDURE — 85027 COMPLETE CBC AUTOMATED: CPT

## 2024-07-11 PROCEDURE — 80048 BASIC METABOLIC PNL TOTAL CA: CPT

## 2024-07-11 PROCEDURE — 83735 ASSAY OF MAGNESIUM: CPT

## 2024-07-11 PROCEDURE — 82948 REAGENT STRIP/BLOOD GLUCOSE: CPT

## 2024-07-11 PROCEDURE — 99232 SBSQ HOSP IP/OBS MODERATE 35: CPT

## 2024-07-11 RX ORDER — AMOXICILLIN 250 MG/1
1000 CAPSULE ORAL EVERY 8 HOURS SCHEDULED
Status: DISCONTINUED | OUTPATIENT
Start: 2024-07-12 | End: 2024-07-13 | Stop reason: HOSPADM

## 2024-07-11 RX ORDER — LISINOPRIL 20 MG/1
20 TABLET ORAL DAILY
Status: DISCONTINUED | OUTPATIENT
Start: 2024-07-11 | End: 2024-07-13 | Stop reason: HOSPADM

## 2024-07-11 RX ORDER — MAGNESIUM SULFATE HEPTAHYDRATE 40 MG/ML
4 INJECTION, SOLUTION INTRAVENOUS ONCE
Status: COMPLETED | OUTPATIENT
Start: 2024-07-11 | End: 2024-07-12

## 2024-07-11 RX ORDER — CALCIUM CARBONATE 500 MG/1
500 TABLET, CHEWABLE ORAL DAILY PRN
Status: DISCONTINUED | OUTPATIENT
Start: 2024-07-11 | End: 2024-07-13 | Stop reason: HOSPADM

## 2024-07-11 RX ORDER — HYDROCHLOROTHIAZIDE 12.5 MG/1
12.5 TABLET ORAL DAILY
Status: DISCONTINUED | OUTPATIENT
Start: 2024-07-11 | End: 2024-07-13 | Stop reason: HOSPADM

## 2024-07-11 RX ORDER — INSULIN GLARGINE 100 [IU]/ML
30 INJECTION, SOLUTION SUBCUTANEOUS EVERY MORNING
Status: DISCONTINUED | OUTPATIENT
Start: 2024-07-12 | End: 2024-07-13 | Stop reason: HOSPADM

## 2024-07-11 RX ADMIN — CEFAZOLIN SODIUM 2000 MG: 2 SOLUTION INTRAVENOUS at 10:32

## 2024-07-11 RX ADMIN — HEPARIN SODIUM 5000 UNITS: 5000 INJECTION INTRAVENOUS; SUBCUTANEOUS at 13:24

## 2024-07-11 RX ADMIN — INSULIN LISPRO 4 UNITS: 100 INJECTION, SOLUTION INTRAVENOUS; SUBCUTANEOUS at 11:50

## 2024-07-11 RX ADMIN — SENNOSIDES AND DOCUSATE SODIUM 1 TABLET: 50; 8.6 TABLET ORAL at 13:40

## 2024-07-11 RX ADMIN — MAGNESIUM SULFATE HEPTAHYDRATE 4 G: 40 INJECTION, SOLUTION INTRAVENOUS at 17:18

## 2024-07-11 RX ADMIN — INSULIN LISPRO 2 UNITS: 100 INJECTION, SOLUTION INTRAVENOUS; SUBCUTANEOUS at 21:45

## 2024-07-11 RX ADMIN — HEPARIN SODIUM 5000 UNITS: 5000 INJECTION INTRAVENOUS; SUBCUTANEOUS at 05:44

## 2024-07-11 RX ADMIN — HEPARIN SODIUM 5000 UNITS: 5000 INJECTION INTRAVENOUS; SUBCUTANEOUS at 21:41

## 2024-07-11 RX ADMIN — INSULIN GLARGINE 20 UNITS: 100 INJECTION, SOLUTION SUBCUTANEOUS at 08:22

## 2024-07-11 RX ADMIN — VENLAFAXINE HYDROCHLORIDE 300 MG: 150 CAPSULE, EXTENDED RELEASE ORAL at 13:35

## 2024-07-11 RX ADMIN — HYDROCHLOROTHIAZIDE 12.5 MG: 12.5 TABLET ORAL at 13:37

## 2024-07-11 RX ADMIN — CHLORHEXIDINE GLUCONATE 15 ML: 1.2 RINSE ORAL at 13:41

## 2024-07-11 RX ADMIN — LISINOPRIL 20 MG: 20 TABLET ORAL at 13:39

## 2024-07-11 RX ADMIN — CEFAZOLIN SODIUM 2000 MG: 2 SOLUTION INTRAVENOUS at 02:55

## 2024-07-11 RX ADMIN — ONDANSETRON 4 MG: 2 INJECTION INTRAMUSCULAR; INTRAVENOUS at 07:42

## 2024-07-11 RX ADMIN — CEFAZOLIN SODIUM 2000 MG: 2 SOLUTION INTRAVENOUS at 21:41

## 2024-07-11 RX ADMIN — METHADONE HYDROCHLORIDE 140 MG: 10 TABLET ORAL at 09:55

## 2024-07-11 RX ADMIN — INSULIN LISPRO 2 UNITS: 100 INJECTION, SOLUTION INTRAVENOUS; SUBCUTANEOUS at 16:42

## 2024-07-11 NOTE — ASSESSMENT & PLAN NOTE
Likely multifactorial in setting of DKA, substance abuse, infection  CT head negative  Mentation at baseline, AOX3
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
## 2024-07-11 NOTE — PLAN OF CARE
Problem: PAIN - ADULT  Goal: Verbalizes/displays adequate comfort level or baseline comfort level  Description: Interventions:  - Encourage patient to monitor pain and request assistance  - Assess pain using appropriate pain scale  - Administer analgesics based on type and severity of pain and evaluate response  - Implement non-pharmacological measures as appropriate and evaluate response  - Consider cultural and social influences on pain and pain management  - Notify physician/advanced practitioner if interventions unsuccessful or patient reports new pain  Outcome: Progressing     Problem: INFECTION - ADULT  Goal: Absence or prevention of progression during hospitalization  Description: INTERVENTIONS:  - Assess and monitor for signs and symptoms of infection  - Monitor lab/diagnostic results  - Monitor all insertion sites, i.e. indwelling lines, tubes, and drains  - Monitor endotracheal if appropriate and nasal secretions for changes in amount and color  - [place] appropriate cooling/warming therapies per order  - Administer medications as ordered  - Instruct and encourage patient and family to use good hand hygiene technique  - Identify and instruct in appropriate isolation precautions for identified infection/condition  Outcome: Progressing     Problem: SAFETY ADULT  Goal: Patient will remain free of falls  Description: INTERVENTIONS:  - Educate patient/family on patient safety including physical limitations  - Instruct patient to call for assistance with activity   - Consult OT/PT to assist with strengthening/mobility   - Keep Call bell within reach  - Keep bed low and locked with side rails adjusted as appropriate  - Keep care items and personal belongings within reach  - Initiate and maintain comfort rounds  - Make Fall Risk Sign visible to staff  - Offer Toileting every 2 Hours, in advance of need  - Initiate/Maintain bed/ chair alarm  - Obtain necessary fall risk management equipment  - Apply yellow  socks and bracelet for high fall risk patients  - Consider moving patient to room near nurses station  Outcome: Progressing  Goal: Maintain or return to baseline ADL function  Description: INTERVENTIONS:  -  Assess patient's ability to carry out ADLs; assess patient's baseline for ADL function and identify physical deficits which impact ability to perform ADLs (bathing, care of mouth/teeth, toileting, grooming, dressing, etc.)  - Assess/evaluate cause of self-care deficits   - Assess range of motion  - Assess patient's mobility; develop plan if impaired  - Assess patient's need for assistive devices and provide as appropriate  - Encourage maximum independence but intervene and supervise when necessary  - Involve family in performance of ADLs  - Assess for home care needs following discharge   - Consider OT consult to assist with ADL evaluation and planning for discharge  - Provide patient education as appropriate  Outcome: Progressing  Goal: Maintains/Returns to pre admission functional level  Description: INTERVENTIONS:  - Perform AM-PAC 6 Click Basic Mobility/ Daily Activity assessment daily.  - Set and communicate daily mobility goal to care team and patient/family/caregiver.   - Collaborate with rehabilitation services on mobility goals if consulted  - Perform Range of Motion 3 times a day.  - Out of bed to chair 3 times a day   - Out of bed for meals 3 times a day  - Out of bed for toileting  - Record patient progress and toleration of activity level   Outcome: Progressing     Problem: DISCHARGE PLANNING  Goal: Discharge to home or other facility with appropriate resources  Description: INTERVENTIONS:  - Identify barriers to discharge w/patient and caregiver  - Arrange for needed discharge resources and transportation as appropriate  - Identify discharge learning needs (meds, wound care, etc.)  - Arrange for interpretive services to assist at discharge as needed  - Refer to Case Management Department for  coordinating discharge planning if the patient needs post-hospital services based on physician/advanced practitioner order or complex needs related to functional status, cognitive ability, or social support system  Outcome: Progressing     Problem: Knowledge Deficit  Goal: Patient/family/caregiver demonstrates understanding of disease process, treatment plan, medications, and discharge instructions  Description: Complete learning assessment and assess knowledge base.  Interventions:  - Provide teaching at level of understanding  - Provide teaching via preferred learning methods  Outcome: Progressing     Problem: NEUROSENSORY - ADULT  Goal: Achieves stable or improved neurological status  Description: INTERVENTIONS  - Monitor and report changes in neurological status  - Monitor vital signs such as temperature, blood pressure, glucose, and any other labs ordered   - Initiate measures to prevent increased intracranial pressure  - Monitor for seizure activity and implement precautions if appropriate      Outcome: Progressing     Problem: CARDIOVASCULAR - ADULT  Goal: Absence of cardiac dysrhythmias or at baseline rhythm  Description: INTERVENTIONS:  - Continuous cardiac monitoring, vital signs, obtain 12 lead EKG if ordered  - Administer antiarrhythmic and heart rate control medications as ordered  - Monitor electrolytes and administer replacement therapy as ordered  Outcome: Progressing     Problem: GASTROINTESTINAL - ADULT  Goal: Minimal or absence of nausea and/or vomiting  Description: INTERVENTIONS:  - Administer IV fluids if ordered to ensure adequate hydration  - Maintain NPO status until nausea and vomiting are resolved  - Nasogastric tube if ordered  - Administer ordered antiemetic medications as needed  - Provide nonpharmacologic comfort measures as appropriate  - Advance diet as tolerated, if ordered  - Consider nutrition services referral to assist patient with adequate nutrition and appropriate food  choices  Outcome: Progressing  Goal: Maintains adequate nutritional intake  Description: INTERVENTIONS:  - Monitor percentage of each meal consumed  - Identify factors contributing to decreased intake, treat as appropriate  - Assist with meals as needed  - Monitor I&O, weight, and lab values if indicated  - Obtain nutrition services referral as needed  Outcome: Progressing     Problem: GENITOURINARY - ADULT  Goal: Maintains or returns to baseline urinary function  Description: INTERVENTIONS:  - Assess urinary function  - Encourage oral fluids to ensure adequate hydration if ordered  - Administer IV fluids as ordered to ensure adequate hydration  - Administer ordered medications as needed  - Offer frequent toileting  - Follow urinary retention protocol if ordered  Outcome: Progressing  Goal: Absence of urinary retention  Description: INTERVENTIONS:  - Assess patient’s ability to void and empty bladder  - Monitor I/O  - Bladder scan as needed  - Discuss with physician/AP medications to alleviate retention as needed  - Discuss catheterization for long term situations as appropriate  Outcome: Progressing     Problem: METABOLIC, FLUID AND ELECTROLYTES - ADULT  Goal: Electrolytes maintained within normal limits  Description: INTERVENTIONS:  - Monitor labs and assess patient for signs and symptoms of electrolyte imbalances  - Administer electrolyte replacement as ordered  - Monitor response to electrolyte replacements, including repeat lab results as appropriate  - Instruct patient on fluid and nutrition as appropriate  Outcome: Progressing  Goal: Fluid balance maintained  Description: INTERVENTIONS:  - Monitor labs   - Monitor I/O and WT  - Instruct patient on fluid and nutrition as appropriate  - Assess for signs & symptoms of volume excess or deficit  Outcome: Progressing  Goal: Glucose maintained within target range  Description: INTERVENTIONS:  - Monitor Blood Glucose as ordered  - Assess for signs and symptoms of  hyperglycemia and hypoglycemia  - Administer ordered medications to maintain glucose within target range  - Assess nutritional intake and initiate nutrition service referral as needed  Outcome: Progressing     Problem: SKIN/TISSUE INTEGRITY - ADULT  Goal: Skin Integrity remains intact(Skin Breakdown Prevention)  Description: Assess:  -Perform Kei assessment every d  -Clean and moisturize skin every day  -Inspect skin when repositioning, toileting, and assisting with ADLS  -Assess extremities for adequate circulation and sensation     Bed Management:  -Have minimal linens on bed & keep smooth, unwrinkled  -Change linens as needed when moist or perspiring  -Avoid sitting or lying in one position for more than 2 hours while in bed    Activity:  -Encourage activity and walks on unit  -Encourage or provide ROM exercises   -Use appropriate equipment to lift or move patient in bed  -Instruct/ Assist with weight shifting every 2 hours when out of bed in chair  -Consider limitation of chair time 2 hour intervals    Goal: Incision(s), wounds(s) or drain site(s) healing without S/S of infection  Description: INTERVENTIONS  - Assess and document dressing, incision, wound bed, drain sites and surrounding tissue  - Provide patient and family education    Outcome: Progressing     Problem: HEMATOLOGIC - ADULT  Goal: Maintains hematologic stability  Description: INTERVENTIONS  - Assess for signs and symptoms of bleeding or hemorrhage  - Monitor labs  - Administer supportive blood products/factors as ordered and appropriate  Outcome: Progressing     Problem: MUSCULOSKELETAL - ADULT  Goal: Maintain or return mobility to safest level of function  Description: INTERVENTIONS:  - Assess patient's ability to carry out ADLs; assess patient's baseline for ADL function and identify physical deficits which impact ability to perform ADLs (bathing, care of mouth/teeth, toileting, grooming, dressing, etc.)  - Assess/evaluate cause of self-care  deficits   - Assess range of motion  - Assess patient's mobility  - Assess patient's need for assistive devices and provide as appropriate  - Encourage maximum independence but intervene and supervise when necessary  - Involve family in performance of ADLs  - Assess for home care needs following discharge   - Consider OT consult to assist with ADL evaluation and planning for discharge  - Provide patient education as appropriate  Outcome: Progressing     Problem: Prexisting or High Potential for Compromised Skin Integrity  Goal: Skin integrity is maintained or improved  Description: INTERVENTIONS:  - Identify patients at risk for skin breakdown  - Assess and monitor skin integrity  - Assess and monitor nutrition and hydration status  - Monitor labs   - Assess for incontinence   - Turn and reposition patient  - Assist with mobility/ambulation  - Relieve pressure over bony prominences  - Avoid friction and shearing  - Provide appropriate hygiene as needed including keeping skin clean and dry  - Evaluate need for skin moisturizer/barrier cream  - Collaborate with interdisciplinary team   - Patient/family teaching  - Consider wound care consult   Outcome: Progressing     Problem: Nutrition/Hydration-ADULT  Goal: Nutrient/Hydration intake appropriate for improving, restoring or maintaining nutritional needs  Description: Monitor and assess patient's nutrition/hydration status for malnutrition. Collaborate with interdisciplinary team and initiate plan and interventions as ordered.  Monitor patient's weight and dietary intake as ordered or per policy. Utilize nutrition screening tool and intervene as necessary. Determine patient's food preferences and provide high-protein, high-caloric foods as appropriate.     INTERVENTIONS:  - Monitor oral intake, urinary output, labs, and treatment plans  - Assess nutrition and hydration status and recommend course of action  - Evaluate amount of meals eaten  - Assist patient with eating  if necessary   - Allow adequate time for meals  - Recommend/ encourage appropriate diets, oral nutritional supplements, and vitamin/mineral supplements  - Order, calculate, and assess calorie counts as needed  - Recommend, monitor, and adjust tube feedings and TPN/PPN based on assessed needs  - Assess need for intravenous fluids  - Provide specific nutrition/hydration education as appropriate  - Include patient/family/caregiver in decisions related to nutrition  Outcome: Progressing

## 2024-07-11 NOTE — ASSESSMENT & PLAN NOTE
Per patient present for two years.   Per ID, it does not appear infected but would consider skin malignancy. Recommend Dermatology follow up and possible skin biopsy

## 2024-07-11 NOTE — ASSESSMENT & PLAN NOTE
Lab Results   Component Value Date    HGBA1C 12.8 (A) 06/18/2024       Recent Labs     07/10/24  1545 07/10/24  2029 07/11/24  0655 07/11/24  1118   POCGLU 242* 165* 148* 273*         Blood Sugar Average: Last 72 hrs:  (P) [id number]  Questionable compliance with home insulin regimen, reports she missed a few doses  Home regimen is 40 units lantus hs  Increasing PO intake, will increase lantus to 30 units hs + SSI  Continue to monitor glucose and adjust as needed pending PO intake  Diabetic diet

## 2024-07-11 NOTE — ASSESSMENT & PLAN NOTE
Patient presented to ED after spouse found her unresponsive at home. Work up revealed glucose 839, pH 7.24 on VBG, AG 26  Started on DKA insulin gtt and fluid resuscitation and admitted to ICU  Suspect in setting of noncompliance vs acute infection  Now transitioned to lantus and sliding scale  Home regimen includes lantus 40 units hs  Increasing PO intake, will increase lantus to 30 units hs + SSI  Continue to monitor glucose and adjust as needed pending PO intake

## 2024-07-11 NOTE — ASSESSMENT & PLAN NOTE
Evidenced by fever, tachycardia, leukocytosis, tachypnea in setting of bacteremia  Leukocytosis resolved, afebrile  1/2 blood cultures growing group B strep and staph epidermidis  Urine culture grew 10,000-19,000 lactobacillus, likely contaminant  No infectious source on CT c/a/p  No concern for vegetations on ECHO  Repeat blood cultures negative for growth at 24 hours  ID consulted, recommendations appreciated  possible transition from IV cefazolin to PO amoxicillin to complete 14 days of treatment through 07/20/24

## 2024-07-11 NOTE — ASSESSMENT & PLAN NOTE
1/2 blood cultures from admission growing group B strep and staph epidermidis  Leukocytosis resolved, afebrile  ECHO without concern for vegetation  Repeat blood cultures negative for growth at 24 hours  ID consulted, recommendations appreciated  possible transition to from IV cefazolin to PO amoxicillin to complete 14 days of treatment through 07/20/24

## 2024-07-11 NOTE — PROGRESS NOTES
Progress Note - Infectious Disease   Marcia Barron 49 y.o. female MRN: [id number]  Unit/Bed#: [address] [place] [address] Encounter: [id number]      Impression/Plan:  1. Sepsis, with fever, tachycardia, tachypnea and leukocytosis. In setting of #2  -continue antibiotic as below  -follow-up cultures   -monitor temperature and hemodynamics  -serial exam  -monitoring serial CBC and BMP for treatment response or any developing      2.  GBStrep Bacteremia. 1 of 2 blood cultures + GBS sensitive to PCN and staph epi. Consider skin source of GBS given polysubstance use, scratches and intertrigo. Staph epi likely represents skin contaminant. 7/9/24 TTE negative for vegetation 7/9/24 blood cultures negative at 48 hours  -continues Cefazolin 2g IV q 8 hours  -follow up final repeat blood culture results  -as blood cultures remain negative, can transition to high dose amoxicillin 1 g PO q 8 hours in am to complete 2 week antibiotic course through 7/20/24 upon discharge     3. SEYMOUR. Peak creatine 4.7 > 1.07, resolved  -renal dose adjust antibiotic as needed  -volume management   -recheck BMP     4. Uncontrolled Diabetes Mellitus with hyperglycemia. Treated for DKA on admission. 6/18/24 HgbA1c 12.8  -glycemic management per primary care team     5. Polysubstance Use/+ Hep C. 7/10/24 HIV screen negative  -symptomatic management per primary    6. Herpes labialis. Valtrex 1000 mg PO x 1, renal dose adjusted given 7/9/24     Antibiotics:  Cefazolin - abx D5     I have discussed the above management plan in detail with patient, RN, and Danni of the primary service. They concur with ID treatment plan and ongoing follow up.  Infectious disease consultation service will sign off for now.  Please call if any new signs or symptoms of infection develop.  Thank you!      Subjective:  Patient has no fever, chills, sweats; no nausea, vomiting x 1 early am, no diarrhea/BM today; no cough, shortness of breath; no pain complaints. No new  symptoms.    Objective:  Vitals:  Temp:  [97.4 °F (36.3 °C)-97.7 °F (36.5 °C)] 97.4 °F (36.3 °C)  HR:  [59-68] 68  Resp:  [18] 18  BP: (120-191)/(64-88) 151/69  SpO2:  [92 %-99 %] 92 %  Temp (24hrs), Av.5 °F (36.4 °C), Min:97.4 °F (36.3 °C), Max:97.7 °F (36.5 °C)  Current: Temperature: (!) 97.4 °F (36.3 °C)    Physical Exam:   General Appearance:  49 year old female, resting in bed, nontoxic appearance, no acute distress.   HEENT: Atraumatic normocephalic. Fading lip blisters   Throat: Oropharynx moist. Poor dentition, mostly edentulous   Pulmonary:   Normal respiratory excursion without accessory muscle use   Cardiac:  RRR   Abdomen:   Soft, non-tender, non-distended   Extremities: No edema   : No lowery, no SPT   Psychiatric: Awake, cooperative   Skin: No new rashes. IV site nontender.        Labs, Imaging, & Other studies:   All pertinent labs and imaging studies were personally reviewed  Results from last 7 days   Lab Units 24  1254 07/10/24  0548 24  0525   WBC Thousand/uL 7.58 6.55 11.63*   HEMOGLOBIN g/dL 12.0 11.3* 11.7   PLATELETS Thousands/uL 153 134* 134*     Results from last 7 days   Lab Units 24  1254 07/10/24  0548 24  0525 24  0510 24  0249   SODIUM mmol/L 132* 138 137   < > 131*   POTASSIUM mmol/L 4.4 4.0 4.2   < > 4.7   CHLORIDE mmol/L 98 104 103   < > 91*   CO2 mmol/L 30 29 27   < > 14*   BUN mg/dL 17 26* 40*   < > 58*   CREATININE mg/dL 1.07 1.32* 1.75*   < > 4.70*   EGFR ml/min/1.73sq m 61 48 34   < > 10   CALCIUM mg/dL 8.8 8.4 8.4   < > 11.6*   AST U/L  --   --   --   --  59*   ALT U/L  --   --   --   --  32   ALK PHOS U/L  --   --   --   --  59    < > = values in this interval not displayed.     Results from last 7 days   Lab Units 24  1448 24  0822 24  0300 24  0249   BLOOD CULTURE  No Growth at 24 hrs.  No Growth at 24 hrs.  --   --  No Growth at 72 hrs.  Streptococcus agalactiae (Group B)*  Staphylococcus epidermidis*    GRAM STAIN RESULT   --   --   --  Gram positive cocci in chains*  Gram positive cocci in clusters*   URINE CULTURE   --   --  10,000-19,000 cfu/ml Lactobacillus species*  --    MRSA CULTURE ONLY   --  No Methicillin Resistant Staphlyococcus aureus (MRSA) isolated  --   --      Results from last 7 days   Lab Units 07/09/24  0525 07/07/24  0249   PROCALCITONIN ng/ml 16.74* 3.73*

## 2024-07-11 NOTE — ASSESSMENT & PLAN NOTE
Lab Results   Component Value Date    EGFR 48 07/10/2024    EGFR 34 07/09/2024    EGFR 23 07/08/2024    CREATININE 1.32 (H) 07/10/2024    CREATININE 1.75 (H) 07/09/2024    CREATININE 2.37 (H) 07/08/2024   Baseline Cr around 1.2 - 1.3  Creatinine at baseline  Resume Lisinopril-HCTZ  Monitor BMP

## 2024-07-11 NOTE — PLAN OF CARE
Problem: PAIN - ADULT  Goal: Verbalizes/displays adequate comfort level or baseline comfort level  Description: Interventions:  - Encourage patient to monitor pain and request assistance  - Assess pain using appropriate pain scale  - Administer analgesics based on type and severity of pain and evaluate response  - Implement non-pharmacological measures as appropriate and evaluate response  - Consider cultural and social influences on pain and pain management  - Notify physician/advanced practitioner if interventions unsuccessful or patient reports new pain  Outcome: Progressing     Problem: INFECTION - ADULT  Goal: Absence or prevention of progression during hospitalization  Description: INTERVENTIONS:  - Assess and monitor for signs and symptoms of infection  - Monitor lab/diagnostic results  - Monitor all insertion sites, i.e. indwelling lines, tubes, and drains  - Monitor endotracheal if appropriate and nasal secretions for changes in amount and color  - [place] appropriate cooling/warming therapies per order  - Administer medications as ordered  - Instruct and encourage patient and family to use good hand hygiene technique  - Identify and instruct in appropriate isolation precautions for identified infection/condition  Outcome: Progressing     Problem: DISCHARGE PLANNING  Goal: Discharge to home or other facility with appropriate resources  Description: INTERVENTIONS:  - Identify barriers to discharge w/patient and caregiver  - Arrange for needed discharge resources and transportation as appropriate  - Identify discharge learning needs (meds, wound care, etc.)  - Arrange for interpretive services to assist at discharge as needed  - Refer to Case Management Department for coordinating discharge planning if the patient needs post-hospital services based on physician/advanced practitioner order or complex needs related to functional status, cognitive ability, or social support system  Outcome: Progressing      Problem: Knowledge Deficit  Goal: Patient/family/caregiver demonstrates understanding of disease process, treatment plan, medications, and discharge instructions  Description: Complete learning assessment and assess knowledge base.  Interventions:  - Provide teaching at level of understanding  - Provide teaching via preferred learning methods  Outcome: Progressing     Problem: NEUROSENSORY - ADULT  Goal: Achieves stable or improved neurological status  Description: INTERVENTIONS  - Monitor and report changes in neurological status  - Monitor vital signs such as temperature, blood pressure, glucose, and any other labs ordered   - Initiate measures to prevent increased intracranial pressure  - Monitor for seizure activity and implement precautions if appropriate      Outcome: Progressing     Problem: CARDIOVASCULAR - ADULT  Goal: Absence of cardiac dysrhythmias or at baseline rhythm  Description: INTERVENTIONS:  - Continuous cardiac monitoring, vital signs, obtain 12 lead EKG if ordered  - Administer antiarrhythmic and heart rate control medications as ordered  - Monitor electrolytes and administer replacement therapy as ordered  Outcome: Progressing     Problem: GENITOURINARY - ADULT  Goal: Maintains or returns to baseline urinary function  Description: INTERVENTIONS:  - Assess urinary function  - Encourage oral fluids to ensure adequate hydration if ordered  - Administer IV fluids as ordered to ensure adequate hydration  - Administer ordered medications as needed  - Offer frequent toileting  - Follow urinary retention protocol if ordered  Outcome: Progressing  Goal: Absence of urinary retention  Description: INTERVENTIONS:  - Assess patient’s ability to void and empty bladder  - Monitor I/O  - Bladder scan as needed  - Discuss with physician/AP medications to alleviate retention as needed  - Discuss catheterization for long term situations as appropriate  Outcome: Progressing     Problem: SKIN/TISSUE INTEGRITY -  ADULT  Goal: Skin Integrity remains intact(Skin Breakdown Prevention)  Description: Assess:  -Perform Kei assessment every shift  -Clean and moisturize skin every shift  -Inspect skin when repositioning, toileting, and assisting with ADLS.  -Assess extremities for adequate circulation and sensation     Bed Management:  -Have minimal linens on bed & keep smooth, unwrinkled  -Change linens as needed when moist or perspiring  -Avoid sitting or lying in one position for more than 2 hours while in bed  -Keep HOB at 30 degrees   Activity:  -Encourage activity and walks on unit  -Encourage or provide ROM exercises   Problem: Nutrition/Hydration-ADULT  Goal: Nutrient/Hydration intake appropriate for improving, restoring or maintaining nutritional needs  Description: Monitor and assess patient's nutrition/hydration status for malnutrition. Collaborate with interdisciplinary team and initiate plan and interventions as ordered.  Monitor patient's weight and dietary intake as ordered or per policy. Utilize nutrition screening tool and intervene as necessary. Determine patient's food preferences and provide high-protein, high-caloric foods as appropriate.     INTERVENTIONS:  - Monitor oral intake, urinary output, labs, and treatment plans  - Assess nutrition and hydration status and recommend course of action  - Evaluate amount of meals eaten  - Assist patient with eating if necessary   - Allow adequate time for meals  - Recommend/ encourage appropriate diets, oral nutritional supplements, and vitamin/mineral supplements  - Order, calculate, and assess calorie counts as needed  - Recommend, monitor, and adjust tube feedings and TPN/PPN based on assessed needs  - Assess need for intravenous fluids  - Provide specific nutrition/hydration education as appropriate  - Include patient/family/caregiver in decisions related to nutrition  Outcome: Progressing     -Turn and reposition patient every 2 Hours  -Use appropriate equipment  to lift or move patient in bed  -Consider limitation of chair time 3 hour intervals    Skin Care:  -Avoid use of baby powder, tape, friction and shearing, hot water or constrictive clothing  -Relieve pressure over bony prominences using waffle cushion.  -Do not massage red bony areas    Next Steps:   -Consider consults to  interdisciplinary teams such as wound care.  Outcome: Progressing  Goal: Incision(s), wounds(s) or drain site(s) healing without S/S of infection  Description: INTERVENTIONS  - Assess and document dressing, incision, wound bed, drain sites and surrounding tissue  - Provide patient and family education  Outcome: Progressing

## 2024-07-12 LAB
ANION GAP SERPL CALCULATED.3IONS-SCNC: 4 MMOL/L (ref 4–13)
BACTERIA BLD CULT: NORMAL
BUN SERPL-MCNC: 17 MG/DL (ref 5–25)
CALCIUM SERPL-MCNC: 9.6 MG/DL (ref 8.4–10.2)
CHLORIDE SERPL-SCNC: 98 MMOL/L (ref 96–108)
CO2 SERPL-SCNC: 32 MMOL/L (ref 21–32)
CREAT SERPL-MCNC: 1.05 MG/DL (ref 0.6–1.3)
ERYTHROCYTE [DISTWIDTH] IN BLOOD BY AUTOMATED COUNT: 13.7 % (ref 11.6–15.1)
GFR SERPL CREATININE-BSD FRML MDRD: 63 ML/MIN/1.73SQ M
GLUCOSE SERPL-MCNC: 150 MG/DL (ref 65–140)
GLUCOSE SERPL-MCNC: 154 MG/DL (ref 65–140)
GLUCOSE SERPL-MCNC: 201 MG/DL (ref 65–140)
GLUCOSE SERPL-MCNC: 216 MG/DL (ref 65–140)
GLUCOSE SERPL-MCNC: 297 MG/DL (ref 65–140)
HCT VFR BLD AUTO: 39.6 % (ref 34.8–46.1)
HGB BLD-MCNC: 12.9 G/DL (ref 11.5–15.4)
MAGNESIUM SERPL-MCNC: 2.5 MG/DL (ref 1.9–2.7)
MCH RBC QN AUTO: 27.7 PG (ref 26.8–34.3)
MCHC RBC AUTO-ENTMCNC: 32.6 G/DL (ref 31.4–37.4)
MCV RBC AUTO: 85 FL (ref 82–98)
PLATELET # BLD AUTO: 160 THOUSANDS/UL (ref 149–390)
PMV BLD AUTO: 10.8 FL (ref 8.9–12.7)
POTASSIUM SERPL-SCNC: 4.7 MMOL/L (ref 3.5–5.3)
RBC # BLD AUTO: 4.65 MILLION/UL (ref 3.81–5.12)
SODIUM SERPL-SCNC: 134 MMOL/L (ref 135–147)
WBC # BLD AUTO: 8.73 THOUSAND/UL (ref 4.31–10.16)

## 2024-07-12 PROCEDURE — 85027 COMPLETE CBC AUTOMATED: CPT

## 2024-07-12 PROCEDURE — 82948 REAGENT STRIP/BLOOD GLUCOSE: CPT

## 2024-07-12 PROCEDURE — 83735 ASSAY OF MAGNESIUM: CPT

## 2024-07-12 PROCEDURE — 80048 BASIC METABOLIC PNL TOTAL CA: CPT

## 2024-07-12 PROCEDURE — 99232 SBSQ HOSP IP/OBS MODERATE 35: CPT | Performed by: NURSE PRACTITIONER

## 2024-07-12 RX ORDER — POLYETHYLENE GLYCOL 3350 17 G/17G
17 POWDER, FOR SOLUTION ORAL DAILY
Status: DISCONTINUED | OUTPATIENT
Start: 2024-07-12 | End: 2024-07-13 | Stop reason: HOSPADM

## 2024-07-12 RX ADMIN — INSULIN LISPRO 1 UNITS: 100 INJECTION, SOLUTION INTRAVENOUS; SUBCUTANEOUS at 08:38

## 2024-07-12 RX ADMIN — INSULIN LISPRO 2 UNITS: 100 INJECTION, SOLUTION INTRAVENOUS; SUBCUTANEOUS at 21:50

## 2024-07-12 RX ADMIN — AMOXICILLIN 1000 MG: 250 CAPSULE ORAL at 08:38

## 2024-07-12 RX ADMIN — LISINOPRIL 20 MG: 20 TABLET ORAL at 08:38

## 2024-07-12 RX ADMIN — CHLORHEXIDINE GLUCONATE 15 ML: 1.2 RINSE ORAL at 08:38

## 2024-07-12 RX ADMIN — METHADONE HYDROCHLORIDE 140 MG: 10 TABLET ORAL at 08:37

## 2024-07-12 RX ADMIN — POLYETHYLENE GLYCOL 3350 17 G: 17 POWDER, FOR SOLUTION ORAL at 09:03

## 2024-07-12 RX ADMIN — NYSTATIN: 100000 POWDER TOPICAL at 17:34

## 2024-07-12 RX ADMIN — SENNOSIDES AND DOCUSATE SODIUM 1 TABLET: 50; 8.6 TABLET ORAL at 17:33

## 2024-07-12 RX ADMIN — VENLAFAXINE HYDROCHLORIDE 300 MG: 150 CAPSULE, EXTENDED RELEASE ORAL at 08:38

## 2024-07-12 RX ADMIN — NYSTATIN: 100000 POWDER TOPICAL at 08:39

## 2024-07-12 RX ADMIN — AMOXICILLIN 1000 MG: 250 CAPSULE ORAL at 21:50

## 2024-07-12 RX ADMIN — INSULIN GLARGINE 30 UNITS: 100 INJECTION, SOLUTION SUBCUTANEOUS at 08:37

## 2024-07-12 RX ADMIN — HEPARIN SODIUM 5000 UNITS: 5000 INJECTION INTRAVENOUS; SUBCUTANEOUS at 21:50

## 2024-07-12 RX ADMIN — SENNOSIDES AND DOCUSATE SODIUM 1 TABLET: 50; 8.6 TABLET ORAL at 08:38

## 2024-07-12 RX ADMIN — HEPARIN SODIUM 5000 UNITS: 5000 INJECTION INTRAVENOUS; SUBCUTANEOUS at 06:02

## 2024-07-12 RX ADMIN — HYDROCHLOROTHIAZIDE 12.5 MG: 12.5 TABLET ORAL at 08:38

## 2024-07-12 RX ADMIN — HEPARIN SODIUM 5000 UNITS: 5000 INJECTION INTRAVENOUS; SUBCUTANEOUS at 14:15

## 2024-07-12 RX ADMIN — INSULIN LISPRO 4 UNITS: 100 INJECTION, SOLUTION INTRAVENOUS; SUBCUTANEOUS at 11:53

## 2024-07-12 RX ADMIN — AMOXICILLIN 1000 MG: 250 CAPSULE ORAL at 17:33

## 2024-07-12 RX ADMIN — INSULIN LISPRO 2 UNITS: 100 INJECTION, SOLUTION INTRAVENOUS; SUBCUTANEOUS at 17:33

## 2024-07-12 NOTE — ASSESSMENT & PLAN NOTE
Patient presented to ED after spouse found her unresponsive at home. Work up revealed glucose 839, pH 7.24 on VBG, AG 26  Started on DKA insulin gtt and fluid resuscitation and admitted to ICU  Suspect in setting of noncompliance vs acute infection  Now transitioned to lantus and sliding scale  Home regimen includes lantus 40 units hs  Increasing PO intake, will increase lantus to 30 units hs + SSI  Continue to monitor glucose and adjust as needed pending PO intake  Patient continues to complain of nausea and reported vomiting earlier this am   Monitor diet tolerance

## 2024-07-12 NOTE — ASSESSMENT & PLAN NOTE
Lab Results   Component Value Date    EGFR 63 07/12/2024    EGFR 61 07/11/2024    EGFR 48 07/10/2024    CREATININE 1.05 07/12/2024    CREATININE 1.07 07/11/2024    CREATININE 1.32 (H) 07/10/2024   Baseline Cr around 1.2 - 1.3  Creatinine at baseline  Resumed Lisinopril-HCTZ  Monitor BMP

## 2024-07-12 NOTE — PLAN OF CARE
Problem: PAIN - ADULT  Goal: Verbalizes/displays adequate comfort level or baseline comfort level  Description: Interventions:  - Encourage patient to monitor pain and request assistance  - Assess pain using appropriate pain scale  - Administer analgesics based on type and severity of pain and evaluate response  - Implement non-pharmacological measures as appropriate and evaluate response  - Consider cultural and social influences on pain and pain management  - Notify physician/advanced practitioner if interventions unsuccessful or patient reports new pain  Outcome: Progressing     Problem: INFECTION - ADULT  Goal: Absence or prevention of progression during hospitalization  Description: INTERVENTIONS:  - Assess and monitor for signs and symptoms of infection  - Monitor lab/diagnostic results  - Monitor all insertion sites, i.e. indwelling lines, tubes, and drains  - Monitor endotracheal if appropriate and nasal secretions for changes in amount and color  - [place] appropriate cooling/warming therapies per order  - Administer medications as ordered  - Instruct and encourage patient and family to use good hand hygiene technique  - Identify and instruct in appropriate isolation precautions for identified infection/condition  Outcome: Progressing     Problem: SAFETY ADULT  Goal: Patient will remain free of falls  Description: INTERVENTIONS:  - Educate patient/family on patient safety including physical limitations  - Instruct patient to call for assistance with activity   - Consult OT/PT to assist with strengthening/mobility   - Keep Call bell within reach  - Keep bed low and locked with side rails adjusted as appropriate  - Keep care items and personal belongings within reach  - Initiate and maintain comfort rounds  - Make Fall Risk Sign visible to staff  - Offer Toileting every 3 Hours, in advance of need  - Initiate/Maintain bed/chair alarm  - Apply yellow socks and bracelet for high fall risk patients  -  Consider moving patient to room near nurses station  Outcome: Progressing  Goal: Maintain or return to baseline ADL function  Description: INTERVENTIONS:  -  Assess patient's ability to carry out ADLs; assess patient's baseline for ADL function and identify physical deficits which impact ability to perform ADLs (bathing, care of mouth/teeth, toileting, grooming, dressing, etc.)  - Assess/evaluate cause of self-care deficits   - Assess range of motion  - Assess patient's mobility; develop plan if impaired  - Assess patient's need for assistive devices and provide as appropriate  - Encourage maximum independence but intervene and supervise when necessary  - Involve family in performance of ADLs  - Assess for home care needs following discharge   - Consider OT consult to assist with ADL evaluation and planning for discharge  - Provide patient education as appropriate  Outcome: Progressing  Goal: Maintains/Returns to pre admission functional level  Description: INTERVENTIONS:  - Perform AM-PAC 6 Click Basic Mobility/ Daily Activity assessment daily.  - Set and communicate daily mobility goal to care team and patient/family/caregiver.   - Collaborate with rehabilitation services on mobility goals if consulted  - Perform Range of Motion 3 times a day.  - Reposition patient every 4 hours.  - Dangle patient 3 times a day  - Stand patient 3 times a day  - Ambulate patient 3 times a day  - Out of bed to chair 3 times a day   - Out of bed for meals 3 times a day  - Out of bed for toileting  - Record patient progress and toleration of activity level   Outcome: Progressing     Problem: DISCHARGE PLANNING  Goal: Discharge to home or other facility with appropriate resources  Description: INTERVENTIONS:  - Identify barriers to discharge w/patient and caregiver  - Arrange for needed discharge resources and transportation as appropriate  - Identify discharge learning needs (meds, wound care, etc.)  - Arrange for interpretive  services to assist at discharge as needed  - Refer to Case Management Department for coordinating discharge planning if the patient needs post-hospital services based on physician/advanced practitioner order or complex needs related to functional status, cognitive ability, or social support system  Outcome: Progressing     Problem: Knowledge Deficit  Goal: Patient/family/caregiver demonstrates understanding of disease process, treatment plan, medications, and discharge instructions  Description: Complete learning assessment and assess knowledge base.  Interventions:  - Provide teaching at level of understanding  - Provide teaching via preferred learning methods  Outcome: Progressing     Problem: NEUROSENSORY - ADULT  Goal: Achieves stable or improved neurological status  Description: INTERVENTIONS  - Monitor and report changes in neurological status  - Monitor vital signs such as temperature, blood pressure, glucose, and any other labs ordered   - Initiate measures to prevent increased intracranial pressure  - Monitor for seizure activity and implement precautions if appropriate      Outcome: Progressing     Problem: CARDIOVASCULAR - ADULT  Goal: Absence of cardiac dysrhythmias or at baseline rhythm  Description: INTERVENTIONS:  - Continuous cardiac monitoring, vital signs, obtain 12 lead EKG if ordered  - Administer antiarrhythmic and heart rate control medications as ordered  - Monitor electrolytes and administer replacement therapy as ordered  Outcome: Progressing     Problem: GASTROINTESTINAL - ADULT  Goal: Minimal or absence of nausea and/or vomiting  Description: INTERVENTIONS:  - Administer IV fluids if ordered to ensure adequate hydration  - Maintain NPO status until nausea and vomiting are resolved  - Nasogastric tube if ordered  - Administer ordered antiemetic medications as needed  - Provide nonpharmacologic comfort measures as appropriate  - Advance diet as tolerated, if ordered  - Consider nutrition  services referral to assist patient with adequate nutrition and appropriate food choices  Outcome: Progressing  Goal: Maintains adequate nutritional intake  Description: INTERVENTIONS:  - Monitor percentage of each meal consumed  - Identify factors contributing to decreased intake, treat as appropriate  - Assist with meals as needed  - Monitor I&O, weight, and lab values if indicated  - Obtain nutrition services referral as needed  Outcome: Progressing     Problem: GENITOURINARY - ADULT  Goal: Maintains or returns to baseline urinary function  Description: INTERVENTIONS:  - Assess urinary function  - Encourage oral fluids to ensure adequate hydration if ordered  - Administer IV fluids as ordered to ensure adequate hydration  - Administer ordered medications as needed  - Offer frequent toileting  - Follow urinary retention protocol if ordered  Outcome: Progressing  Goal: Absence of urinary retention  Description: INTERVENTIONS:  - Assess patient’s ability to void and empty bladder  - Monitor I/O  - Bladder scan as needed  - Discuss with physician/AP medications to alleviate retention as needed  - Discuss catheterization for long term situations as appropriate  Outcome: Progressing     Problem: METABOLIC, FLUID AND ELECTROLYTES - ADULT  Goal: Electrolytes maintained within normal limits  Description: INTERVENTIONS:  - Monitor labs and assess patient for signs and symptoms of electrolyte imbalances  - Administer electrolyte replacement as ordered  - Monitor response to electrolyte replacements, including repeat lab results as appropriate  - Instruct patient on fluid and nutrition as appropriate  Outcome: Progressing  Goal: Fluid balance maintained  Description: INTERVENTIONS:  - Monitor labs   - Monitor I/O and WT  - Instruct patient on fluid and nutrition as appropriate  - Assess for signs & symptoms of volume excess or deficit  Outcome: Progressing  Goal: Glucose maintained within target range  Description:  INTERVENTIONS:  - Monitor Blood Glucose as ordered  - Assess for signs and symptoms of hyperglycemia and hypoglycemia  - Administer ordered medications to maintain glucose within target range  - Assess nutritional intake and initiate nutrition service referral as needed  Outcome: Progressing     Problem: MUSCULOSKELETAL - ADULT  Goal: Maintain or return mobility to safest level of function  Description: INTERVENTIONS:  - Assess patient's ability to carry out ADLs; assess patient's baseline for ADL function and identify physical deficits which impact ability to perform ADLs (bathing, care of mouth/teeth, toileting, grooming, dressing, etc.)  - Assess/evaluate cause of self-care deficits   - Assess range of motion  - Assess patient's mobility  - Assess patient's need for assistive devices and provide as appropriate  - Encourage maximum independence but intervene and supervise when necessary  - Involve family in performance of ADLs  - Assess for home care needs following discharge   - Consider OT consult to assist with ADL evaluation and planning for discharge  - Provide patient education as appropriate  Outcome: Progressing     Problem: Prexisting or High Potential for Compromised Skin Integrity  Goal: Skin integrity is maintained or improved  Description: INTERVENTIONS:  - Identify patients at risk for skin breakdown  - Assess and monitor skin integrity  - Assess and monitor nutrition and hydration status  - Monitor labs   - Assess for incontinence   - Turn and reposition patient  - Assist with mobility/ambulation  - Relieve pressure over bony prominences  - Avoid friction and shearing  - Provide appropriate hygiene as needed including keeping skin clean and dry  - Evaluate need for skin moisturizer/barrier cream  - Collaborate with interdisciplinary team   - Patient/family teaching  - Consider wound care consult   Outcome: Progressing     Problem: Nutrition/Hydration-ADULT  Goal: Nutrient/Hydration intake  appropriate for improving, restoring or maintaining nutritional needs  Description: Monitor and assess patient's nutrition/hydration status for malnutrition. Collaborate with interdisciplinary team and initiate plan and interventions as ordered.  Monitor patient's weight and dietary intake as ordered or per policy. Utilize nutrition screening tool and intervene as necessary. Determine patient's food preferences and provide high-protein, high-caloric foods as appropriate.     INTERVENTIONS:  - Monitor oral intake, urinary output, labs, and treatment plans  - Assess nutrition and hydration status and recommend course of action  - Evaluate amount of meals eaten  - Assist patient with eating if necessary   - Allow adequate time for meals  - Recommend/ encourage appropriate diets, oral nutritional supplements, and vitamin/mineral supplements  - Order, calculate, and assess calorie counts as needed  - Recommend, monitor, and adjust tube feedings and TPN/PPN based on assessed needs  - Assess need for intravenous fluids  - Provide specific nutrition/hydration education as appropriate  - Include patient/family/caregiver in decisions related to nutrition  Outcome: Progressing

## 2024-07-12 NOTE — PLAN OF CARE
Problem: PAIN - ADULT  Goal: Verbalizes/displays adequate comfort level or baseline comfort level  Description: Interventions:  - Encourage patient to monitor pain and request assistance  - Assess pain using appropriate pain scale  - Administer analgesics based on type and severity of pain and evaluate response  - Implement non-pharmacological measures as appropriate and evaluate response  - Consider cultural and social influences on pain and pain management  - Notify physician/advanced practitioner if interventions unsuccessful or patient reports new pain  Outcome: Progressing     Problem: INFECTION - ADULT  Goal: Absence or prevention of progression during hospitalization  Description: INTERVENTIONS:  - Assess and monitor for signs and symptoms of infection  - Monitor lab/diagnostic results  - Monitor all insertion sites, i.e. indwelling lines, tubes, and drains  - Monitor endotracheal if appropriate and nasal secretions for changes in amount and color  - [place] appropriate cooling/warming therapies per order  - Administer medications as ordered  - Instruct and encourage patient and family to use good hand hygiene technique  - Identify and instruct in appropriate isolation precautions for identified infection/condition  Outcome: Progressing     Problem: SAFETY ADULT  Goal: Patient will remain free of falls  Description: INTERVENTIONS:  - Educate patient/family on patient safety including physical limitations  - Instruct patient to call for assistance with activity   - Consult OT/PT to assist with strengthening/mobility   - Keep Call bell within reach  - Keep bed low and locked with side rails adjusted as appropriate  - Keep care items and personal belongings within reach  - Initiate and maintain comfort rounds  - Make Fall Risk Sign visible to staff  - Offer Toileting every 2 Hours, in advance of need  - Initiate/Maintain bed alarm  - Obtain necessary fall risk management equipment: alarm  - Apply yellow  socks and bracelet for high fall risk patients  - Consider moving patient to room near nurses station  Outcome: Progressing  Goal: Maintain or return to baseline ADL function  Description: INTERVENTIONS:  -  Assess patient's ability to carry out ADLs; assess patient's baseline for ADL function and identify physical deficits which impact ability to perform ADLs (bathing, care of mouth/teeth, toileting, grooming, dressing, etc.)  - Assess/evaluate cause of self-care deficits   - Assess range of motion  - Assess patient's mobility; develop plan if impaired  - Assess patient's need for assistive devices and provide as appropriate  - Encourage maximum independence but intervene and supervise when necessary  - Involve family in performance of ADLs  - Assess for home care needs following discharge   - Consider OT consult to assist with ADL evaluation and planning for discharge  - Provide patient education as appropriate  Outcome: Progressing  Goal: Maintains/Returns to pre admission functional level  Description: INTERVENTIONS:  - Perform AM-PAC 6 Click Basic Mobility/ Daily Activity assessment daily.  - Set and communicate daily mobility goal to care team and patient/family/caregiver.   - Collaborate with rehabilitation services on mobility goals if consulted    - Record patient progress and toleration of activity level   Outcome: Progressing     Problem: DISCHARGE PLANNING  Goal: Discharge to home or other facility with appropriate resources  Description: INTERVENTIONS:  - Identify barriers to discharge w/patient and caregiver  - Arrange for needed discharge resources and transportation as appropriate  - Identify discharge learning needs (meds, wound care, etc.)  - Arrange for interpretive services to assist at discharge as needed  - Refer to Case Management Department for coordinating discharge planning if the patient needs post-hospital services based on physician/advanced practitioner order or complex needs related to  functional status, cognitive ability, or social support system  Outcome: Progressing     Problem: Knowledge Deficit  Goal: Patient/family/caregiver demonstrates understanding of disease process, treatment plan, medications, and discharge instructions  Description: Complete learning assessment and assess knowledge base.  Interventions:  - Provide teaching at level of understanding  - Provide teaching via preferred learning methods  Outcome: Progressing     Problem: NEUROSENSORY - ADULT  Goal: Achieves stable or improved neurological status  Description: INTERVENTIONS  - Monitor and report changes in neurological status  - Monitor vital signs such as temperature, blood pressure, glucose, and any other labs ordered   - Initiate measures to prevent increased intracranial pressure  - Monitor for seizure activity and implement precautions if appropriate      Outcome: Completed     Problem: CARDIOVASCULAR - ADULT  Goal: Absence of cardiac dysrhythmias or at baseline rhythm  Description: INTERVENTIONS:  - Continuous cardiac monitoring, vital signs, obtain 12 lead EKG if ordered  - Administer antiarrhythmic and heart rate control medications as ordered  - Monitor electrolytes and administer replacement therapy as ordered  Outcome: Completed     Problem: GASTROINTESTINAL - ADULT  Goal: Minimal or absence of nausea and/or vomiting  Description: INTERVENTIONS:  - Administer IV fluids if ordered to ensure adequate hydration  - Maintain NPO status until nausea and vomiting are resolved  - Nasogastric tube if ordered  - Administer ordered antiemetic medications as needed  - Provide nonpharmacologic comfort measures as appropriate  - Advance diet as tolerated, if ordered  - Consider nutrition services referral to assist patient with adequate nutrition and appropriate food choices  Outcome: Progressing  Goal: Maintains adequate nutritional intake  Description: INTERVENTIONS:  - Monitor percentage of each meal consumed  - Identify  factors contributing to decreased intake, treat as appropriate  - Assist with meals as needed  - Monitor I&O, weight, and lab values if indicated  - Obtain nutrition services referral as needed  Outcome: Progressing     Problem: GENITOURINARY - ADULT  Goal: Maintains or returns to baseline urinary function  Description: INTERVENTIONS:  - Assess urinary function  - Encourage oral fluids to ensure adequate hydration if ordered  - Administer IV fluids as ordered to ensure adequate hydration  - Administer ordered medications as needed  - Offer frequent toileting  - Follow urinary retention protocol if ordered  Outcome: Completed  Goal: Absence of urinary retention  Description: INTERVENTIONS:  - Assess patient’s ability to void and empty bladder  - Monitor I/O  - Bladder scan as needed  - Discuss with physician/AP medications to alleviate retention as needed  - Discuss catheterization for long term situations as appropriate  Outcome: Completed     Problem: METABOLIC, FLUID AND ELECTROLYTES - ADULT  Goal: Electrolytes maintained within normal limits  Description: INTERVENTIONS:  - Monitor labs and assess patient for signs and symptoms of electrolyte imbalances  - Administer electrolyte replacement as ordered  - Monitor response to electrolyte replacements, including repeat lab results as appropriate  - Instruct patient on fluid and nutrition as appropriate  Outcome: Progressing  Goal: Fluid balance maintained  Description: INTERVENTIONS:  - Monitor labs   - Monitor I/O and WT  - Instruct patient on fluid and nutrition as appropriate  - Assess for signs & symptoms of volume excess or deficit  Outcome: Progressing  Goal: Glucose maintained within target range  Description: INTERVENTIONS:  - Monitor Blood Glucose as ordered  - Assess for signs and symptoms of hyperglycemia and hypoglycemia  - Administer ordered medications to maintain glucose within target range  - Assess nutritional intake and initiate nutrition service  referral as needed  Outcome: Progressing     Problem: SKIN/TISSUE INTEGRITY - ADULT  Goal: Skin Integrity remains intact(Skin Breakdown Prevention)  Description: Assess:  -Perform [place] assessment every 24  -Clean and moisturize skin every 12  -Inspect skin when repositioning, toileting, and assisting with ADLS    -Assess extremities for adequate circulation and sensation     Bed Management:  -Have minimal linens on bed & keep smooth, unwrinkled  -Change linens as needed when moist or perspiring  -Avoid sitting or lying in one position for more than 2 hours while in bed  -Keep HOB at 45degrees       Activity:  -Mobilize patient 3 times a day  -Encourage activity and walks on unit  -Encourage or provide ROM exercises   -Turn and reposition patient every 2 Hours  -Use appropriate equipment to lift or move patient in bed  -Instruct/ Assist with weight shifting every 2 when out of bed in chair  -Consider limitation of chair time 2 hour intervals    Skin Care:  -Avoid use of baby powder, tape, friction and shearing, hot water or constrictive clothing    -Do not massage red bony areas    Next Steps:  -Teach patient strategies to minimize risks such as hygiene   -Consider consults to  interdisciplinary teams such as wound care  Outcome: Progressing  Goal: Incision(s), wounds(s) or drain site(s) healing without S/S of infection  Description: INTERVENTIONS  - Assess and document dressing, incision, wound bed, drain sites and surrounding tissue  - Provide patient and family education  - Perform skin care/dressing changes every 24  Outcome: Progressing     Problem: Prexisting or High Potential for Compromised Skin Integrity  Goal: Skin integrity is maintained or improved  Description: INTERVENTIONS:  - Identify patients at risk for skin breakdown  - Assess and monitor skin integrity  - Assess and monitor nutrition and hydration status  - Monitor labs   - Assess for incontinence   - Turn and reposition patient  - Assist with  mobility/ambulation  - Relieve pressure over bony prominences  - Avoid friction and shearing  - Provide appropriate hygiene as needed including keeping skin clean and dry  - Evaluate need for skin moisturizer/barrier cream  - Collaborate with interdisciplinary team   - Patient/family teaching  - Consider wound care consult   Outcome: Progressing     Problem: Nutrition/Hydration-ADULT  Goal: Nutrient/Hydration intake appropriate for improving, restoring or maintaining nutritional needs  Description: Monitor and assess patient's nutrition/hydration status for malnutrition. Collaborate with interdisciplinary team and initiate plan and interventions as ordered.  Monitor patient's weight and dietary intake as ordered or per policy. Utilize nutrition screening tool and intervene as necessary. Determine patient's food preferences and provide high-protein, high-caloric foods as appropriate.     INTERVENTIONS:  - Monitor oral intake, urinary output, labs, and treatment plans  - Assess nutrition and hydration status and recommend course of action  - Evaluate amount of meals eaten  - Assist patient with eating if necessary   - Allow adequate time for meals  - Recommend/ encourage appropriate diets, oral nutritional supplements, and vitamin/mineral supplements  - Order, calculate, and assess calorie counts as needed  - Recommend, monitor, and adjust tube feedings and TPN/PPN based on assessed needs  - Assess need for intravenous fluids  - Provide specific nutrition/hydration education as appropriate  - Include patient/family/caregiver in decisions related to nutrition  Outcome: Progressing

## 2024-07-12 NOTE — ASSESSMENT & PLAN NOTE
Evidenced by fever, tachycardia, leukocytosis, tachypnea in setting of bacteremia  Leukocytosis resolved, afebrile  1/2 blood cultures growing group B strep and staph epidermidis  Urine culture grew 10,000-19,000 lactobacillus, likely contaminant  No infectious source on CT c/a/p  No concern for vegetations on ECHO  Repeat blood cultures negative for growth at 48 hours  ID consulted, recommendations appreciated  Transitioned from IV cefazolin to PO amoxicillin to complete 14 days of treatment through 07/20/24

## 2024-07-12 NOTE — ASSESSMENT & PLAN NOTE
Lab Results   Component Value Date    HGBA1C 12.8 (A) 06/18/2024       Recent Labs     07/11/24  1118 07/11/24  1603 07/11/24 2051 07/12/24  0724   POCGLU 273* 218* 201* 154*         Blood Sugar Average: Last 72 hrs:  (P) [id number]  Questionable compliance with home insulin regimen, reports she missed a few doses  Home regimen is 40 units lantus hs  Increasing PO intake, increased lantus to 30 units hs + SSI  Continue to monitor glucose and adjust as needed pending PO intake  Diabetic diet  Patient reported she threw up earlier this morning; was able to keep some eggs down, but still feels queasy  Monitor
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
## 2024-07-12 NOTE — ASSESSMENT & PLAN NOTE
History of noncompliance, reports she missed a few doses of her insulin prior to admission  Encourage compliance with home insulin regimen  Discussed importance of diet and compliance to avoid complications of diabetes.

## 2024-07-12 NOTE — ASSESSMENT & PLAN NOTE
Per patient present for two years.   Per ID, it does not appear infected but would consider skin malignancy. Recommend Dermatology follow up and possible skin biopsy outpatient

## 2024-07-13 VITALS
OXYGEN SATURATION: 100 % | DIASTOLIC BLOOD PRESSURE: 82 MMHG | BODY MASS INDEX: 34.66 KG/M2 | SYSTOLIC BLOOD PRESSURE: 142 MMHG | RESPIRATION RATE: 18 BRPM | HEIGHT: 63 IN | WEIGHT: 195.6 LBS | TEMPERATURE: 98.4 F | HEART RATE: 74 BPM

## 2024-07-13 LAB
ALBUMIN SERPL BCG-MCNC: 3.6 G/DL (ref 3.5–5)
ALP SERPL-CCNC: 53 U/L (ref 34–104)
ALT SERPL W P-5'-P-CCNC: 50 U/L (ref 7–52)
ANION GAP SERPL CALCULATED.3IONS-SCNC: 7 MMOL/L (ref 4–13)
AST SERPL W P-5'-P-CCNC: 38 U/L (ref 13–39)
BILIRUB SERPL-MCNC: 0.53 MG/DL (ref 0.2–1)
BUN SERPL-MCNC: 19 MG/DL (ref 5–25)
CALCIUM SERPL-MCNC: 9.4 MG/DL (ref 8.4–10.2)
CHLORIDE SERPL-SCNC: 97 MMOL/L (ref 96–108)
CO2 SERPL-SCNC: 31 MMOL/L (ref 21–32)
CREAT SERPL-MCNC: 0.99 MG/DL (ref 0.6–1.3)
ERYTHROCYTE [DISTWIDTH] IN BLOOD BY AUTOMATED COUNT: 13.5 % (ref 11.6–15.1)
GFR SERPL CREATININE-BSD FRML MDRD: 68 ML/MIN/1.73SQ M
GLUCOSE SERPL-MCNC: 156 MG/DL (ref 65–140)
GLUCOSE SERPL-MCNC: 172 MG/DL (ref 65–140)
GLUCOSE SERPL-MCNC: 305 MG/DL (ref 65–140)
HCT VFR BLD AUTO: 40 % (ref 34.8–46.1)
HGB BLD-MCNC: 13.4 G/DL (ref 11.5–15.4)
MCH RBC QN AUTO: 28 PG (ref 26.8–34.3)
MCHC RBC AUTO-ENTMCNC: 33.5 G/DL (ref 31.4–37.4)
MCV RBC AUTO: 84 FL (ref 82–98)
PLATELET # BLD AUTO: 213 THOUSANDS/UL (ref 149–390)
PMV BLD AUTO: 9.6 FL (ref 8.9–12.7)
POTASSIUM SERPL-SCNC: 4.1 MMOL/L (ref 3.5–5.3)
PROT SERPL-MCNC: 6.7 G/DL (ref 6.4–8.4)
RBC # BLD AUTO: 4.79 MILLION/UL (ref 3.81–5.12)
SODIUM SERPL-SCNC: 135 MMOL/L (ref 135–147)
WBC # BLD AUTO: 9.45 THOUSAND/UL (ref 4.31–10.16)

## 2024-07-13 PROCEDURE — 82948 REAGENT STRIP/BLOOD GLUCOSE: CPT

## 2024-07-13 PROCEDURE — 99239 HOSP IP/OBS DSCHRG MGMT >30: CPT | Performed by: NURSE PRACTITIONER

## 2024-07-13 PROCEDURE — 80053 COMPREHEN METABOLIC PANEL: CPT | Performed by: NURSE PRACTITIONER

## 2024-07-13 PROCEDURE — 85027 COMPLETE CBC AUTOMATED: CPT | Performed by: NURSE PRACTITIONER

## 2024-07-13 RX ORDER — AMOXICILLIN 500 MG/1
1000 CAPSULE ORAL EVERY 8 HOURS SCHEDULED
Qty: 46 CAPSULE | Refills: 0 | Status: SHIPPED | OUTPATIENT
Start: 2024-07-13 | End: 2024-07-21

## 2024-07-13 RX ORDER — NYSTATIN 100000 [USP'U]/G
POWDER TOPICAL 2 TIMES DAILY
Qty: 15 G | Refills: 0 | Status: SHIPPED | OUTPATIENT
Start: 2024-07-13

## 2024-07-13 RX ADMIN — NYSTATIN: 100000 POWDER TOPICAL at 08:27

## 2024-07-13 RX ADMIN — HEPARIN SODIUM 5000 UNITS: 5000 INJECTION INTRAVENOUS; SUBCUTANEOUS at 05:42

## 2024-07-13 RX ADMIN — CHLORHEXIDINE GLUCONATE 15 ML: 1.2 RINSE ORAL at 08:24

## 2024-07-13 RX ADMIN — INSULIN GLARGINE 30 UNITS: 100 INJECTION, SOLUTION SUBCUTANEOUS at 08:24

## 2024-07-13 RX ADMIN — AMOXICILLIN 1000 MG: 250 CAPSULE ORAL at 05:42

## 2024-07-13 RX ADMIN — LISINOPRIL 20 MG: 20 TABLET ORAL at 08:25

## 2024-07-13 RX ADMIN — ONDANSETRON 4 MG: 2 INJECTION INTRAMUSCULAR; INTRAVENOUS at 05:52

## 2024-07-13 RX ADMIN — SENNOSIDES AND DOCUSATE SODIUM 1 TABLET: 50; 8.6 TABLET ORAL at 08:25

## 2024-07-13 RX ADMIN — METHADONE HYDROCHLORIDE 140 MG: 10 TABLET ORAL at 08:24

## 2024-07-13 RX ADMIN — HYDROCHLOROTHIAZIDE 12.5 MG: 12.5 TABLET ORAL at 08:24

## 2024-07-13 RX ADMIN — POLYETHYLENE GLYCOL 3350 17 G: 17 POWDER, FOR SOLUTION ORAL at 08:24

## 2024-07-13 RX ADMIN — INSULIN LISPRO 1 UNITS: 100 INJECTION, SOLUTION INTRAVENOUS; SUBCUTANEOUS at 08:25

## 2024-07-13 RX ADMIN — VENLAFAXINE HYDROCHLORIDE 300 MG: 150 CAPSULE, EXTENDED RELEASE ORAL at 08:24

## 2024-07-13 RX ADMIN — INSULIN LISPRO 4 UNITS: 100 INJECTION, SOLUTION INTRAVENOUS; SUBCUTANEOUS at 11:11
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
## 2024-07-13 NOTE — ASSESSMENT & PLAN NOTE
Dose verified with [place]  Continue methadone 140 mg daily  As noted above patient indicates that she does have a dose for Sunday and will follow-up with the clinic on Monday

## 2024-07-13 NOTE — ASSESSMENT & PLAN NOTE
History of noncompliance, reports she missed a few doses of her insulin prior to admission  Encourage compliance with home insulin regimen  Discussed importance of diet and compliance to avoid complications of diabetes.   Follow-up outpatient PCP 1 to 2 weeks postdischarge

## 2024-07-13 NOTE — ASSESSMENT & PLAN NOTE
Lab Results   Component Value Date    HGBA1C 12.8 (A) 06/18/2024       Recent Labs     07/12/24  1127 07/12/24  1546 07/12/24 2039 07/13/24  0733   POCGLU 297* 201* 216* 172*         Blood Sugar Average: Last 72 hrs:  (P) 205  Questionable compliance with home insulin regimen, reports she missed a few doses  Home regimen is 40 units lantus hs  Oral intake improved, no further nausea and vomiting.  Follow-up outpatient PCP 1 to 2 weeks postdischarge

## 2024-07-13 NOTE — ASSESSMENT & PLAN NOTE
1/2 blood cultures from admission growing group B strep and staph epidermidis  Leukocytosis resolved, afebrile  ECHO without concern for vegetation  Repeat blood cultures negative for growth at 24 hours  ID consulted, recommendations appreciated  possible transition to from IV cefazolin to PO amoxicillin to complete 14 days of treatment through 07/20/24  Amoxicillin E scribed to patient's retail pharmacy  Follow-up PCP 1 to 2 weeks postdischarge

## 2024-07-13 NOTE — ASSESSMENT & PLAN NOTE
Lab Results   Component Value Date    EGFR 68 07/13/2024    EGFR 63 07/12/2024    EGFR 61 07/11/2024    CREATININE 0.99 07/13/2024    CREATININE 1.05 07/12/2024    CREATININE 1.07 07/11/2024   Baseline Cr around 1.2 - 1.3  Creatinine at baseline  Resumed Lisinopril-HCTZ  Stable

## 2024-07-13 NOTE — ASSESSMENT & PLAN NOTE
Patient presented to ED after spouse found her unresponsive at home. Work up revealed glucose 839, pH 7.24 on VBG, AG 26  Started on DKA insulin gtt and fluid resuscitation and admitted to ICU  Suspect in setting of noncompliance vs acute infection  Now transitioned to lantus and sliding scale  Home regimen includes lantus 40 units hs  Oral intake has improved, no further nausea or vomiting.  Patient indicated that she has all of her supplies and medications that she needs at home.  Is advised to follow-up with her PCP 1 to 2 weeks postdischarge  Patient will be discharged to home today

## 2024-07-13 NOTE — ASSESSMENT & PLAN NOTE
Evidenced by fever, tachycardia, leukocytosis, tachypnea in setting of bacteremia  Leukocytosis resolved, afebrile  1/2 blood cultures growing group B strep and staph epidermidis  Urine culture grew 10,000-19,000 lactobacillus, likely contaminant  No infectious source on CT c/a/p  No concern for vegetations on ECHO  Repeat blood cultures negative for growth to date   ID consulted, recommendations appreciated  Transitioned from IV cefazolin to PO amoxicillin to complete 14 days of treatment through 07/20/24  Amoxicillin E scribed to patient's retail pharmacy.  Follow-up PCP 1 to 2 weeks postdischarge

## 2024-07-13 NOTE — PLAN OF CARE
Problem: PAIN - ADULT  Goal: Verbalizes/displays adequate comfort level or baseline comfort level  Description: Interventions:  - Encourage patient to monitor pain and request assistance  - Assess pain using appropriate pain scale  - Administer analgesics based on type and severity of pain and evaluate response  - Implement non-pharmacological measures as appropriate and evaluate response  - Consider cultural and social influences on pain and pain management  - Notify physician/advanced practitioner if interventions unsuccessful or patient reports new pain  7/13/2024 1057 by Kourtney Quiles RN  Outcome: Progressing  7/13/2024 1057 by Kourtney Quiles RN  Outcome: Progressing     Problem: INFECTION - ADULT  Goal: Absence or prevention of progression during hospitalization  Description: INTERVENTIONS:  - Assess and monitor for signs and symptoms of infection  - Monitor lab/diagnostic results  - Monitor all insertion sites, i.e. indwelling lines, tubes, and drains  - Monitor endotracheal if appropriate and nasal secretions for changes in amount and color  - [place] appropriate cooling/warming therapies per order  - Administer medications as ordered  - Instruct and encourage patient and family to use good hand hygiene technique  - Identify and instruct in appropriate isolation precautions for identified infection/condition  7/13/2024 1057 by Kourtney Quiles RN  Outcome: Progressing  7/13/2024 1057 by Kourtney Quiles RN  Outcome: Progressing     Problem: SAFETY ADULT  Goal: Patient will remain free of falls  Description: INTERVENTIONS:  - Educate patient/family on patient safety including physical limitations  - Instruct patient to call for assistance with activity   - Consult OT/PT to assist with strengthening/mobility   - Keep Call bell within reach  - Keep bed low and locked with side rails adjusted as appropriate  - Keep care items and personal belongings within reach  - Initiate and maintain comfort rounds  -  Make Fall Risk Sign visible to staff  - Offer Toileting every 2 Hours, in advance of need  - Initiate/Maintain bed alarm  - Obtain necessary fall risk management equipment: alarm  - Apply yellow socks and bracelet for high fall risk patients  - Consider moving patient to room near nurses station  7/13/2024 1057 by Kourtney Quiles RN  Outcome: Progressing  7/13/2024 1057 by Kourtney Quiles RN  Outcome: Progressing  Goal: Maintain or return to baseline ADL function  Description: INTERVENTIONS:  -  Assess patient's ability to carry out ADLs; assess patient's baseline for ADL function and identify physical deficits which impact ability to perform ADLs (bathing, care of mouth/teeth, toileting, grooming, dressing, etc.)  - Assess/evaluate cause of self-care deficits   - Assess range of motion  - Assess patient's mobility; develop plan if impaired  - Assess patient's need for assistive devices and provide as appropriate  - Encourage maximum independence but intervene and supervise when necessary  - Involve family in performance of ADLs  - Assess for home care needs following discharge   - Consider OT consult to assist with ADL evaluation and planning for discharge  - Provide patient education as appropriate  7/13/2024 1057 by Kourtney Quiles RN  Outcome: Progressing  7/13/2024 1057 by Kourtney Quiles RN  Outcome: Progressing     Problem: SAFETY ADULT  Goal: Maintain or return to baseline ADL function  Description: INTERVENTIONS:  -  Assess patient's ability to carry out ADLs; assess patient's baseline for ADL function and identify physical deficits which impact ability to perform ADLs (bathing, care of mouth/teeth, toileting, grooming, dressing, etc.)  - Assess/evaluate cause of self-care deficits   - Assess range of motion  - Assess patient's mobility; develop plan if impaired  - Assess patient's need for assistive devices and provide as appropriate  - Encourage maximum independence but intervene and supervise when  necessary  - Involve family in performance of ADLs  - Assess for home care needs following discharge   - Consider OT consult to assist with ADL evaluation and planning for discharge  - Provide patient education as appropriate  7/13/2024 1057 by Kourtney Quiles RN  Outcome: Progressing  7/13/2024 1057 by Kourtney Quiles RN  Outcome: Progressing     Problem: DISCHARGE PLANNING  Goal: Discharge to home or other facility with appropriate resources  Description: INTERVENTIONS:  - Identify barriers to discharge w/patient and caregiver  - Arrange for needed discharge resources and transportation as appropriate  - Identify discharge learning needs (meds, wound care, etc.)  - Arrange for interpretive services to assist at discharge as needed  - Refer to Case Management Department for coordinating discharge planning if the patient needs post-hospital services based on physician/advanced practitioner order or complex needs related to functional status, cognitive ability, or social support system  7/13/2024 1057 by Kourtney Quiles RN  Outcome: Progressing  7/13/2024 1057 by Kourtney Quiles RN  Outcome: Progressing     Problem: GASTROINTESTINAL - ADULT  Goal: Minimal or absence of nausea and/or vomiting  Description: INTERVENTIONS:  - Administer IV fluids if ordered to ensure adequate hydration  - Maintain NPO status until nausea and vomiting are resolved  - Nasogastric tube if ordered  - Administer ordered antiemetic medications as needed  - Provide nonpharmacologic comfort measures as appropriate  - Advance diet as tolerated, if ordered  - Consider nutrition services referral to assist patient with adequate nutrition and appropriate food choices  7/13/2024 1057 by Kourtney Quiles RN  Outcome: Progressing  7/13/2024 1057 by Kourtney Quiles RN  Outcome: Progressing

## 2024-07-13 NOTE — PLAN OF CARE
Problem: PAIN - ADULT  Goal: Verbalizes/displays adequate comfort level or baseline comfort level  Description: Interventions:  - Encourage patient to monitor pain and request assistance  - Assess pain using appropriate pain scale  - Administer analgesics based on type and severity of pain and evaluate response  - Implement non-pharmacological measures as appropriate and evaluate response  - Consider cultural and social influences on pain and pain management  - Notify physician/advanced practitioner if interventions unsuccessful or patient reports new pain  Outcome: Progressing     Problem: INFECTION - ADULT  Goal: Absence or prevention of progression during hospitalization  Description: INTERVENTIONS:  - Assess and monitor for signs and symptoms of infection  - Monitor lab/diagnostic results  - Monitor all insertion sites, i.e. indwelling lines, tubes, and drains  - Monitor endotracheal if appropriate and nasal secretions for changes in amount and color  - [place] appropriate cooling/warming therapies per order  - Administer medications as ordered  - Instruct and encourage patient and family to use good hand hygiene technique  - Identify and instruct in appropriate isolation precautions for identified infection/condition  Outcome: Progressing     Problem: SAFETY ADULT  Goal: Patient will remain free of falls  Description: INTERVENTIONS:  - Educate patient/family on patient safety including physical limitations  - Instruct patient to call for assistance with activity   - Consult OT/PT to assist with strengthening/mobility   - Keep Call bell within reach  - Keep bed low and locked with side rails adjusted as appropriate  - Keep care items and personal belongings within reach  - Initiate and maintain comfort rounds  - Make Fall Risk Sign visible to staff  - Offer Toileting every 2 Hours, in advance of need  - Initiate/Maintain bed alarm  - Obtain necessary fall risk management equipment: alarm  - Apply yellow  socks and bracelet for high fall risk patients  - Consider moving patient to room near nurses station  Outcome: Progressing     Problem: DISCHARGE PLANNING  Goal: Discharge to home or other facility with appropriate resources  Description: INTERVENTIONS:  - Identify barriers to discharge w/patient and caregiver  - Arrange for needed discharge resources and transportation as appropriate  - Identify discharge learning needs (meds, wound care, etc.)  - Arrange for interpretive services to assist at discharge as needed  - Refer to Case Management Department for coordinating discharge planning if the patient needs post-hospital services based on physician/advanced practitioner order or complex needs related to functional status, cognitive ability, or social support system  Outcome: Progressing     Problem: Knowledge Deficit  Goal: Patient/family/caregiver demonstrates understanding of disease process, treatment plan, medications, and discharge instructions  Description: Complete learning assessment and assess knowledge base.  Interventions:  - Provide teaching at level of understanding  - Provide teaching via preferred learning methods  Outcome: Progressing     Problem: GASTROINTESTINAL - ADULT  Goal: Minimal or absence of nausea and/or vomiting  Description: INTERVENTIONS:  - Administer IV fluids if ordered to ensure adequate hydration  - Maintain NPO status until nausea and vomiting are resolved  - Nasogastric tube if ordered  - Administer ordered antiemetic medications as needed  - Provide nonpharmacologic comfort measures as appropriate  - Advance diet as tolerated, if ordered  - Consider nutrition services referral to assist patient with adequate nutrition and appropriate food choices  Outcome: Progressing     Problem: METABOLIC, FLUID AND ELECTROLYTES - ADULT  Goal: Glucose maintained within target range  Description: INTERVENTIONS:  - Monitor Blood Glucose as ordered  - Assess for signs and symptoms of  hyperglycemia and hypoglycemia  - Administer ordered medications to maintain glucose within target range  - Assess nutritional intake and initiate nutrition service referral as needed  Outcome: Progressing

## 2024-07-13 NOTE — DISCHARGE SUMMARY
[facility]  Discharge- Marcia Barron 1975, 49 y.o. female MRN: [id number]  Unit/Bed#: [address] [place] [address] Encounter: [id number]  Primary Care Provider: No primary care provider on file.   Date and time admitted to hospital: 7/7/2024  2:43 AM    * DKA (diabetic ketoacidosis) (HCC)  Assessment & Plan  Patient presented to ED after spouse found her unresponsive at home. Work up revealed glucose 839, pH 7.24 on VBG, AG 26  Started on DKA insulin gtt and fluid resuscitation and admitted to ICU  Suspect in setting of noncompliance vs acute infection  Now transitioned to lantus and sliding scale  Home regimen includes lantus 40 units hs  Oral intake has improved, no further nausea or vomiting.  Patient indicated that she has all of her supplies and medications that she needs at home.  Is advised to follow-up with her PCP 1 to 2 weeks postdischarge  Patient will be discharged to home today    Bacteremia  Assessment & Plan  1/2 blood cultures from admission growing group B strep and staph epidermidis  Leukocytosis resolved, afebrile  ECHO without concern for vegetation  Repeat blood cultures negative for growth at 24 hours  ID consulted, recommendations appreciated  possible transition to from IV cefazolin to PO amoxicillin to complete 14 days of treatment through 07/20/24  Amoxicillin E scribed to patient's retail pharmacy  Follow-up PCP 1 to 2 weeks postdischarge    Sepsis ([facility])  Assessment & Plan  Evidenced by fever, tachycardia, leukocytosis, tachypnea in setting of bacteremia  Leukocytosis resolved, afebrile  1/2 blood cultures growing group B strep and staph epidermidis  Urine culture grew 10,000-19,000 lactobacillus, likely contaminant  No infectious source on CT c/a/p  No concern for vegetations on ECHO  Repeat blood cultures negative for growth to date   ID consulted, recommendations appreciated  Transitioned from IV cefazolin to PO amoxicillin to complete 14 days of treatment through  07/20/24  Amoxicillin E scribed to patient's retail pharmacy.  Follow-up PCP 1 to 2 weeks postdischarge      Essential hypertension  Assessment & Plan  Continue lisinopril/HCTZ   Follow-up outpatient PCP 1 to 2 weeks postdischarge    Skin lesion of left ear  Assessment & Plan  Per patient present for two years.   Per ID, it does not appear infected but would consider skin malignancy.   Recommend Dermatology follow up and possible skin biopsy outpatient     Stage 3b chronic kidney disease (HCC)  Assessment & Plan  Lab Results   Component Value Date    EGFR 68 07/13/2024    EGFR 63 07/12/2024    EGFR 61 07/11/2024    CREATININE 0.99 07/13/2024    CREATININE 1.05 07/12/2024    CREATININE 1.07 07/11/2024   Baseline Cr around 1.2 - 1.3  Creatinine at baseline  Resumed Lisinopril-HCTZ  Stable    Type 2 diabetes mellitus with hyperglycemia, with long-term current use of insulin (HCC)  Assessment & Plan  Lab Results   Component Value Date    HGBA1C 12.8 (A) 06/18/2024       Recent Labs     07/12/24  1127 07/12/24  1546 07/12/24  2039 07/13/24  0733   POCGLU 297* 201* 216* 172*         Blood Sugar Average: Last 72 hrs:  (P) 205  Questionable compliance with home insulin regimen, reports she missed a few doses  Home regimen is 40 units lantus hs  Oral intake improved, no further nausea and vomiting.  Follow-up outpatient PCP 1 to 2 weeks postdischarge  Medical Problems       Resolved Problems  Date Reviewed: 7/13/2024            Resolved    SEYMOUR (acute kidney injury) (HCC) 7/10/2024     Resolved by  Danni Gaspar PA-C    Toxic metabolic encephalopathy 7/11/2024     Resolved by  Amira Ashton        Discharging Physician / Practitioner: SANTANA Carreon  PCP: No primary care provider on file.  Admission Date:   Admission Orders (From admission, onward)       Ordered        07/07/24 0636  INPATIENT ADMISSION  Once                          Discharge Date: 07/13/24    Consultations During Hospital Stay:  Critical  care  ID    Procedures Performed:     Echocardiogram performed in 7/9/2024 shows EF 55%, normal systolic function, wall motion is normal, diastolic function normal.  Trace regurgitation of tricuspid and pulmonic valves as per cardiology report.    Significant Findings / Test Results:   CT of the head performed on 7/7/2024 showed no acute intracranial abnormality as per radiology report.  CT of chest abdomen pelvis performed on 7/7/2024 showed mild interstitial prominence predominantly in the lung bases correlate for mild interstitial edema.  Tiny pulmonary nodules, guidelines on incidental pulmonary nodule no routine follow-up is needed if patient is low risk.  If patient is high risk optional follow-up chest CT at 12 months can be considered.  Mild splenomegaly as per radiology report.    Incidental Findings:   See above      Test Results Pending at Discharge (will require follow up):   None     Outpatient Tests Requested:  None    Complications: None    Reason for Admission: Found unresponsive at home    Hospital Course:   Marcia Barron is a 49 y.o. female patient past medical history of polysubstance abuse, hypertension, anxiety and depression, methadone use, diabetes type 2, CKD stage III and history of noncompliance who originally presented to the hospital on 7/7/2024 due to found unresponsive at home.  She arrived in the ER with a blood sugar greater than 800 and pH 7.24.  Creatinine elevated 4.7, lactic acid 8.3, WBC 18.72, positive UA, procalcitonin 3.73.  She received 2 L of normal saline started on IV antibiotics and was started on a DKA insulin drip.  Patient improved and was transitioned to Lantus and sliding scale insulin.  SEYMOUR resolved prior to discharge.  Patient was also found to be bacteremic, 1/2 blood cultures from admission growing group B strep and Staph epidermidis.  ID was consulted, transition from IV cefazolin to p.o. amoxicillin to complete 14 days of treatment through 7/20/2024.  Repeat  "blood cultures negative to date.  Patient's oral intake improved prior to discharge.  She is advised to follow-up with her PCP 1 to 2 weeks postdischarge.  Patient verbalized understanding.  She reports that she has all of her medications that she needs at home, does not require any refills at this time.  Her amoxicillin is E scribed to her retail pharmacy for pickup.  Patient is discharged home today.            Please see above list of diagnoses and related plan for additional information.     Condition at Discharge: good    Discharge Day Visit / Exam:   Subjective:    Patient reports that she feels much better, is able to eat without any nausea or vomiting.  Was able to tolerate lunch and dinner yesterday, no problems with breakfast this morning.  She is eager to be going home.  We discussed continuing with the oral antibiotic which has been E scribed to her retail pharmacy.  She is also advised to follow-up with her PCP 1 to 2 weeks postdischarge.  She is agreeable to this.    Vitals: Blood Pressure: 142/82 (07/13/24 0823)  Pulse: 74 (07/13/24 0823)  Temperature: 98.4 °F (36.9 °C) (07/13/24 0823)  Temp Source: Oral (07/13/24 0823)  Respirations: 18 (07/13/24 0823)  Height: 5' 3\" (160 cm) (07/09/24 1405)  Weight - Scale: 88.7 kg (195 lb 9.6 oz) (07/13/24 0600)  SpO2: 100 % (07/13/24 0823)    Exam:   Physical Exam  Vitals and nursing note reviewed.   Constitutional:       Appearance: She is obese.   HENT:      Head: Normocephalic.      Nose: Nose normal.   Eyes:      Extraocular Movements: Extraocular movements intact.      Conjunctiva/sclera: Conjunctivae normal.      Pupils: Pupils are equal, round, and reactive to light.   Cardiovascular:      Rate and Rhythm: Normal rate and regular rhythm.      Pulses: Normal pulses.      Heart sounds: Normal heart sounds.   Pulmonary:      Effort: Pulmonary effort is normal.      Breath sounds: Normal breath sounds.   Abdominal:      General: Bowel sounds are normal. There " is no distension.      Palpations: Abdomen is soft.      Tenderness: There is no abdominal tenderness.   Genitourinary:     Comments: Voiding spontaneously   Musculoskeletal:         General: Normal range of motion.      Cervical back: Normal range of motion.   Skin:     General: Skin is warm and dry.      Capillary Refill: Capillary refill takes less than 2 seconds.   Neurological:      General: No focal deficit present.      Mental Status: She is alert and oriented to person, place, and time.   Psychiatric:         Mood and Affect: Mood normal.         Behavior: Behavior normal.         Thought Content: Thought content normal.         Judgment: Judgment normal.          Discussion with Family: Patient declined call to .     Discharge instructions/Information to patient and family:   See after visit summary for information provided to patient and family.      Provisions for Follow-Up Care:  See after visit summary for information related to follow-up care and any pertinent home health orders.      Mobility at time of Discharge:   Basic Mobility Inpatient Raw Score: 24  JH-HLM Goal: 8: Walk 250 feet or more  JH-HLM Achieved: 8: Walk 250 feet ot more  HLM Goal achieved. Continue to encourage appropriate mobility.     Disposition:   Home    Planned Readmission: No      Discharge Statement:  I spent greater than 45 minutes discharging the patient. This time was spent on the day of discharge. I had direct contact with the patient on the day of discharge. Greater than 50% of the total time was spent examining patient, answering all patient questions, arranging and discussing plan of care with patient as well as directly providing post-discharge instructions.  Additional time then spent on discharge activities.    Discharge Medications:  See after visit summary for reconciled discharge medications provided to patient and/or family.      **Please Note: This note may have been constructed using a voice  recognition system**

## 2024-07-15 LAB
BACTERIA BLD CULT: NORMAL
BACTERIA BLD CULT: NORMAL

## 2024-07-16 ENCOUNTER — TELEPHONE (OUTPATIENT)
Age: 49
End: 2024-07-16

## 2024-07-16 NOTE — TELEPHONE ENCOUNTER
Jovita from the [place] [facility] of [facility] called as they were notified the pt tested positive for Hep C in June. They need to know if this is a new dx or a preexisting condition as well as were any other tests ordered, such as liver enzymes. Jovita was advised the pt was referred to a GI for further evaluation and treatment. She will be sending a fax stating the additional info they need.

## 2024-07-19 NOTE — UTILIZATION REVIEW
NOTIFICATION OF ADMISSION DISCHARGE   This is a Notification of Discharge from [facility]. Please be advised that this patient has been discharge from our facility. Below you will find the admission and discharge date and time including the patient’s disposition.   UTILIZATION REVIEW CONTACT:  Estela Gauthier  Utilization   Network Utilization Review Department  Phone: [phone] x carefully listen to the prompts. All voicemails are confidential.  Email: [email or web address]     ADMISSION INFORMATION  PRESENTATION DATE: 7/7/2024  2:43 AM  OBERVATION ADMISSION DATE: N/A  INPATIENT ADMISSION DATE: 7/7/24  6:36 AM   DISCHARGE DATE: 7/13/2024  2:46 PM   DISPOSITION:Home/Self Care    Network Utilization Review Department  ATTENTION: Please call with any questions or concerns to [phone] and carefully listen to the prompts so that you are directed to the right person. All voicemails are confidential.   For Discharge needs, contact Care Management DC Support Team at [phone] opt. 2  Send all requests for admission clinical reviews, approved or denied determinations and any other requests to dedicated fax number below belonging to the campus where the patient is receiving treatment. List of dedicated fax numbers for the Facilities:  FACILITY NAME UR FAX NUMBER   ADMISSION DENIALS (Administrative/Medical Necessity) [phone]   DISCHARGE SUPPORT TEAM ([facility]) [phone]   PARENT CHILD HEALTH (Maternity/NICU/Pediatrics) [phone]   [facility] [phone]   [facility] [phone]   [facility] [phone]   [facility] [phone]   [facility] [phone]   [facility] [phone]   [facility] [phone]   [facility] [phone]    [facility] [phone]   [facility] [phone]   [facility] [phone]   [facility] [phone]

## 2024-07-22 NOTE — TELEPHONE ENCOUNTER
Elise will refax form for completion. She said its a few simple questions that can be answered by a Medical Assistant, does not need to be a doctor.

## 2024-07-23 NOTE — TELEPHONE ENCOUNTER
Elise from [place] [facility] called for follow up on form faxed. Needs additional information and would like form faxed when completed to [phone] and if questions Elise can be reached at [phone]

## 2024-07-30 ENCOUNTER — HOSPITAL ENCOUNTER (EMERGENCY)
Facility: HOSPITAL | Age: 49
Discharge: HOME | End: 2024-07-30
Attending: EMERGENCY MEDICINE
Payer: COMMERCIAL

## 2024-07-30 VITALS
SYSTOLIC BLOOD PRESSURE: 149 MMHG | OXYGEN SATURATION: 99 % | RESPIRATION RATE: 16 BRPM | DIASTOLIC BLOOD PRESSURE: 99 MMHG | HEART RATE: 73 BPM | TEMPERATURE: 98.4 F

## 2024-07-30 DIAGNOSIS — N39.0 UTI (URINARY TRACT INFECTION): Primary | ICD-10-CM

## 2024-07-30 DIAGNOSIS — N17.9 AKI (ACUTE KIDNEY INJURY) (HCC): ICD-10-CM

## 2024-07-30 LAB
ALBUMIN SERPL BCG-MCNC: 3.8 G/DL (ref 3.5–5)
ALP SERPL-CCNC: 66 U/L (ref 34–104)
ALT SERPL W P-5'-P-CCNC: 39 U/L (ref 7–52)
ANION GAP SERPL CALCULATED.3IONS-SCNC: 8 MMOL/L (ref 4–13)
AST SERPL W P-5'-P-CCNC: 26 U/L (ref 13–39)
BACTERIA UR QL AUTO: ABNORMAL /HPF
BASOPHILS # BLD AUTO: 0.02 THOUSANDS/ÂΜL (ref 0–0.1)
BASOPHILS NFR BLD AUTO: 0 % (ref 0–1)
BILIRUB SERPL-MCNC: 0.36 MG/DL (ref 0.2–1)
BILIRUB UR QL STRIP: ABNORMAL
BUN SERPL-MCNC: 23 MG/DL (ref 5–25)
CALCIUM SERPL-MCNC: 9 MG/DL (ref 8.4–10.2)
CHLORIDE SERPL-SCNC: 97 MMOL/L (ref 96–108)
CLARITY UR: ABNORMAL
CO2 SERPL-SCNC: 28 MMOL/L (ref 21–32)
COLOR UR: YELLOW
CREAT SERPL-MCNC: 1.62 MG/DL (ref 0.6–1.3)
EOSINOPHIL # BLD AUTO: 0.13 THOUSAND/ÂΜL (ref 0–0.61)
EOSINOPHIL NFR BLD AUTO: 2 % (ref 0–6)
ERYTHROCYTE [DISTWIDTH] IN BLOOD BY AUTOMATED COUNT: 13.6 % (ref 11.6–15.1)
GFR SERPL CREATININE-BSD FRML MDRD: 37 ML/MIN/1.73SQ M
GLUCOSE SERPL-MCNC: 280 MG/DL (ref 65–140)
GLUCOSE UR STRIP-MCNC: ABNORMAL MG/DL
HCT VFR BLD AUTO: 37.2 % (ref 34.8–46.1)
HGB BLD-MCNC: 12.8 G/DL (ref 11.5–15.4)
HGB UR QL STRIP.AUTO: ABNORMAL
HIV 1+2 AB+HIV1 P24 AG SERPL QL IA: NORMAL
HIV 2 AB SERPL QL IA: NORMAL
HIV1 AB SERPL QL IA: NORMAL
HIV1 P24 AG SERPL QL IA: NORMAL
HYALINE CASTS #/AREA URNS LPF: ABNORMAL /LPF
IMM GRANULOCYTES # BLD AUTO: 0.02 THOUSAND/UL (ref 0–0.2)
IMM GRANULOCYTES NFR BLD AUTO: 0 % (ref 0–2)
KETONES UR STRIP-MCNC: NEGATIVE MG/DL
LEUKOCYTE ESTERASE UR QL STRIP: ABNORMAL
LYMPHOCYTES # BLD AUTO: 2.9 THOUSANDS/ÂΜL (ref 0.6–4.47)
LYMPHOCYTES NFR BLD AUTO: 49 % (ref 14–44)
MCH RBC QN AUTO: 28.4 PG (ref 26.8–34.3)
MCHC RBC AUTO-ENTMCNC: 34.4 G/DL (ref 31.4–37.4)
MCV RBC AUTO: 83 FL (ref 82–98)
MONOCYTES # BLD AUTO: 0.54 THOUSAND/ÂΜL (ref 0.17–1.22)
MONOCYTES NFR BLD AUTO: 9 % (ref 4–12)
MUCOUS THREADS UR QL AUTO: ABNORMAL
NEUTROPHILS # BLD AUTO: 2.37 THOUSANDS/ÂΜL (ref 1.85–7.62)
NEUTS SEG NFR BLD AUTO: 40 % (ref 43–75)
NITRITE UR QL STRIP: NEGATIVE
NON-SQ EPI CELLS URNS QL MICRO: ABNORMAL /HPF
NRBC BLD AUTO-RTO: 0 /100 WBCS
PH UR STRIP.AUTO: 5.5 [PH]
PLATELET # BLD AUTO: 133 THOUSANDS/UL (ref 149–390)
PMV BLD AUTO: 9.5 FL (ref 8.9–12.7)
POTASSIUM SERPL-SCNC: 4.2 MMOL/L (ref 3.5–5.3)
PROT SERPL-MCNC: 7 G/DL (ref 6.4–8.4)
PROT UR STRIP-MCNC: ABNORMAL MG/DL
RBC # BLD AUTO: 4.51 MILLION/UL (ref 3.81–5.12)
RBC #/AREA URNS AUTO: ABNORMAL /HPF
SODIUM SERPL-SCNC: 133 MMOL/L (ref 135–147)
SP GR UR STRIP.AUTO: >=1.03 (ref 1–1.03)
TREPONEMA PALLIDUM IGG+IGM AB [PRESENCE] IN SERUM OR PLASMA BY IMMUNOASSAY: NORMAL
UROBILINOGEN UR STRIP-ACNC: 2 MG/DL
WBC # BLD AUTO: 5.98 THOUSAND/UL (ref 4.31–10.16)
WBC #/AREA URNS AUTO: ABNORMAL /HPF

## 2024-07-30 PROCEDURE — 85025 COMPLETE CBC W/AUTO DIFF WBC: CPT | Performed by: PHYSICIAN ASSISTANT

## 2024-07-30 PROCEDURE — 87086 URINE CULTURE/COLONY COUNT: CPT | Performed by: PHYSICIAN ASSISTANT

## 2024-07-30 PROCEDURE — 96365 THER/PROPH/DIAG IV INF INIT: CPT

## 2024-07-30 PROCEDURE — 99284 EMERGENCY DEPT VISIT MOD MDM: CPT | Performed by: PHYSICIAN ASSISTANT

## 2024-07-30 PROCEDURE — 80053 COMPREHEN METABOLIC PANEL: CPT | Performed by: PHYSICIAN ASSISTANT

## 2024-07-30 PROCEDURE — 81001 URINALYSIS AUTO W/SCOPE: CPT | Performed by: PHYSICIAN ASSISTANT

## 2024-07-30 PROCEDURE — 87491 CHLMYD TRACH DNA AMP PROBE: CPT | Performed by: PHYSICIAN ASSISTANT

## 2024-07-30 PROCEDURE — 96361 HYDRATE IV INFUSION ADD-ON: CPT

## 2024-07-30 PROCEDURE — 87389 HIV-1 AG W/HIV-1&-2 AB AG IA: CPT | Performed by: PHYSICIAN ASSISTANT

## 2024-07-30 PROCEDURE — 36415 COLL VENOUS BLD VENIPUNCTURE: CPT | Performed by: PHYSICIAN ASSISTANT

## 2024-07-30 PROCEDURE — 87591 N.GONORRHOEAE DNA AMP PROB: CPT | Performed by: PHYSICIAN ASSISTANT

## 2024-07-30 PROCEDURE — 99283 EMERGENCY DEPT VISIT LOW MDM: CPT

## 2024-07-30 PROCEDURE — 86780 TREPONEMA PALLIDUM: CPT | Performed by: PHYSICIAN ASSISTANT

## 2024-07-30 RX ORDER — CEFTRIAXONE 1 G/50ML
1000 INJECTION, SOLUTION INTRAVENOUS ONCE
Status: COMPLETED | OUTPATIENT
Start: 2024-07-30 | End: 2024-07-30

## 2024-07-30 RX ORDER — CEPHALEXIN 500 MG/1
500 CAPSULE ORAL 2 TIMES DAILY
Qty: 14 CAPSULE | Refills: 0 | Status: SHIPPED | OUTPATIENT
Start: 2024-07-30 | End: 2024-08-06

## 2024-07-30 RX ADMIN — SODIUM CHLORIDE 1000 ML: 0.9 INJECTION, SOLUTION INTRAVENOUS at 10:39

## 2024-07-30 RX ADMIN — CEFTRIAXONE 1000 MG: 1 INJECTION, SOLUTION INTRAVENOUS at 10:39

## 2024-07-30 RX ADMIN — SODIUM CHLORIDE 1000 ML: 0.9 INJECTION, SOLUTION INTRAVENOUS at 09:51

## 2024-07-30 NOTE — ED PROVIDER NOTES
"History  Chief Complaint   Patient presents with    Urinary Retention     Pt reports increasing urinary retention for the past several days.  States she hasn't been able to void for the past 30 Hrs.     49-year-old female presenting today for evaluation of difficulty urinating over the past few days.  Relays that she was recently admitted and diagnosed with UTI and was on antibiotics, completing her course.  States that she has has gone and decreased amounts, states that she has not urinated in 30 hours, has not had any pain or urge to urinate.  States that she does not have any burning with urination.  Taking her medication as prescribed.  Denies fevers, nausea, vomiting, shortness of breath, chest pain, flank pain etc.  Differential includes but is not limited to UTI, urinary retention, pyelo, etc.        Prior to Admission Medications   Prescriptions Last Dose Informant Patient Reported? Taking?   BD Pen Needle Micro U/F 32G X 6 MM MISC   No No   Sig: USE 3 (THREE) TIMES A DAY BEFORE MEALS   Insulin Syringe-Needle U-100 (BD Insulin Syringe U/F) 31G X 5/16\" 0.3 ML MISC   No No   Sig: 3 TIMES DAILY BEFORE MEALS   albuterol (Proventil HFA) 90 mcg/act inhaler   No No   Sig: Inhale 2 puffs every 6 (six) hours as needed for wheezing   insulin glargine (LANTUS) 100 units/mL subcutaneous injection   Yes No   Sig: Inject 40 Units under the skin daily   insulin lispro (Admelog) 100 units/mL injection   No No   Sig: INJECT 5-10 UNITS UNDER THE SKIN 3 (THREE) TIMES A DAY WITH MEALS   lisinopril-hydrochlorothiazide (PRINZIDE,ZESTORETIC) 20-12.5 MG per tablet   No No   Sig: Take 1 tablet by mouth daily   methadone (DOLOPHINE) 10 mg/mL oral concentrated solution  Self Yes No   Sig: Take 140 mg by mouth daily Methadone clinic   nystatin (MYCOSTATIN) powder   No No   Sig: Apply topically 2 (two) times a day   traZODone (DESYREL) 50 mg tablet   Yes No   Sig: Take 50 mg by mouth daily at bedtime   venlafaxine (EFFEXOR-XR) 150 mg 24 " hr capsule  Self Yes No   Sig: Take 300 mg by mouth daily       Facility-Administered Medications: None       Past Medical History:   Diagnosis Date    Anxiety     Asthma     Cholelithiases 2/5/2020    Added automatically from request for surgery [id number]    Depression     Diabetes mellitus (HCC)     Hypertension     Mixed hyperlipidemia 1/6/2023    Neuropathy     Psychiatric disorder        Past Surgical History:   Procedure Laterality Date    CHOLECYSTECTOMY  2020    CHOLECYSTECTOMY LAPAROSCOPIC N/A 02/10/2020    Procedure: CHOLECYSTECTOMY LAPAROSCOPIC;  Surgeon: Shaheed Nolan MD;  Location: [facility];  Service: General    INCISION AND DRAINAGE OF WOUND Right 07/01/2017    Procedure: INCISION AND DRAINAGE (I&D) EXTREMITY THIGH;  Surgeon: Veto Reyes MD;  Location: [facility];  Service: General       Family History   Problem Relation Age of Onset    Hypertension Mother     Drug abuse Mother     Diabetes Father     Hypertension Father     COPD Father     ADD / ADHD Father     Anxiety disorder Father     Bipolar disorder Father     Kidney cancer Maternal Grandmother     Cancer Maternal Grandmother     Lung cancer Paternal Uncle     Diabetes Family     Hypertension Family     Aneurysm Family         PDF, paternal aunt, other paternal family members    Drug abuse Maternal Uncle     Schizophrenia Maternal Uncle     Suicide Attempts Maternal Uncle      I have reviewed and agree with the history as documented.    E-Cigarette/Vaping    E-Cigarette Use Never User      E-Cigarette/Vaping Substances    Nicotine No     THC No     CBD No     Flavoring No     Other No     Unknown No      Social History     Tobacco Use    Smoking status: Never    Smokeless tobacco: Never    Tobacco comments:     never   Vaping Use    Vaping status: Never Used   Substance Use Topics    Alcohol use: Never    Drug use: Not Currently     Types: Heroin     Comment: hx of IVDU       Review of Systems   Constitutional: Negative.  Negative for  chills, fatigue and fever.   HENT: Negative.  Negative for congestion, postnasal drip, rhinorrhea and sore throat.    Eyes: Negative.    Respiratory: Negative.  Negative for cough, shortness of breath and wheezing.    Cardiovascular: Negative.    Gastrointestinal: Negative.  Negative for abdominal pain, diarrhea, nausea and vomiting.   Endocrine: Negative.    Genitourinary:  Positive for difficulty urinating. Negative for decreased urine volume, dyspareunia, dysuria, enuresis, flank pain, frequency, genital sores, hematuria, menstrual problem, pelvic pain, urgency, vaginal bleeding, vaginal discharge and vaginal pain.   Musculoskeletal: Negative.    Skin: Negative.    Neurological: Negative.    Hematological: Negative.    Psychiatric/Behavioral: Negative.     All other systems reviewed and are negative.      Physical Exam  Physical Exam  Vitals and nursing note reviewed.   Constitutional:       General: She is not in acute distress.     Appearance: Normal appearance. She is well-developed. She is obese. She is not diaphoretic.   HENT:      Head: Normocephalic and atraumatic.      Right Ear: External ear normal.      Left Ear: External ear normal.      Nose: Nose normal.      Mouth/Throat:      Pharynx: No oropharyngeal exudate.   Eyes:      General: No scleral icterus.        Right eye: No discharge.         Left eye: No discharge.      Conjunctiva/sclera: Conjunctivae normal.      Pupils: Pupils are equal, round, and reactive to light.   Cardiovascular:      Rate and Rhythm: Normal rate and regular rhythm.      Pulses: Normal pulses.      Heart sounds: Normal heart sounds. No murmur heard.     No friction rub. No gallop.   Pulmonary:      Effort: Pulmonary effort is normal. No respiratory distress.      Breath sounds: Normal breath sounds. No stridor. No wheezing, rhonchi or rales.   Chest:      Chest wall: No tenderness.   Abdominal:      General: Abdomen is flat. Bowel sounds are normal. There is no distension.       Palpations: Abdomen is soft. There is no mass.      Tenderness: There is no abdominal tenderness. There is no right CVA tenderness, left CVA tenderness, guarding or rebound.      Hernia: No hernia is present.   Musculoskeletal:      Cervical back: Normal range of motion and neck supple.   Lymphadenopathy:      Cervical: No cervical adenopathy.   Skin:     General: Skin is warm and dry.      Capillary Refill: Capillary refill takes less than 2 seconds.      Coloration: Skin is not pale.      Findings: No erythema or rash.   Neurological:      General: No focal deficit present.      Mental Status: She is alert and oriented to person, place, and time. Mental status is at baseline.   Psychiatric:         Thought Content: Thought content normal.         Judgment: Judgment normal.      Comments: Patient intermittently teary-eyed         Vital Signs  ED Triage Vitals   Temperature Pulse Respirations Blood Pressure SpO2   07/30/24 0804 07/30/24 0804 07/30/24 0804 07/30/24 0806 07/30/24 0804   98.4 °F (36.9 °C) 90 18 149/99 98 %      Temp Source Heart Rate Source Patient Position - Orthostatic VS BP Location FiO2 (%)   07/30/24 0804 07/30/24 1123 -- -- --   Oral Monitor         Pain Score       --                  Vitals:    07/30/24 0804 07/30/24 0806 07/30/24 1123   BP:  149/99    Pulse: 90  73         Visual Acuity      ED Medications  Medications   sodium chloride 0.9 % bolus 1,000 mL (0 mL Intravenous Stopped 7/30/24 1051)   sodium chloride 0.9 % bolus 1,000 mL (0 mL Intravenous Stopped 7/30/24 1125)   cefTRIAXone (ROCEPHIN) IVPB (premix in dextrose) 1,000 mg 50 mL (0 mg Intravenous Stopped 7/30/24 1109)       Diagnostic Studies  Results Reviewed       Procedure Component Value Units Date/Time    RPR-Syphilis Screening (Total Syphilis IGG/IGM) [[id number]] Collected: 07/30/24 1039    Lab Status: In process Specimen: Blood from Arm, Right Updated: 07/30/24 1046    HIV 1/2 AG/AB w Reflex [facility] for 2 yr old and above  [[id number]] Collected: 07/30/24 1039    Lab Status: In process Specimen: Blood from Arm, Right Updated: 07/30/24 1046    Chlamydia/GC amplified DNA by PCR [[id number]] Collected: 07/30/24 1021    Lab Status: In process Specimen: Urine, Other Updated: 07/30/24 1026    Urine Microscopic [[id number]]  (Abnormal) Collected: 07/30/24 0930    Lab Status: Final result Specimen: Urine, Clean Catch Updated: 07/30/24 0947     RBC, UA 0-1 /hpf      WBC, UA 10-20 /hpf      Epithelial Cells Moderate /hpf      Bacteria, UA Occasional /hpf      MUCUS THREADS Moderate     Hyaline Casts, UA 10-20 /lpf     Urine culture [[id number]] Collected: 07/30/24 0930    Lab Status: In process Specimen: Urine, Clean Catch Updated: 07/30/24 0947    UA w Reflex to Microscopic w Reflex to Culture [[id number]]  (Abnormal) Collected: 07/30/24 0930    Lab Status: Final result Specimen: Urine, Clean Catch Updated: 07/30/24 0936     Color, UA Yellow     Clarity, UA Slightly Cloudy     Specific Gravity, UA >=1.030     pH, UA 5.5     Leukocytes, UA Large     Nitrite, UA Negative     Protein, UA 30 (1+) mg/dl      Glucose,  (3/20%) mg/dl      Ketones, UA Negative mg/dl      Urobilinogen, UA 2.0 mg/dl      Bilirubin, UA Small     Occult Blood, UA Trace    Comprehensive metabolic panel [[id number]]  (Abnormal) Collected: 07/30/24 0910    Lab Status: Final result Specimen: Blood from Arm, Right Updated: 07/30/24 0933     Sodium 133 mmol/L      Potassium 4.2 mmol/L      Chloride 97 mmol/L      CO2 28 mmol/L      ANION GAP 8 mmol/L      BUN 23 mg/dL      Creatinine 1.62 mg/dL      Glucose 280 mg/dL      Calcium 9.0 mg/dL      AST 26 U/L      ALT 39 U/L      Alkaline Phosphatase 66 U/L      Total Protein 7.0 g/dL      Albumin 3.8 g/dL      Total Bilirubin 0.36 mg/dL      eGFR 37 ml/min/1.73sq m     Narrative:      National Kidney Disease Foundation guidelines for Chronic Kidney Disease (CKD):     Stage 1 with normal or high GFR (GFR > 90 mL/min/1.73 square  meters)    Stage 2 Mild CKD (GFR = 60-89 mL/min/1.73 square meters)    Stage 3A Moderate CKD (GFR = 45-59 mL/min/1.73 square meters)    Stage 3B Moderate CKD (GFR = 30-44 mL/min/1.73 square meters)    Stage 4 Severe CKD (GFR = 15-29 mL/min/1.73 square meters)    Stage 5 End Stage CKD (GFR <15 mL/min/1.73 square meters)  Note: GFR calculation is accurate only with a steady state creatinine    CBC and differential [[id number]]  (Abnormal) Collected: 07/30/24 0910    Lab Status: Final result Specimen: Blood from Arm, Right Updated: 07/30/24 0916     WBC 5.98 Thousand/uL      RBC 4.51 Million/uL      Hemoglobin 12.8 g/dL      Hematocrit 37.2 %      MCV 83 fL      MCH 28.4 pg      MCHC 34.4 g/dL      RDW 13.6 %      MPV 9.5 fL      Platelets 133 Thousands/uL      nRBC 0 /100 WBCs      Segmented % 40 %      Immature Grans % 0 %      Lymphocytes % 49 %      Monocytes % 9 %      Eosinophils Relative 2 %      Basophils Relative 0 %      Absolute Neutrophils 2.37 Thousands/µL      Absolute Immature Grans 0.02 Thousand/uL      Absolute Lymphocytes 2.90 Thousands/µL      Absolute Monocytes 0.54 Thousand/µL      Eosinophils Absolute 0.13 Thousand/µL      Basophils Absolute 0.02 Thousands/µL                    No orders to display              Procedures  Procedures         ED Course  ED Course as of 07/30/24 1131   Tu Jul 30, 2024   1048 Patient getting fluids then will be discharged                                  SBIRT 22yo+      Flowsheet Row Most Recent Value   Initial Alcohol Screen: US AUDIT-C     1. How often do you have a drink containing alcohol? 0 Filed at: 07/30/2024 0803   2. How many drinks containing alcohol do you have on a typical day you are drinking?  0 Filed at: 07/30/2024 0803   3a. Male UNDER 65: How often do you have five or more drinks on one occasion? 0 Filed at: 07/30/2024 0803   3b. FEMALE Any Age, or MALE 65+: How often do you have 4 or more drinks on one occassion? 0 Filed at: 07/30/2024 0803    Audit-C Score 0 Filed at: 07/30/2024 0803   MANUEL: How many times in the past year have you...    Used an illegal drug or used a prescription medication for non-medical reasons? Never Filed at: 07/30/2024 0803                      Medical Decision Making  Patient feeling better after IV hydration.  No evidence of UTI noted, will send out urine culture and treat.  Patient has SEYMOUR with a creatinine of 1.6 compared to 0.99 couple weeks ago during admission.  Encourage patient to increase hydration and will have her repeat her BMP in 2 days.  IV ceftriaxone given here in the emergency department.  Upon further conversation, patient states that she is concerned for potential STDs, has not had any vaginal discharge however relays that she recently found out that her  was cheating on her, states that she found out that he was cheating on her the same day she was admitted few weeks ago.  She is concerned as another individual who he was having intercourse with was diagnosed with HIV.  We will send out HIV, syphilis and GC.  In the meantime we will treat UTI. Discussed case and management with Dr. Hernadez.     Patient is informed to return to the emergency department for worsening of symptoms such as fevers, etc. and was given proper education regarding their diagnosis and symptoms. Otherwise the patient is informed to follow up with their primary care doctor for re-evaluation. The patient verbalizes understanding and agrees with above assessment and plan. All questions were answered.            Amount and/or Complexity of Data Reviewed  Labs: ordered.    Risk  Prescription drug management.                 Disposition  Final diagnoses:   UTI (urinary tract infection)   SEYMOUR (acute kidney injury) ([facility])     Time reflects when diagnosis was documented in both MDM as applicable and the Disposition within this note       Time User Action Codes Description Comment    7/30/2024 11:23 AM Radha Diamond Add [N39.0] UTI  (urinary tract infection)     7/30/2024 11:23 AM Radha Diamond Add [N17.9] SEYMOUR (acute kidney injury) ([facility])           ED Disposition       ED Disposition   Discharge    Condition   Stable    Date/Time   Tue Jul 30, 2024 1122    Comment   Marcia CONNORS Jimaldo discharge to home/self care.                   Follow-up Information       Follow up With Specialties Details Why Contact Info Additional Information    [facility] Emergency Department Emergency Medicine Go to  If symptoms worsen, otherwise please follow up with your family doctor [address] [place] [address] [phone] [facility] Emergency Department, [address] [place] [address], [id number]            Patient's Medications   Discharge Prescriptions    CEPHALEXIN (KEFLEX) 500 MG CAPSULE    Take 1 capsule (500 mg total) by mouth 2 (two) times a day for 7 days       Start Date: 7/30/2024 End Date: 8/6/2024       Order Dose: 500 mg       Quantity: 14 capsule    Refills: 0       Outpatient Discharge Orders   Basic metabolic panel   Standing Status: Future Standing Exp. Date: 07/30/25       PDMP Review       None            ED Provider  Electronically Signed by             Radha Diamond PA-C  07/30/24 1131

## 2024-07-30 NOTE — DISCHARGE INSTRUCTIONS
Please go to outpatient labs in 48 hours to have repeat blood work and please increase your hydration

## 2024-07-30 NOTE — Clinical Note
Marcia Barron was seen and treated in our emergency department on 7/30/2024.                Diagnosis: SEYMOUR Kennedy  may return to work on return date.    She may return on this date: 07/31/2024    Please allow patient to use mouth swabs at methadone clinic until 8/13     If you have any questions or concerns, please don't hesitate to call.      Radha Diamond PA-C    ______________________________           _______________          _______________  Hospital Representative                              Date                                Time

## 2024-07-31 LAB
BACTERIA UR CULT: NORMAL
C TRACH DNA SPEC QL NAA+PROBE: NEGATIVE
N GONORRHOEA DNA SPEC QL NAA+PROBE: NEGATIVE

## 2024-08-06 PROBLEM — E11.10 DKA (DIABETIC KETOACIDOSIS) (HCC): Status: RESOLVED | Noted: 2024-07-07 | Resolved: 2024-08-06

## 2024-08-08 PROBLEM — A41.9 SEPSIS (HCC): Status: RESOLVED | Noted: 2024-07-08 | Resolved: 2024-08-08

## 2024-08-08 PROBLEM — N39.0 UTI (URINARY TRACT INFECTION): Status: RESOLVED | Noted: 2024-07-08 | Resolved: 2024-08-08

## 2024-08-27 ENCOUNTER — OFFICE VISIT (OUTPATIENT)
Dept: FAMILY MEDICINE CLINIC | Facility: CLINIC | Age: 49
End: 2024-08-27
Payer: COMMERCIAL

## 2024-08-27 VITALS
BODY MASS INDEX: 33.84 KG/M2 | WEIGHT: 191 LBS | HEART RATE: 88 BPM | TEMPERATURE: 95.5 F | HEIGHT: 63 IN | OXYGEN SATURATION: 99 % | DIASTOLIC BLOOD PRESSURE: 72 MMHG | SYSTOLIC BLOOD PRESSURE: 118 MMHG | RESPIRATION RATE: 12 BRPM

## 2024-08-27 DIAGNOSIS — Z91.199 NONCOMPLIANCE: Chronic | ICD-10-CM

## 2024-08-27 DIAGNOSIS — I10 ESSENTIAL HYPERTENSION: Chronic | ICD-10-CM

## 2024-08-27 DIAGNOSIS — I12.9 TYPE 2 DIABETES MELLITUS WITH STAGE 3 CHRONIC KIDNEY DISEASE AND HYPERTENSION (HCC): Chronic | ICD-10-CM

## 2024-08-27 DIAGNOSIS — E78.2 MIXED HYPERLIPIDEMIA: Chronic | ICD-10-CM

## 2024-08-27 DIAGNOSIS — G40.909 SEIZURE DISORDER (HCC): Chronic | ICD-10-CM

## 2024-08-27 DIAGNOSIS — F31.9 BIPOLAR 1 DISORDER (HCC): ICD-10-CM

## 2024-08-27 DIAGNOSIS — H25.9 AGE-RELATED CATARACT OF BOTH EYES, UNSPECIFIED AGE-RELATED CATARACT TYPE: ICD-10-CM

## 2024-08-27 DIAGNOSIS — E11.22 TYPE 2 DIABETES MELLITUS WITH STAGE 3 CHRONIC KIDNEY DISEASE AND HYPERTENSION (HCC): Chronic | ICD-10-CM

## 2024-08-27 DIAGNOSIS — E11.65 TYPE 2 DIABETES MELLITUS WITH HYPERGLYCEMIA, WITHOUT LONG-TERM CURRENT USE OF INSULIN (HCC): ICD-10-CM

## 2024-08-27 DIAGNOSIS — Z90.49 STATUS POST LAPAROSCOPIC CHOLECYSTECTOMY: ICD-10-CM

## 2024-08-27 DIAGNOSIS — J45.20 MILD INTERMITTENT ASTHMA WITHOUT COMPLICATION: Chronic | ICD-10-CM

## 2024-08-27 DIAGNOSIS — F90.9 ADULT ADHD: Chronic | ICD-10-CM

## 2024-08-27 DIAGNOSIS — N18.30 TYPE 2 DIABETES MELLITUS WITH STAGE 3 CHRONIC KIDNEY DISEASE AND HYPERTENSION (HCC): Chronic | ICD-10-CM

## 2024-08-27 DIAGNOSIS — R78.81 BACTEREMIA: ICD-10-CM

## 2024-08-27 DIAGNOSIS — F11.20 METHADONE MAINTENANCE THERAPY PATIENT (HCC): Chronic | ICD-10-CM

## 2024-08-27 DIAGNOSIS — N18.32 STAGE 3B CHRONIC KIDNEY DISEASE (HCC): Chronic | ICD-10-CM

## 2024-08-27 DIAGNOSIS — F41.9 ANXIETY AND DEPRESSION: Chronic | ICD-10-CM

## 2024-08-27 DIAGNOSIS — F32.A ANXIETY AND DEPRESSION: Chronic | ICD-10-CM

## 2024-08-27 DIAGNOSIS — Z01.818 PREOP EXAMINATION: Primary | ICD-10-CM

## 2024-08-27 DIAGNOSIS — Z13.0 SCREENING, IRON DEFICIENCY ANEMIA: ICD-10-CM

## 2024-08-27 PROBLEM — Z79.4 TYPE 2 DIABETES MELLITUS WITH HYPERGLYCEMIA, WITH LONG-TERM CURRENT USE OF INSULIN (HCC): Chronic | Status: RESOLVED | Noted: 2023-01-15 | Resolved: 2024-08-27

## 2024-08-27 PROBLEM — H61.92 SKIN LESION OF LEFT EAR: Status: RESOLVED | Noted: 2024-07-11 | Resolved: 2024-08-27

## 2024-08-27 PROCEDURE — 99215 OFFICE O/P EST HI 40 MIN: CPT | Performed by: STUDENT IN AN ORGANIZED HEALTH CARE EDUCATION/TRAINING PROGRAM

## 2024-08-27 RX ORDER — CLONAZEPAM 1 MG/1
1 TABLET ORAL 3 TIMES DAILY
COMMUNITY
Start: 2024-08-08

## 2024-08-27 RX ORDER — PRAZOSIN HYDROCHLORIDE 5 MG/1
5 CAPSULE ORAL
COMMUNITY
Start: 2024-08-08

## 2024-08-27 RX ORDER — GABAPENTIN 800 MG/1
800 TABLET ORAL 3 TIMES DAILY
COMMUNITY
Start: 2024-08-08

## 2024-08-27 RX ORDER — INSULIN LISPRO 100 [IU]/ML
INJECTION, SOLUTION INTRAVENOUS; SUBCUTANEOUS
Qty: 10 ML | Refills: 1 | Status: SHIPPED | OUTPATIENT
Start: 2024-08-27

## 2024-08-27 RX ORDER — INSULIN GLARGINE 100 [IU]/ML
40 INJECTION, SOLUTION SUBCUTANEOUS DAILY
Qty: 10 ML | Refills: 1 | Status: SHIPPED | OUTPATIENT
Start: 2024-08-27

## 2024-08-27 RX ORDER — DIVALPROEX SODIUM 500 MG/1
1500 TABLET, EXTENDED RELEASE ORAL
COMMUNITY
Start: 2024-08-08

## 2024-08-27 NOTE — LETTER
August 27, 2024     Patient: Marcia Barron  YOB: 1975  Date of Visit: 8/27/2024      To Whom it May Concern:    Marcia Barron is under my professional care.     Please allow patient to use mouth swabs at methadone clinic for the next year.    If you have any questions or concerns, please don't hesitate to call.         Sincerely,          Heath Hanna, DO        CC: No Recipients

## 2024-08-27 NOTE — PROGRESS NOTES
Presurgical Evaluation    Subjective:      Patient ID: Marcia Barron is a 49 y.o. female.    Chief Complaint   Patient presents with   • Pre-op Exam     Patient getting cateract surgery on 09/03/24 w/ Dr. Leahy at [facility]        Prior anesthesia: Yes   General; Complications:  None / Tolerated well    CAD History: None    Pulmonary History: None    Renal history: CKD stage 3 - GFR 30-59    Diabetes History:  Type 2  Controlled     Neurological History: Seizure     On Immunosuppressant meds/biologics: No      Review of Systems   Constitutional:  Negative for chills and fever.   HENT:  Negative for ear pain and sore throat.    Eyes:  Negative for pain and visual disturbance.   Respiratory:  Negative for cough and shortness of breath.    Cardiovascular:  Negative for chest pain and palpitations.   Gastrointestinal:  Negative for abdominal pain and vomiting.   Genitourinary:  Negative for dysuria, frequency and hematuria.   Musculoskeletal:  Negative for arthralgias and back pain.   Skin:  Negative for color change and rash.   Neurological:  Negative for seizures and syncope.   All other systems reviewed and are negative.        Current Outpatient Medications   Medication Sig Dispense Refill   • albuterol (Proventil HFA) 90 mcg/act inhaler Inhale 2 puffs every 6 (six) hours as needed for wheezing 6.7 g 5   • BD Pen Needle Micro U/F 32G X 6 MM MISC USE 3 (THREE) TIMES A DAY BEFORE MEALS 100 each 3   • clonazePAM (KlonoPIN) 1 mg tablet Take 1 mg by mouth 3 (three) times a day     • divalproex sodium (DEPAKOTE ER) 500 mg 24 hr tablet Take 1,500 mg by mouth daily at bedtime     • gabapentin (NEURONTIN) 800 mg tablet Take 800 mg by mouth 3 (three) times a day     • insulin glargine (LANTUS) 100 units/mL subcutaneous injection Inject 40 Units under the skin daily 10 mL 1   • insulin lispro (Admelog) 100 units/mL injection INJECT 5-10 UNITS UNDER THE SKIN 3 (THREE) TIMES A DAY WITH MEALS 10 mL 1   • Insulin Syringe-Needle  "U-100 (BD Insulin Syringe U/F) 31G X 5/16\" 0.3 ML MISC 3 TIMES DAILY BEFORE MEALS 100 each 0   • lisinopril-hydrochlorothiazide (PRINZIDE,ZESTORETIC) 20-12.5 MG per tablet Take 1 tablet by mouth daily 90 tablet 3   • methadone (DOLOPHINE) 10 mg/mL oral concentrated solution Take 140 mg by mouth daily Methadone clinic     • nystatin (MYCOSTATIN) powder Apply topically 2 (two) times a day 15 g 0   • prazosin (MINIPRESS) 5 mg capsule Take 5 mg by mouth daily at bedtime     • traZODone (DESYREL) 50 mg tablet Take 50 mg by mouth daily at bedtime     • venlafaxine (EFFEXOR-XR) 150 mg 24 hr capsule Take 300 mg by mouth daily        No current facility-administered medications for this visit.       Allergies on file:   Cat hair extract, Codeine, and Sulfamethoxazole-trimethoprim    Patient Active Problem List   Diagnosis   • Essential hypertension   • Type 2 diabetes mellitus with stage 3 chronic kidney disease and hypertension (HCC)   • Anxiety and depression   • Bipolar 1 disorder (HCC)   • Adult ADHD   • Other insomnia   • Status post laparoscopic cholecystectomy   • Mild intermittent asthma without complication   • Methadone maintenance therapy patient ([facility])   • Stage 3b chronic kidney disease (HCC)   • Hepatitis C antibody test positive   • Seizure disorder ([facility])   • Substance use disorder   • Mixed hyperlipidemia   • Noncompliance        Past Medical History:   Diagnosis Date   • Anxiety    • Asthma    • Cholelithiases 2/5/2020    Added automatically from request for surgery [id number]   • Depression    • Diabetes mellitus (HCC)    • Hypertension    • Mixed hyperlipidemia 1/6/2023   • Neuropathy    • Psychiatric disorder        Past Surgical History:   Procedure Laterality Date   • CHOLECYSTECTOMY  2020   • CHOLECYSTECTOMY LAPAROSCOPIC N/A 02/10/2020    Procedure: CHOLECYSTECTOMY LAPAROSCOPIC;  Surgeon: Shaheed Nolan MD;  Location: [facility];  Service: General   • INCISION AND DRAINAGE OF WOUND Right 07/01/2017    " "Procedure: INCISION AND DRAINAGE (I&D) EXTREMITY THIGH;  Surgeon: Veto Reyes MD;  Location: [facility];  Service: General       Family History   Problem Relation Age of Onset   • Hypertension Mother    • Drug abuse Mother    • Diabetes Father    • Hypertension Father    • COPD Father    • ADD / ADHD Father    • Anxiety disorder Father    • Bipolar disorder Father    • Kidney cancer Maternal Grandmother    • Cancer Maternal Grandmother    • Lung cancer Paternal Uncle    • Diabetes Family    • Hypertension Family    • Aneurysm Family         PDF, paternal aunt, other paternal family members   • Drug abuse Maternal Uncle    • Schizophrenia Maternal Uncle    • Suicide Attempts Maternal Uncle        Social History     Tobacco Use   • Smoking status: Never   • Smokeless tobacco: Never   • Tobacco comments:     never   Vaping Use   • Vaping status: Never Used   Substance Use Topics   • Alcohol use: Never   • Drug use: Not Currently     Types: Heroin     Comment: hx of IVDU       Objective:    Vitals:    08/27/24 1410   BP: 118/72   BP Location: Left arm   Patient Position: Sitting   Pulse: 88   Resp: 12   Temp: (!) 95.5 °F (35.3 °C)   TempSrc: Temporal   SpO2: 99%   Weight: 86.6 kg (191 lb)   Height: 5' 3\" (1.6 m)        Physical Exam  Constitutional:       General: She is not in acute distress.  HENT:      Head: Normocephalic and atraumatic.   Eyes:      General: No scleral icterus.     Conjunctiva/sclera: Conjunctivae normal.   Cardiovascular:      Rate and Rhythm: Normal rate.      Pulses: Normal pulses.      Heart sounds: No murmur heard.  Pulmonary:      Effort: Pulmonary effort is normal. No respiratory distress.      Breath sounds: Normal breath sounds. No wheezing or rales.   Abdominal:      General: Bowel sounds are normal. There is no distension.      Palpations: Abdomen is soft.   Musculoskeletal:         General: No swelling. Normal range of motion.   Skin:     General: Skin is warm and dry.      Capillary " Refill: Capillary refill takes less than 2 seconds.      Coloration: Skin is not jaundiced.   Neurological:      General: No focal deficit present.      Mental Status: She is alert and oriented to person, place, and time. Mental status is at baseline.   Psychiatric:         Mood and Affect: Mood normal.         Behavior: Behavior normal.           Preop labs/testing available and reviewed: yes    eGFR   Date Value Ref Range Status   2024 37 ml/min/1.73sq m Final     WBC   Date Value Ref Range Status   2024 5.98 4.31 - 10.16 Thousand/uL Final     Hemoglobin   Date Value Ref Range Status   2024 12.8 11.5 - 15.4 g/dL Final     Hematocrit   Date Value Ref Range Status   2024 37.2 34.8 - 46.1 % Final     Platelets   Date Value Ref Range Status   2024 133 (L) 149 - 390 Thousands/uL Final          EKG yes    Echo yes    Stress test/cath no    PFT/Bob no    Functional capacity: Mowing lawn, Push mower  5-6 Mets   Pick the highest level patient can comfortably perform   4 mets or greater for surgery    RCRI  High Risk surgery?         1 Point  CAD History:         1 Point   MI; Positive Stress Test; CP due to Mi;  Nitrate Usage to control Angina; Pathologic Q wave on EKG  CHF Active:         1 Point   Pulm Edema; Paroxysmal Nocturnal Dyspnea;  Bibasilar Rales (crackles);S3; CHF on CXR  Cerebrovascular Disease (TIA or CVA):     1 Point  DM on Insulin:        1 Point  Serum Creat >2.0 mg/dl:       1 Point          Total Points: 1     Scorin: Class I, Very Low Risk (0.4%)     1: Class II, Low risk (0.9%)     2: Class III Moderate (6.6%)     3: Class IV High (>11%)      SEYMOUR Risk:  GFR:   eGFR   Date Value Ref Range Status   2024 37 ml/min/1.73sq m Final         For PCP:  If GFR>60, Hold ACE/ARB/Diuretic on the day of surgery, and NSAIDS 10 days before.    If GFR<45, Consider PRE and POST op Nephrology Consult.    If 46 <GFR> 59 : Has Patient had SEYMOUR in last 6 Months? N/A   If YES: Preop  Nephrology consult   If No:  Post Op Nephrology consult.           Assessment/Plan:    Patient is medically optimized for the planned procedure.    Further testing/evaluation is not required.    Postop concerns: no    Problem List Items Addressed This Visit        Cardiovascular and Mediastinum    Type 2 diabetes mellitus with stage 3 chronic kidney disease and hypertension (HCC) (Chronic)    Relevant Medications    insulin glargine (LANTUS) 100 units/mL subcutaneous injection    insulin lispro (Admelog) 100 units/mL injection    prazosin (MINIPRESS) 5 mg capsule    Other Relevant Orders    Comprehensive metabolic panel    Hemoglobin A1C    Essential hypertension (Chronic)    Relevant Medications    prazosin (MINIPRESS) 5 mg capsule       Respiratory    Mild intermittent asthma without complication (Chronic)       Nervous and Auditory    Seizure disorder (HCC) (Chronic)    Relevant Medications    clonazePAM (KlonoPIN) 1 mg tablet    divalproex sodium (DEPAKOTE ER) 500 mg 24 hr tablet    gabapentin (NEURONTIN) 800 mg tablet       Genitourinary    Stage 3b chronic kidney disease (HCC) (Chronic)       Behavioral Health    Adult ADHD (Chronic)    Anxiety and depression (Chronic)    Methadone maintenance therapy patient (HCC) (Chronic)    Noncompliance (Chronic)    Bipolar 1 disorder (HCC)       Surgery/Wound/Pain    Status post laparoscopic cholecystectomy       Other    Mixed hyperlipidemia (Chronic)    RESOLVED: Bacteremia   Other Visit Diagnoses     Preop examination    -  Primary    Screening, iron deficiency anemia        Relevant Orders    CBC and differential    Type 2 diabetes mellitus with hyperglycemia, without long-term current use of insulin (HCC)        Relevant Medications    insulin glargine (LANTUS) 100 units/mL subcutaneous injection    insulin lispro (Admelog) 100 units/mL injection    Age-related cataract of both eyes, unspecified age-related cataract type            Patient is a 49-year-old female  "presenting for preoperative evaluation for left eye cataract procedure with ophthalmology.  Patient is medically optimized for planned procedure, will follow-up with CBC/CMP.  Recently hospitalized for DKA, blood sugars have been well-controlled in the outpatient setting, patient has been compliant with medication.      Continue medication compliance, no anticoagulation or antiplatelet therapy, follow-up blood work, 4-week follow-up recommended.       NOTE: Please use the above to review important meds for your specialty, the remainder \"per anesthesia Guidelines.\"    NOTE: Please place an Inbasket message for \"SOC\" pool for complicated patients.     "

## 2024-09-09 ENCOUNTER — TELEPHONE (OUTPATIENT)
Age: 49
End: 2024-09-09

## 2024-09-09 DIAGNOSIS — R21 RASH: ICD-10-CM

## 2024-09-09 DIAGNOSIS — B49 FUNGAL INFECTION: Primary | ICD-10-CM

## 2024-09-09 RX ORDER — NYSTATIN 100000 [USP'U]/G
POWDER TOPICAL 2 TIMES DAILY
Qty: 15 G | Refills: 0 | Status: CANCELLED | OUTPATIENT
Start: 2024-09-09

## 2024-09-09 RX ORDER — NYSTATIN 100000 [USP'U]/G
POWDER TOPICAL 3 TIMES DAILY
Qty: 60 G | Refills: 0 | Status: SHIPPED | OUTPATIENT
Start: 2024-09-09

## 2024-09-09 NOTE — TELEPHONE ENCOUNTER
Marcia Barron   to  [facility] (supporting Heath Hanna DO)         9/7/24  2:05 AM  Can u send me a refill for my nystatin  power I used in hospital and my Zofran. I dont see gas Dr till November 2004.     Any questions please call me.     Thanks     Marcia

## 2024-10-23 DIAGNOSIS — E11.65 TYPE 2 DIABETES MELLITUS WITH HYPERGLYCEMIA, WITHOUT LONG-TERM CURRENT USE OF INSULIN (HCC): ICD-10-CM

## 2024-10-24 RX ORDER — INSULIN GLARGINE-YFGN 100 [IU]/ML
40 INJECTION, SOLUTION SUBCUTANEOUS DAILY
Qty: 10 ML | Refills: 1 | Status: SHIPPED | OUTPATIENT
Start: 2024-10-24

## 2024-11-18 ENCOUNTER — OFFICE VISIT (OUTPATIENT)
Dept: GASTROENTEROLOGY | Facility: CLINIC | Age: 49
End: 2024-11-18
Payer: COMMERCIAL

## 2024-11-18 VITALS
SYSTOLIC BLOOD PRESSURE: 140 MMHG | WEIGHT: 182 LBS | HEART RATE: 71 BPM | BODY MASS INDEX: 32.25 KG/M2 | HEIGHT: 63 IN | DIASTOLIC BLOOD PRESSURE: 102 MMHG

## 2024-11-18 DIAGNOSIS — Z12.11 SCREENING FOR COLON CANCER: ICD-10-CM

## 2024-11-18 DIAGNOSIS — B18.2 CHRONIC HEPATITIS C WITHOUT HEPATIC COMA (HCC): ICD-10-CM

## 2024-11-18 DIAGNOSIS — R63.4 UNINTENTIONAL WEIGHT LOSS: Primary | ICD-10-CM

## 2024-11-18 PROCEDURE — 99204 OFFICE O/P NEW MOD 45 MIN: CPT | Performed by: STUDENT IN AN ORGANIZED HEALTH CARE EDUCATION/TRAINING PROGRAM

## 2024-11-18 RX ORDER — PREDNISOLONE ACETATE 10 MG/ML
SUSPENSION/ DROPS OPHTHALMIC
COMMUNITY
Start: 2024-10-01

## 2024-11-18 RX ORDER — ONDANSETRON 8 MG/1
8 TABLET, FILM COATED ORAL EVERY 8 HOURS PRN
COMMUNITY

## 2024-11-18 RX ORDER — KETOROLAC TROMETHAMINE 5 MG/ML
SOLUTION OPHTHALMIC
COMMUNITY
Start: 2024-10-24

## 2024-11-18 RX ORDER — OFLOXACIN 3 MG/ML
SOLUTION/ DROPS OPHTHALMIC
COMMUNITY
Start: 2024-10-07

## 2024-11-18 NOTE — LETTER
November 19, 2024     Heath Hanna DO  [address] [facility] [address]    Patient: Marcia Barron   YOB: 1975   Date of Visit: 11/18/2024       Dear Dr. Hanna:    Thank you for referring Marcia Barron to me for evaluation. Below are my notes for this consultation.    If you have questions, please do not hesitate to call me. I look forward to following your patient along with you.         Sincerely,        Jazmine Quinn MD        CC: No Recipients    Jazmine Quinn MD  11/19/2024  9:52 AM  Signed  [facility] - Outpatient Consultation  Marcia Barron 49 y.o. female MRN: [id number]  Encounter: [id number]    PCP:  No primary care provider on file., None  Referring Provider:  Heath Hanna*, [phone]    Patient: Marcia Barron, 1975  Reason for Referral: chronic HCV infection     ASSESSMENT/PLAN:  49 y.o. female with history of DM, HTN, CKD and opioid dependence on MMT who presents for initial evaluation for chronic HCV infection.     She was recently diagnosed with HCV infection on routine screening when she was found to have +HCV Ab with ,000 in June 2024. She acquired HCV through IVDU with earliest use 6 years ago. She does not recall prior jaundice or hepatitis. She has never had a liver biopsy.     Her labs are notable for mildly elevated serum transaminases with ALT 53, preserved synthetic liver function, and chronic thrombocytopenia.  She had an abdominal CT in July 2024 which showed no hepatobiliary abnormality although exam was limited in the absence of contrast.  She has never had an EGD/COY.    We discussed the natural history, prognosis, complications of chronic HCV infection, including progression to cirrhosis and HCC.  We discussed that virologic cure is associated with a reduction in these risks and would recommend treatment with DAA therapy. She will need to complete a serologic workup to exclude HBV/HIV coinfection.  She will also need an  HCV FibroSure and ultrasound elastography to exclude advanced fibrosis.    We discussed that she needs to complete the entire duration of therapy to prevent resistance, treatment failure and limitations in treatment options.  She also agrees to follow up with scheduled office and lab visits.  I also recommended that she pursue an EGD/COY for workup of unintentional weight loss but she would like to defer at this time.    Otherwise, she will return to clinic in 4 to 6 months or sooner if needed.  Thank you for the opportunity to consult her care.    - CBC, CMP and INR  - HCV VL and genotype   - HAV IgG, HBsAg, HBcAb, and HBsAb; vaccinate for HAV and HBV if non-immune  - HIV  - HCV Fibrosure and US elastography  - EGD/COY    Jazmine Quinn MD  Division of Gastroenterology and Hepatology  [facility]    ============================================================================  CC/HPI: 49 y.o. female with history of DM, HTN, CKD and opioid dependence on MMT who presents for initial evaluation for chronic HCV infection.     She was recently diagnosed with HCV infection on routine screening when she was found to have +HCV Ab with ,000 in June 2024. She acquired HCV through IVDU, earliest use was 6 years ago. She has been abstinent for 2 years and is on MMT. She does not recall prior jaundice or hepatitis. She has never had a liver biopsy.     She reports intentional weight loss from 500 lbs (2010) -> 200 lbs four months ago. She reports unintentional weight loss of 18 lbs recently. She reports poor PO intake with nausea/vomiting with nocturnal symptoms.     She was admitted in July for AMS in the context of sepsis. She had a CT head which was negative as well as a pan-CT without source although exam was limited in the absence of contrast.    She reports history of kidney malignancy. She has never had an EGD/COY.     ROS: Complete review of systems otherwise negative.     PAST  MEDICAL/SURGICAL HISTORY:  Past Medical History:   Diagnosis Date   • Anxiety    • Asthma    • Cholelithiases 02/05/2020    Added automatically from request for surgery [id number]   • Depression    • Diabetes mellitus (HCC)    • Hypertension    • Liver disease    • Mixed hyperlipidemia 01/06/2023   • Neuropathy    • Psychiatric disorder         Past Surgical History:   Procedure Laterality Date   • CHOLECYSTECTOMY  2020   • CHOLECYSTECTOMY LAPAROSCOPIC N/A 02/10/2020    Procedure: CHOLECYSTECTOMY LAPAROSCOPIC;  Surgeon: Shaheed Nolan MD;  Location: [facility];  Service: General   • INCISION AND DRAINAGE OF WOUND Right 07/01/2017    Procedure: INCISION AND DRAINAGE (I&D) EXTREMITY THIGH;  Surgeon: Veto Reyes MD;  Location: [facility];  Service: General       FAMILY/SOCIAL HISTORY:  Family History   Problem Relation Age of Onset   • Hypertension Mother    • Drug abuse Mother    • Diabetes Father    • Hypertension Father    • COPD Father    • ADD / ADHD Father    • Anxiety disorder Father    • Bipolar disorder Father    • Kidney cancer Maternal Grandmother    • Cancer Maternal Grandmother    • Lung cancer Paternal Uncle    • Diabetes Family    • Hypertension Family    • Aneurysm Family         PDF, paternal aunt, other paternal family members   • Drug abuse Maternal Uncle    • Schizophrenia Maternal Uncle    • Suicide Attempts Maternal Uncle        Social History     Tobacco Use   • Smoking status: Never   • Smokeless tobacco: Never   • Tobacco comments:     never   Vaping Use   • Vaping status: Never Used   Substance Use Topics   • Alcohol use: Never   • Drug use: Not Currently     Types: Heroin     Comment: hx of IVDU       MEDICATIONS:  Current Outpatient Medications on File Prior to Visit   Medication Sig Dispense Refill   • albuterol (Proventil HFA) 90 mcg/act inhaler Inhale 2 puffs every 6 (six) hours as needed for wheezing 6.7 g 5   • clonazePAM (KlonoPIN) 1 mg tablet Take 1 mg by mouth 3 (three) times a day    "  • divalproex sodium (DEPAKOTE ER) 500 mg 24 hr tablet Take 1,500 mg by mouth daily at bedtime     • gabapentin (NEURONTIN) 800 mg tablet Take 800 mg by mouth 3 (three) times a day     • Insulin Glargine-yfgn 100 UNIT/ML SOLN INJECT 40 UNITS UNDER THE SKIN DAILY 10 mL 1   • insulin lispro (Admelog) 100 units/mL injection INJECT 5-10 UNITS UNDER THE SKIN 3 (THREE) TIMES A DAY WITH MEALS 10 mL 1   • ketorolac (ACULAR) 0.5 % ophthalmic solution PLEASE SEE ATTACHED FOR DETAILED DIRECTIONS     • lisinopril-hydrochlorothiazide (PRINZIDE,ZESTORETIC) 20-12.5 MG per tablet Take 1 tablet by mouth daily 90 tablet 3   • methadone (DOLOPHINE) 10 mg/mL oral concentrated solution Take 140 mg by mouth daily Methadone clinic     • nystatin (MYCOSTATIN) powder Apply topically 3 (three) times a day 60 g 0   • ofloxacin (OCUFLOX) 0.3 % ophthalmic solution PLEASE SEE ATTACHED FOR DETAILED DIRECTIONS     • ondansetron (ZOFRAN) 8 mg tablet Take 8 mg by mouth every 8 (eight) hours as needed for nausea or vomiting     • prazosin (MINIPRESS) 5 mg capsule Take 5 mg by mouth daily at bedtime     • prednisoLONE acetate (PRED FORTE) 1 % ophthalmic suspension PLEASE SEE ATTACHED FOR DETAILED DIRECTIONS     • traZODone (DESYREL) 50 mg tablet Take 50 mg by mouth daily at bedtime     • venlafaxine (EFFEXOR-XR) 150 mg 24 hr capsule Take 300 mg by mouth daily      • BD Pen Needle Micro U/F 32G X 6 MM MISC USE 3 (THREE) TIMES A DAY BEFORE MEALS 100 each 3   • Insulin Syringe-Needle U-100 (BD Insulin Syringe U/F) 31G X 5/16\" 0.3 ML MISC 3 TIMES DAILY BEFORE MEALS 100 each 0     No current facility-administered medications on file prior to visit.       Allergies   Allergen Reactions   • Cat Hair Extract Other (See Comments)   • Codeine Other (See Comments)   • Sulfamethoxazole-Trimethoprim Hives and Other (See Comments)       PHYSICAL EXAM:  BP (!) 140/102 (BP Location: Right leg, Patient Position: Sitting, Cuff Size: Standard)   Pulse 71   Ht 5' 3\" " (1.6 m)   Wt 82.6 kg (182 lb)   LMP 10/07/2022 (Approximate)   BMI 32.24 kg/m²   GENERAL: NAD, AAO  HEENT: anicteric, OP clear, MMM  ABDOMEN: S/ND/NT, normoactive BS, no hepatomegaly, spleen not palpable  EXTREMITIES: no edema  SKIN: no rashes, no palmar erythema, no spider angiomata   NEURO: normal gait, no tremor, no asterixis     LABS/RADIOLOGY/ENDOSCOPY:  Lab Results   Component Value Date    WBC 5.98 07/30/2024    HGB 12.8 07/30/2024    HCT 37.2 07/30/2024     (L) 07/30/2024    GLUF 110 (H) 01/19/2018    BUN 23 07/30/2024    CREATININE 1.62 (H) 07/30/2024    K 4.2 07/30/2024    CL 97 07/30/2024    CO2 28 07/30/2024    BILIDIR 0.00 03/16/2019    ALKPHOS 66 07/30/2024    ALT 39 07/30/2024    AST 26 07/30/2024    GLOB 3.5 06/21/2024    CALCIUM 9.0 07/30/2024    EGFR 37 07/30/2024    TRIG 194 (H) 06/21/2024    HDL 38 (L) 06/21/2024    INR 1.56 (H) 07/07/2024    PTT 26 09/17/2019       MELD 3.0: 18 at 7/9/2024  5:25 AM  MELD-Na: 18 at 7/9/2024  5:25 AM  Calculated from:  Serum Creatinine: 1.75 mg/dL at 7/9/2024  5:25 AM  Serum Sodium: 137 mmol/L at 7/9/2024  5:25 AM  Total Bilirubin: 1.05 mg/dL at 7/7/2024  2:49 AM  Serum Albumin: 4 g/dL (Using max of 3.5 g/dL) at 7/7/2024  2:49 AM  INR(ratio): 1.56 at 7/7/2024  2:49 AM  Age at listing (hypothetical): 49 years  Sex: Female at 7/9/2024  5:25 AM

## 2024-11-18 NOTE — PROGRESS NOTES
[facility] - Outpatient Consultation  Marcia Barron 49 y.o. female MRN: [id number]  Encounter: [id number]    PCP:  No primary care provider on file., None  Referring Provider:  Heath Hanna*, [phone]    Patient: Marcia Barron, 1975  Reason for Referral: chronic HCV infection     ASSESSMENT/PLAN:  49 y.o. female with history of DM, HTN, CKD and opioid dependence on MMT who presents for initial evaluation for chronic HCV infection.     She was recently diagnosed with HCV infection on routine screening when she was found to have +HCV Ab with ,000 in June 2024. She acquired HCV through IVDU with earliest use 6 years ago. She does not recall prior jaundice or hepatitis. She has never had a liver biopsy.     Her labs are notable for mildly elevated serum transaminases with ALT 53, preserved synthetic liver function, and chronic thrombocytopenia.  She had an abdominal CT in July 2024 which showed no hepatobiliary abnormality although exam was limited in the absence of contrast.  She has never had an EGD/COY.    We discussed the natural history, prognosis, complications of chronic HCV infection, including progression to cirrhosis and HCC.  We discussed that virologic cure is associated with a reduction in these risks and would recommend treatment with DAA therapy. She will need to complete a serologic workup to exclude HBV/HIV coinfection.  She will also need an HCV FibroSure and ultrasound elastography to exclude advanced fibrosis.    We discussed that she needs to complete the entire duration of therapy to prevent resistance, treatment failure and limitations in treatment options.  She also agrees to follow up with scheduled office and lab visits.  I also recommended that she pursue an EGD/COY for workup of unintentional weight loss but she would like to defer at this time.    Otherwise, she will return to clinic in 4 to 6 months or sooner if needed.  Thank you for the opportunity  to consult her care.    - CBC, CMP and INR  - HCV VL and genotype   - HAV IgG, HBsAg, HBcAb, and HBsAb; vaccinate for HAV and HBV if non-immune  - HIV  - HCV Fibrosure and US elastography  - EGD/COY    Jazmine Quinn MD  Division of Gastroenterology and Hepatology  [facility]    ============================================================================  CC/HPI: 49 y.o. female with history of DM, HTN, CKD and opioid dependence on MMT who presents for initial evaluation for chronic HCV infection.     She was recently diagnosed with HCV infection on routine screening when she was found to have +HCV Ab with ,000 in June 2024. She acquired HCV through IVDU, earliest use was 6 years ago. She has been abstinent for 2 years and is on MMT. She does not recall prior jaundice or hepatitis. She has never had a liver biopsy.     She reports intentional weight loss from 500 lbs (2010) -> 200 lbs four months ago. She reports unintentional weight loss of 18 lbs recently. She reports poor PO intake with nausea/vomiting with nocturnal symptoms.     She was admitted in July for AMS in the context of sepsis. She had a CT head which was negative as well as a pan-CT without source although exam was limited in the absence of contrast.    She reports history of kidney malignancy. She has never had an EGD/COY.     ROS: Complete review of systems otherwise negative.     PAST MEDICAL/SURGICAL HISTORY:  Past Medical History:   Diagnosis Date    Anxiety     Asthma     Cholelithiases 02/05/2020    Added automatically from request for surgery [id number]    Depression     Diabetes mellitus (HCC)     Hypertension     Liver disease     Mixed hyperlipidemia 01/06/2023    Neuropathy     Psychiatric disorder         Past Surgical History:   Procedure Laterality Date    CHOLECYSTECTOMY  2020    CHOLECYSTECTOMY LAPAROSCOPIC N/A 02/10/2020    Procedure: CHOLECYSTECTOMY LAPAROSCOPIC;  Surgeon: Shaheed Nolan MD;  Location: [facility]  MAIN OR;  Service: General    INCISION AND DRAINAGE OF WOUND Right 07/01/2017    Procedure: INCISION AND DRAINAGE (I&D) EXTREMITY THIGH;  Surgeon: Veto Reyes MD;  Location: [facility] MAIN OR;  Service: General       FAMILY/SOCIAL HISTORY:  Family History   Problem Relation Age of Onset    Hypertension Mother     Drug abuse Mother     Diabetes Father     Hypertension Father     COPD Father     ADD / ADHD Father     Anxiety disorder Father     Bipolar disorder Father     Kidney cancer Maternal Grandmother     Cancer Maternal Grandmother     Lung cancer Paternal Uncle     Diabetes Family     Hypertension Family     Aneurysm Family         PDF, paternal aunt, other paternal family members    Drug abuse Maternal Uncle     Schizophrenia Maternal Uncle     Suicide Attempts Maternal Uncle        Social History     Tobacco Use    Smoking status: Never    Smokeless tobacco: Never    Tobacco comments:     never   Vaping Use    Vaping status: Never Used   Substance Use Topics    Alcohol use: Never    Drug use: Not Currently     Types: Heroin     Comment: hx of IVDU       MEDICATIONS:  Current Outpatient Medications on File Prior to Visit   Medication Sig Dispense Refill    albuterol (Proventil HFA) 90 mcg/act inhaler Inhale 2 puffs every 6 (six) hours as needed for wheezing 6.7 g 5    clonazePAM (KlonoPIN) 1 mg tablet Take 1 mg by mouth 3 (three) times a day      divalproex sodium (DEPAKOTE ER) 500 mg 24 hr tablet Take 1,500 mg by mouth daily at bedtime      gabapentin (NEURONTIN) 800 mg tablet Take 800 mg by mouth 3 (three) times a day      Insulin Glargine-yfgn 100 UNIT/ML SOLN INJECT 40 UNITS UNDER THE SKIN DAILY 10 mL 1    insulin lispro (Admelog) 100 units/mL injection INJECT 5-10 UNITS UNDER THE SKIN 3 (THREE) TIMES A DAY WITH MEALS 10 mL 1    ketorolac (ACULAR) 0.5 % ophthalmic solution PLEASE SEE ATTACHED FOR DETAILED DIRECTIONS      lisinopril-hydrochlorothiazide (PRINZIDE,ZESTORETIC) 20-12.5 MG per tablet Take 1 tablet  "by mouth daily 90 tablet 3    methadone (DOLOPHINE) 10 mg/mL oral concentrated solution Take 140 mg by mouth daily Methadone clinic      nystatin (MYCOSTATIN) powder Apply topically 3 (three) times a day 60 g 0    ofloxacin (OCUFLOX) 0.3 % ophthalmic solution PLEASE SEE ATTACHED FOR DETAILED DIRECTIONS      ondansetron (ZOFRAN) 8 mg tablet Take 8 mg by mouth every 8 (eight) hours as needed for nausea or vomiting      prazosin (MINIPRESS) 5 mg capsule Take 5 mg by mouth daily at bedtime      prednisoLONE acetate (PRED FORTE) 1 % ophthalmic suspension PLEASE SEE ATTACHED FOR DETAILED DIRECTIONS      traZODone (DESYREL) 50 mg tablet Take 50 mg by mouth daily at bedtime      venlafaxine (EFFEXOR-XR) 150 mg 24 hr capsule Take 300 mg by mouth daily       BD Pen Needle Micro U/F 32G X 6 MM MISC USE 3 (THREE) TIMES A DAY BEFORE MEALS 100 each 3    Insulin Syringe-Needle U-100 (BD Insulin Syringe U/F) 31G X 5/16\" 0.3 ML MISC 3 TIMES DAILY BEFORE MEALS 100 each 0     No current facility-administered medications on file prior to visit.       Allergies   Allergen Reactions    Cat Hair Extract Other (See Comments)    Codeine Other (See Comments)    Sulfamethoxazole-Trimethoprim Hives and Other (See Comments)       PHYSICAL EXAM:  BP (!) 140/102 (BP Location: Right leg, Patient Position: Sitting, Cuff Size: Standard)   Pulse 71   Ht 5' 3\" (1.6 m)   Wt 82.6 kg (182 lb)   LMP 10/07/2022 (Approximate)   BMI 32.24 kg/m²   GENERAL: NAD, AAO  HEENT: anicteric, OP clear, MMM  ABDOMEN: S/ND/NT, normoactive BS, no hepatomegaly, spleen not palpable  EXTREMITIES: no edema  SKIN: no rashes, no palmar erythema, no spider angiomata   NEURO: normal gait, no tremor, no asterixis     LABS/RADIOLOGY/ENDOSCOPY:  Lab Results   Component Value Date    WBC 5.98 07/30/2024    HGB 12.8 07/30/2024    HCT 37.2 07/30/2024     (L) 07/30/2024    GLUF 110 (H) 01/19/2018    BUN 23 07/30/2024    CREATININE 1.62 (H) 07/30/2024    K 4.2 07/30/2024    " CL 97 07/30/2024    CO2 28 07/30/2024    BILIDIR 0.00 03/16/2019    ALKPHOS 66 07/30/2024    ALT 39 07/30/2024    AST 26 07/30/2024    GLOB 3.5 06/21/2024    CALCIUM 9.0 07/30/2024    EGFR 37 07/30/2024    TRIG 194 (H) 06/21/2024    HDL 38 (L) 06/21/2024    INR 1.56 (H) 07/07/2024    PTT 26 09/17/2019       MELD 3.0: 18 at 7/9/2024  5:25 AM  MELD-Na: 18 at 7/9/2024  5:25 AM  Calculated from:  Serum Creatinine: 1.75 mg/dL at 7/9/2024  5:25 AM  Serum Sodium: 137 mmol/L at 7/9/2024  5:25 AM  Total Bilirubin: 1.05 mg/dL at 7/7/2024  2:49 AM  Serum Albumin: 4 g/dL (Using max of 3.5 g/dL) at 7/7/2024  2:49 AM  INR(ratio): 1.56 at 7/7/2024  2:49 AM  Age at listing (hypothetical): 49 years  Sex: Female at 7/9/2024  5:25 AM

## 2024-12-11 DIAGNOSIS — J45.20 MILD INTERMITTENT ASTHMA WITHOUT COMPLICATION: ICD-10-CM

## 2024-12-11 DIAGNOSIS — E11.65 TYPE 2 DIABETES MELLITUS WITH HYPERGLYCEMIA, WITHOUT LONG-TERM CURRENT USE OF INSULIN (HCC): ICD-10-CM

## 2024-12-12 RX ORDER — ALBUTEROL SULFATE 90 UG/1
INHALANT RESPIRATORY (INHALATION)
Qty: 6.7 G | Refills: 5 | Status: SHIPPED | OUTPATIENT
Start: 2024-12-12

## 2024-12-12 RX ORDER — INSULIN GLARGINE-YFGN 100 [IU]/ML
40 INJECTION, SOLUTION SUBCUTANEOUS DAILY
Qty: 10 ML | Refills: 1 | Status: SHIPPED | OUTPATIENT
Start: 2024-12-12 | End: 2024-12-19 | Stop reason: SDUPTHER

## 2024-12-19 ENCOUNTER — OFFICE VISIT (OUTPATIENT)
Dept: FAMILY MEDICINE CLINIC | Facility: CLINIC | Age: 49
End: 2024-12-19
Payer: COMMERCIAL

## 2024-12-19 VITALS
SYSTOLIC BLOOD PRESSURE: 90 MMHG | HEIGHT: 63 IN | WEIGHT: 191 LBS | HEART RATE: 72 BPM | OXYGEN SATURATION: 100 % | BODY MASS INDEX: 33.84 KG/M2 | TEMPERATURE: 97 F | DIASTOLIC BLOOD PRESSURE: 62 MMHG | RESPIRATION RATE: 16 BRPM

## 2024-12-19 DIAGNOSIS — Z91.199 NONCOMPLIANCE: Chronic | ICD-10-CM

## 2024-12-19 DIAGNOSIS — E11.65 TYPE 2 DIABETES MELLITUS WITH HYPERGLYCEMIA, WITHOUT LONG-TERM CURRENT USE OF INSULIN (HCC): ICD-10-CM

## 2024-12-19 DIAGNOSIS — F90.9 ADULT ADHD: Chronic | ICD-10-CM

## 2024-12-19 DIAGNOSIS — E78.2 MIXED HYPERLIPIDEMIA: Chronic | ICD-10-CM

## 2024-12-19 DIAGNOSIS — N18.32 STAGE 3B CHRONIC KIDNEY DISEASE (HCC): Chronic | ICD-10-CM

## 2024-12-19 DIAGNOSIS — G47.09 OTHER INSOMNIA: ICD-10-CM

## 2024-12-19 DIAGNOSIS — F32.A ANXIETY AND DEPRESSION: Chronic | ICD-10-CM

## 2024-12-19 DIAGNOSIS — J45.20 MILD INTERMITTENT ASTHMA WITHOUT COMPLICATION: Chronic | ICD-10-CM

## 2024-12-19 DIAGNOSIS — Z90.49 STATUS POST LAPAROSCOPIC CHOLECYSTECTOMY: ICD-10-CM

## 2024-12-19 DIAGNOSIS — N18.30 TYPE 2 DIABETES MELLITUS WITH STAGE 3 CHRONIC KIDNEY DISEASE AND HYPERTENSION (HCC): Chronic | ICD-10-CM

## 2024-12-19 DIAGNOSIS — R76.8 HEPATITIS C ANTIBODY TEST POSITIVE: ICD-10-CM

## 2024-12-19 DIAGNOSIS — R11.2 NAUSEA AND VOMITING, UNSPECIFIED VOMITING TYPE: ICD-10-CM

## 2024-12-19 DIAGNOSIS — F31.9 BIPOLAR 1 DISORDER (HCC): ICD-10-CM

## 2024-12-19 DIAGNOSIS — I10 ESSENTIAL HYPERTENSION: Chronic | ICD-10-CM

## 2024-12-19 DIAGNOSIS — H25.9 SENILE CATARACT OF RIGHT EYE, UNSPECIFIED AGE-RELATED CATARACT TYPE: ICD-10-CM

## 2024-12-19 DIAGNOSIS — E11.22 TYPE 2 DIABETES MELLITUS WITH STAGE 3 CHRONIC KIDNEY DISEASE AND HYPERTENSION (HCC): Chronic | ICD-10-CM

## 2024-12-19 DIAGNOSIS — G40.909 SEIZURE DISORDER (HCC): Chronic | ICD-10-CM

## 2024-12-19 DIAGNOSIS — Z00.00 ANNUAL PHYSICAL EXAM: Primary | ICD-10-CM

## 2024-12-19 DIAGNOSIS — F11.20 METHADONE MAINTENANCE THERAPY PATIENT (HCC): Chronic | ICD-10-CM

## 2024-12-19 DIAGNOSIS — I12.9 TYPE 2 DIABETES MELLITUS WITH STAGE 3 CHRONIC KIDNEY DISEASE AND HYPERTENSION (HCC): Chronic | ICD-10-CM

## 2024-12-19 DIAGNOSIS — F41.9 ANXIETY AND DEPRESSION: Chronic | ICD-10-CM

## 2024-12-19 PROBLEM — F19.90 SUBSTANCE USE DISORDER: Chronic | Status: RESOLVED | Noted: 2023-01-06 | Resolved: 2024-12-19

## 2024-12-19 LAB
ALBUMIN SERPL-MCNC: 3.8 G/DL (ref 3.9–4.9)
ALP SERPL-CCNC: 75 IU/L (ref 44–121)
ALT SERPL-CCNC: 33 IU/L (ref 0–32)
AST SERPL-CCNC: 34 IU/L (ref 0–40)
BASOPHILS # BLD AUTO: 0 X10E3/UL (ref 0–0.2)
BASOPHILS NFR BLD AUTO: 0 %
BILIRUB SERPL-MCNC: 0.3 MG/DL (ref 0–1.2)
BUN SERPL-MCNC: 25 MG/DL (ref 6–24)
BUN/CREAT SERPL: 23 (ref 9–23)
CALCIUM SERPL-MCNC: 9.6 MG/DL (ref 8.7–10.2)
CHLORIDE SERPL-SCNC: 95 MMOL/L (ref 96–106)
CO2 SERPL-SCNC: 24 MMOL/L (ref 20–29)
CREAT SERPL-MCNC: 1.09 MG/DL (ref 0.57–1)
EGFR: 63 ML/MIN/1.73
EOSINOPHIL # BLD AUTO: 0.2 X10E3/UL (ref 0–0.4)
EOSINOPHIL NFR BLD AUTO: 3 %
ERYTHROCYTE [DISTWIDTH] IN BLOOD BY AUTOMATED COUNT: 13.7 % (ref 11.7–15.4)
EST. AVERAGE GLUCOSE BLD GHB EST-MCNC: 217 MG/DL
GLOBULIN SER-MCNC: 3.4 G/DL (ref 1.5–4.5)
GLUCOSE SERPL-MCNC: 295 MG/DL (ref 70–99)
HBA1C MFR BLD: 9.2 % (ref 4.8–5.6)
HCT VFR BLD AUTO: 38.4 % (ref 34–46.6)
HGB BLD-MCNC: 12.9 G/DL (ref 11.1–15.9)
IMM GRANULOCYTES # BLD: 0 X10E3/UL (ref 0–0.1)
IMM GRANULOCYTES NFR BLD: 0 %
LYMPHOCYTES # BLD AUTO: 2.8 X10E3/UL (ref 0.7–3.1)
LYMPHOCYTES NFR BLD AUTO: 40 %
MCH RBC QN AUTO: 29.3 PG (ref 26.6–33)
MCHC RBC AUTO-ENTMCNC: 33.6 G/DL (ref 31.5–35.7)
MCV RBC AUTO: 87 FL (ref 79–97)
MICRODELETION SYND BLD/T FISH: NORMAL
MICRODELETION SYND BLD/T FISH: NORMAL
MONOCYTES # BLD AUTO: 0.6 X10E3/UL (ref 0.1–0.9)
MONOCYTES NFR BLD AUTO: 8 %
NEUTROPHILS # BLD AUTO: 3.3 X10E3/UL (ref 1.4–7)
NEUTROPHILS NFR BLD AUTO: 49 %
PLATELET # BLD AUTO: 188 X10E3/UL (ref 150–450)
POTASSIUM SERPL-SCNC: 4.1 MMOL/L (ref 3.5–5.2)
PROT SERPL-MCNC: 7.2 G/DL (ref 6–8.5)
RBC # BLD AUTO: 4.41 X10E6/UL (ref 3.77–5.28)
SL AMB POCT HEMOGLOBIN AIC: 9.2 (ref ?–6.5)
SODIUM SERPL-SCNC: 137 MMOL/L (ref 134–144)
WBC # BLD AUTO: 6.8 X10E3/UL (ref 3.4–10.8)

## 2024-12-19 PROCEDURE — 99214 OFFICE O/P EST MOD 30 MIN: CPT | Performed by: STUDENT IN AN ORGANIZED HEALTH CARE EDUCATION/TRAINING PROGRAM

## 2024-12-19 PROCEDURE — 83036 HEMOGLOBIN GLYCOSYLATED A1C: CPT | Performed by: STUDENT IN AN ORGANIZED HEALTH CARE EDUCATION/TRAINING PROGRAM

## 2024-12-19 PROCEDURE — 99396 PREV VISIT EST AGE 40-64: CPT | Performed by: STUDENT IN AN ORGANIZED HEALTH CARE EDUCATION/TRAINING PROGRAM

## 2024-12-19 RX ORDER — INSULIN GLARGINE-YFGN 100 [IU]/ML
10 INJECTION, SOLUTION SUBCUTANEOUS DAILY
Qty: 10 ML | Refills: 1 | Status: SHIPPED | OUTPATIENT
Start: 2024-12-19

## 2024-12-19 RX ORDER — ONDANSETRON 8 MG/1
8 TABLET, FILM COATED ORAL EVERY 8 HOURS PRN
Qty: 20 TABLET | Refills: 0 | Status: SHIPPED | OUTPATIENT
Start: 2024-12-19

## 2024-12-19 RX ORDER — ARIPIPRAZOLE 10 MG/1
10 TABLET ORAL DAILY
COMMUNITY
Start: 2024-12-02

## 2024-12-19 NOTE — PATIENT INSTRUCTIONS
"Patient Education     Routine physical for adults   The Basics   Written by the doctors and editors at [facility]   What is a physical? -- A physical is a routine visit, or \"check-up,\" with your doctor. You might also hear it called a \"wellness visit\" or \"preventive visit.\"  During each visit, the doctor will:   Ask about your physical and mental health   Ask about your habits, behaviors, and lifestyle   Do an exam   Give you vaccines if needed   Talk to you about any medicines you take   Give advice about your health   Answer your questions  Getting regular check-ups is an important part of taking care of your health. It can help your doctor find and treat any problems you have. But it's also important for preventing health problems.  A routine physical is different from a \"sick visit.\" A sick visit is when you see a doctor because of a health concern or problem. Since physicals are scheduled ahead of time, you can think about what you want to ask the doctor.  How often should I get a physical? -- It depends on your age and health. In general, for people age 21 years and older:   If you are younger than 50 years, you might be able to get a physical every 3 years.   If you are 50 years or older, your doctor might recommend a physical every year.  If you have an ongoing health condition, like diabetes or high blood pressure, your doctor will probably want to see you more often.  What happens during a physical? -- In general, each visit will include:   Physical exam - The doctor or nurse will check your height, weight, heart rate, and blood pressure. They will also look at your eyes and ears. They will ask about how you are feeling and whether you have any symptoms that bother you.   Medicines - It's a good idea to bring a list of all the medicines you take to each doctor visit. Your doctor will talk to you about your medicines and answer any questions. Tell them if you are having any side effects that bother you. You " "should also tell them if you are having trouble paying for any of your medicines.   Habits and behaviors - This includes:   Your diet   Your exercise habits   Whether you smoke, drink alcohol, or use drugs   Whether you are sexually active   Whether you feel safe at home  Your doctor will talk to you about things you can do to improve your health and lower your risk of health problems. They will also offer help and support. For example, if you want to quit smoking, they can give you advice and might prescribe medicines. If you want to improve your diet or get more physical activity, they can help you with this, too.   Lab tests, if needed - The tests you get will depend on your age and situation. For example, your doctor might want to check your:   Cholesterol   Blood sugar   Iron level   Vaccines - The recommended vaccines will depend on your age, health, and what vaccines you already had. Vaccines are very important because they can prevent certain serious or deadly infections.   Discussion of screening - \"Screening\" means checking for diseases or other health problems before they cause symptoms. Your doctor can recommend screening based on your age, risk, and preferences. This might include tests to check for:   Cancer, such as breast, prostate, cervical, ovarian, colorectal, prostate, lung, or skin cancer   Sexually transmitted infections, such as chlamydia and gonorrhea   Mental health conditions like depression and anxiety  Your doctor will talk to you about the different types of screening tests. They can help you decide which screenings to have. They can also explain what the results might mean.   Answering questions - The physical is a good time to ask the doctor or nurse questions about your health. If needed, they can refer you to other doctors or specialists, too.  Adults older than 65 years often need other care, too. As you get older, your doctor will talk to you about:   How to prevent falling at " home   Hearing or vision tests   Memory testing   How to take your medicines safely   Making sure that you have the help and support you need at home  All topics are updated as new evidence becomes available and our peer review process is complete.  This topic retrieved from [employer] on: May 02, 2024.  Topic 483670 Version 1.0  Release: 32.4.3 - C32.122  © 2024 UpToDate, Inc. and/or its affiliates. All rights reserved.  Consumer Information Use and Disclaimer   Disclaimer: This generalized information is a limited summary of diagnosis, treatment, and/or medication information. It is not meant to be comprehensive and should be used as a tool to help the user understand and/or assess potential diagnostic and treatment options. It does NOT include all information about conditions, treatments, medications, side effects, or risks that may apply to a specific patient. It is not intended to be medical advice or a substitute for the medical advice, diagnosis, or treatment of a health care provider based on the health care provider's examination and assessment of a patient's specific and unique circumstances. Patients must speak with a health care provider for complete information about their health, medical questions, and treatment options, including any risks or benefits regarding use of medications. This information does not endorse any treatments or medications as safe, effective, or approved for treating a specific patient. UpToDate, Inc. and its affiliates disclaim any warranty or liability relating to this information or the use thereof.The use of this information is governed by the Terms of Use, available at https://www.[employer].com/en/know/clinical-effectiveness-terms. 2024© UpToDate, Inc. and its affiliates and/or licensors. All rights reserved.  Copyright   © 2024 UpToDate, Inc. and/or its affiliates. All rights reserved.

## 2024-12-19 NOTE — PROGRESS NOTES
Adult Annual Physical  Name: Marcia Barron      : 1975      MRN: [id number]  Encounter Provider: Heath Hanna DO  Encounter Date: 2024   Encounter department: [facility]    Assessment & Plan  Annual physical exam         Type 2 diabetes mellitus with stage 3 chronic kidney disease and hypertension (HCC)    Lab Results   Component Value Date    HGBA1C 9.2 (A) 2024     Orders:  •  POCT hemoglobin A1c    Senile cataract of right eye, unspecified age-related cataract type         Essential hypertension         Mild intermittent asthma without complication         Seizure disorder (HCC)         Stage 3b chronic kidney disease (HCC)  Lab Results   Component Value Date    EGFR 37 2024    EGFR 68 2024    EGFR 63 2024    CREATININE 1.62 (H) 2024    CREATININE 0.99 2024    CREATININE 1.05 2024          Adult ADHD         Anxiety and depression         Bipolar 1 disorder (HCC)         Methadone maintenance therapy patient (HCC)         Noncompliance         Status post laparoscopic cholecystectomy         Other insomnia         Hepatitis C antibody test positive         Mixed hyperlipidemia         Nausea and vomiting, unspecified vomiting type    Orders:  •  ondansetron (ZOFRAN) 8 mg tablet; Take 1 tablet (8 mg total) by mouth every 8 (eight) hours as needed for nausea or vomiting    Type 2 diabetes mellitus with hyperglycemia, without long-term current use of insulin (HCC)    Lab Results   Component Value Date    HGBA1C 9.2 (A) 2024     Orders:  •  Insulin Glargine-yfgn 100 UNIT/ML SOLN; Inject 0.1 mL (10 Units total) as directed daily        Patient is a pleasant 49-year-old female coming in for annual physical/follow-up chronic disease management.  Patient's hemoglobin A1c has improved to 9.2%, continue basal/bolus regimen, monitor for hypoglycemia.  Recommend recheck in 3 months.    Cataract surgery planned with ophthalmology this  coming Monday, patient is medically optimized for planned procedure, benefits outweigh risk.    Anticipate patient will continue to feel better with appropriate glycemic A1c control.  No changes to medications today in office, patient intermittently does use Zofran, refilled.    Patient did have blood work earlier today, pending at this time.    Immunizations and preventive care screenings were discussed with patient today. Appropriate education was printed on patient's after visit summary.    Counseling:  Alcohol/drug use: discussed moderation in alcohol intake, the recommendations for healthy alcohol use, and avoidance of illicit drug use.  Dental Health: discussed importance of regular tooth brushing, flossing, and dental visits.  Injury prevention: discussed safety/seat belts, safety helmets, smoke detectors, carbon monoxide detectors, and smoking near bedding or upholstery.  Sexual health: discussed sexually transmitted diseases, partner selection, use of condoms, avoidance of unintended pregnancy, and contraceptive alternatives.  Exercise: the importance of regular exercise/physical activity was discussed. Recommend exercise 3-5 times per week for at least 30 minutes.          History of Present Illness     Adult Annual Physical:  Patient presents for annual physical.     Diet and Physical Activity:  - Diet/Nutrition: well balanced diet.  - Exercise: 3-4 times a week on average and 1-2 times a week on average.    General Health:  - Sleep: sleeps well.  - Hearing: normal hearing bilateral ears.  - Vision: no vision problems.  - Dental: regular dental visits.    /GYN Health:  - Follows with GYN: yes.   - History of STDs: no    Review of Systems   Constitutional:  Negative for chills and fever.   HENT:  Negative for ear pain and sore throat.    Eyes:  Negative for pain and visual disturbance.   Respiratory:  Negative for cough, shortness of breath and wheezing.    Cardiovascular:  Negative for chest pain,  "palpitations and leg swelling.   Gastrointestinal:  Negative for abdominal pain and vomiting.   Genitourinary:  Negative for dysuria and hematuria.   Musculoskeletal:  Negative for arthralgias and back pain.   Skin:  Negative for color change and rash.   Neurological:  Negative for dizziness, seizures, syncope and headaches.   Psychiatric/Behavioral:  Negative for agitation, behavioral problems and confusion.    All other systems reviewed and are negative.        Objective   BP 90/62 (BP Location: Left arm, Patient Position: Sitting, Cuff Size: Adult)   Pulse 72   Temp (!) 97 °F (36.1 °C) (Temporal)   Resp 16   Ht 5' 3\" (1.6 m)   Wt 86.6 kg (191 lb)   LMP 10/07/2022 (Approximate)   SpO2 100%   BMI 33.83 kg/m²     Physical Exam  Vitals and nursing note reviewed.   Constitutional:       General: She is not in acute distress.     Appearance: She is well-developed.   HENT:      Head: Normocephalic and atraumatic.   Eyes:      General: No scleral icterus.     Conjunctiva/sclera: Conjunctivae normal.   Cardiovascular:      Rate and Rhythm: Normal rate and regular rhythm.      Pulses: Normal pulses.      Heart sounds: No murmur heard.  Pulmonary:      Effort: Pulmonary effort is normal. No respiratory distress.      Breath sounds: Normal breath sounds.   Abdominal:      General: Bowel sounds are normal. There is no distension.      Palpations: Abdomen is soft.      Tenderness: There is no abdominal tenderness.   Musculoskeletal:         General: No swelling. Normal range of motion.      Cervical back: Neck supple.   Skin:     General: Skin is warm and dry.      Capillary Refill: Capillary refill takes less than 2 seconds.   Neurological:      General: No focal deficit present.      Mental Status: She is alert and oriented to person, place, and time. Mental status is at baseline.   Psychiatric:         Mood and Affect: Mood normal.         Behavior: Behavior normal.         "

## 2024-12-19 NOTE — ASSESSMENT & PLAN NOTE
Lab Results   Component Value Date    EGFR 37 07/30/2024    EGFR 68 07/13/2024    EGFR 63 07/12/2024    CREATININE 1.62 (H) 07/30/2024    CREATININE 0.99 07/13/2024    CREATININE 1.05 07/12/2024

## 2024-12-19 NOTE — ASSESSMENT & PLAN NOTE
Lab Results   Component Value Date    HGBA1C 9.2 (A) 12/19/2024     Orders:  •  POCT hemoglobin A1c

## 2024-12-20 ENCOUNTER — TELEPHONE (OUTPATIENT)
Age: 49
End: 2024-12-20

## 2024-12-20 ENCOUNTER — RESULTS FOLLOW-UP (OUTPATIENT)
Dept: FAMILY MEDICINE CLINIC | Facility: CLINIC | Age: 49
End: 2024-12-20

## 2024-12-20 NOTE — TELEPHONE ENCOUNTER
Pt called to request we send our pre-op clearance note to Dr Lockhart at [phone] as soon as possible.

## 2025-01-19 DIAGNOSIS — L08.9 SKIN INFECTION: Primary | ICD-10-CM

## 2025-01-19 RX ORDER — MUPIROCIN 20 MG/G
OINTMENT TOPICAL 3 TIMES DAILY
Qty: 15 G | Refills: 0 | Status: SHIPPED | OUTPATIENT
Start: 2025-01-19

## 2025-02-04 DIAGNOSIS — R11.2 NAUSEA AND VOMITING, UNSPECIFIED VOMITING TYPE: ICD-10-CM

## 2025-02-04 RX ORDER — ONDANSETRON 8 MG/1
TABLET, FILM COATED ORAL
Qty: 20 TABLET | Refills: 0 | Status: SHIPPED | OUTPATIENT
Start: 2025-02-04

## 2025-02-26 ENCOUNTER — TELEPHONE (OUTPATIENT)
Age: 50
End: 2025-02-26

## 2025-02-26 NOTE — TELEPHONE ENCOUNTER
Spoke with Jennifer at [employer]. She says patient reached out to them for insulin pump and she needs Dr Hanna to send RX.    I called patient and she confirmed that she requested wireless insulin pump from [employer] to wear on arm as it will be easier to check sugars.    Told Jennifer at [employer] - she will fax form.

## 2025-02-26 NOTE — TELEPHONE ENCOUNTER
Jennifer calling on behalf of the patient from [employer].  She stated that the patient has tried calling the office several times and she is not getting an answer back in regards to her [employer] refills.    There are no messages in chart that patient has called, nor is there record of patient receiving [facility] insulin pumps that are in chart.    Called office clinical for assistance and clarification for Jennifer.  Lauren transferred Jennifer to Lien in office.

## 2025-03-03 DIAGNOSIS — N18.30 TYPE 2 DIABETES MELLITUS WITH STAGE 3 CHRONIC KIDNEY DISEASE AND HYPERTENSION (HCC): Primary | ICD-10-CM

## 2025-03-03 DIAGNOSIS — E11.22 TYPE 2 DIABETES MELLITUS WITH STAGE 3 CHRONIC KIDNEY DISEASE AND HYPERTENSION (HCC): Primary | ICD-10-CM

## 2025-03-03 DIAGNOSIS — I12.9 TYPE 2 DIABETES MELLITUS WITH STAGE 3 CHRONIC KIDNEY DISEASE AND HYPERTENSION (HCC): Primary | ICD-10-CM

## 2025-03-03 RX ORDER — LANCETS 33 GAUGE
EACH MISCELLANEOUS
Qty: 100 EACH | Refills: 3 | Status: SHIPPED | OUTPATIENT
Start: 2025-03-03

## 2025-03-03 RX ORDER — BLOOD SUGAR DIAGNOSTIC
STRIP MISCELLANEOUS
Qty: 100 EACH | Refills: 3 | Status: SHIPPED | OUTPATIENT
Start: 2025-03-03

## 2025-03-03 NOTE — TELEPHONE ENCOUNTER
Patient advised. She has appointment with Dr Howie rodriguez 6/5. In meantime she needs refill One Touch verio test strips sent to [facility]. Do not sent on med list.

## 2025-03-06 NOTE — TELEPHONE ENCOUNTER
Jennifer from [employer] called again to see if we received their fax. We still have not and she was advised the pt will be seeing endo.

## 2025-03-13 ENCOUNTER — HOSPITAL ENCOUNTER (OUTPATIENT)
Dept: RADIOLOGY | Facility: HOSPITAL | Age: 50
Discharge: HOME | End: 2025-03-13
Attending: STUDENT IN AN ORGANIZED HEALTH CARE EDUCATION/TRAINING PROGRAM
Payer: COMMERCIAL

## 2025-03-13 DIAGNOSIS — B18.2 CHRONIC HEPATITIS C WITHOUT HEPATIC COMA (HCC): ICD-10-CM

## 2025-03-13 PROCEDURE — 76981 USE PARENCHYMA: CPT

## 2025-03-13 PROCEDURE — 76705 ECHO EXAM OF ABDOMEN: CPT

## 2025-03-14 ENCOUNTER — TELEPHONE (OUTPATIENT)
Age: 50
End: 2025-03-14

## 2025-03-14 NOTE — TELEPHONE ENCOUNTER
Patients GI provider:  Dr. Quinn    Number to return call: [phone]    Reason for call: Pt calling re: US results. She would like Dr. Quinn or her clinical team to call her once Dr. Quinn reviews them. Thank you.    Scheduled procedure/appointment date if applicable: Apt 6/18/25

## 2025-03-20 ENCOUNTER — TELEPHONE (OUTPATIENT)
Age: 50
End: 2025-03-20

## 2025-03-20 NOTE — TELEPHONE ENCOUNTER
Pt called to reschedule her appt with Dr Hanna.  Rescheduled to 3/24/25.  Pt stated she will need Ly[employer]  arranged for her.  Please call her later with the pickup time.  Thank you!

## 2025-03-20 NOTE — TELEPHONE ENCOUNTER
Patient's appointment changed.  Lfyt not needed at time of her appointment.  She is coming in office with  at time of his visit.

## 2025-03-24 ENCOUNTER — OFFICE VISIT (OUTPATIENT)
Dept: FAMILY MEDICINE CLINIC | Facility: CLINIC | Age: 50
End: 2025-03-24
Payer: COMMERCIAL

## 2025-03-24 ENCOUNTER — RESULTS FOLLOW-UP (OUTPATIENT)
Age: 50
End: 2025-03-24

## 2025-03-24 ENCOUNTER — APPOINTMENT (EMERGENCY)
Dept: RADIOLOGY | Facility: HOSPITAL | Age: 50
End: 2025-03-24
Payer: COMMERCIAL

## 2025-03-24 ENCOUNTER — HOSPITAL ENCOUNTER (EMERGENCY)
Facility: HOSPITAL | Age: 50
Discharge: HOME | End: 2025-03-24
Attending: EMERGENCY MEDICINE
Payer: COMMERCIAL

## 2025-03-24 VITALS
SYSTOLIC BLOOD PRESSURE: 199 MMHG | TEMPERATURE: 97.8 F | HEART RATE: 78 BPM | OXYGEN SATURATION: 97 % | DIASTOLIC BLOOD PRESSURE: 98 MMHG | RESPIRATION RATE: 20 BRPM

## 2025-03-24 VITALS
BODY MASS INDEX: 38.34 KG/M2 | HEIGHT: 63 IN | HEART RATE: 75 BPM | TEMPERATURE: 97.1 F | WEIGHT: 216.4 LBS | OXYGEN SATURATION: 99 % | SYSTOLIC BLOOD PRESSURE: 132 MMHG | DIASTOLIC BLOOD PRESSURE: 90 MMHG | RESPIRATION RATE: 18 BRPM

## 2025-03-24 DIAGNOSIS — R76.8 HEPATITIS C ANTIBODY TEST POSITIVE: ICD-10-CM

## 2025-03-24 DIAGNOSIS — M79.89 LEG SWELLING: ICD-10-CM

## 2025-03-24 DIAGNOSIS — R10.9 FLANK PAIN: Primary | ICD-10-CM

## 2025-03-24 DIAGNOSIS — E11.65 TYPE 2 DIABETES MELLITUS WITH HYPERGLYCEMIA, WITHOUT LONG-TERM CURRENT USE OF INSULIN (HCC): ICD-10-CM

## 2025-03-24 DIAGNOSIS — R30.0 DYSURIA: ICD-10-CM

## 2025-03-24 DIAGNOSIS — R31.9 HEMATURIA: ICD-10-CM

## 2025-03-24 DIAGNOSIS — N39.0 UTI (URINARY TRACT INFECTION): ICD-10-CM

## 2025-03-24 DIAGNOSIS — N18.32 STAGE 3B CHRONIC KIDNEY DISEASE (HCC): Chronic | ICD-10-CM

## 2025-03-24 DIAGNOSIS — R10.9 ACUTE LEFT FLANK PAIN: Primary | ICD-10-CM

## 2025-03-24 DIAGNOSIS — I12.9 TYPE 2 DIABETES MELLITUS WITH STAGE 3 CHRONIC KIDNEY DISEASE AND HYPERTENSION (HCC): ICD-10-CM

## 2025-03-24 DIAGNOSIS — E11.22 TYPE 2 DIABETES MELLITUS WITH STAGE 3 CHRONIC KIDNEY DISEASE AND HYPERTENSION (HCC): ICD-10-CM

## 2025-03-24 DIAGNOSIS — H61.92 EARLOBE LESION, LEFT: ICD-10-CM

## 2025-03-24 DIAGNOSIS — N18.30 TYPE 2 DIABETES MELLITUS WITH STAGE 3 CHRONIC KIDNEY DISEASE AND HYPERTENSION (HCC): ICD-10-CM

## 2025-03-24 LAB
ALBUMIN SERPL BCG-MCNC: 3.7 G/DL (ref 3.5–5)
ALP SERPL-CCNC: 62 U/L (ref 34–104)
ALT SERPL W P-5'-P-CCNC: 49 U/L (ref 7–52)
ANION GAP SERPL CALCULATED.3IONS-SCNC: 9 MMOL/L (ref 4–13)
AST SERPL W P-5'-P-CCNC: 43 U/L (ref 13–39)
BACTERIA UR QL AUTO: ABNORMAL /HPF
BASOPHILS # BLD AUTO: 0.03 THOUSANDS/ÂΜL (ref 0–0.1)
BASOPHILS NFR BLD AUTO: 0 % (ref 0–1)
BILIRUB SERPL-MCNC: 0.54 MG/DL (ref 0.2–1)
BILIRUB UR QL STRIP: NEGATIVE
BUN SERPL-MCNC: 23 MG/DL (ref 5–25)
CALCIUM SERPL-MCNC: 9.1 MG/DL (ref 8.4–10.2)
CHLORIDE SERPL-SCNC: 97 MMOL/L (ref 96–108)
CLARITY UR: ABNORMAL
CO2 SERPL-SCNC: 28 MMOL/L (ref 21–32)
COLOR UR: YELLOW
CREAT SERPL-MCNC: 0.88 MG/DL (ref 0.6–1.3)
EOSINOPHIL # BLD AUTO: 0.18 THOUSAND/ÂΜL (ref 0–0.61)
EOSINOPHIL NFR BLD AUTO: 2 % (ref 0–6)
ERYTHROCYTE [DISTWIDTH] IN BLOOD BY AUTOMATED COUNT: 13.6 % (ref 11.6–15.1)
EXT PREGNANCY TEST URINE: NEGATIVE
EXT. CONTROL: NORMAL
GFR SERPL CREATININE-BSD FRML MDRD: 77 ML/MIN/1.73SQ M
GLUCOSE SERPL-MCNC: 255 MG/DL (ref 65–140)
GLUCOSE UR STRIP-MCNC: ABNORMAL MG/DL
HCT VFR BLD AUTO: 37.5 % (ref 34.8–46.1)
HGB BLD-MCNC: 12.7 G/DL (ref 11.5–15.4)
HGB UR QL STRIP.AUTO: ABNORMAL
IMM GRANULOCYTES # BLD AUTO: 0.05 THOUSAND/UL (ref 0–0.2)
IMM GRANULOCYTES NFR BLD AUTO: 1 % (ref 0–2)
KETONES UR STRIP-MCNC: NEGATIVE MG/DL
LEUKOCYTE ESTERASE UR QL STRIP: ABNORMAL
LYMPHOCYTES # BLD AUTO: 2.41 THOUSANDS/ÂΜL (ref 0.6–4.47)
LYMPHOCYTES NFR BLD AUTO: 23 % (ref 14–44)
MCH RBC QN AUTO: 28.2 PG (ref 26.8–34.3)
MCHC RBC AUTO-ENTMCNC: 33.9 G/DL (ref 31.4–37.4)
MCV RBC AUTO: 83 FL (ref 82–98)
MONOCYTES # BLD AUTO: 0.68 THOUSAND/ÂΜL (ref 0.17–1.22)
MONOCYTES NFR BLD AUTO: 7 % (ref 4–12)
NEUTROPHILS # BLD AUTO: 7.09 THOUSANDS/ÂΜL (ref 1.85–7.62)
NEUTS SEG NFR BLD AUTO: 67 % (ref 43–75)
NITRITE UR QL STRIP: NEGATIVE
NON-SQ EPI CELLS URNS QL MICRO: ABNORMAL /HPF
NRBC BLD AUTO-RTO: 0 /100 WBCS
PH UR STRIP.AUTO: 6.5 [PH]
PLATELET # BLD AUTO: 209 THOUSANDS/UL (ref 149–390)
PMV BLD AUTO: 8.4 FL (ref 8.9–12.7)
POTASSIUM SERPL-SCNC: 5.1 MMOL/L (ref 3.5–5.3)
PROT SERPL-MCNC: 7.3 G/DL (ref 6.4–8.4)
PROT UR STRIP-MCNC: ABNORMAL MG/DL
RBC # BLD AUTO: 4.51 MILLION/UL (ref 3.81–5.12)
RBC #/AREA URNS AUTO: ABNORMAL /HPF
SL AMB  POCT GLUCOSE, UA: 100
SL AMB LEUKOCYTE ESTERASE,UA: 15
SL AMB POCT BILIRUBIN,UA: NORMAL
SL AMB POCT BLOOD,UA: 200
SL AMB POCT CLARITY,UA: CLEAR
SL AMB POCT COLOR,UA: YELLOW
SL AMB POCT HEMOGLOBIN AIC: 10.3 (ref ?–6.5)
SL AMB POCT KETONES,UA: NORMAL
SL AMB POCT NITRITE,UA: NORMAL
SL AMB POCT PH,UA: 6
SL AMB POCT SPECIFIC GRAVITY,UA: 1.03
SL AMB POCT URINE PROTEIN: 100
SL AMB POCT UROBILINOGEN: NORMAL
SODIUM SERPL-SCNC: 134 MMOL/L (ref 135–147)
SP GR UR STRIP.AUTO: >=1.03 (ref 1–1.03)
UROBILINOGEN UR STRIP-ACNC: 4 MG/DL
WBC # BLD AUTO: 10.44 THOUSAND/UL (ref 4.31–10.16)
WBC #/AREA URNS AUTO: ABNORMAL /HPF

## 2025-03-24 PROCEDURE — 85025 COMPLETE CBC W/AUTO DIFF WBC: CPT | Performed by: EMERGENCY MEDICINE

## 2025-03-24 PROCEDURE — 99284 EMERGENCY DEPT VISIT MOD MDM: CPT | Performed by: EMERGENCY MEDICINE

## 2025-03-24 PROCEDURE — 36415 COLL VENOUS BLD VENIPUNCTURE: CPT | Performed by: EMERGENCY MEDICINE

## 2025-03-24 PROCEDURE — 81025 URINE PREGNANCY TEST: CPT | Performed by: EMERGENCY MEDICINE

## 2025-03-24 PROCEDURE — 83036 HEMOGLOBIN GLYCOSYLATED A1C: CPT | Performed by: STUDENT IN AN ORGANIZED HEALTH CARE EDUCATION/TRAINING PROGRAM

## 2025-03-24 PROCEDURE — 99284 EMERGENCY DEPT VISIT MOD MDM: CPT

## 2025-03-24 PROCEDURE — 93971 EXTREMITY STUDY: CPT | Performed by: SURGERY

## 2025-03-24 PROCEDURE — 96361 HYDRATE IV INFUSION ADD-ON: CPT

## 2025-03-24 PROCEDURE — 74176 CT ABD & PELVIS W/O CONTRAST: CPT

## 2025-03-24 PROCEDURE — 96365 THER/PROPH/DIAG IV INF INIT: CPT

## 2025-03-24 PROCEDURE — 81001 URINALYSIS AUTO W/SCOPE: CPT | Performed by: EMERGENCY MEDICINE

## 2025-03-24 PROCEDURE — 80053 COMPREHEN METABOLIC PANEL: CPT | Performed by: EMERGENCY MEDICINE

## 2025-03-24 PROCEDURE — 93971 EXTREMITY STUDY: CPT

## 2025-03-24 PROCEDURE — 99214 OFFICE O/P EST MOD 30 MIN: CPT | Performed by: STUDENT IN AN ORGANIZED HEALTH CARE EDUCATION/TRAINING PROGRAM

## 2025-03-24 PROCEDURE — 81002 URINALYSIS NONAUTO W/O SCOPE: CPT | Performed by: STUDENT IN AN ORGANIZED HEALTH CARE EDUCATION/TRAINING PROGRAM

## 2025-03-24 RX ORDER — BLOOD SUGAR DIAGNOSTIC
STRIP MISCELLANEOUS
Qty: 200 EACH | Refills: 3 | Status: SHIPPED | OUTPATIENT
Start: 2025-03-24

## 2025-03-24 RX ORDER — NAPROXEN 500 MG/1
500 TABLET ORAL 2 TIMES DAILY WITH MEALS
Qty: 30 TABLET | Refills: 0 | Status: SHIPPED | OUTPATIENT
Start: 2025-03-24

## 2025-03-24 RX ORDER — INSULIN LISPRO 100 [IU]/ML
INJECTION, SOLUTION INTRAVENOUS; SUBCUTANEOUS
Qty: 10 ML | Refills: 1 | Status: SHIPPED | OUTPATIENT
Start: 2025-03-24

## 2025-03-24 RX ORDER — ARIPIPRAZOLE 5 MG/1
TABLET ORAL
COMMUNITY
Start: 2025-03-11

## 2025-03-24 RX ORDER — INSULIN GLARGINE-YFGN 100 [IU]/ML
20 INJECTION, SOLUTION SUBCUTANEOUS 2 TIMES DAILY
Qty: 10 ML | Refills: 1 | Status: SHIPPED | OUTPATIENT
Start: 2025-03-24

## 2025-03-24 RX ORDER — CEFPODOXIME PROXETIL 200 MG/1
200 TABLET, FILM COATED ORAL 2 TIMES DAILY
Qty: 20 TABLET | Refills: 0 | Status: SHIPPED | OUTPATIENT
Start: 2025-03-24 | End: 2025-04-03

## 2025-03-24 RX ORDER — CEFTRIAXONE 1 G/50ML
1000 INJECTION, SOLUTION INTRAVENOUS ONCE
Status: COMPLETED | OUTPATIENT
Start: 2025-03-24 | End: 2025-03-24

## 2025-03-24 RX ORDER — LANCETS 33 GAUGE
EACH MISCELLANEOUS
Qty: 200 EACH | Refills: 3 | Status: SHIPPED | OUTPATIENT
Start: 2025-03-24

## 2025-03-24 RX ORDER — BLOOD-GLUCOSE METER
KIT MISCELLANEOUS
Qty: 1 KIT | Refills: 0 | Status: SHIPPED | OUTPATIENT
Start: 2025-03-24

## 2025-03-24 RX ADMIN — CEFTRIAXONE 1000 MG: 1 INJECTION, SOLUTION INTRAVENOUS at 19:46

## 2025-03-24 RX ADMIN — SODIUM CHLORIDE 1000 ML: 0.9 INJECTION, SOLUTION INTRAVENOUS at 19:44

## 2025-03-24 NOTE — PROGRESS NOTES
Name: Marcia Barron      : 1975      MRN: [id number]  Encounter Provider: Heath Hanna DO  Encounter Date: 3/24/2025   Encounter department: [facility]  :  Assessment & Plan  Acute left flank pain  Concern for acute left flank pain, patient is concerned she may have passed a kidney stone and still having significant pain with blood in urine, no signs of infection on urine dip in office today.  Given transportation difficulties, on a logistical standpoint recommend emergency room evaluation for blood work and CT imaging to rule out kidney stone.  Patient in agreement of plan, left to hospital placed.  Orders:  •  Transfer to other facility    Type 2 diabetes mellitus with stage 3 chronic kidney disease and hypertension (HCC)    Lab Results   Component Value Date    HGBA1C 10.3 (A) 2025       Orders:  •  POCT hemoglobin A1c    Dysuria    Orders:  •  POCT urine dip  •  Urine culture    Stage 3b chronic kidney disease (HCC)  Lab Results   Component Value Date    EGFR 63 2024    EGFR 37 2024    EGFR 68 2024    CREATININE 1.09 (H) 2024    CREATININE 1.62 (H) 2024    CREATININE 0.99 2024            Hepatitis C antibody test positive  Patient is following closely with hepatology for treatment protocol       Type 2 diabetes mellitus with hyperglycemia, without long-term current use of insulin ([facility])  Blood sugar elevated, hemoglobin A1c of 10.3%, recommend glargine insulin 20 units twice daily, continue lispro 3 times daily with meals.  Continue to monitor blood glucose.  Lab Results   Component Value Date    HGBA1C 10.3 (A) 2025     Orders:  •  Insulin Glargine-yfgn 100 UNIT/ML SOLN; Inject 0.2 mL (20 Units total) as directed 2 (two) times a day  •  insulin lispro (Admelog) 100 units/mL injection; INJECT 5-10 UNITS UNDER THE SKIN 3 (THREE) TIMES A DAY WITH MEALS  •  Blood Glucose Monitoring Suppl (OneTouch Verio Reflect) w/Device KIT;  Check blood sugars twice daily. Please substitute with appropriate alternative as covered by patient's insurance. Dx: E11.65  •  glucose blood (OneTouch Verio) test strip; Check blood sugars twice daily. Please substitute with appropriate alternative as covered by patient's insurance. Dx: E11.65  •  OneTouch Delica Lancets 33G MISC; Check blood sugars twice daily. Please substitute with appropriate alternative as covered by patient's insurance. Dx: E11.65    Earlobe lesion, left  Symptoms of not improved with topical antibiotic, recommend dermatology evaluation  Orders:  •  Ambulatory Referral to Dermatology; Future           History of Present Illness   Acute care visit, follow-up A1c, concern for kidney stone, left flank pain.    Diabetes  Pertinent negatives for hypoglycemia include no confusion or seizures. Pertinent negatives for diabetes include no chest pain.   Difficulty Urinating   Associated symptoms include flank pain. Pertinent negatives include no chills, hematuria or vomiting.   Back Pain  Associated symptoms include dysuria. Pertinent negatives include no abdominal pain, chest pain or fever.     Review of Systems   Constitutional:  Negative for chills and fever.   HENT:  Negative for ear pain and sore throat.    Eyes:  Negative for pain and visual disturbance.   Respiratory:  Negative for cough and shortness of breath.    Cardiovascular:  Negative for chest pain and palpitations.   Gastrointestinal:  Negative for abdominal pain and vomiting.   Genitourinary:  Positive for dysuria and flank pain. Negative for difficulty urinating and hematuria.   Musculoskeletal:  Positive for back pain. Negative for arthralgias.   Skin:  Negative for color change and rash.   Neurological:  Negative for seizures and syncope.   Psychiatric/Behavioral:  Negative for agitation, behavioral problems and confusion.    All other systems reviewed and are negative.      Objective   /90 (BP Location: Left arm, Patient  "Position: Sitting, Cuff Size: Large)   Pulse 75   Temp (!) 97.1 °F (36.2 °C) (Temporal)   Resp 18   Ht 5' 3\" (1.6 m)   Wt 98.2 kg (216 lb 6.4 oz)   LMP 10/07/2022 (Approximate)   SpO2 99%   BMI 38.33 kg/m²      Physical Exam  Vitals and nursing note reviewed.   Constitutional:       General: She is not in acute distress.     Appearance: She is well-developed.   HENT:      Head: Normocephalic and atraumatic.   Eyes:      General: No scleral icterus.     Conjunctiva/sclera: Conjunctivae normal.   Cardiovascular:      Rate and Rhythm: Normal rate and regular rhythm.      Pulses: Normal pulses.      Heart sounds: No murmur heard.  Pulmonary:      Effort: Pulmonary effort is normal. No respiratory distress.      Breath sounds: Normal breath sounds.   Abdominal:      General: Bowel sounds are normal. There is no distension.      Palpations: Abdomen is soft.      Tenderness: There is no abdominal tenderness. There is left CVA tenderness.   Musculoskeletal:         General: No swelling. Normal range of motion.      Cervical back: Neck supple.   Skin:     General: Skin is warm and dry.      Capillary Refill: Capillary refill takes less than 2 seconds.   Neurological:      General: No focal deficit present.      Mental Status: She is alert and oriented to person, place, and time. Mental status is at baseline.   Psychiatric:         Mood and Affect: Mood normal.         Behavior: Behavior normal.         "

## 2025-03-24 NOTE — ASSESSMENT & PLAN NOTE
Lab Results   Component Value Date    EGFR 63 12/19/2024    EGFR 37 07/30/2024    EGFR 68 07/13/2024    CREATININE 1.09 (H) 12/19/2024    CREATININE 1.62 (H) 07/30/2024    CREATININE 0.99 07/13/2024

## 2025-03-24 NOTE — ED PROVIDER NOTES
ED Disposition       None          Assessment & Plan       Medical Decision Making  Differential diagnosis for patients abdominal/flank pain includes but is not limited to nephrolithiasis, pyelonephritis, colitis, diverticulitis.  Differential diagnosis for patient's leg swelling includes but is not limited to DVT, ruptured Baker's cyst, less likely cellulitis.  I ordered lab work including CBC, CMP, urinalysis.  Patient with a negative venous duplex study left lower extremity.  Signed out to Dr. Gillespie pending results of CT stone study.     Amount and/or Complexity of Data Reviewed  External Data Reviewed: notes.     Details: Patient evaluated by primary care provider earlier today, noted to have acute left flank pain, hematuria, uncontrolled diabetes.  Labs: ordered.  Radiology: ordered.    Risk  Prescription drug management.             Medications   sodium chloride 0.9 % bolus 1,000 mL (1,000 mL Intravenous New Bag 3/24/25 1944)   cefTRIAXone (ROCEPHIN) IVPB (premix in dextrose) 1,000 mg 50 mL (1,000 mg Intravenous New Bag 3/24/25 1946)       ED Risk Strat Scores                                                History of Present Illness       Chief Complaint   Patient presents with    Flank Pain     Started with L flank pain with radiation to front that started yesterday, had some dysuria, states sugars have been in 300-400 range. Seen by Dr Hanna today, thinks she passed a stone but pain coming back    Leg Swelling     L leg swelling and pain for about a week       Past Medical History:   Diagnosis Date    AAA (abdominal aortic aneurysm) (HCC)     Allergic 1980    Septra    Anxiety     Asthma     Cholelithiases 02/05/2020    Added automatically from request for surgery [id number]    COPD (chronic obstructive pulmonary disease) (HCC) 2014    Me and father    Depression     Diabetes mellitus (HCC)     Gastric ulcer     GERD (gastroesophageal reflux disease) 2023    Me    Headache(784.0) 2010    Me    Hepatitis C  01/01/2023    HL (hearing loss) 1995    Me    Hypertension     Infectious viral hepatitis 2023    Me    Liver disease     Memory loss 2000    Me    Mixed hyperlipidemia 01/06/2023    Neuropathy     Pneumonia 2014    Me    Psychiatric disorder     Seizures (HCC) 2008    Me    Substance abuse (HCC) 2009    Urinary tract infection 2001    When i started gettìng them    Visual impairment 2000    Me      Past Surgical History:   Procedure Laterality Date    CHOLECYSTECTOMY  2020    CHOLECYSTECTOMY LAPAROSCOPIC N/A 02/10/2020    Procedure: CHOLECYSTECTOMY LAPAROSCOPIC;  Surgeon: Shaheed Nolan MD;  Location: [facility];  Service: General    EYE SURGERY  2024    Rìght    INCISION AND DRAINAGE OF WOUND Right 07/01/2017    Procedure: INCISION AND DRAINAGE (I&D) EXTREMITY THIGH;  Surgeon: Veto Reyes MD;  Location: [facility];  Service: General      Family History   Problem Relation Age of Onset    Hypertension Mother     Drug abuse Mother     Diabetes type II Mother         18 in tumor on spine    Vision loss Mother     Arthritis Mother     Hearing loss Mother     Anxiety disorder Mother     Diabetes Father     Hypertension Father     COPD Father     ADD / ADHD Father         Passed away july 1 2022    Anxiety disorder Father     Bipolar disorder Father     Diabetes type II Father         Copd, diabetic    Kidney disease Father     Mental illness Father     Hearing loss Father     Heart disease Father     Depression Father     Coronary artery disease Father     Asthma Father     Glaucoma Father     Self-Injury Father     Kidney cancer Maternal Grandmother     Cancer Maternal Grandmother         Kidney    Diabetes type II Maternal Grandmother         Old age    Kidney disease Maternal Grandmother     Dementia Maternal Grandmother     Arthritis Maternal Grandmother     Hearing loss Maternal Grandmother     Lung cancer Paternal Uncle     Diabetes Family     Hypertension Family     Aneurysm Family         PDF, paternal aunt,  other paternal family members    Drug abuse Maternal Uncle     Schizophrenia Maternal Uncle     Suicide Attempts Maternal Uncle     Bipolar disorder Maternal Uncle     Self-Injury Maternal Uncle     Mental illness Maternal Uncle         Bipolar depression, pstd    Diabetes type II Maternal Grandfather         Kidney Cancer    Substance Abuse Maternal Grandfather     Stroke Maternal Grandfather     Vision loss Maternal Grandfather     Kidney disease Maternal Grandfather         Kidney cancer    Diabetes type II Paternal Grandfather         Brain tumor    Stroke Paternal Grandfather     Arthritis Paternal Grandmother     Cancer Paternal Grandmother         Kidney cancer    Hearing loss Paternal Grandmother     Glaucoma Paternal Grandmother     Psychiatric Illness Paternal Grandmother     Diabetes type II Sister         Brain tumor    Diabetes type II Sister         Diabetic, high blood pressure    Substance Abuse Maternal Aunt     Drug abuse Maternal Aunt     Substance Abuse Maternal Uncle     Mental illness Maternal Uncle     Depression Maternal Uncle     Hypertension Sister     Asthma Sister     Hearing loss Sister     Vision loss Sister     Glaucoma Sister     Cancer Paternal Uncle         Lung cancer    Anxiety disorder Paternal Uncle       Social History     Tobacco Use    Smoking status: Never    Smokeless tobacco: Never    Tobacco comments:     never   Vaping Use    Vaping status: Never Used   Substance Use Topics    Alcohol use: Never    Drug use: Not Currently     Types: Heroin     Comment: hx of IVDU      E-Cigarette/Vaping    E-Cigarette Use Never User       E-Cigarette/Vaping Substances    Nicotine No     THC No     CBD No     Flavoring No     Other No     Unknown No       I have reviewed and agree with the history as documented.     Patient is a 50-year-old female history of asthma, COPD, diabetes, hypertension, hyperlipidemia, hepatitis C presenting for evaluation of left flank and abdominal pain.   Patient states pain started yesterday evening, was initially severe, associated with some hematuria and dysuria.  Patient states that the pain resolved overnight but started again midway through the day.  Patient states fever with a Tmax of 101, ongoing dysuria and hematuria.  Patient states her pain is currently a 5 out of 10 severity.  Patient denies associated nausea, vomiting, diarrhea, constipation.  Patient denies prior history of nephrolithiasis.  Patient denies any prior similar episodes.  Patient separately complaining of of approximately 1 week of left calf swelling.  Patient denies significant erythema, warmth.  Patient denies any trauma to the area.  Patient denies recent immobilization or surgery.  Patient denies chest pain, shortness of breath, prior history of DVT/PE.        Review of Systems   Constitutional:  Positive for fever. Negative for chills and fatigue.   Respiratory:  Negative for cough and shortness of breath.    Cardiovascular:  Positive for leg swelling.   Gastrointestinal:  Positive for abdominal pain. Negative for diarrhea, nausea and vomiting.   Genitourinary:  Positive for dysuria and flank pain. Negative for frequency.   Musculoskeletal:  Negative for arthralgias and myalgias.   Neurological:  Negative for headaches.   Psychiatric/Behavioral:  Negative for confusion.    All other systems reviewed and are negative.          Objective       ED Triage Vitals [03/24/25 1750]   Temperature Pulse Blood Pressure Respirations SpO2 Patient Position - Orthostatic VS   97.8 °F (36.6 °C) 78 (!) 199/98 20 97 % Sitting      Temp Source Heart Rate Source BP Location FiO2 (%) Pain Score    Tympanic Monitor Right arm -- 5      Vitals      Date and Time Temp Pulse SpO2 Resp BP Pain Score FACES Pain Rating User   03/24/25 1750 97.8 °F (36.6 °C) 78 97 % 20 199/98 5 -- LS            Physical Exam  Vitals and nursing note reviewed.   Constitutional:       General: She is not in acute distress.      Appearance: Normal appearance. She is not ill-appearing, toxic-appearing or diaphoretic.      Comments: Well-appearing, nontoxic, nondistressed   HENT:      Head: Normocephalic and atraumatic.      Comments: Moist mucous membranes     Right Ear: External ear normal.      Left Ear: External ear normal.   Eyes:      General:         Right eye: No discharge.         Left eye: No discharge.   Cardiovascular:      Comments: Regular rate and rhythm, no murmurs rubs or gallops.  Extremities warm and well-perfused without mottling  Pulmonary:      Effort: No respiratory distress.      Comments: No increased work of breathing.  Speaking in complete sentences.  Lungs clear to auscultation bilaterally without wheezes, rales, rhonchi.  Satting 97% on room air indicating adequate oxygenation  Abdominal:      General: There is no distension.      Tenderness: There is abdominal tenderness.      Comments: Mild generalized left lateral abdominal tenderness   Genitourinary:     Comments: Left-sided CVA tenderness  Musculoskeletal:         General: No deformity.      Cervical back: Normal range of motion.      Comments: Pitting edema left lower extremity   Skin:     Findings: No lesion or rash.   Neurological:      Mental Status: She is alert and oriented to person, place, and time. Mental status is at baseline.      Comments: Awake, alert, pleasant, interactive   Psychiatric:         Mood and Affect: Mood and affect normal.         Results Reviewed       Procedure Component Value Units Date/Time    POCT pregnancy, urine [[id number]]  (Normal) Collected: 03/24/25 1954    Lab Status: Final result Updated: 03/24/25 1954     EXT Preg Test, Ur Negative     Control Valid    CBC and differential [[id number]]  (Abnormal) Collected: 03/24/25 1936    Lab Status: Final result Specimen: Blood from Arm, Left Updated: 03/24/25 1949     WBC 10.44 Thousand/uL      RBC 4.51 Million/uL      Hemoglobin 12.7 g/dL      Hematocrit 37.5 %      MCV 83 fL       "MCH 28.2 pg      MCHC 33.9 g/dL      RDW 13.6 %      MPV 8.4 fL      Platelets 209 Thousands/uL      nRBC 0 /100 WBCs      Segmented % 67 %      Immature Grans % 1 %      Lymphocytes % 23 %      Monocytes % 7 %      Eosinophils Relative 2 %      Basophils Relative 0 %      Absolute Neutrophils 7.09 Thousands/µL      Absolute Immature Grans 0.05 Thousand/uL      Absolute Lymphocytes 2.41 Thousands/µL      Absolute Monocytes 0.68 Thousand/µL      Eosinophils Absolute 0.18 Thousand/µL      Basophils Absolute 0.03 Thousands/µL     Urinalysis with microscopic [[id number]] Collected: 03/24/25 1936    Lab Status: In process Specimen: Urine, Other Updated: 03/24/25 1946    Comprehensive metabolic panel [[id number]] Collected: 03/24/25 1936    Lab Status: In process Specimen: Blood from Arm, Left Updated: 03/24/25 1946            VAS lower limb venous duplex study, unilateral/limited    (Results Pending)   CT renal stone study abdomen pelvis wo contrast    (Results Pending)       Procedures    ED Medication and Procedure Management   Prior to Admission Medications   Prescriptions Last Dose Informant Patient Reported? Taking?   ARIPiprazole (ABILIFY) 5 mg tablet   Yes No   Sig: TAKE 1 TALBET AT BEDTIME   BD Pen Needle Micro U/F 32G X 6 MM MISC  Self No No   Sig: USE 3 (THREE) TIMES A DAY BEFORE MEALS   Blood Glucose Monitoring Suppl (OneTouch Verio Reflect) w/Device KIT   No No   Sig: Check blood sugars twice daily. Please substitute with appropriate alternative as covered by patient's insurance. Dx: E11.65   Insulin Glargine-yfgn 100 UNIT/ML SOLN   No No   Sig: Inject 0.2 mL (20 Units total) as directed 2 (two) times a day   Insulin Syringe-Needle U-100 (BD Insulin Syringe U/F) 31G X 5/16\" 0.3 ML MISC  Self No No   Sig: 3 TIMES DAILY BEFORE MEALS   OneTouch Delica Lancets 33G MISC   No No   Sig: Check blood sugars once daily. Please substitute with appropriate alternative as covered by patient's insurance. Dx: E11.65 "   OneTouch Delica Lancets 33G MISC   No No   Sig: Check blood sugars twice daily. Please substitute with appropriate alternative as covered by patient's insurance. Dx: E11.65   albuterol (PROVENTIL HFA,VENTOLIN HFA) 90 mcg/act inhaler   No No   Sig: INHALE 2 PUFFS EVERY 6 HOURS AS NEEDED FOR WHEEZING   clonazePAM (KlonoPIN) 1 mg tablet  Self Yes No   Sig: Take 1 mg by mouth 3 (three) times a day   divalproex sodium (DEPAKOTE ER) 500 mg 24 hr tablet  Self Yes No   Sig: Take 1,500 mg by mouth daily at bedtime   gabapentin (NEURONTIN) 800 mg tablet  Self Yes No   Sig: Take 800 mg by mouth 3 (three) times a day   glucose blood (OneTouch Verio) test strip   No No   Sig: Check blood sugars once daily. Please substitute with appropriate alternative as covered by patient's insurance. Dx: E11.65   glucose blood (OneTouch Verio) test strip   No No   Sig: Check blood sugars twice daily. Please substitute with appropriate alternative as covered by patient's insurance. Dx: E11.65   insulin lispro (Admelog) 100 units/mL injection   No No   Sig: INJECT 5-10 UNITS UNDER THE SKIN 3 (THREE) TIMES A DAY WITH MEALS   lisinopril-hydrochlorothiazide (PRINZIDE,ZESTORETIC) 20-12.5 MG per tablet  Self No No   Sig: Take 1 tablet by mouth daily   methadone (DOLOPHINE) 10 mg/mL oral concentrated solution  Self Yes No   Sig: Take 140 mg by mouth daily Methadone clinic   mupirocin (BACTROBAN) 2 % ointment   No No   Sig: Apply topically 3 (three) times a day   ondansetron (ZOFRAN) 8 mg tablet   No No   Sig: TAKE 1 TABLET BY MOUTH EVERY 8 HOURS AS NEEDED FOR NAUSEA OR VOMITING.   prazosin (MINIPRESS) 5 mg capsule  Self Yes No   Sig: Take 5 mg by mouth daily at bedtime   traZODone (DESYREL) 50 mg tablet  Self Yes No   Sig: Take 50 mg by mouth daily at bedtime   venlafaxine (EFFEXOR-XR) 150 mg 24 hr capsule  Self Yes No   Sig: Take 300 mg by mouth daily       Facility-Administered Medications: None     Patient's Medications   Discharge Prescriptions     No medications on file     No discharge procedures on file.  ED SEPSIS DOCUMENTATION            Shiv Chambers MD  03/24/25 2006

## 2025-03-24 NOTE — ASSESSMENT & PLAN NOTE
Lab Results   Component Value Date    HGBA1C 10.3 (A) 03/24/2025       Orders:  •  POCT hemoglobin A1c

## 2025-03-25 ENCOUNTER — TELEPHONE (OUTPATIENT)
Dept: FAMILY MEDICINE CLINIC | Facility: CLINIC | Age: 50
End: 2025-03-25

## 2025-03-25 LAB — EXTERNAL HIV SCREEN: NORMAL

## 2025-03-25 RX ORDER — BLOOD SUGAR DIAGNOSTIC
STRIP MISCELLANEOUS
Qty: 200 STRIP | Refills: 3 | OUTPATIENT
Start: 2025-03-25

## 2025-03-26 LAB
BACTERIA UR CULT: NORMAL
Lab: NO GROWTH

## 2025-03-26 NOTE — TELEPHONE ENCOUNTER
TINO Smithuch Verio strips SUBMITTED     to [employer]    via    [x]CMM-KEY: [id number]  []Surescripts-Case ID #    []Availity-Auth ID #  NDC #    []Faxed to plan   []Other website    []Phone call Case ID #      []PA sent as URGENT    All office notes, labs and other pertaining documents and studies sent. Clinical questions answered. Awaiting determination from insurance company.     Turnaround time for your insurance to make a decision on your Prior Authorization can take 7-21 business days.

## 2025-03-27 LAB
BACTERIA UR CULT: NORMAL
Lab: NO GROWTH

## 2025-03-28 DIAGNOSIS — Z12.12 ENCOUNTER FOR COLORECTAL CANCER SCREENING: Primary | ICD-10-CM

## 2025-03-28 DIAGNOSIS — Z12.11 ENCOUNTER FOR COLORECTAL CANCER SCREENING: Primary | ICD-10-CM

## 2025-04-01 ENCOUNTER — TELEPHONE (OUTPATIENT)
Age: 50
End: 2025-04-01

## 2025-04-01 NOTE — TELEPHONE ENCOUNTER
Could staff contact [facility] to obtain hepatology labs ordered by Dr. Quinn 11/18/24 visit?  Patient would like results.  Currently scheduled for next f/u appointment 06/18/25 at [facility]. Thank you.

## 2025-04-01 NOTE — TELEPHONE ENCOUNTER
Patients GI provider:  Dr. Quinn    Number to return call: [phone]    Reason for call: Pt called asking us to call [facility] customer service and get lab results. I called but was on hold too long and had to disconnect. I will try again if there is an appropriate time available. Could someone reach out to [facility] at [phone]    Scheduled procedure/appointment date if applicable: Apt/procedure 06/18/25

## 2025-04-01 NOTE — TELEPHONE ENCOUNTER
Spoke with Phuong @ [facility]. She is faxing the lab results she has from multiple providers to [phone]

## 2025-04-01 NOTE — TELEPHONE ENCOUNTER
Patients GI provider:  Dr. Quinn    Number to return call: [phone]     Reason for call: Pt calling to see if we receive labs results from [facility], some results are scanned I but is is urine not blood work, the pt will contact [facility] to have them send over the results.Please call the pt back once results are received and reviewed.     Scheduled procedure/appointment date if applicable: Appt 6/18/25

## 2025-04-02 ENCOUNTER — RESULTS FOLLOW-UP (OUTPATIENT)
Age: 50
End: 2025-04-02

## 2025-04-02 DIAGNOSIS — B18.2 CHRONIC HEPATITIS C WITHOUT HEPATIC COMA (HCC): Primary | ICD-10-CM

## 2025-04-02 DIAGNOSIS — R11.2 NAUSEA AND VOMITING, UNSPECIFIED VOMITING TYPE: ICD-10-CM

## 2025-04-02 NOTE — TELEPHONE ENCOUNTER
Spoke with pt. Advised we received lab results from 11/18/24 orders. Explained that a hep B surface AB test is still needed. Pt agreeable to complete. Requested order to be faxed to [facility] in [place], [place] (f) [phone] and paper copy be mailed to her. Reviewed initial steps in treatment for hep c and obtaining prior auth from insurance.  Pt agreeable and verbalized understanding of all information.  Pt agreeable to MCM and phone for communication throughout treatment     Lab slip faxed and mailed as requested.

## 2025-04-02 NOTE — TELEPHONE ENCOUNTER
----- Message from Jazmine Quinn MD sent at 4/2/2025  1:19 PM EDT -----    ----- Message -----  From: INDIA Torres  Sent: 4/2/2025  11:43 AM EDT  To: Jazmine Quinn MD

## 2025-04-02 NOTE — LETTER
4/2/25    Dear Marcia,      It was a pleasure to speak with you on the phone. The lab slip for the Hepatitis B Surface antibody blood test has been faxed to [facility] in [place], [place] fax # [phone]. I have also placed a paper copy in the mail to you along with the following information.        I will be assisting in coordinating your treatment for Hepatitis C. This letter contains important information regarding the initiation of treatment for Hepatitis C based upon referral from Dr Jazmine Quinn at your office visit with her on 11/18/24. Please read ALL information. You may message or call me at the office [phone] with any questions or concerns you may have. I will call you once all your test results are received to discuss treatment and answer any questions or concerns you may have.                      The goals of treatment are to:      remove the virus permanently from your body.  slow or stop damage to your liver.  reduce your risk of cirrhosis, liver cancer & failure.           The virologic cure with DAA therapy (medication) has been associated with a reduction in these risks and we recommend treatment with either Mavyret or Epclusa therapy, depending on your insurance and bloodwork results. You will need to complete the entire duration of therapy to prevent resistance, treatment failure and limitations in treatment options. We ask that you agree to follow up with scheduled office, phone calls and lab visits for treatment to be successful.      STEP #1 - LAB WORK             The first step is completing all the testing required by your insurance. Once I have all the results, I can then send a prior auth for the appropriate medication for approval to your insurance. Insurances will only accept test results that have been completed within 90 days of initiating the prior auth, again thank you in advance for completing your bloodwork. We can not get medication until insurance has completed authorization.       STEP #2 - PRIOR AUTHORIZATION                 Once approved, the prescription will be sent to a SPECIALTY pharmacy determined by your insurance. The SPECIALTY PHARMACY will contact you to arrange for delivery to your home once insurance has given approval. This prescription CAN NOT be filled at your local pharmacy!!! The approval process to delivery can take up to 2-4 weeks depending on your insurance & you may receive a denial letter from your insurance.  This doesn't mean you will not get treatment.  It just means we must work longer with your insurance to make sure you get it at an affordable cost. Usually applying for copay assistance is needed to decrease the cost, we will have you enroll in the appropriate program once we know which medication your insurance approves. You will not be expected to pay a large amount of money, if anything at all. Please try to refrain from any insurance interruptions during treatment. This will cause an interruption in treatment and put you at risk for failed treatment.               STEP #3 - IMMUNIZATIONS (While waiting to complete authorization)        Before starting treatment, your bloodwork includes tests to check for hepatitis B virus infection. If you have ever had hepatitis B virus infection (your recent bloodwork indicates you have never had this infection) the hepatitis B virus could become active again during or after treatment of hepatitis C virus. Hepatitis B virus becoming active again (called reactivation) may cause serious liver problems including liver failure and death. We will monitor you if you are at risk for hepatitis B virus reactivation during treatment. The hepatitis B surface antibody will tell us how protected you are. We will recommend the vaccine if you are not adequately protected.       It is important to ensure you are protected against both Hepatitis A & B. We recommend contacting your PCP to arrange to be vaccinated if vaccines are  recommended for you. This will help to protect your liver.     STEP #4 - PHARMACY      If you are taking any STATIN (any medication name ending in statin) or Proton Pump Inhibiting medications (omeprazole, pantoprazole), you may be asked to not take them during your treatment. The Proton Pump Inhibitor medication only needs to be held with EPCLUSA medication. Avoidance of alcohol and some over the counter herbal supplements are also recommended. Please let us know if you take any herbal supplements as this may cause adverse reactions with the hepatitis C medication you are prescribed.          To ensure complete and successful treatment, it is important to keep us updated with the best way to contact you.  The SPECIALTY pharmacy will need to be in touch with you ALSO to deliver refills. YOU CAN'T GET REFILLS AT YOUR LOCAL PHARMACY!!!  If they can't get in touch with you, they will hold onto the medication and your treatment will be incomplete. Sometimes their number may look like a spam call, unfortunately we encourage you to answer it, so you do not miss your medication.          The prior auth from your insurance has an expiration date. You should receive a refill phone call approx. 2-3 weeks into treatment. If you do not hear from the Specialty pharmacy, please call them to ensure timely delivery of your refills to avoid expiration of the prior auth. Expiration of the prior auth will result in interruption of treatment and increase chance of failure.         STEP #5 -Treatment Monitoring    When you receive your first fill, you must call [phone] (Hepatology) with the day you started the medication. You will be expected to complete bloodwork 4 weeks after the start of the medication to ensure effectiveness of treatment. You will continue to take your medication regardless of these results.  We will give you a follow up call to remind you of lab work and see how treatment is going.           Please call  [phone] (Hepatology) to inform the last day of treatment. You will be expected to obtain bloodwork 12 weeks after completion of treatment to confirm treatment was successful and a viral load is not detected without the help of the medication.  It is important to stay in touch with us throughout treatment until we confirm cure to obtain the best possible outcomes. Please keep us updated if you have a change in address, phone number or insurance.      FOR FEMALES: During treatment it will be important to NOT become pregnant. We encourage using 2 forms of birth control. Pregnancies occurring during treatment will be recommended to terminate due to the defects from medication exposure to the fetus. Prior to treatment we may ask for a test to confirm you are not pregnant at the start of treatment.     Thank you,   Zhane Purdy RN  [facility]

## 2025-04-03 LAB — COLOGUARD RESULT REPORTABLE: NORMAL

## 2025-04-03 RX ORDER — ONDANSETRON 8 MG/1
8 TABLET, FILM COATED ORAL EVERY 8 HOURS PRN
Qty: 20 TABLET | Refills: 0 | Status: SHIPPED | OUTPATIENT
Start: 2025-04-03

## 2025-04-04 ENCOUNTER — RESULTS FOLLOW-UP (OUTPATIENT)
Dept: FAMILY MEDICINE CLINIC | Facility: CLINIC | Age: 50
End: 2025-04-04

## 2025-04-04 NOTE — TELEPHONE ENCOUNTER
----- Message from Heath Hanna DO sent at 4/4/2025  6:54 AM EDT -----  Please let patient know that her [facility] results were not able to be processed, [facility] will be sending another test.

## 2025-04-11 LAB — HBV SURFACE AB SER-ACNC: 92.1 MIU/ML

## 2025-04-11 RX ORDER — GLECAPREVIR AND PIBRENTASVIR 40; 100 MG/1; MG/1
3 TABLET, FILM COATED ORAL DAILY
Qty: 84 TABLET | Refills: 1 | Status: SHIPPED | OUTPATIENT
Start: 2025-04-11 | End: 2025-06-06

## 2025-04-11 RX ORDER — GLECAPREVIR AND PIBRENTASVIR 40; 100 MG/1; MG/1
3 TABLET, FILM COATED ORAL DAILY
Qty: 84 TABLET | Refills: 1 | OUTPATIENT
Start: 2025-04-11 | End: 2025-06-06

## 2025-04-11 NOTE — TELEPHONE ENCOUNTER
"Received email from [facility] regarding prior auth    \"No PA required for this one, but out of network to fill here… [facility] plan.  I'll forward all patient info to [facility] ([place]. let me know if we can help any further.\"    "

## 2025-04-11 NOTE — TELEPHONE ENCOUNTER
Mavyret 8 wk script entered for PA to [facility]    Treatment naive   Viral load [id number]  Genotype 3  Fibrosure/Elastography F1-F2  HBV surface AB   92.1 (IMMUNE)    MCM sent to pt advising of need for HEP A vaccine

## 2025-04-15 ENCOUNTER — TELEPHONE (OUTPATIENT)
Age: 50
End: 2025-04-15

## 2025-04-22 LAB — COLOGUARD RESULT REPORTABLE: NEGATIVE

## 2025-04-30 ENCOUNTER — TELEPHONE (OUTPATIENT)
Age: 50
End: 2025-04-30

## 2025-04-30 NOTE — TELEPHONE ENCOUNTER
Spoke to [facility] pilar Herrera. Mavyret scheduled for delivery 5/2/2025.

## 2025-05-01 ENCOUNTER — OFFICE VISIT (OUTPATIENT)
Dept: FAMILY MEDICINE CLINIC | Facility: CLINIC | Age: 50
End: 2025-05-01
Payer: COMMERCIAL

## 2025-05-01 VITALS
RESPIRATION RATE: 12 BRPM | TEMPERATURE: 97.8 F | SYSTOLIC BLOOD PRESSURE: 144 MMHG | OXYGEN SATURATION: 98 % | WEIGHT: 220 LBS | HEIGHT: 63 IN | BODY MASS INDEX: 38.98 KG/M2 | DIASTOLIC BLOOD PRESSURE: 90 MMHG | HEART RATE: 81 BPM

## 2025-05-01 DIAGNOSIS — R76.8 HEPATITIS C ANTIBODY TEST POSITIVE: ICD-10-CM

## 2025-05-01 DIAGNOSIS — G47.09 OTHER INSOMNIA: ICD-10-CM

## 2025-05-01 DIAGNOSIS — Z12.31 ENCOUNTER FOR SCREENING MAMMOGRAM FOR BREAST CANCER: ICD-10-CM

## 2025-05-01 DIAGNOSIS — I12.9 TYPE 2 DIABETES MELLITUS WITH STAGE 3 CHRONIC KIDNEY DISEASE AND HYPERTENSION (HCC): ICD-10-CM

## 2025-05-01 DIAGNOSIS — F32.A ANXIETY AND DEPRESSION: Chronic | ICD-10-CM

## 2025-05-01 DIAGNOSIS — N18.32 STAGE 3B CHRONIC KIDNEY DISEASE (HCC): Chronic | ICD-10-CM

## 2025-05-01 DIAGNOSIS — I10 ESSENTIAL HYPERTENSION: Chronic | ICD-10-CM

## 2025-05-01 DIAGNOSIS — E11.22 TYPE 2 DIABETES MELLITUS WITH STAGE 3 CHRONIC KIDNEY DISEASE AND HYPERTENSION (HCC): ICD-10-CM

## 2025-05-01 DIAGNOSIS — M62.830 SPASM OF MUSCLE OF LOWER BACK: Primary | ICD-10-CM

## 2025-05-01 DIAGNOSIS — N18.30 TYPE 2 DIABETES MELLITUS WITH STAGE 3 CHRONIC KIDNEY DISEASE AND HYPERTENSION (HCC): ICD-10-CM

## 2025-05-01 DIAGNOSIS — F11.20 METHADONE MAINTENANCE THERAPY PATIENT (HCC): Chronic | ICD-10-CM

## 2025-05-01 DIAGNOSIS — Z12.4 SCREENING FOR CERVICAL CANCER: ICD-10-CM

## 2025-05-01 DIAGNOSIS — F41.9 ANXIETY AND DEPRESSION: Chronic | ICD-10-CM

## 2025-05-01 DIAGNOSIS — G40.909 SEIZURE DISORDER (HCC): Chronic | ICD-10-CM

## 2025-05-01 PROBLEM — Z91.199 NONCOMPLIANCE: Chronic | Status: RESOLVED | Noted: 2023-01-15 | Resolved: 2025-05-01

## 2025-05-01 PROCEDURE — 99214 OFFICE O/P EST MOD 30 MIN: CPT | Performed by: STUDENT IN AN ORGANIZED HEALTH CARE EDUCATION/TRAINING PROGRAM

## 2025-05-01 RX ORDER — TIZANIDINE 2 MG/1
2 TABLET ORAL
Qty: 20 TABLET | Refills: 0 | Status: SHIPPED | OUTPATIENT
Start: 2025-05-01

## 2025-05-01 NOTE — ASSESSMENT & PLAN NOTE
Depression Screening Follow-up Plan: Patient's depression screening was positive with a PHQ-9 score of 12. Patient declines further evaluation by mental health professional and/or medications. They have no active suicidal ideations. Brief counseling provided and recommend additional follow-up/re-evaluation at next office visit.

## 2025-05-01 NOTE — ASSESSMENT & PLAN NOTE
Lab Results   Component Value Date    EGFR 77 03/24/2025    EGFR 63 12/19/2024    EGFR 37 07/30/2024    CREATININE 0.88 03/24/2025    CREATININE 1.09 (H) 12/19/2024    CREATININE 1.62 (H) 07/30/2024

## 2025-05-01 NOTE — PROGRESS NOTES
Name: Marcia Barron      : 1975      MRN: [id number]  Encounter Provider: Heath Hanna DO  Encounter Date: 2025   Encounter department: [facility]  :  Assessment & Plan  Spasm of muscle of lower back  Symptoms appear to be muscle spasms right lumbar region, recommend Zanaflex at night, light stretching/strengthening reviewed, heat to area.  If symptoms worsen or not improving reevaluation recommended, recent CT imaging reviewed.  Urine analysis with urine culture for complete workup.  Orders:  •  tiZANidine (ZANAFLEX) 2 mg tablet; Take 1 tablet (2 mg total) by mouth daily at bedtime    Essential hypertension  Blood pressure overall well-controlled, continue antihypertensive, recheck in 2 weeks       Encounter for screening mammogram for breast cancer    Orders:  •  Mammo screening bilateral w 3d and cad; Future    Screening for cervical cancer    Orders:  •  Ambulatory referral to Obstetrics / Gynecology; Future    Type 2 diabetes mellitus with stage 3 chronic kidney disease and hypertension (HCC)    Lab Results   Component Value Date    HGBA1C 10.3 (A) 2025   Patient notes that blood glucose levels have been stable, recheck A1c routine management    Orders:  •  Albumin / creatinine urine ratio    Seizure disorder (HCC)  Stable, no recent seizure activity, continue antiepileptic medication       Stage 3b chronic kidney disease (HCC)  Lab Results   Component Value Date    EGFR 77 2025    EGFR 63 2024    EGFR 37 2024    CREATININE 0.88 2025    CREATININE 1.09 (H) 2024    CREATININE 1.62 (H) 2024            Anxiety and depression  Depression Screening Follow-up Plan: Patient's depression screening was positive with a PHQ-9 score of 12. Patient declines further evaluation by mental health professional and/or medications. They have no active suicidal ideations. Brief counseling provided and recommend additional follow-up/re-evaluation at  "next office visit.         Methadone maintenance therapy patient (HCC)  Patient is reducing methadone at this time with appropriate team       Hepatitis C antibody test positive  Patient will be starting Mavyret, monitor       Other insomnia  Continue appropriate sleep/wake cycle              History of Present Illness   Difficulty Urinating   This is a chronic problem. The current episode started in the past 7 days. The problem occurs every urination. The problem has been unchanged. The quality of the pain is described as burning. The pain is at a severity of 6/10. The pain is moderate. The fever has been present for 1 - 2 days. There is A history of pyelonephritis. Associated symptoms include flank pain, frequency, hematuria, nausea, a possible pregnancy, sweats and urgency. Pertinent negatives include no chills, discharge, hesitancy or vomiting.     Review of Systems   Constitutional:  Negative for chills and fever.   HENT:  Negative for ear pain and sore throat.    Eyes:  Negative for pain and visual disturbance.   Respiratory:  Negative for cough and shortness of breath.    Cardiovascular:  Negative for chest pain and palpitations.   Gastrointestinal:  Positive for nausea. Negative for abdominal pain and vomiting.   Genitourinary:  Positive for dysuria, flank pain, frequency, hematuria and urgency. Negative for hesitancy.   Musculoskeletal:  Negative for arthralgias and back pain.   Skin:  Negative for color change and rash.   Neurological:  Negative for seizures and syncope.   All other systems reviewed and are negative.      Objective   /90 (BP Location: Left arm, Patient Position: Sitting, Cuff Size: Large)   Pulse 81   Temp 97.8 °F (36.6 °C) (Temporal)   Resp 12   Ht 5' 3\" (1.6 m)   Wt 99.8 kg (220 lb)   LMP 10/07/2022 (Approximate)   SpO2 98%   BMI 38.97 kg/m²      Physical Exam  Vitals and nursing note reviewed.   Constitutional:       General: She is not in acute distress.     Appearance: " She is well-developed.   HENT:      Head: Normocephalic and atraumatic.   Eyes:      General: No scleral icterus.     Conjunctiva/sclera: Conjunctivae normal.   Cardiovascular:      Rate and Rhythm: Normal rate and regular rhythm.      Pulses: Normal pulses.      Heart sounds: No murmur heard.  Pulmonary:      Effort: Pulmonary effort is normal. No respiratory distress.      Breath sounds: Normal breath sounds.   Abdominal:      General: Bowel sounds are normal. There is no distension.      Palpations: Abdomen is soft.      Tenderness: There is no abdominal tenderness.   Musculoskeletal:         General: No swelling. Normal range of motion.      Cervical back: Neck supple.   Skin:     General: Skin is warm and dry.      Capillary Refill: Capillary refill takes less than 2 seconds.   Neurological:      General: No focal deficit present.      Mental Status: She is alert and oriented to person, place, and time. Mental status is at baseline.   Psychiatric:         Mood and Affect: Mood normal.         Behavior: Behavior normal.

## 2025-05-01 NOTE — ASSESSMENT & PLAN NOTE
Lab Results   Component Value Date    HGBA1C 10.3 (A) 03/24/2025   Patient notes that blood glucose levels have been stable, recheck A1c routine management    Orders:  •  Albumin / creatinine urine ratio

## 2025-05-02 LAB
ALBUMIN/CREAT UR: 326 MG/G CREAT (ref 0–29)
CREAT UR-MCNC: 25.8 MG/DL
MICROALBUMIN UR-MCNC: 84.2 UG/ML
MICRODELETION SYND BLD/T FISH: NORMAL

## 2025-06-02 ENCOUNTER — TELEPHONE (OUTPATIENT)
Dept: GASTROENTEROLOGY | Facility: AMBULARY SURGERY CENTER | Age: 50
End: 2025-06-02

## 2025-06-02 NOTE — TELEPHONE ENCOUNTER
Called and spoke with pt reporting she is unable to get her Mavyret refill due to prescription being closed. Called and spoke with [facility] specialty pharmacy, they are reporting that prescription needs a prior authorization. When questioned why a prior auth was needed for a refill when the original script did not need a prior auth I was told they would call back with more information. Provided with office call back number.

## 2025-06-03 NOTE — TELEPHONE ENCOUNTER
Spoke with Sandro at [facility]. He advised that issue has been taken care of and they will contact pt as soon as they hang up with me to arrange shipping of refill to her. I requested they expedite shipping to her to prevent interruption in treatment

## 2025-06-03 NOTE — TELEPHONE ENCOUNTER
LVM advising pt of conversation with [facility] and that she should be hearing from them to arrange shipping of the medication to her. Provided with [facility] specialty pharmacy phone number  [phone] for her to call in case she missed them this morning

## 2025-06-03 NOTE — TELEPHONE ENCOUNTER
Pt returning call, verified prior notes, pt will reach out to [facility] pt is asking if her lab orders were faxed to [facility], please fax lab orders if they haven't been sent.

## 2025-06-05 ENCOUNTER — CONSULT (OUTPATIENT)
Dept: ENDOCRINOLOGY | Facility: CLINIC | Age: 50
End: 2025-06-05
Payer: COMMERCIAL

## 2025-06-05 VITALS
SYSTOLIC BLOOD PRESSURE: 134 MMHG | DIASTOLIC BLOOD PRESSURE: 80 MMHG | BODY MASS INDEX: 38.55 KG/M2 | HEART RATE: 75 BPM | WEIGHT: 217.6 LBS | HEIGHT: 63 IN | OXYGEN SATURATION: 100 %

## 2025-06-05 DIAGNOSIS — R63.4 WEIGHT LOSS: ICD-10-CM

## 2025-06-05 DIAGNOSIS — E11.65 TYPE 2 DIABETES MELLITUS WITH HYPERGLYCEMIA, WITH LONG-TERM CURRENT USE OF INSULIN (HCC): ICD-10-CM

## 2025-06-05 DIAGNOSIS — I10 ESSENTIAL HYPERTENSION: Chronic | ICD-10-CM

## 2025-06-05 DIAGNOSIS — R76.8 HEPATITIS C ANTIBODY TEST POSITIVE: ICD-10-CM

## 2025-06-05 DIAGNOSIS — E66.01 CLASS 2 SEVERE OBESITY WITH SERIOUS COMORBIDITY AND BODY MASS INDEX (BMI) OF 38.0 TO 38.9 IN ADULT, UNSPECIFIED OBESITY TYPE (HCC): ICD-10-CM

## 2025-06-05 DIAGNOSIS — E78.2 MIXED HYPERLIPIDEMIA: Chronic | ICD-10-CM

## 2025-06-05 DIAGNOSIS — N18.30 TYPE 2 DIABETES MELLITUS WITH STAGE 3 CHRONIC KIDNEY DISEASE AND HYPERTENSION (HCC): ICD-10-CM

## 2025-06-05 DIAGNOSIS — E11.22 TYPE 2 DIABETES MELLITUS WITH STAGE 3 CHRONIC KIDNEY DISEASE AND HYPERTENSION (HCC): ICD-10-CM

## 2025-06-05 DIAGNOSIS — Z79.4 TYPE 2 DIABETES MELLITUS WITH HYPERGLYCEMIA, WITH LONG-TERM CURRENT USE OF INSULIN (HCC): Primary | ICD-10-CM

## 2025-06-05 DIAGNOSIS — N18.32 STAGE 3B CHRONIC KIDNEY DISEASE (HCC): Chronic | ICD-10-CM

## 2025-06-05 DIAGNOSIS — E66.812 CLASS 2 SEVERE OBESITY WITH SERIOUS COMORBIDITY AND BODY MASS INDEX (BMI) OF 38.0 TO 38.9 IN ADULT, UNSPECIFIED OBESITY TYPE (HCC): ICD-10-CM

## 2025-06-05 DIAGNOSIS — I12.9 TYPE 2 DIABETES MELLITUS WITH STAGE 3 CHRONIC KIDNEY DISEASE AND HYPERTENSION (HCC): ICD-10-CM

## 2025-06-05 DIAGNOSIS — F11.20 METHADONE MAINTENANCE THERAPY PATIENT (HCC): Chronic | ICD-10-CM

## 2025-06-05 DIAGNOSIS — Z79.4 TYPE 2 DIABETES MELLITUS WITH HYPERGLYCEMIA, WITH LONG-TERM CURRENT USE OF INSULIN (HCC): ICD-10-CM

## 2025-06-05 DIAGNOSIS — E11.65 TYPE 2 DIABETES MELLITUS WITH HYPERGLYCEMIA, WITH LONG-TERM CURRENT USE OF INSULIN (HCC): Primary | ICD-10-CM

## 2025-06-05 PROCEDURE — 99204 OFFICE O/P NEW MOD 45 MIN: CPT | Performed by: INTERNAL MEDICINE

## 2025-06-05 RX ORDER — INSULIN GLARGINE 100 [IU]/ML
INJECTION, SOLUTION SUBCUTANEOUS
Qty: 15 ML | Refills: 2 | Status: SHIPPED | OUTPATIENT
Start: 2025-06-05

## 2025-06-05 RX ORDER — INSULIN GLARGINE-YFGN 100 [IU]/ML
20 INJECTION, SOLUTION SUBCUTANEOUS 2 TIMES DAILY
Qty: 10 ML | Refills: 1 | Status: CANCELLED | OUTPATIENT
Start: 2025-06-05

## 2025-06-05 RX ORDER — INSULIN LISPRO 100 [IU]/ML
5 INJECTION, SOLUTION INTRAVENOUS; SUBCUTANEOUS
Qty: 15 ML | Refills: 3 | Status: SHIPPED | OUTPATIENT
Start: 2025-06-05 | End: 2025-06-06

## 2025-06-05 RX ORDER — INSULIN LISPRO 100 [IU]/ML
INJECTION, SOLUTION INTRAVENOUS; SUBCUTANEOUS
Qty: 15 ML | Status: CANCELLED | OUTPATIENT
Start: 2025-06-05

## 2025-06-05 RX ORDER — INSULIN LISPRO 100 [IU]/ML
INJECTION, SOLUTION INTRAVENOUS; SUBCUTANEOUS
Qty: 10 ML | Refills: 1 | Status: CANCELLED | OUTPATIENT
Start: 2025-06-05

## 2025-06-05 NOTE — PATIENT INSTRUCTIONS
Change Lantus to 22 units subcutaneously once a day at bedtime  Start Humalog/admelog 5 units with meals 3 times a day  In addition, please use humalog/admelog insulin per the following sliding scale to correct high blood sugars:  BG  151-200: 1 unit  201-250: 2 units  251-300: 3 units  301-350: 4 units  > 350: 5 units  Do not correct bed time highs unless > 200 mg/dl     Admelog expend is not covered by your insurance, please call your insurance to ask which short acting insulin may be covered in a pen form.  Check blood sugars before meals and at bedtime, send log for review in 2 to 3 weeks  Please have labs done as ordered  Follow-up in 3 months

## 2025-06-05 NOTE — PROGRESS NOTES
Marcia Barron 50 y.o. female MRN: [id number]    Encounter: [id number]  Assessment & Plan  1. Type 2 Diabetes Mellitus continue insulin therapy with hypo-EMEA  2 CKD  3.  Obesity, BMI 38  4. Unintentional weight loss  Poorly controlled with A1c 10.3% in 3/2025  - Order TSH and free T4 to rule out thyroid issues contributing to weight loss.Will also check antibodies to evaluate for NIC  - Adjust glargine to 22 units QHS, option to switch to morning. Humalog 5 units TID with meals.   - Check BG before meals and at bedtime, send log in 2-3 weeks.  Contact insurance for covered short-acting insulin pen if Admelog FlexPen not covered.  Is agreeable to a trial of metformin XR in the future    5. Neuropathy  - Managed with gabapentin 800 mg TID, effective.    6.  Hypertension-fairly controlled, continue current medications  7.  Hyperlipidemia -not on statin medication.  Check fasting lipid panel  8.Hepatic Steatosis  - Confirmed by ultrasound.  - Recommend weight loss and blood sugar control.  - Continue current management, monitor liver function.    9. Hep C, Constipation  - follow up with gastroenterologist, Dr. Quinn, for further evaluation and management.      Follow-up: In 3 months.    CC: diabetes mellitus     History of Present Illness  A 50-year-old female presents for diabetes mellitus management. Her past medical history includes hypertension, hyperlipidemia, CKD stage III, obesity, seizure disorder, ADHD, bipolar disorder, anxiety, and depression, Hep C.    She has had diabetes for 10 years, with a family history in her father and sister. She was referred for diabetes management and medication refills.     She is currently on glargine 20 units BID and Admelog 0-10 units TID before meals, adjusting based on pre-meal blood glucose levels. Her morning blood glucose ranges from , post-lunch levels are in the low 200s, and post-dinner levels are around 300. She does not monitor her blood glucose at bedtime. She  prefers oral medication over insulin and is intolerant to metformin due to gastrointestinal side effects, although she has not tried extended-release metformin.     There have been no significant changes in her energy levels or appetite. She has eliminated soda from her diet and believes her condition has improved. She had a previous hospitalization due to hyperglycemia with blood glucose levels over 1000. She reports no chest pain, shortness of breath, diarrhea, or urinary complaints.     She experiences severe nausea attributed to gastroparesis and hepatic steatosis, which is treated by Dr. Quinn. She has abdominal pain several times daily related to constipation, and over-the-counter remedies have been ineffective. Her nausea and abdominal pain resolve after bowel movements.    She has a history of kidney disease but no strokes or heart attacks. She underwent LASIK surgery in February 2025 by Dr. Lockhart and had a follow-up at the end of the year.     She reports no numbness or tingling in her extremities.  She is on gabapentin 800 mg TID for diabetic neuropathy in her legs and reports benefit from the medication.    She has had significant weight loss from 500 lbs to 217 lbs over 4 years through walking and dietary changes, with a recent loss of 40 lbs in the last 4 months unintentionally    She experiences constipation due to methadone use and is currently tapering her dose, now at 10 mg daily. She has been on methadone since 2018.    She started hepatitis C treatment 4 weeks ago.    FAMILY HISTORY  Father and sister: type II diabetes.       All other systems were reviewed and are negative.       Review of Systems    Historical Information   Past Medical History[1]  Past Surgical History[2]  Social History   Social History     Substance and Sexual Activity   Alcohol Use Never     Social History     Substance and Sexual Activity   Drug Use Not Currently    Types: Heroin    Comment: hx of IVDU     Tobacco Use  "History[3]  Family History: Family History[4]    Meds/Allergies   Current Medications[5]  Allergies[6]    Objective   Vitals: Blood pressure 134/80, pulse 75, height 5' 3\" (1.6 m), weight 98.7 kg (217 lb 9.6 oz), last menstrual period 10/07/2022, SpO2 100%, not currently breastfeeding.    Physical Exam  Constitutional:       Appearance: She is well-developed.   HENT:      Head: Normocephalic and atraumatic.     Eyes:      Conjunctiva/sclera: Conjunctivae normal.     Neck:      Thyroid: No thyromegaly.     Cardiovascular:      Rate and Rhythm: Normal rate and regular rhythm.      Pulses: no weak pulses.           Dorsalis pedis pulses are 2+ on the right side and 2+ on the left side.      Heart sounds: Normal heart sounds. No murmur heard.  Pulmonary:      Effort: Pulmonary effort is normal.      Breath sounds: Normal breath sounds. No wheezing.   Abdominal:      General: There is no distension.      Palpations: Abdomen is soft.      Tenderness: There is no abdominal tenderness.     Musculoskeletal:         General: Normal range of motion.      Cervical back: Normal range of motion and neck supple.   Feet:      Right foot:      Skin integrity: Dry skin present. No ulcer, skin breakdown, erythema, warmth or callus.      Left foot:      Skin integrity: Dry skin present. No ulcer, skin breakdown, erythema, warmth or callus.     Skin:     General: Skin is warm and dry.     Neurological:      Mental Status: She is alert and oriented to person, place, and time.     Psychiatric:         Behavior: Behavior normal.     Diabetic Foot Exam    Patient's shoes and socks removed.    Right Foot/Ankle   Right Foot Inspection  Skin Exam: skin normal, skin intact and dry skin. No warmth, no callus, no erythema, no maceration, no abnormal color, no pre-ulcer, no ulcer and no callus.     Toe Exam: ROM and strength within normal limits.     Sensory   Vibration: intact  Monofilament testing: intact    Vascular  Capillary refills: < 3 " "seconds  The right DP pulse is 2+.     Left Foot/Ankle  Left Foot Inspection  Skin Exam: skin normal, skin intact and dry skin. No warmth, no erythema, no maceration, normal color, no pre-ulcer, no ulcer and no callus.     Toe Exam: ROM and strength within normal limits.     Sensory   Vibration: intact  Monofilament testing: intact    Vascular  Capillary refills: < 3 seconds  The left DP pulse is 2+.     Assign Risk Category  No deformity present  No loss of protective sensation  No weak pulses  Risk: 0      The history was obtained from the review of the chart, patient.    Lab Results:      Lab Results   Component Value Date    WBC 10.44 (H) 03/24/2025    HGB 12.7 03/24/2025    HCT 37.5 03/24/2025    MCV 83 03/24/2025     03/24/2025     Lab Results   Component Value Date    CREATININE 0.88 03/24/2025    BUN 23 03/24/2025    K 5.1 03/24/2025    CL 97 03/24/2025    CO2 28 03/24/2025     Lab Results   Component Value Date    HGBA1C 10.3 (A) 03/24/2025         Imaging Studies:       Results Review Statement: No pertinent imaging studies reviewed.    Portions of the record may have been created with voice recognition software. Occasional wrong word or \"sound a like\" substitutions may have occurred due to the inherent limitations of voice recognition software. Read the chart carefully and recognize, using context, where substitutions have occurred.          [1]   Past Medical History:  Diagnosis Date    AAA (abdominal aortic aneurysm) (HCC)     Allergic 1980    Septra    Anxiety     Asthma     Cholelithiases 02/05/2020    Added automatically from request for surgery [id number]    COPD (chronic obstructive pulmonary disease) (HCC) 2014    Me and father    Depression     Diabetes mellitus (HCC)     Gastric ulcer     GERD (gastroesophageal reflux disease) 2023    Me    Headache(784.0) 2010    Me    Hepatitis C 01/01/2023    HL (hearing loss) 1995    Me    Hypertension     Infectious viral hepatitis 2023    Me    Liver " disease     Memory loss 2000    Me    Mixed hyperlipidemia 01/06/2023    Neuropathy     Pneumonia 2014    Me    Psychiatric disorder     Seizures (HCC) 2008    Me    Substance abuse (HCC) 2009    Urinary tract infection 2001    When i started gettìng them    Visual impairment 2000    Me   [2]   Past Surgical History:  Procedure Laterality Date    CHOLECYSTECTOMY  2020    CHOLECYSTECTOMY LAPAROSCOPIC N/A 02/10/2020    Procedure: CHOLECYSTECTOMY LAPAROSCOPIC;  Surgeon: Shaheed Nolan MD;  Location: [facility];  Service: General    EYE SURGERY  2024    Rìght    INCISION AND DRAINAGE OF WOUND Right 07/01/2017    Procedure: INCISION AND DRAINAGE (I&D) EXTREMITY THIGH;  Surgeon: Veto Reyes MD;  Location: [facility];  Service: General   [3]   Social History  Tobacco Use   Smoking Status Never   Smokeless Tobacco Never   Tobacco Comments    never   [4]   Family History  Problem Relation Name Age of Onset    Hypertension Mother alec wrinkle     Drug abuse Mother alec wrinkle     Diabetes type II Mother alec cordoba         18 in tumor on spine    Vision loss Mother alec wrinkle     Arthritis Mother alec wrinkle     Hearing loss Mother alec wrinkalin     Anxiety disorder Mother alec wrinkle     Diabetes Father ami wrinkle     Hypertension Father ami wrinkle     COPD Father ami wrinkle     ADD / ADHD Father ami wrinkle         Passed away july 1 2022    Anxiety disorder Father ami wrinkle     Bipolar disorder Father ami wrinkle     Diabetes type II Father ami wrinkle         Copd, diabetic    Kidney disease Father ami wrinkle     Mental illness Father ami wrinkle     Hearing loss Father ami wrinkle     Heart disease Father ami wrinkle     Depression Father ami wrinkle     Coronary artery disease Father ami wrinkle     Asthma Father ami wrinkle     Glaucoma Father ami wrinkle     Self-Injury Father ami wrinkle     Kidney cancer Maternal Grandmother bereket nkechi     Cancer Maternal Grandmother bereket nkechi          Kidney    Diabetes type II Maternal Grandmother bereket nkechi         Old age    Kidney disease Maternal Grandmother bereket nkechi     Dementia Maternal Grandmother bereket nkechi     Arthritis Maternal Grandmother bereket nkechi     Hearing loss Maternal Grandmother bereket nkechi     Lung cancer Paternal Uncle      Diabetes Family      Hypertension Family      Aneurysm Family          PDF, paternal aunt, other paternal family members    Drug abuse Maternal Uncle malcolm wrinkle     Schizophrenia Maternal Uncle malcolm wrinkle     Suicide Attempts Maternal Uncle malcolm wrinkle     Bipolar disorder Maternal Uncle malcolm wrinkle     Self-Injury Maternal Uncle malcolm wrinkle     Mental illness Maternal Uncle malcolm wrinkle         Bipolar depression, pstd    Diabetes type II Maternal Grandfather Chuyita nkechi         Kidney Cancer    Substance Abuse Maternal Grandfather Chuyita nkechi     Stroke Maternal Grandfather Chuyita nkechi     Vision loss Maternal Grandfather Chuyita nkechi     Kidney disease Maternal Grandfather Chuyita nkechi         Kidney cancer    Diabetes type II Paternal Grandfather Suhas wrinkle         Brain tumor    Stroke Paternal Grandfather Borger wrinkle     Arthritis Paternal Grandmother Chuyita [place]     Cancer Paternal Grandmother Chuyita [place]         Kidney cancer    Hearing loss Paternal Grandmother Chuyita [place]     Glaucoma Paternal Grandmother Chuyita [place]     Psychiatric Illness Paternal Grandmother Chuyita [place]     Diabetes type II Sister Yon Wrinkle         Brain tumor    Diabetes type II Sister Peggy Bryan         Diabetic, high blood pressure    Substance Abuse Maternal Aunt Michel Tse     Drug abuse Maternal Aunt Michel Tse     Substance Abuse Maternal Uncle Malcolm WRINKLE     Mental illness Maternal Uncle Malcolm WRINKLE     Depression Maternal Uncle Malcolm WRINKLE     Hypertension Sister Peggy Adams     Asthma Sister Peggy Adams     Hearing loss  "Sister Peggy Adams     Vision loss Sister Peggy Adams     Glaucoma Sister Peggy Adams     Cancer Paternal Uncle Manan WRINKLE         Lung cancer    Anxiety disorder Paternal Uncle Manan WRINKLE    [5]   Current Outpatient Medications   Medication Sig Dispense Refill    albuterol (PROVENTIL HFA,VENTOLIN HFA) 90 mcg/act inhaler INHALE 2 PUFFS EVERY 6 HOURS AS NEEDED FOR WHEEZING 6.7 g 5    ARIPiprazole (ABILIFY) 5 mg tablet       BD Pen Needle Micro U/F 32G X 6 MM MISC USE 3 (THREE) TIMES A DAY BEFORE MEALS 100 each 3    Blood Glucose Monitoring Suppl (OneTouch Verio Reflect) w/Device KIT Check blood sugars twice daily. Please substitute with appropriate alternative as covered by patient's insurance. Dx: E11.65 1 kit 0    clonazePAM (KlonoPIN) 1 mg tablet Take 1 mg by mouth in the morning and 1 mg in the evening and 1 mg before bedtime.      divalproex sodium (DEPAKOTE ER) 500 mg 24 hr tablet Take 1,500 mg by mouth daily at bedtime      gabapentin (NEURONTIN) 800 mg tablet Take 800 mg by mouth in the morning and 800 mg in the evening and 800 mg before bedtime.      Glecaprevir-Pibrentasvir (Mavyret) 100-40 MG TABS Take 3 tablets by mouth daily 84 tablet 1    glucose blood (OneTouch Verio) test strip Check blood sugars once daily. Please substitute with appropriate alternative as covered by patient's insurance. Dx: E11.65 100 each 3    Insulin Glargine-yfgn 100 UNIT/ML SOLN Inject 0.2 mL (20 Units total) as directed 2 (two) times a day 10 mL 1    insulin lispro (Admelog) 100 units/mL injection INJECT 5-10 UNITS UNDER THE SKIN 3 (THREE) TIMES A DAY WITH MEALS 10 mL 1    Insulin Syringe-Needle U-100 (BD Insulin Syringe U/F) 31G X 5/16\" 0.3 ML MISC 3 TIMES DAILY BEFORE MEALS 100 each 0    lisinopril-hydrochlorothiazide (PRINZIDE,ZESTORETIC) 20-12.5 MG per tablet Take 1 tablet by mouth daily 90 tablet 3    methadone (DOLOPHINE) 10 mg/mL oral concentrated solution Take 140 mg by mouth in the morning. Methadone " clinic.      mupirocin (BACTROBAN) 2 % ointment Apply topically 3 (three) times a day 15 g 0    ondansetron (ZOFRAN) 8 mg tablet Take 1 tablet (8 mg total) by mouth every 8 (eight) hours as needed for nausea or vomiting 20 tablet 0    OneTouch Delica Lancets 33G MISC Check blood sugars once daily. Please substitute with appropriate alternative as covered by patient's insurance. Dx: E11.65 100 each 3    prazosin (MINIPRESS) 5 mg capsule Take 5 mg by mouth daily at bedtime      tiZANidine (ZANAFLEX) 2 mg tablet Take 1 tablet (2 mg total) by mouth daily at bedtime 20 tablet 0    traZODone (DESYREL) 50 mg tablet Take 50 mg by mouth daily at bedtime      venlafaxine (EFFEXOR-XR) 150 mg 24 hr capsule Take 300 mg by mouth in the morning.      naproxen (Naprosyn) 500 mg tablet Take 1 tablet (500 mg total) by mouth 2 (two) times a day with meals 30 tablet 0     No current facility-administered medications for this visit.   [6]   Allergies  Allergen Reactions    Codeine Hives    Sulfamethoxazole-Trimethoprim Hives and Other (See Comments)     hives

## 2025-06-06 RX ORDER — INSULIN LISPRO 100 [IU]/ML
5 INJECTION, SOLUTION INTRAVENOUS; SUBCUTANEOUS
Qty: 15 ML | Refills: 3 | Status: SHIPPED | OUTPATIENT
Start: 2025-06-06

## 2025-06-17 DIAGNOSIS — E11.65 TYPE 2 DIABETES MELLITUS WITH HYPERGLYCEMIA, WITH LONG-TERM CURRENT USE OF INSULIN (HCC): ICD-10-CM

## 2025-06-17 DIAGNOSIS — R63.4 WEIGHT LOSS: ICD-10-CM

## 2025-06-17 DIAGNOSIS — Z79.4 TYPE 2 DIABETES MELLITUS WITH HYPERGLYCEMIA, WITH LONG-TERM CURRENT USE OF INSULIN (HCC): ICD-10-CM

## 2025-06-17 DIAGNOSIS — N18.30 TYPE 2 DIABETES MELLITUS WITH STAGE 3 CHRONIC KIDNEY DISEASE AND HYPERTENSION (HCC): ICD-10-CM

## 2025-06-17 DIAGNOSIS — I12.9 TYPE 2 DIABETES MELLITUS WITH STAGE 3 CHRONIC KIDNEY DISEASE AND HYPERTENSION (HCC): ICD-10-CM

## 2025-06-17 DIAGNOSIS — E11.22 TYPE 2 DIABETES MELLITUS WITH STAGE 3 CHRONIC KIDNEY DISEASE AND HYPERTENSION (HCC): ICD-10-CM

## 2025-06-17 LAB
ALBUMIN SERPL-MCNC: 4 G/DL (ref 3.9–4.9)
ALBUMIN/CREAT UR: 130 MG/G CREAT (ref 0–29)
ALP SERPL-CCNC: 85 IU/L (ref 44–121)
ALT SERPL-CCNC: 11 IU/L (ref 0–32)
AST SERPL-CCNC: 14 IU/L (ref 0–40)
BASOPHILS # BLD AUTO: 0 X10E3/UL (ref 0–0.2)
BASOPHILS NFR BLD AUTO: 0 %
BILIRUB SERPL-MCNC: 0.3 MG/DL (ref 0–1.2)
BUN SERPL-MCNC: 22 MG/DL (ref 6–24)
BUN SERPL-MCNC: 22 MG/DL (ref 6–24)
BUN/CREAT SERPL: 17 (ref 9–23)
BUN/CREAT SERPL: 17 (ref 9–23)
CALCIUM SERPL-MCNC: 9.7 MG/DL (ref 8.7–10.2)
CALCIUM SERPL-MCNC: 9.7 MG/DL (ref 8.7–10.2)
CHLORIDE SERPL-SCNC: 93 MMOL/L (ref 96–106)
CHLORIDE SERPL-SCNC: 93 MMOL/L (ref 96–106)
CHOLEST SERPL-MCNC: 201 MG/DL (ref 100–199)
CHOLEST/HDLC SERPL: 5.2 RATIO (ref 0–4.4)
CO2 SERPL-SCNC: 23 MMOL/L (ref 20–29)
CO2 SERPL-SCNC: 23 MMOL/L (ref 20–29)
CREAT SERPL-MCNC: 1.26 MG/DL (ref 0.57–1)
CREAT SERPL-MCNC: 1.3 MG/DL (ref 0.57–1)
CREAT UR-MCNC: 44.7 MG/DL
EGFR: 51 ML/MIN/1.73
EGFR: 53 ML/MIN/1.73
EOSINOPHIL # BLD AUTO: 0.2 X10E3/UL (ref 0–0.4)
EOSINOPHIL NFR BLD AUTO: 2 %
ERYTHROCYTE [DISTWIDTH] IN BLOOD BY AUTOMATED COUNT: 15.1 % (ref 11.7–15.4)
GLOBULIN SER-MCNC: 3.2 G/DL (ref 1.5–4.5)
GLUCOSE SERPL-MCNC: 206 MG/DL (ref 70–99)
GLUCOSE SERPL-MCNC: 208 MG/DL (ref 70–99)
HCT VFR BLD AUTO: 39.8 % (ref 34–46.6)
HCV RNA SERPL NAA+PROBE-ACNC: NORMAL IU/ML
HDLC SERPL-MCNC: 39 MG/DL
HGB BLD-MCNC: 13 G/DL (ref 11.1–15.9)
IMM GRANULOCYTES # BLD: 0 X10E3/UL (ref 0–0.1)
IMM GRANULOCYTES NFR BLD: 0 %
LDL DIRECT COMMENT: ABNORMAL
LDLC SERPL CALC-MCNC: 107 MG/DL (ref 0–99)
LDLC SERPL DIRECT ASSAY-MCNC: 106 MG/DL (ref 0–99)
LYMPHOCYTES # BLD AUTO: 5 X10E3/UL (ref 0.7–3.1)
LYMPHOCYTES NFR BLD AUTO: 50 %
MCH RBC QN AUTO: 28.4 PG (ref 26.6–33)
MCHC RBC AUTO-ENTMCNC: 32.7 G/DL (ref 31.5–35.7)
MCV RBC AUTO: 87 FL (ref 79–97)
MICROALBUMIN UR-MCNC: 58.1 UG/ML
MONOCYTES # BLD AUTO: 0.6 X10E3/UL (ref 0.1–0.9)
MONOCYTES NFR BLD AUTO: 6 %
NEUTROPHILS # BLD AUTO: 4.2 X10E3/UL (ref 1.4–7)
NEUTROPHILS NFR BLD AUTO: 42 %
PLATELET # BLD AUTO: 280 X10E3/UL (ref 150–450)
POTASSIUM SERPL-SCNC: 4.2 MMOL/L (ref 3.5–5.2)
POTASSIUM SERPL-SCNC: 4.5 MMOL/L (ref 3.5–5.2)
PROT SERPL-MCNC: 7.2 G/DL (ref 6–8.5)
RBC # BLD AUTO: 4.58 X10E6/UL (ref 3.77–5.28)
SL AMB VLDL CHOLESTEROL CALC: 55 MG/DL (ref 5–40)
SODIUM SERPL-SCNC: 135 MMOL/L (ref 134–144)
SODIUM SERPL-SCNC: 136 MMOL/L (ref 134–144)
TEST INFORMATION: NORMAL
TRIGL SERPL-MCNC: 318 MG/DL (ref 0–149)
WBC # BLD AUTO: 10 X10E3/UL (ref 3.4–10.8)

## 2025-06-18 ENCOUNTER — OFFICE VISIT (OUTPATIENT)
Age: 50
End: 2025-06-18
Payer: COMMERCIAL

## 2025-06-18 VITALS
WEIGHT: 220 LBS | BODY MASS INDEX: 38.98 KG/M2 | SYSTOLIC BLOOD PRESSURE: 138 MMHG | HEIGHT: 63 IN | HEART RATE: 80 BPM | DIASTOLIC BLOOD PRESSURE: 94 MMHG

## 2025-06-18 DIAGNOSIS — B18.2 CHRONIC HEPATITIS C WITHOUT HEPATIC COMA (HCC): Primary | ICD-10-CM

## 2025-06-18 DIAGNOSIS — K74.69 OTHER CIRRHOSIS OF LIVER (HCC): ICD-10-CM

## 2025-06-18 PROCEDURE — 99214 OFFICE O/P EST MOD 30 MIN: CPT | Performed by: STUDENT IN AN ORGANIZED HEALTH CARE EDUCATION/TRAINING PROGRAM

## 2025-06-18 RX ORDER — GLECAPREVIR AND PIBRENTASVIR 40; 100 MG/1; MG/1
3 TABLET, FILM COATED ORAL DAILY
COMMUNITY
Start: 2025-06-02

## 2025-06-18 RX ORDER — INSULIN LISPRO 100 [IU]/ML
5 INJECTION, SOLUTION INTRAVENOUS; SUBCUTANEOUS
Qty: 12 ML | Refills: 2 | Status: SHIPPED | OUTPATIENT
Start: 2025-06-18

## 2025-06-18 NOTE — PROGRESS NOTES
[facility] - Outpatient Consultation  Marcia Barron 50 y.o. female MRN: [id number]  Encounter: [id number]    PCP:  No primary care provider on file., None  Referring Provider:  Self, Referral,     Name: Marcia Barron      : 1975      MRN: [id number]  Encounter Provider: Jazmine Quinn MD  Encounter Date: 2025   Encounter department: [facility]  :  Assessment & Plan  Chronic hepatitis C without hepatic coma (HCC)  Other cirrhosis of liver (HCC)  49 y.o. female with history of DM, HTN, CKD and opioid dependence on MMT who presents for follow up evaluation for chronic HCV infection.      She was recently diagnosed with HCV infection on routine screening when she was found to have +HCV Ab with VL 1.2 million in 2025 with GT3. She acquired HCV through IVDU with earliest use 6 years ago. She does not recall prior jaundice or hepatitis. She has never had a liver biopsy. Pre-treatment serologies were negative for HIV and HBV coinfection. Her US elastography did show F4 disease which was discordant with HCV Fibrosure.      Her labs are notable for mildly elevated serum transaminases with ALT 53, preserved synthetic liver function, and chronic thrombocytopenia.  She had an abdominal CT in 2024 which showed no hepatobiliary abnormality although exam was limited in the absence of contrast.  She has never had an EGD/COY.    She was started on 8 week course of Mayvret and is tolerating it well. Her liver tests and platelets have normalized. Her HCV VL was UD.     I recommend completing her course of therapy and rechecking HCV VL in 2025 to confirm SVR 12. She should also have ELF and repeat US elastography to evaluate for regression of fibrosis. I suspect that this is an overestimation in the context of acute hepatitis.      Otherwise, she will return to clinic in 3 to 6 months or sooner if needed.  Thank you for the opportunity to consult her care.     -  HCV VL due 9/2025  - US elastography with ELF due at SVR 12     Orders:    US elastography/UGAP; Future    Hepatitis C RNA, Quantitative PCR; Future    Enhanced Liver Fibrosis (ELF) Score; Future        History of Present Illness   Marcia Barron is a 50 y.o. female with history of DM, HTN, CKD and opioid dependence on MMT who presents for follow up evaluation for chronic HCV infection. She was last seen by me in November 2024.    Interval events  - US elastography showed F4 disease but HCV Fibrosure showed stage 1-2 disease  - Started on Mayvret, currently on week 5. HCV VL was undetectable. Pt also reporting constipation   - Did not schedule EGD/COY    Extended liver history   She was recently diagnosed with HCV infection on routine screening when she was found to have +HCV Ab with ,000 in June 2024. She acquired HCV through IVDU, earliest use was 6 years ago. She has been abstinent for 2 years and is on MMT. She does not recall prior jaundice or hepatitis. She has never had a liver biopsy.      She reports intentional weight loss from 500 lbs (2010) -> 200 lbs four months ago. She reports unintentional weight loss of 18 lbs recently. She reports poor PO intake with nausea/vomiting with nocturnal symptoms.      She was admitted in July for AMS in the context of sepsis. She had a CT head which was negative as well as a pan-CT without source although exam was limited in the absence of contrast.     She reports history of kidney malignancy. She has never had an EGD/COY.      HPI    Review of Systems   Constitutional: Negative.    HENT: Negative.     Eyes: Negative.    Respiratory: Negative.     Cardiovascular: Negative.    Gastrointestinal:  Positive for constipation.   Endocrine: Negative.    Genitourinary: Negative.    Musculoskeletal: Negative.    Skin: Negative.    Allergic/Immunologic: Negative.    Neurological: Negative.    Hematological: Negative.    Psychiatric/Behavioral: Negative.      A complete review  "of systems is negative other than that noted above in the HPI.      Current Medications[1]  Objective   /94 (BP Location: Left arm, Patient Position: Sitting, Cuff Size: Standard)   Pulse 80   Ht 5' 3\" (1.6 m)   Wt 99.8 kg (220 lb)   LMP 10/07/2022 (Approximate)   BMI 38.97 kg/m²     Physical Exam  Constitutional:       Appearance: Normal appearance. She is obese.   HENT:      Head: Normocephalic and atraumatic.     Eyes:      General: No scleral icterus.    Abdominal:      General: Abdomen is flat. There is no distension.     Skin:     General: Skin is warm and dry.      Coloration: Skin is not jaundiced.     Neurological:      General: No focal deficit present.      Mental Status: She is alert and oriented to person, place, and time.        Lab Results: I personally reviewed relevant lab results.     LABS/RADIOLOGY/ENDOSCOPY:  Lab Results   Component Value Date    WBC 10.0 06/16/2025    HGB 13.0 06/16/2025    HCT 39.8 06/16/2025     06/16/2025    GLUF 110 (H) 01/19/2018    BUN 22 06/16/2025    BUN 22 06/16/2025    CREATININE 1.26 (H) 06/16/2025    CREATININE 1.30 (H) 06/16/2025    K 4.5 06/16/2025    K 4.2 06/16/2025    CL 93 (L) 06/16/2025    CL 93 (L) 06/16/2025    CO2 23 06/16/2025    CO2 23 06/16/2025    BILIDIR 0.00 03/16/2019    ALKPHOS 62 03/24/2025    ALT 11 06/16/2025    AST 14 06/16/2025    GLOB 3.2 06/16/2025    CALCIUM 9.1 03/24/2025    EGFR 53 (L) 06/16/2025    EGFR 51 (L) 06/16/2025    TRIG 318 (H) 06/16/2025    HDL 39 (L) 06/16/2025    INR 1.56 (H) 07/07/2024    PTT 26 09/17/2019       MELD 3.0: 18 at 7/9/2024  5:25 AM  MELD-Na: 18 at 7/9/2024  5:25 AM  Calculated from:  Serum Creatinine: 1.75 mg/dL at 7/9/2024  5:25 AM  Serum Sodium: 137 mmol/L at 7/9/2024  5:25 AM  Total Bilirubin: 1.05 mg/dL at 7/7/2024  2:49 AM  Serum Albumin: 4 g/dL (Using max of 3.5 g/dL) at 7/7/2024  2:49 AM  INR(ratio): 1.56 at 7/7/2024  2:49 AM  Age at listing (hypothetical): 49 years  Sex: Female at " "7/9/2024  5:25 AM            US elastography (3/2025)  Shear Wave Elastography sampling was performed to evaluate stiffness changes within the liver associated with fibrosis.  E1 12.27 kPa  E2 17.83 kPa  E3 13.47 kPa  E4 15.71 kPa  E5 15.18 kPa  E6 14.84 kPa  E7 13.88 kPa  E8 15.31 kPa  E9 12.89 kPa  E10 15.82 kPa     E median: 15.01 kPa.  The corresponding Metavir score is F4 (cirrhosis), > 11.87 kPa.  > 1.98 m/s.  IQR/median: 13    RUQ US (3/2025)  Hepatic steatosis.             [1]   Current Outpatient Medications   Medication Sig Dispense Refill    albuterol (PROVENTIL HFA,VENTOLIN HFA) 90 mcg/act inhaler INHALE 2 PUFFS EVERY 6 HOURS AS NEEDED FOR WHEEZING 6.7 g 5    ARIPiprazole (ABILIFY) 5 mg tablet       BD Pen Needle Micro U/F 32G X 6 MM MISC USE 3 (THREE) TIMES A DAY BEFORE MEALS 100 each 3    Blood Glucose Monitoring Suppl (OneTouch Verio Reflect) w/Device KIT Check blood sugars twice daily. Please substitute with appropriate alternative as covered by patient's insurance. Dx: E11.65 1 kit 0    clonazePAM (KlonoPIN) 1 mg tablet Take 1 mg by mouth in the morning and 1 mg in the evening and 1 mg before bedtime.      divalproex sodium (DEPAKOTE ER) 500 mg 24 hr tablet Take 1,500 mg by mouth daily at bedtime      gabapentin (NEURONTIN) 800 mg tablet Take 800 mg by mouth in the morning and 800 mg in the evening and 800 mg before bedtime.      glucose blood (OneTouch Verio) test strip Check blood sugars once daily. Please substitute with appropriate alternative as covered by patient's insurance. Dx: E11.65 100 each 3    Insulin Glargine Solostar (Lantus SoloStar) 100 UNIT/ML SOPN Inject 22 units subcutaneously once a day at bedtime 15 mL 2    insulin lispro (Admelog) 100 units/mL injection Inject 5 Units under the skin 3 (three) times a day with meals 12 mL 2    Insulin Syringe-Needle U-100 (BD Insulin Syringe U/F) 31G X 5/16\" 0.3 ML MISC 3 TIMES DAILY BEFORE MEALS 100 each 0    Mavyret 100-40 MG TABS Take 3 " tablets by mouth in the morning      methadone (DOLOPHINE) 10 mg/mL oral concentrated solution Take 140 mg by mouth in the morning. Methadone clinic.      mupirocin (BACTROBAN) 2 % ointment Apply topically 3 (three) times a day 15 g 0    ondansetron (ZOFRAN) 8 mg tablet Take 1 tablet (8 mg total) by mouth every 8 (eight) hours as needed for nausea or vomiting 20 tablet 0    prazosin (MINIPRESS) 5 mg capsule Take 5 mg by mouth daily at bedtime      tiZANidine (ZANAFLEX) 2 mg tablet Take 1 tablet (2 mg total) by mouth daily at bedtime 20 tablet 0    traZODone (DESYREL) 50 mg tablet Take 50 mg by mouth daily at bedtime      venlafaxine (EFFEXOR-XR) 150 mg 24 hr capsule Take 300 mg by mouth in the morning.      lisinopril-hydrochlorothiazide (PRINZIDE,ZESTORETIC) 20-12.5 MG per tablet Take 1 tablet by mouth daily 90 tablet 3    naproxen (Naprosyn) 500 mg tablet Take 1 tablet (500 mg total) by mouth 2 (two) times a day with meals 30 tablet 0    OneTouch Delica Lancets 33G MISC Check blood sugars once daily. Please substitute with appropriate alternative as covered by patient's insurance. Dx: E11.65 100 each 3     No current facility-administered medications for this visit.

## 2025-06-21 DIAGNOSIS — I10 ESSENTIAL HYPERTENSION: ICD-10-CM

## 2025-06-22 RX ORDER — LISINOPRIL AND HYDROCHLOROTHIAZIDE 12.5; 2 MG/1; MG/1
1 TABLET ORAL DAILY
Qty: 90 TABLET | Refills: 1 | Status: SHIPPED | OUTPATIENT
Start: 2025-06-22

## 2025-06-25 LAB
GAD65 AB SER-ACNC: 234.6 U/ML (ref 0–5)
PANC ISLET CELL AB TITR SER: NEGATIVE {TITER}
T4 FREE SERPL-MCNC: 1.54 NG/DL (ref 0.82–1.77)
TSH SERPL DL<=0.005 MIU/L-ACNC: 7.11 UIU/ML (ref 0.45–4.5)
ZNT8 AB SERPL IA-ACNC: <15 U/ML

## 2025-06-26 ENCOUNTER — RESULTS FOLLOW-UP (OUTPATIENT)
Dept: ENDOCRINOLOGY | Facility: CLINIC | Age: 50
End: 2025-06-26

## 2025-06-26 NOTE — TELEPHONE ENCOUNTER
I called and left a voicemail for the patient stating Dr. Denise has review her lab test results and stated Please call the patient and inform of results.  ANAIS 65 antibody positive suggesting patient has type 1 diabetes melitis/NIC.  Need to continue insulin for management of diabetes mellitus  TSH mildly elevated, free T4 within normal limits.  Recommend repeating TSH, free T4 in 6 to 8 weeks, also check TPO, TG antibody  Cholesterol levels are high, recommend starting statin medication, does not follow-up.

## 2025-07-01 DIAGNOSIS — R11.2 NAUSEA AND VOMITING, UNSPECIFIED VOMITING TYPE: ICD-10-CM

## 2025-07-03 RX ORDER — GLECAPREVIR AND PIBRENTASVIR 40; 100 MG/1; MG/1
3 TABLET, FILM COATED ORAL DAILY
Qty: 84 TABLET | OUTPATIENT
Start: 2025-07-03

## 2025-07-03 RX ORDER — ONDANSETRON 8 MG/1
TABLET, FILM COATED ORAL
Qty: 20 TABLET | Refills: 0 | Status: SHIPPED | OUTPATIENT
Start: 2025-07-03

## 2025-07-17 ENCOUNTER — TELEPHONE (OUTPATIENT)
Age: 50
End: 2025-07-17

## 2025-07-18 RX ORDER — GLECAPREVIR AND PIBRENTASVIR 40; 100 MG/1; MG/1
3 TABLET, FILM COATED ORAL DAILY
Qty: 84 TABLET | OUTPATIENT
Start: 2025-07-18

## 2025-07-18 NOTE — TELEPHONE ENCOUNTER
Pt was to be completed with her Mavyret x 8 wks on 7/3/25. I have had multiple unsuccessful attempts to contact her to confirm complete and determine when her 12 wk SVR is due.

## 2025-07-28 DIAGNOSIS — R11.2 NAUSEA AND VOMITING, UNSPECIFIED VOMITING TYPE: ICD-10-CM

## 2025-07-29 RX ORDER — ONDANSETRON 8 MG/1
TABLET, FILM COATED ORAL
Qty: 20 TABLET | Refills: 1 | Status: SHIPPED | OUTPATIENT
Start: 2025-07-29

## 2025-08-18 DIAGNOSIS — E11.65 TYPE 2 DIABETES MELLITUS WITH HYPERGLYCEMIA, WITH LONG-TERM CURRENT USE OF INSULIN (HCC): ICD-10-CM

## 2025-08-18 DIAGNOSIS — I12.9 TYPE 2 DIABETES MELLITUS WITH STAGE 3 CHRONIC KIDNEY DISEASE AND HYPERTENSION (HCC): Primary | ICD-10-CM

## 2025-08-18 DIAGNOSIS — E11.22 TYPE 2 DIABETES MELLITUS WITH STAGE 3 CHRONIC KIDNEY DISEASE AND HYPERTENSION (HCC): Primary | ICD-10-CM

## 2025-08-18 DIAGNOSIS — Z79.4 TYPE 2 DIABETES MELLITUS WITH HYPERGLYCEMIA, WITH LONG-TERM CURRENT USE OF INSULIN (HCC): ICD-10-CM

## 2025-08-18 DIAGNOSIS — N18.30 TYPE 2 DIABETES MELLITUS WITH STAGE 3 CHRONIC KIDNEY DISEASE AND HYPERTENSION (HCC): Primary | ICD-10-CM

## 2025-08-19 RX ORDER — BLOOD SUGAR DIAGNOSTIC
STRIP MISCELLANEOUS
Qty: 200 STRIP | Refills: 3 | Status: SHIPPED | OUTPATIENT
Start: 2025-08-19

## 2025-08-19 RX ORDER — PEN NEEDLE, DIABETIC 31 GX5/16"
NEEDLE, DISPOSABLE MISCELLANEOUS 2 TIMES DAILY
Qty: 200 EACH | Refills: 3 | Status: SHIPPED | OUTPATIENT
Start: 2025-08-19

## 2025-08-19 RX ORDER — BLOOD-GLUCOSE METER
EACH MISCELLANEOUS
Qty: 1 KIT | Refills: 0 | Status: SHIPPED | OUTPATIENT
Start: 2025-08-19

## 2025-08-20 ENCOUNTER — TELEPHONE (OUTPATIENT)
Age: 50
End: 2025-08-20

## (undated) DEVICE — PACK GENERAL LF

## (undated) DEVICE — SHEARS MONOPOL MINI 5MM X 31CM RATCHET HNDL

## (undated) DEVICE — BASIC DOUBLE BASIN 2-LF: Brand: MEDLINE INDUSTRIES, INC.

## (undated) DEVICE — ANTI-FOG SOLUTION WITH FOAM PAD: Brand: DEVON

## (undated) DEVICE — SCD SEQUENTIAL COMPRESSION COMFORT SLEEVE MEDIUM KNEE LENGTH: Brand: KENDALL SCD

## (undated) DEVICE — GLOVE INDICATOR PI UNDERGLOVE SZ 7.5 BLUE

## (undated) DEVICE — ENDO CLIP II 10MM PISTOL GRIP SINGLE USE CLIP APPLIER

## (undated) DEVICE — GLOVE SRG BIOGEL 7

## (undated) DEVICE — SUT VICRYL PLUS 0 CT-3 27 IN VCP329H

## (undated) DEVICE — DRAPE SHEET THREE QUARTER

## (undated) DEVICE — CURITY IDOFORM PACKING STRIP: Brand: CURITY

## (undated) DEVICE — TROCAR: Brand: KII FIOS FIRST ENTRY

## (undated) DEVICE — ASTOUND IMPERVIOUS SURGICAL GOWN: Brand: CONVERTORS

## (undated) DEVICE — TROCARS: Brand: KII® BALLOON BLUNT TIP SYSTEM

## (undated) DEVICE — INTENDED FOR TISSUE SEPARATION, AND OTHER PROCEDURES THAT REQUIRE A SHARP SURGICAL BLADE TO PUNCTURE OR CUT.: Brand: BARD-PARKER SAFETY BLADES SIZE 15, STERILE

## (undated) DEVICE — LATEX FREE 10 FT  INSUFFLATION TUBING, COLDER CONNECTOR WITH 1 MICRON FILTER STERILE ONE TIME USE, 20 U: Brand: SURGICAL DIRECT

## (undated) DEVICE — LABEL STERILE (RSC) (2-SENSOR CAINE 2-LIDOCAINE 2-HEPARIN)

## (undated) DEVICE — ASTOUND STANDARD SURGICAL GOWN, XL: Brand: CONVERTORS

## (undated) DEVICE — TISSUE RETRIEVAL SYSTEM: Brand: INZII RETRIEVAL SYSTEM

## (undated) DEVICE — 3M™ TEGADERM™ TRANSPARENT FILM DRESSING FRAME STYLE, 1628, 6 IN X 8 IN (15 CM X 20 CM), 10/CT 8CT/CASE: Brand: 3M™ TEGADERM™

## (undated) DEVICE — TROCAR: Brand: KII SLEEVE

## (undated) DEVICE — ABDOMINAL PAD: Brand: DERMACEA

## (undated) DEVICE — SPONGE GAUZE 4 X 8 12 PLY STRL LF

## (undated) DEVICE — DRAPE UTILITY

## (undated) DEVICE — 3M™ TEGADERM™ TRANSPARENT FILM DRESSING FRAME STYLE, 1626W, 4 IN X 4-3/4 IN (10 CM X 12 CM), 50/CT 4CT/CASE: Brand: 3M™ TEGADERM™

## (undated) DEVICE — IRRIG ENDO FLO TUBING

## (undated) DEVICE — DRAPE LAPAROTOMY W/POUCHES

## (undated) DEVICE — CULTURE TUBE ANAEROBIC

## (undated) DEVICE — 3M™ TEGADERM™ TRANSPARENT FILM DRESSING FRAME STYLE, 1624W, 2-3/8 IN X 2-3/4 IN (6 CM X 7 CM), 100/CT 4CT/CASE: Brand: 3M™ TEGADERM™

## (undated) DEVICE — TIBURON SPLIT SHEET: Brand: CONVERTORS

## (undated) DEVICE — CHLORAPREP HI-LITE 26ML ORANGE

## (undated) DEVICE — STOCKINETTE,IMPERVIOUS,12X48,STERILE: Brand: MEDLINE

## (undated) DEVICE — GROUNDING PAD UNIVERSAL SLW

## (undated) DEVICE — SYRINGE 10ML LL CONTROL TOP

## (undated) DEVICE — 3M™ STERI-STRIP™ REINFORCED ADHESIVE SKIN CLOSURES, R1547, 1/2 IN X 4 IN (12 MM X 100 MM), 6 STRIPS/ENVELOPE: Brand: 3M™ STERI-STRIP™

## (undated) DEVICE — SUT MONOCRYL 4-0 PC-5 18 IN Y823G

## (undated) DEVICE — NEEDLE 25G X 1 1/2

## (undated) DEVICE — IODOFORM PACKING STRIP: Brand: CURITY